# Patient Record
Sex: FEMALE | Race: WHITE | NOT HISPANIC OR LATINO | Employment: FULL TIME | URBAN - METROPOLITAN AREA
[De-identification: names, ages, dates, MRNs, and addresses within clinical notes are randomized per-mention and may not be internally consistent; named-entity substitution may affect disease eponyms.]

---

## 2017-01-06 ENCOUNTER — OFFICE VISIT (OUTPATIENT)
Dept: FAMILY MEDICINE | Facility: CLINIC | Age: 62
End: 2017-01-06
Payer: COMMERCIAL

## 2017-01-06 VITALS
DIASTOLIC BLOOD PRESSURE: 82 MMHG | HEIGHT: 66 IN | TEMPERATURE: 96.9 F | HEART RATE: 70 BPM | BODY MASS INDEX: 26.68 KG/M2 | SYSTOLIC BLOOD PRESSURE: 122 MMHG | OXYGEN SATURATION: 99 % | WEIGHT: 166 LBS

## 2017-01-06 DIAGNOSIS — Z00.00 ROUTINE GENERAL MEDICAL EXAMINATION AT A HEALTH CARE FACILITY: Primary | ICD-10-CM

## 2017-01-06 DIAGNOSIS — Z12.31 VISIT FOR SCREENING MAMMOGRAM: ICD-10-CM

## 2017-01-06 DIAGNOSIS — Z23 NEED FOR PROPHYLACTIC VACCINATION AND INOCULATION AGAINST INFLUENZA: ICD-10-CM

## 2017-01-06 DIAGNOSIS — F33.41 MAJOR DEPRESSIVE DISORDER, RECURRENT EPISODE, IN PARTIAL REMISSION (H): ICD-10-CM

## 2017-01-06 DIAGNOSIS — Z98.84 S/P GASTRIC BYPASS: ICD-10-CM

## 2017-01-06 DIAGNOSIS — R55 SYNCOPE, NEAR: ICD-10-CM

## 2017-01-06 DIAGNOSIS — I10 ESSENTIAL HYPERTENSION WITH GOAL BLOOD PRESSURE LESS THAN 140/90: ICD-10-CM

## 2017-01-06 DIAGNOSIS — Z86.711 PERSONAL HISTORY OF PE (PULMONARY EMBOLISM): ICD-10-CM

## 2017-01-06 DIAGNOSIS — K21.9 GASTROESOPHAGEAL REFLUX DISEASE WITHOUT ESOPHAGITIS: ICD-10-CM

## 2017-01-06 DIAGNOSIS — Z11.59 NEED FOR HEPATITIS C SCREENING TEST: ICD-10-CM

## 2017-01-06 DIAGNOSIS — D68.59 PRIMARY HYPERCOAGULABLE STATE (H): ICD-10-CM

## 2017-01-06 DIAGNOSIS — Z71.89 ADVANCE CARE PLANNING: Chronic | ICD-10-CM

## 2017-01-06 PROBLEM — Z87.19 H/O GASTROINTESTINAL HEMORRHAGE: Status: ACTIVE | Noted: 2017-01-06

## 2017-01-06 LAB
ALBUMIN SERPL-MCNC: 4 G/DL (ref 3.4–5)
ALP SERPL-CCNC: 108 U/L (ref 40–150)
ALT SERPL W P-5'-P-CCNC: 27 U/L (ref 0–50)
ANION GAP SERPL CALCULATED.3IONS-SCNC: 8 MMOL/L (ref 3–14)
AST SERPL W P-5'-P-CCNC: 20 U/L (ref 0–45)
BILIRUB SERPL-MCNC: 0.8 MG/DL (ref 0.2–1.3)
BUN SERPL-MCNC: 7 MG/DL (ref 7–30)
CALCIUM SERPL-MCNC: 9 MG/DL (ref 8.5–10.1)
CHLORIDE SERPL-SCNC: 104 MMOL/L (ref 94–109)
CHOLEST SERPL-MCNC: 187 MG/DL
CO2 SERPL-SCNC: 31 MMOL/L (ref 20–32)
CREAT SERPL-MCNC: 0.72 MG/DL (ref 0.52–1.04)
CREAT UR-MCNC: 359 MG/DL
ERYTHROCYTE [DISTWIDTH] IN BLOOD BY AUTOMATED COUNT: 13.9 % (ref 10–15)
FOLATE SERPL-MCNC: 20.9 NG/ML
GFR SERPL CREATININE-BSD FRML MDRD: 83 ML/MIN/1.7M2
GLUCOSE SERPL-MCNC: 87 MG/DL (ref 70–99)
HCT VFR BLD AUTO: 43.5 % (ref 35–47)
HCV AB SERPL QL IA: NORMAL
HDLC SERPL-MCNC: 79 MG/DL
HGB BLD-MCNC: 14.3 G/DL (ref 11.7–15.7)
IRON SATN MFR SERPL: 64 % (ref 15–46)
IRON SERPL-MCNC: 192 UG/DL (ref 35–180)
LDLC SERPL CALC-MCNC: 98 MG/DL
MCH RBC QN AUTO: 31.7 PG (ref 26.5–33)
MCHC RBC AUTO-ENTMCNC: 32.9 G/DL (ref 31.5–36.5)
MCV RBC AUTO: 97 FL (ref 78–100)
MICROALBUMIN UR-MCNC: 74 MG/L
MICROALBUMIN/CREAT UR: 20.53 MG/G CR (ref 0–25)
NONHDLC SERPL-MCNC: 108 MG/DL
PLATELET # BLD AUTO: 193 10E9/L (ref 150–450)
POTASSIUM SERPL-SCNC: 3.3 MMOL/L (ref 3.4–5.3)
PROT SERPL-MCNC: 7.3 G/DL (ref 6.8–8.8)
RBC # BLD AUTO: 4.51 10E12/L (ref 3.8–5.2)
SODIUM SERPL-SCNC: 143 MMOL/L (ref 133–144)
TIBC SERPL-MCNC: 301 UG/DL (ref 240–430)
TRIGL SERPL-MCNC: 50 MG/DL
VIT B12 SERPL-MCNC: 1854 PG/ML (ref 193–986)
WBC # BLD AUTO: 9.5 10E9/L (ref 4–11)

## 2017-01-06 PROCEDURE — 86803 HEPATITIS C AB TEST: CPT | Mod: 90 | Performed by: PHYSICIAN ASSISTANT

## 2017-01-06 PROCEDURE — 80061 LIPID PANEL: CPT | Performed by: PHYSICIAN ASSISTANT

## 2017-01-06 PROCEDURE — 83550 IRON BINDING TEST: CPT | Performed by: PHYSICIAN ASSISTANT

## 2017-01-06 PROCEDURE — 83540 ASSAY OF IRON: CPT | Performed by: PHYSICIAN ASSISTANT

## 2017-01-06 PROCEDURE — 90686 IIV4 VACC NO PRSV 0.5 ML IM: CPT | Performed by: PHYSICIAN ASSISTANT

## 2017-01-06 PROCEDURE — 82607 VITAMIN B-12: CPT | Mod: 90 | Performed by: PHYSICIAN ASSISTANT

## 2017-01-06 PROCEDURE — 80053 COMPREHEN METABOLIC PANEL: CPT | Performed by: PHYSICIAN ASSISTANT

## 2017-01-06 PROCEDURE — 85027 COMPLETE CBC AUTOMATED: CPT | Performed by: PHYSICIAN ASSISTANT

## 2017-01-06 PROCEDURE — 90471 IMMUNIZATION ADMIN: CPT | Performed by: PHYSICIAN ASSISTANT

## 2017-01-06 PROCEDURE — 99396 PREV VISIT EST AGE 40-64: CPT | Mod: 25 | Performed by: PHYSICIAN ASSISTANT

## 2017-01-06 PROCEDURE — 99000 SPECIMEN HANDLING OFFICE-LAB: CPT | Performed by: PHYSICIAN ASSISTANT

## 2017-01-06 PROCEDURE — 82746 ASSAY OF FOLIC ACID SERUM: CPT | Mod: 90 | Performed by: PHYSICIAN ASSISTANT

## 2017-01-06 PROCEDURE — 36415 COLL VENOUS BLD VENIPUNCTURE: CPT | Performed by: PHYSICIAN ASSISTANT

## 2017-01-06 PROCEDURE — 82043 UR ALBUMIN QUANTITATIVE: CPT | Performed by: PHYSICIAN ASSISTANT

## 2017-01-06 RX ORDER — METOPROLOL SUCCINATE 100 MG/1
150 TABLET, EXTENDED RELEASE ORAL DAILY
Qty: 135 TABLET | Refills: 1 | Status: SHIPPED | OUTPATIENT
Start: 2017-01-06 | End: 2017-03-17

## 2017-01-06 RX ORDER — BUPROPION HYDROCHLORIDE 150 MG/1
150 TABLET ORAL EVERY MORNING
Qty: 90 TABLET | Refills: 1 | Status: SHIPPED | OUTPATIENT
Start: 2017-01-06 | End: 2017-09-25

## 2017-01-06 RX ORDER — PANTOPRAZOLE SODIUM 40 MG/1
40 TABLET, DELAYED RELEASE ORAL
Qty: 180 TABLET | Refills: 1 | Status: SHIPPED | OUTPATIENT
Start: 2017-01-06 | End: 2018-01-08

## 2017-01-06 NOTE — PROGRESS NOTES
SUBJECTIVE:     CC: Marlee Tobar is an 61 year old woman who presents for preventive health visit.     Healthy Habits:    Do you get at least three servings of calcium containing foods daily (dairy, green leafy vegetables, etc.)? yes    Amount of exercise or daily activities, outside of work: 5 day(s) per week    Problems taking medications regularly No    Medication side effects: No    Have you had an eye exam in the past two years? yes    Do you see a dentist twice per year? yes    Do you have sleep apnea, excessive snoring or daytime drowsiness?no      She no longer has dizziness episodes.        Today's PHQ-2 Score: 0  PHQ-2 ( 1999 Pfizer) 1/6/2017 1/6/2017   Q1: Little interest or pleasure in doing things 0 0   Q2: Feeling down, depressed or hopeless 0 0   PHQ-2 Score 0 0       Abuse: Current or Past(Physical, Sexual or Emotional)- No  Do you feel safe in your environment - Yes    Social History   Substance Use Topics     Smoking status: Never Smoker      Smokeless tobacco: Never Used     Alcohol Use: Yes      Comment: very rare     The patient does not drink >3 drinks per day nor >7 drinks per week.    Recent Labs   Lab Test  12/16/15   0750  12/10/14   0746  10/28/13   0713   CHOL  160  166  129   HDL  68  64  53   LDL  79  90  67   TRIG  63  58  46   CHOLHDLRATIO   --   2.6  2.5   NHDL  92   --    --        Reviewed orders with patient.  Reviewed health maintenance and updated orders accordingly - Yes    Mammo Decision Support:  Patient over age 50, mutual decision to screen reflected in health maintenance.    Pertinent mammograms are reviewed under the imaging tab.  History of abnormal Pap smear: NO - age 30- 65 PAP every 3 years recommended  All Histories reviewed and updated in Epic.      ROS:  C: NEGATIVE for fever, chills, change in weight  I: NEGATIVE for worrisome rashes, moles or lesions  E: NEGATIVE for vision changes or irritation  ENT: NEGATIVE for ear, mouth and throat problems  R:  "NEGATIVE for significant cough or SOB  B: NEGATIVE for masses, tenderness or discharge  CV: NEGATIVE for chest pain, palpitations or peripheral edema  GI: NEGATIVE for nausea, abdominal pain, heartburn, or change in bowel habits  : NEGATIVE for unusual urinary or vaginal symptoms. No vaginal bleeding.  M: NEGATIVE for significant arthralgias or myalgia  N: NEGATIVE for weakness, dizziness or paresthesias  P: NEGATIVE for changes in mood or affect     Problem list, Medication list, Allergies, and Medical/Social/Surgical histories reviewed in [facility] and updated as appropriate.  Labs reviewed in EPIC  BP Readings from Last 3 Encounters:   01/06/17 122/82   01/28/16 162/90   12/16/15 130/82    Wt Readings from Last 3 Encounters:   01/06/17 166 lb (75.297 kg)   01/28/16 168 lb 6.4 oz (76.386 kg)   12/16/15 163 lb 4 oz (74.05 kg)                  OBJECTIVE:     /82 mmHg  Pulse 70  Temp(Src) 96.9  F (36.1  C) (Oral)  Ht 5' 6\" (1.676 m)  Wt 166 lb (75.297 kg)  BMI 26.81 kg/m2  SpO2 99%  LMP 06/27/2010  Breastfeeding? No  EXAM:  GENERAL APPEARANCE: healthy, alert and no distress  EYES: Eyes grossly normal to inspection, PERRL and conjunctivae and sclerae normal  HENT: ear canals and TM's normal, nose and mouth without ulcers or lesions, oropharynx clear and oral mucous membranes moist  NECK: no adenopathy, no asymmetry, masses, or scars and thyroid normal to palpation  RESP: lungs clear to auscultation - no rales, rhonchi or wheezes  BREAST: normal without masses, tenderness or nipple discharge and no palpable axillary masses or adenopathy  CV: regular rate and rhythm, normal S1 S2, no S3 or S4, no murmur, click or rub, no peripheral edema and peripheral pulses strong  ABDOMEN: soft, nontender, no hepatosplenomegaly, no masses and bowel sounds normal   (female): normal female external genitalia, normal urethral meatus, vaginal mucosal atrophy noted, normal cervix, adnexae, and uterus without masses or " abnormal discharge  MS: no musculoskeletal defects are noted and gait is age appropriate without ataxia  SKIN: no suspicious lesions or rashes  NEURO: Normal strength and tone, sensory exam grossly normal, mentation intact and speech normal  PSYCH: mentation appears normal and affect normal/bright    ASSESSMENT/PLAN:     1. Routine general medical examination at a health care facility       HEALTH CARE MAINTENANCE              Reviewed USPTF recommendations and anticipatory guidance.              See orders.   - Lipid Profile with reflex to direct LDL    2. Primary hypercoagulable state (H)  Not on anticoagulation, due to previous GI bleed.        4. S/P gastric bypass  Due to recheck labs.    - Vitamin B12  - Comprehensive metabolic panel  - CBC with platelets  - cyanocobalamin 500 MCG TABS; Take 1 tablet (500 mcg) by mouth daily  Dispense: 90 tablet; Refill: 3  - Folate  - Iron and iron binding capacity    5. Major depressive disorder, recurrent episode, in partial remission (H)  Stable.   - buPROPion (WELLBUTRIN XL) 150 MG 24 hr tablet; Take 1 tablet (150 mg) by mouth every morning  Dispense: 90 tablet; Refill: 1    6. Essential hypertension with goal blood pressure less than 140/90  Stable.  Due to recheck labs.  - metoprolol (TOPROL-XL) 100 MG 24 hr tablet; Take 1.5 tablets (150 mg) by mouth daily  Dispense: 135 tablet; Refill: 1    7. Gastroesophageal reflux disease without esophagitis  Stable.   - pantoprazole (PROTONIX) 40 MG EC tablet; Take 1 tablet (40 mg) by mouth 2 times daily  Dispense: 180 tablet; Refill: 1    8. Personal history of PE (pulmonary embolism)  No symptoms of SOB, chest pain or leg pains.    9. Syncope, near  Symptoms have resolved, reviewed previous workup by Dr. Merrill.  No change in treatment at this time.     10. Need for hepatitis C screening test  due  - Hepatitis C Screen Reflex to HCV RNA Quant and Genotype    11. Advance care planning  due    12. Need for prophylactic vaccination  "and inoculation against influenza  Discussed influenza vaccination, including risks/benefits.  Patient has opted to have this done.          - FLU VAC, SPLIT VIRUS IM > 3 YO (QUADRIVALENT) [08525]  - Vaccine Administration, Initial [14771]    13. Visit for screening mammogram  I discussed in detail with Marlee Tobar the importance of breast cancer screening through monthly self breast exams and annual breast exams in the clinic.    - MA SCREENING DIGITAL BILAT - Future  (s+30); Future    COUNSELING:   Reviewed preventive health counseling, as reflected in patient instructions       Regular exercise       Healthy diet/nutrition       Advance Care Planning         reports that she has never smoked. She has never used smokeless tobacco.    Estimated body mass index is 26.81 kg/(m^2) as calculated from the following:    Height as of this encounter: 5' 6\" (1.676 m).    Weight as of this encounter: 166 lb (75.297 kg).   Weight management plan: Discussed healthy diet and exercise guidelines and patient will follow up in 12 months in clinic to re-evaluate.    Counseling Resources:  ATP IV Guidelines  Pooled Cohorts Equation Calculator  Breast Cancer Risk Calculator  FRAX Risk Assessment  ICSI Preventive Guidelines  Dietary Guidelines for Americans, 2010  USDA's MyPlate  ASA Prophylaxis  Lung CA Screening    Lindsay Foreman PA-C  [facility]  Injectable Influenza Immunization Documentation    1.  Is the person to be vaccinated sick today?  No    2. Does the person to be vaccinated have an allergy to eggs or to a component of the vaccine?  No    3. Has the person to be vaccinated today ever had a serious reaction to influenza vaccine in the past?  No    4. Has the person to be vaccinated ever had Guillain-Plymouth syndrome?  No     Form completed by Michelle    "

## 2017-01-06 NOTE — MR AVS SNAPSHOT
After Visit Summary   1/6/2017    Marlee Tobar    MRN: [id number]           Patient Information     Date Of Birth          1955        Visit Information        Provider Department      1/6/2017 7:00 AM Lindsay Foreman PA-C [facility]        Today's Diagnoses     Visit for screening mammogram    -  1     Need for hepatitis C screening test         Need for prophylactic vaccination and inoculation against influenza         Advance care planning         Routine general medical examination at a health care facility         Primary hypercoagulable state (H)         Hypertension goal BP (blood pressure) < 140/90         S/P gastric bypass         Major depressive disorder, recurrent episode, in partial remission (H)         Essential hypertension with goal blood pressure less than 140/90         Gastroesophageal reflux disease without esophagitis           Care Instructions      Preventive Health Recommendations  Female Ages 50 - 64    Yearly exam: See your health care provider every year in order to  o Review health changes.   o Discuss preventive care.    o Review your medicines if your doctor has prescribed any.      Get a Pap test every three years (unless you have an abnormal result and your provider advises testing more often).    If you get Pap tests with HPV test, you only need to test every 5 years, unless you have an abnormal result.     You do not need a Pap test if your uterus was removed (hysterectomy) and you have not had cancer.    You should be tested each year for STDs (sexually transmitted diseases) if you're at risk.     Have a mammogram every 1 to 2 years.    Have a colonoscopy at age 50, or have a yearly FIT test (stool test). These exams screen for colon cancer.      Have a cholesterol test every 5 years, or more often if advised.    Have a diabetes test (fasting glucose) every three years. If you are at risk for diabetes, you should have this test more  often.     If you are at risk for osteoporosis (brittle bone disease), think about having a bone density scan (DEXA).    Shots: Get a flu shot each year. Get a tetanus shot every 10 years.    Nutrition:     Eat at least 5 servings of fruits and vegetables each day.    Eat whole-grain bread, whole-wheat pasta and brown rice instead of white grains and rice.    Talk to your provider about Calcium and Vitamin D.     Lifestyle    Exercise at least 150 minutes a week (30 minutes a day, 5 days a week). This will help you control your weight and prevent disease.    Limit alcohol to one drink per day.    No smoking.     Wear sunscreen to prevent skin cancer.     See your dentist every six months for an exam and cleaning.    See your eye doctor every 1 to 2 years.      Based on your medical history and these are the current health maintenance or preventive care services that you are due for (some may have been done at this visit)  Health Maintenance Due   Topic Date Due     ADVANCE DIRECTIVE PLANNING Q5 YRS (NO INBASKET)  07/09/1975     HEPATITIS C SCREENING  07/09/1975     PHQ-9 Q6 MONTHS (NO INBASKET)  06/16/2016     INFLUENZA VACCINE (SYSTEM ASSIGNED)  09/01/2016     DEPRESSION ACTION PLAN Q1 YR (NO INBASKET)  12/16/2016     MAMMO Q1 YR INBASKET MESSAGE  12/22/2016       At [facility], we strive to deliver an exceptional experience to you, every time we see you.    If you receive a survey in the mail, please send us back your thoughts. We really do value your feedback.    Your care team's suggested websites for health information:  Www.[facility].org : Up to date and easily searchable information on multiple topics.  Www.medlineplus.gov : medication info, interactive tutorials, watch real surgeries online  Www.familydoctor.org : good info from the Academy of Family Physicians  Www.cdc.gov : public health info, travel advisories, epidemics (H1N1)  Www.aap.org : children's health info, normal  development, vaccinations  Www.health.[facility].[place].us : [place] dept of health, public health issues in [place], N1N1    How to contact your care team:   Chris Warren/Javier [phone]         Pharmacy [phone]    Dr. Cruz, Heather Wells PA-C, Dr. Napier, Mirna CRYSTAL CNP, Lindsay Foreman PA-C, Dr. Hogan, and Berkley Ceballos CNP    Team RNs: Yaneli & Karlene      Clinic hours  M-Th 7 am-7 pm   Fri 7 am-5 pm.   Urgent care M-F 11 am-9 pm,   Sat/Sun 9 am-5 pm.  Pharmacy M-Th 8 am-8 pm Fri 8 am-6 pm  Sat/Sun 9 am-5 pm.     All password changes, disabled accounts, or ID changes in [employer]/MyHealth will be done by our Access Services Department.    If you need help with your account or password, call: [phone]. Clinic staff no longer has the ability to change passwords.             Follow-ups after your visit        Your next 10 appointments already scheduled     Corky 10, 2017  8:15 AM   MA SCREENING DIGITAL BIL[id number]    [address] [place] [id number] [phone]           Do not use any powder, lotion or deodorant under your arms or on your breast. If you do, we will ask you to remove it before your exam.  Wear comfortable, two-piece clothing.  If you have any allergies, tell your care team.  Bring any previous mammograms from other facilities or have them mailed to the breast center.              Future tests that were ordered for you today     Open Future Orders        Priority Expected Expires Ordered    MA SCREENING DIGITAL BILAT - Future  (s+30) Routine  1/6/2018 1/6/2017            Who to contact     If you have questions or need follow up information about today's clinic visit or your schedule please contact [facility] directly at [phone].  Normal or non-critical lab and imaging results will be communicated to you by MyChart, letter or phone within 4 business days after  "the clinic has received the results. If you do not hear from us within 7 days, please contact the clinic through Slingbox or phone. If you have a critical or abnormal lab result, we will notify you by phone as soon as possible.  Submit refill requests through Slingbox or call your pharmacy and they will forward the refill request to us. Please allow 3 business days for your refill to be completed.          Additional Information About Your Visit        Slingbox Information     Slingbox gives you secure access to your electronic health record. If you see a primary care provider, you can also send messages to your care team and make appointments. If you have questions, please call your primary care clinic.  If you do not have a primary care provider, please call [phone] and they will assist you.        Care EveryWhere ID     This is your Care EveryWhere ID. This could be used by other organizations to access your [place] medical records  [id number]        Your Vitals Were     Pulse Temperature Height BMI (Body Mass Index) Pulse Oximetry Last Period    70 96.9  F (36.1  C) (Oral) 5' 6\" (1.676 m) 26.81 kg/m2 99% 06/27/2010    Breastfeeding?                   No            Blood Pressure from Last 3 Encounters:   01/06/17 122/82   01/28/16 162/90   12/16/15 130/82    Weight from Last 3 Encounters:   01/06/17 166 lb (75.297 kg)   01/28/16 168 lb 6.4 oz (76.386 kg)   12/16/15 163 lb 4 oz (74.05 kg)              We Performed the Following     Albumin Random Urine Quantitative     CBC with platelets     Comprehensive metabolic panel     DEPRESSION ACTION PLAN (DAP) Order [[id number]]     FLU VAC, SPLIT VIRUS IM > 3 YO (QUADRIVALENT) [[id number]]     Folate     Hepatitis C Screen Reflex to HCV RNA Quant and Genotype     Iron and iron binding capacity     Lipid Profile with reflex to direct LDL     Vaccine Administration, Initial [31856]     Vitamin B12          Today's Medication Changes          These changes are accurate as " of: 1/6/17  7:34 AM.  If you have any questions, ask your nurse or doctor.               These medicines have changed or have updated prescriptions.        Dose/Directions    buPROPion 150 MG 24 hr tablet   Commonly known as:  WELLBUTRIN XL   This may have changed:  See the new instructions.   Used for:  Major depressive disorder, recurrent episode, in partial remission (H)   Changed by:  Lindsay Foreman PA-C        Dose:  150 mg   Take 1 tablet (150 mg) by mouth every morning   Quantity:  90 tablet   Refills:  1            Where to get your medicines      These medications were sent to [facility] beds at the [facility] - [place], [place] - [address] [facility]  [address] [facility], [place] [facility] [address]     Phone:  [phone]    - buPROPion 150 MG 24 hr tablet  - cyanocobalamin 500 MCG Tabs  - metoprolol 100 MG 24 hr tablet  - pantoprazole 40 MG EC tablet             Primary Care Provider Office Phone # Fax #    Lindsay Foreman PA-C [phone]       [facility] [address]  [facility] [address]        Thank you!     Thank you for choosing [facility]  for your care. Our goal is always to provide you with excellent care. Hearing back from our patients is one way we can continue to improve our services. Please take a few minutes to complete the written survey that you may receive in the mail after your visit with us. Thank you!             Your Updated Medication List - Protect others around you: Learn how to safely use, store and throw away your medicines at www.disposemymeds.org.          This list is accurate as of: 1/6/17  7:34 AM.  Always use your most recent med list.                   Brand Name Dispense Instructions for use    Biotin 5000 MCG Caps      Take 1 tablet by mouth daily.       buPROPion 150 MG 24 hr tablet    WELLBUTRIN XL    90 tablet    Take 1 tablet (150 mg) by mouth every morning       CALCIUM CITRATE PO      Take 3 capsules by  mouth 3 times daily       COMPRESSION STOCKINGS     1 each    1 each continuous       cyanocobalamin 500 MCG Tabs     90 tablet    Take 1 tablet (500 mcg) by mouth daily       IRON 100 PLUS 100-250-0.025-1 MG Tabs   Generic drug:  Iron-vit C-vit B12-folic acid      Take  by mouth.       metoprolol 100 MG 24 hr tablet    TOPROL-XL    135 tablet    Take 1.5 tablets (150 mg) by mouth daily       MULTIVITAL Tabs      Take 2 tablets by mouth daily.       pantoprazole 40 MG EC tablet    PROTONIX    180 tablet    Take 1 tablet (40 mg) by mouth 2 times daily       VITAMIN C PO          VITAMIN D PO

## 2017-01-06 NOTE — PATIENT INSTRUCTIONS
Preventive Health Recommendations  Female Ages 50 - 64    Yearly exam: See your health care provider every year in order to  o Review health changes.   o Discuss preventive care.    o Review your medicines if your doctor has prescribed any.      Get a Pap test every three years (unless you have an abnormal result and your provider advises testing more often).    If you get Pap tests with HPV test, you only need to test every 5 years, unless you have an abnormal result.     You do not need a Pap test if your uterus was removed (hysterectomy) and you have not had cancer.    You should be tested each year for STDs (sexually transmitted diseases) if you're at risk.     Have a mammogram every 1 to 2 years.    Have a colonoscopy at age 50, or have a yearly FIT test (stool test). These exams screen for colon cancer.      Have a cholesterol test every 5 years, or more often if advised.    Have a diabetes test (fasting glucose) every three years. If you are at risk for diabetes, you should have this test more often.     If you are at risk for osteoporosis (brittle bone disease), think about having a bone density scan (DEXA).    Shots: Get a flu shot each year. Get a tetanus shot every 10 years.    Nutrition:     Eat at least 5 servings of fruits and vegetables each day.    Eat whole-grain bread, whole-wheat pasta and brown rice instead of white grains and rice.    Talk to your provider about Calcium and Vitamin D.     Lifestyle    Exercise at least 150 minutes a week (30 minutes a day, 5 days a week). This will help you control your weight and prevent disease.    Limit alcohol to one drink per day.    No smoking.     Wear sunscreen to prevent skin cancer.     See your dentist every six months for an exam and cleaning.    See your eye doctor every 1 to 2 years.      Based on your medical history and these are the current health maintenance or preventive care services that you are due for (some may have been done at this  visit)  Health Maintenance Due   Topic Date Due     ADVANCE DIRECTIVE PLANNING Q5 YRS (NO INBASKET)  07/09/1975     HEPATITIS C SCREENING  07/09/1975     PHQ-9 Q6 MONTHS (NO INBASKET)  06/16/2016     INFLUENZA VACCINE (SYSTEM ASSIGNED)  09/01/2016     DEPRESSION ACTION PLAN Q1 YR (NO INBASKET)  12/16/2016     MAMMO Q1 YR INBASKET MESSAGE  12/22/2016       At [facility], we strive to deliver an exceptional experience to you, every time we see you.    If you receive a survey in the mail, please send us back your thoughts. We really do value your feedback.    Your care team's suggested websites for health information:  Www.[place].org : Up to date and easily searchable information on multiple topics.  Www.medlineplus.gov : medication info, interactive tutorials, watch real surgeries online  Www.familydoctor.org : good info from the Academy of Family Physicians  Www.cdc.gov : public health info, travel advisories, epidemics (H1N1)  Www.aap.org : children's health info, normal development, vaccinations  Www.health.[facility].[place].us : [place] dept of health, public health issues in [place], N1N1    How to contact your care team:   Team Briana/Spirit [phone]         Pharmacy [phone]    Dr. Cruz, Heather Wells PA-C, Dr. Napier, Mirna CRYSTAL CNP, Lindsay Foreman PA-C, Dr. Hogan, and Berkley Ceballos, CNP    Team RNs: Yaneli Soler      Clinic hours  M-Th 7 am-7 pm   Fri 7 am-5 pm.   Urgent care M-F 11 am-9 pm,   Sat/Sun 9 am-5 pm.  Pharmacy M-Th 8 am-8 pm Fri 8 am-6 pm  Sat/Sun 9 am-5 pm.     All password changes, disabled accounts, or ID changes in [email or web address] will be done by our Access Services Department.    If you need help with your account or password, call: [phone]. Clinic staff no longer has the ability to change passwords.

## 2017-01-06 NOTE — NURSING NOTE
"Chief Complaint   Patient presents with     Physical     Pt is fasting.        Initial /82 mmHg  Pulse 70  Temp(Src) 96.9  F (36.1  C) (Oral)  Ht 5' 6\" (1.676 m)  Wt 166 lb (75.297 kg)  BMI 26.81 kg/m2  SpO2 99%  LMP 06/27/2010  Breastfeeding? No Estimated body mass index is 26.81 kg/(m^2) as calculated from the following:    Height as of this encounter: 5' 6\" (1.676 m).    Weight as of this encounter: 166 lb (75.297 kg).  BP completed using cuff size: regular  An,CMA      "

## 2017-01-07 ASSESSMENT — PATIENT HEALTH QUESTIONNAIRE - PHQ9: SUM OF ALL RESPONSES TO PHQ QUESTIONS 1-9: 2

## 2017-01-14 ENCOUNTER — RADIANT APPOINTMENT (OUTPATIENT)
Dept: MAMMOGRAPHY | Facility: CLINIC | Age: 62
End: 2017-01-14
Payer: COMMERCIAL

## 2017-01-14 DIAGNOSIS — Z12.31 VISIT FOR SCREENING MAMMOGRAM: ICD-10-CM

## 2017-01-14 PROCEDURE — G0202 SCR MAMMO BI INCL CAD: HCPCS | Mod: TC

## 2017-03-13 ENCOUNTER — TELEPHONE (OUTPATIENT)
Dept: FAMILY MEDICINE | Facility: CLINIC | Age: 62
End: 2017-03-13

## 2017-03-13 ENCOUNTER — OFFICE VISIT (OUTPATIENT)
Dept: URGENT CARE | Facility: URGENT CARE | Age: 62
End: 2017-03-13
Payer: COMMERCIAL

## 2017-03-13 VITALS
SYSTOLIC BLOOD PRESSURE: 170 MMHG | OXYGEN SATURATION: 100 % | HEIGHT: 66 IN | RESPIRATION RATE: 16 BRPM | DIASTOLIC BLOOD PRESSURE: 93 MMHG | TEMPERATURE: 98 F | WEIGHT: 165 LBS | BODY MASS INDEX: 26.52 KG/M2 | HEART RATE: 68 BPM

## 2017-03-13 DIAGNOSIS — R42 DIZZINESS: Primary | ICD-10-CM

## 2017-03-13 DIAGNOSIS — Z86.79 H/O ESSENTIAL HYPERTENSION: ICD-10-CM

## 2017-03-13 PROCEDURE — 93000 ELECTROCARDIOGRAM COMPLETE: CPT | Performed by: PHYSICIAN ASSISTANT

## 2017-03-13 PROCEDURE — 99214 OFFICE O/P EST MOD 30 MIN: CPT | Performed by: PHYSICIAN ASSISTANT

## 2017-03-13 RX ORDER — AMLODIPINE BESYLATE 5 MG/1
5 TABLET ORAL
Qty: 30 TABLET | Refills: 1 | Status: SHIPPED | OUTPATIENT
Start: 2017-03-13 | End: 2017-03-17

## 2017-03-13 ASSESSMENT — PAIN SCALES - GENERAL: PAINLEVEL: NO PAIN (0)

## 2017-03-13 NOTE — TELEPHONE ENCOUNTER
Reason for call:  Patient reporting a symptom    Symptom or request: light headness x1 week concerned about poss panic attack    Duration (how long have symptoms been present): 1 wk    Have you been treated for this before? Yes (panic attacks, vertigo x1)    Additional comments: please call back and advise asap Sending HIGH PRIORITY message    Phone Number patient can be reached at: [phone]    Best Time:  any    Can we leave a detailed message on this number:  YES    Call taken on 3/13/2017 at 10:38 AM by Anabel Childs

## 2017-03-13 NOTE — TELEPHONE ENCOUNTER
Patient is calling in for dizziness for one week. States that it comes and goes. At times she feels like the bottom of her feet are hot. States that times her nose is numb. She is having increased stress at this time. So she is unsure if that is part of the issue.     Patient is negative for: sudden onset of weakness in the arms or legs; difficulty speaking or confusion; fainting spells or loss of consciousness; irregular heart beats; history of trauma or blow to the head; recent hx or severe vomiting, diarrhea, or bleeding and dizziness and increased pulse with sitting or standing; persistent severe headache or change in vision. (Telephone Triage Protocols for Nurses, fifth edition; Bassem)    Patient scheduled per protocol for tomorrow at 7:20 am. She will come into the clinic if symptoms change or she wants to be seen sooner.    Karlene Zuniga RN, [facility] Triage

## 2017-03-13 NOTE — MR AVS SNAPSHOT
After Visit Summary   3/13/2017    Marlee Tobar    MRN: [id number]           Patient Information     Date Of Birth          1955        Visit Information        Provider Department      3/13/2017 11:45 AM Mita Moon PA-C [facility]        Today's Diagnoses     Dizziness    -  1      Care Instructions    Needs follow with Dr. Merrill this week for blood pressure per DR. Merrill        Follow-ups after your visit        Your next 10 appointments already scheduled     Mar 14, 2017  7:20 AM CDT   [facility] Visit with Cris[facility]ier [facility]   [facility]    [address] [place] [id number] [phone]           Bring a current list of meds and any records pertaining to this visit.  For Physicals, please bring immunization records and any forms needing to be filled out.  Please arrive 10 minutes early to complete paperwork.              Who to contact     If you have questions or need follow up information about today's clinic visit or your schedule please contact [facility] directly at [phone].  Normal or non-critical lab and imaging results will be communicated to you by Electrochaeahart, letter or phone within 4 business days after the clinic has received the results. If you do not hear from us within 7 days, please contact the clinic through South[employer] Solution[employer] or phone. If you have a critical or abnormal lab result, we will notify you by phone as soon as possible.  Submit refill requests through Synappio or call your pharmacy and they will forward the refill request to us. Please allow 3 business days for your refill to be completed.          Additional Information About Your Visit        Electrochaeahart Information     Synappio gives you secure access to your electronic health record. If you see a primary care provider, you can also send messages to your care team and make appointments. If you have questions,  "please call your primary care clinic.  If you do not have a primary care provider, please call [phone] and they will assist you.        Care EveryWhere ID     This is your Care EveryWhere ID. This could be used by other organizations to access your [facility] medical records  [id number]        Your Vitals Were     Pulse Temperature Respirations Height Last Period Pulse Oximetry    68 98  F (36.7  C) (Oral) 16 5' 6\" (1.676 m) 06/27/2010 100%    BMI (Body Mass Index)                   26.63 kg/m2            Blood Pressure from Last 3 Encounters:   03/13/17 (!) 170/93   01/06/17 122/82   01/28/16 162/90    Weight from Last 3 Encounters:   03/13/17 165 lb (74.8 kg)   01/06/17 166 lb (75.3 kg)   01/28/16 168 lb 6.4 oz (76.4 kg)              We Performed the Following     EKG 12-lead complete w/read - Clinics          Today's Medication Changes          These changes are accurate as of: 3/13/17  1:01 PM.  If you have any questions, ask your nurse or doctor.               Start taking these medicines.        Dose/Directions    amLODIPine 5 MG tablet   Commonly known as:  NORVASC   Used for:  Dizziness   Started by:  Mita Moon PA-C        Dose:  5 mg   Take 1 tablet (5 mg) by mouth 2 times daily   Quantity:  30 tablet   Refills:  1            Where to get your medicines      These medications were sent to [facility] - [place], [place] - [address]  [address], [facility] [address]     Phone:  [phone]     amLODIPine 5 MG tablet                Primary Care Provider Office Phone # Fax #    Lindsay Deja Foreman PA-C [phone]       [facility] [address]  [facility] [address]        Thank you!     Thank you for choosing [facility]  for your care. Our goal is always to provide you with excellent care. Hearing back from our patients is one way we can continue to improve our services. Please take a few minutes to complete " the written survey that you may receive in the mail after your visit with us. Thank you!             Your Updated Medication List - Protect others around you: Learn how to safely use, store and throw away your medicines at www.disposemymeds.org.          This list is accurate as of: 3/13/17  1:01 PM.  Always use your most recent med list.                   Brand Name Dispense Instructions for use    amLODIPine 5 MG tablet    NORVASC    30 tablet    Take 1 tablet (5 mg) by mouth 2 times daily       Biotin 5000 MCG Caps      Take 1 tablet by mouth daily.       buPROPion 150 MG 24 hr tablet    WELLBUTRIN XL    90 tablet    Take 1 tablet (150 mg) by mouth every morning       CALCIUM CITRATE PO      Take 3 capsules by mouth 3 times daily       COMPRESSION STOCKINGS     1 each    1 each continuous       cyanocobalamin 500 MCG Tabs     90 tablet    Take 250 mcg daily       IRON 100 PLUS 100-250-0.025-1 MG Tabs   Generic drug:  Iron-vit C-vit B12-folic acid      Take  by mouth.       metoprolol 100 MG 24 hr tablet    TOPROL-XL    135 tablet    Take 1.5 tablets (150 mg) by mouth daily       MULTIVITAL Tabs      Take 2 tablets by mouth daily.       pantoprazole 40 MG EC tablet    PROTONIX    180 tablet    Take 1 tablet (40 mg) by mouth 2 times daily       VITAMIN C PO          VITAMIN D PO

## 2017-03-13 NOTE — PROGRESS NOTES
"RN Triage:     Chief Complaint   Patient presents with     Dizziness     1 wk hx of lightheaded episodes, today with additional sx       Initial BP (!) 170/93  Pulse 68  Temp 98  F (36.7  C) (Oral)  Resp 16  Ht 5' 6\" (1.676 m)  Wt 165 lb (74.8 kg)  LMP 06/27/2010  SpO2 100%  BMI 26.63 kg/m2 Estimated body mass index is 26.63 kg/(m^2) as calculated from the following:    Height as of this encounter: 5' 6\" (1.676 m).    Weight as of this encounter: 165 lb (74.8 kg).  BP completed using cuff size: regular    Assessment:  Patient states 1 week hx of episodes of feeling lightheaded, happening a couple times daily and lasts for less than 1 min.  She states she has been under a lot of stress and is selling her house etc.  Today she has had 2 episodes so far.  The last episode came on when she felt sick to her stomach and was running the bathroom (had diarrhea which she states she has occasionally s/p gastric bypass).  This time she felt her legs were weak, her feet were burning and tingling and her nose felt numb.  This went away within 1 minute.     Patient denies one sided weakness or paresthesias, severe headache, change in vision, N/V, SOB, chest, arm or jaw pain or pressure, leg pain or swelling, difficulty speaking or confusion; fainting spells or loss of consciousness; irregular heart beats, history of trauma or blow to the head, recent travel (hx of PE).     Blood pressure is elevated at 188/95, recheck at 170/93- pt states hx of controlled HTN and taking all medications as prescribed.  Pt is A&Ox3, NAD, ROBLERO, normal gait.      Triage Dispo: Urgent: Assessment reveals patient should be seen with next available provider.    Intervention: Pt placed in clinic room to be seen next. Report given to SID Bella RN      Allergies   Allergen Reactions     Contrast Dye      Happened when patient was ~ 18 years ago during a test for 'kidneys'.  Patient thinks she might have developed a rash, couldn't " "remember.     Diatrizoate Itching     Iodine      Vitamin K Swelling     facial       Past Medical History   Diagnosis Date     Bleeding stomach ulcer 2011     [facility] [place] [facility]     Gallstones      History of tonsillectomy      Hypertension goal BP (blood pressure) < 140/90      Pulmonary embolism (H) 2/10/2011     developed after surgery, coumadin x 6 months         Current Outpatient Prescriptions on File Prior to Visit:  cyanocobalamin 500 MCG TABS Take 250 mcg daily   buPROPion (WELLBUTRIN XL) 150 MG 24 hr tablet Take 1 tablet (150 mg) by mouth every morning   metoprolol (TOPROL-XL) 100 MG 24 hr tablet Take 1.5 tablets (150 mg) by mouth daily   pantoprazole (PROTONIX) 40 MG EC tablet Take 1 tablet (40 mg) by mouth 2 times daily   COMPRESSION STOCKINGS 1 each continuous   CALCIUM CITRATE PO Take 3 capsules by mouth 3 times daily   Cholecalciferol (VITAMIN D PO)    Ascorbic Acid (VITAMIN C PO)    Iron-vit C-vit B12-folic acid (IRON 100 PLUS) 100-250-0.025-1 MG TABS Take  by mouth.   Biotin 5000 MCG CAPS Take 1 tablet by mouth daily.   Multiple Vitamins-Minerals (MULTIVITAL) TABS Take 2 tablets by mouth daily.     No current facility-administered medications on file prior to visit.     Social History   Substance Use Topics     Smoking status: Never Smoker     Smokeless tobacco: Never Used     Alcohol use Yes      Comment: very rare       ROS:  General: negative for fever  Resp: chest pain as above  CV: + as above  ABD: Negative for abd pain.  Neurologic:negative for headache    OBJECTIVE:  BP (!) 170/93  Pulse 68  Temp 98  F (36.7  C) (Oral)  Resp 16  Ht 5' 6\" (1.676 m)  Wt 165 lb (74.8 kg)  LMP 06/27/2010  SpO2 100%  BMI 26.63 kg/m2   General:   awake, alert, and cooperative.  NAD.   Head: Normocephalic, atraumatic.  Eyes: Conjunctiva clear,   Heart: Regular rate and rhythm. No murmur.  Lungs: Chest is clear; no wheezes or rales.   Neuro: Alert and oriented - normal speech.  EKG- Compared to " one from 2014 and 2016. No change. I see not acute findings. I reviewed it with Dr. Merrill  ASSESSMENT:    ICD-10-CM    1. Dizziness R42 EKG 12-lead complete w/read - Clinics     amLODIPine (NORVASC) 5 MG tablet   2. Elevated BP I10    3. H/O essential hypertension Z86.79        PLAN: Discussed with Dr. Merrill. Add Norvasc 5 mg bid. F/U with Dr. Merrill this week. To ER if increase or new symptoms develop.      Advised about symptoms which might herald more serious problems.

## 2017-03-17 ENCOUNTER — OFFICE VISIT (OUTPATIENT)
Dept: FAMILY MEDICINE | Facility: CLINIC | Age: 62
End: 2017-03-17
Payer: COMMERCIAL

## 2017-03-17 VITALS
SYSTOLIC BLOOD PRESSURE: 140 MMHG | TEMPERATURE: 98 F | WEIGHT: 164 LBS | DIASTOLIC BLOOD PRESSURE: 80 MMHG | OXYGEN SATURATION: 100 % | BODY MASS INDEX: 26.36 KG/M2 | HEIGHT: 66 IN | HEART RATE: 66 BPM

## 2017-03-17 DIAGNOSIS — R53.83 OTHER FATIGUE: ICD-10-CM

## 2017-03-17 DIAGNOSIS — G47.00 INSOMNIA, UNSPECIFIED TYPE: ICD-10-CM

## 2017-03-17 DIAGNOSIS — I10 HYPERTENSION GOAL BP (BLOOD PRESSURE) < 130/80: Primary | ICD-10-CM

## 2017-03-17 DIAGNOSIS — Z13.29 SCREENING FOR THYROID DISORDER: ICD-10-CM

## 2017-03-17 LAB — TSH SERPL DL<=0.005 MIU/L-ACNC: 2.06 MU/L (ref 0.4–4)

## 2017-03-17 PROCEDURE — 86376 MICROSOMAL ANTIBODY EACH: CPT | Performed by: INTERNAL MEDICINE

## 2017-03-17 PROCEDURE — 99214 OFFICE O/P EST MOD 30 MIN: CPT | Performed by: INTERNAL MEDICINE

## 2017-03-17 PROCEDURE — 84443 ASSAY THYROID STIM HORMONE: CPT | Performed by: INTERNAL MEDICINE

## 2017-03-17 PROCEDURE — 36415 COLL VENOUS BLD VENIPUNCTURE: CPT | Performed by: INTERNAL MEDICINE

## 2017-03-17 RX ORDER — TRAZODONE HYDROCHLORIDE 50 MG/1
50 TABLET, FILM COATED ORAL
Qty: 30 TABLET | Refills: 5 | Status: SHIPPED | OUTPATIENT
Start: 2017-03-17 | End: 2017-04-10

## 2017-03-17 RX ORDER — CARVEDILOL 12.5 MG/1
12.5 TABLET ORAL 2 TIMES DAILY WITH MEALS
Qty: 60 TABLET | Refills: 5 | Status: SHIPPED | OUTPATIENT
Start: 2017-03-17 | End: 2017-04-11

## 2017-03-17 RX ORDER — LOSARTAN POTASSIUM 50 MG/1
50 TABLET ORAL 2 TIMES DAILY WITH MEALS
Qty: 60 TABLET | Refills: 11 | Status: SHIPPED | OUTPATIENT
Start: 2017-03-17 | End: 2017-04-11

## 2017-03-17 NOTE — MR AVS SNAPSHOT
After Visit Summary   3/17/2017    Marlee Tobar    MRN: [id number]           Patient Information     Date Of Birth          1955        Visit Information        Provider Department      3/17/2017 8:00 AM Bandar Merrill MD [facility]        Today's Diagnoses     Hypertension goal BP (blood pressure) < 130/80    -  1    Other fatigue        Screening for thyroid disorder        Insomnia, unspecified type          Care Instructions    This summary includes the important diagnoses, test, medications and other important parts of your medical history.  Below are a few good we sites you can use to learn more about these.     Www.Pose.com.Relative.ai : Up to date and easily searchable information on multiple topics.  Www.Pose.com.Relative.ai/Pharmacy/c_[email or web address].asp : [facility] $4.99 medications  Www.revoPT.gov : medication info, interactive tutorials, watch real surgeries online  Www.familydoctor.org : good info from the Academy of Family Physicians  Www.nxtControl.PrePayMe : good info from the [facility]  Www.cdc.gov : public health info, travel advisories, epidemics (H1N1)  Www.aap.org : children's health info, normal development, vaccinations  Www.health.[facility].[place].us : [place] dept of heatlh, public health issues in [place], N1N1    Based on your medical history and these are the current health maintenance or preventive care services that you are due for (some may have been done at this visit:)  There are no preventive care reminders to display for this patient.  =================================================================================  Normal Values   Blood pressure  <140/90 for most adults    <130/80 for some chronic diseases (ask your care team about yours)    BMI (body mass index)  18.5-25 kg/m2 (based on height and weight)     Thank you for visiting [facility]    Normal or non-critical lab and imaging results will be communicated to you by Soni, letter  or phone within 7 days.  If you do not hear from us within 10 days, please call the clinic. If you have a critical or abnormal lab result, we will notify you by phone as soon as possible.     If you have any questions regarding your visit please contact:     Team Comfort:   Clinic Hours Telephone Number   Dr. Nate Romero Priscilla   7am-5pm  Monday - Friday [phone]  [facility]   Pharmacy 8am-8pm Monday-Thursday      8am-6pm Friday  9am-5pm Saturday-Sunday [phone]   Urgent Care 11am-8pm Monday-Friday        9am-5pm Saturday-Sunday [phone]     After hours, weekend or if you need to make an appointment with your primary provider please call [phone].   After Hours nurse advise: call [facility]: [phone]    Medication Refills:  Call your pharmacy and they will forward the refill to us. Please allow 3 business days for your refills to be completed.    Use LiveLoop (secure email communication and access to your chart) to send your primary care provider a message or make an appointment. Ask someone on your Team how to sign up for LiveLoop. To log on to [email or web address] or for more information in [employer] please visit the website at [email or web address].  As of October 8, 2013, all password changes, disabled accounts, or ID changes in LiveLoop/MyHealth will be done by our Access Services Department.   If you need help with your account or password, call: [phone]. Clinic staff no longer has the ability to change passwords.           Follow-ups after your visit        Follow-up notes from your care team     Return in about 1 month (around 4/17/2017) for BP Recheck, Routine Visit.      Who to contact     If you have questions or need follow up information about today's clinic visit or your schedule please contact [facility] directly at [phone].  Normal or non-critical lab and imaging results will  "be communicated to you by StudyAppshart, letter or phone within 4 business days after the clinic has received the results. If you do not hear from us within 7 days, please contact the clinic through Novogenie or phone. If you have a critical or abnormal lab result, we will notify you by phone as soon as possible.  Submit refill requests through Novogenie or call your pharmacy and they will forward the refill request to us. Please allow 3 business days for your refill to be completed.          Additional Information About Your Visit        Novogenie Information     Novogenie gives you secure access to your electronic health record. If you see a primary care provider, you can also send messages to your care team and make appointments. If you have questions, please call your primary care clinic.  If you do not have a primary care provider, please call [phone] and they will assist you.        Care EveryWhere ID     This is your Care EveryWhere ID. This could be used by other organizations to access your [facility] medical records  [id number]        Your Vitals Were     Pulse Temperature Height Last Period Pulse Oximetry BMI (Body Mass Index)    66 98  F (36.7  C) (Oral) 5' 6\" (1.676 m) 06/27/2010 100% 26.47 kg/m2       Blood Pressure from Last 3 Encounters:   03/17/17 140/80   03/13/17 (!) 170/93   01/06/17 122/82    Weight from Last 3 Encounters:   03/17/17 164 lb (74.4 kg)   03/13/17 165 lb (74.8 kg)   01/06/17 166 lb (75.3 kg)              We Performed the Following     Thyroid peroxidase antibody     TSH with free T4 reflex          Today's Medication Changes          These changes are accurate as of: 3/17/17  8:48 AM.  If you have any questions, ask your nurse or doctor.               Start taking these medicines.        Dose/Directions    traZODone 50 MG tablet   Commonly known as:  DESYREL   Used for:  Insomnia, unspecified type   Started by:  Bandar Merrill MD        Dose:  50 mg   Take 1 tablet (50 mg) by mouth " nightly as needed for sleep Take 2 hours before bedtime.   Quantity:  30 tablet   Refills:  5         These medicines have changed or have updated prescriptions.        Dose/Directions    carvedilol 12.5 MG tablet   Commonly known as:  COREG   This may have changed:  medication strength   Used for:  Hypertension goal BP (blood pressure) < 130/80   Changed by:  Bandar Merrill MD        Dose:  12.5 mg   Take 1 tablet (12.5 mg) by mouth 2 times daily (with meals)   Quantity:  60 tablet   Refills:  5       losartan 50 MG tablet   Commonly known as:  COZAAR   This may have changed:  medication strength   Used for:  Hypertension goal BP (blood pressure) < 130/80   Changed by:  Bandar Merrill MD        Dose:  50 mg   Take 1 tablet (50 mg) by mouth 2 times daily (with meals)   Quantity:  60 tablet   Refills:  11         Stop taking these medicines if you haven't already. Please contact your care team if you have questions.     amLODIPine 5 MG tablet   Commonly known as:  NORVASC   Stopped by:  Bandar Merrill MD           metoprolol 100 MG 24 hr tablet   Commonly known as:  TOPROL-XL   Stopped by:  Bandar Merrill MD                Where to get your medicines      These medications were sent to [facility] - [place], [place] - [address]  [address] [facility] [address]     Phone:  [phone]     carvedilol 12.5 MG tablet    losartan 50 MG tablet    traZODone 50 MG tablet                Primary Care Provider Office Phone # Fax #    Lindsay Foreman PA-C [phone]       [facility] [address]  [facility] [address]        Thank you!     Thank you for choosing [facility]  for your care. Our goal is always to provide you with excellent care. Hearing back from our patients is one way we can continue to improve our services. Please take a few minutes to complete the written survey that you may receive in the  mail after your visit with us. Thank you!             Your Updated Medication List - Protect others around you: Learn how to safely use, store and throw away your medicines at www.disposemymeds.org.          This list is accurate as of: 3/17/17  8:48 AM.  Always use your most recent med list.                   Brand Name Dispense Instructions for use    Biotin 5000 MCG Caps      Take 1 tablet by mouth daily.       buPROPion 150 MG 24 hr tablet    WELLBUTRIN XL    90 tablet    Take 1 tablet (150 mg) by mouth every morning       CALCIUM CITRATE PO      Take 3 capsules by mouth 3 times daily       carvedilol 12.5 MG tablet    COREG    60 tablet    Take 1 tablet (12.5 mg) by mouth 2 times daily (with meals)       COMPRESSION STOCKINGS     1 each    1 each continuous       cyanocobalamin 500 MCG Tabs     90 tablet    Take 250 mcg daily       IRON 100 PLUS 100-250-0.025-1 MG Tabs   Generic drug:  Iron-vit C-vit B12-folic acid      Take  by mouth.       losartan 50 MG tablet    COZAAR    60 tablet    Take 1 tablet (50 mg) by mouth 2 times daily (with meals)       MULTIVITAL Tabs      Take 2 tablets by mouth daily.       pantoprazole 40 MG EC tablet    PROTONIX    180 tablet    Take 1 tablet (40 mg) by mouth 2 times daily       traZODone 50 MG tablet    DESYREL    30 tablet    Take 1 tablet (50 mg) by mouth nightly as needed for sleep Take 2 hours before bedtime.       VITAMIN C PO          VITAMIN D PO

## 2017-03-17 NOTE — PROGRESS NOTES
SUBJECTIVE:                                                    Marlee Tobar is a 61 year old female who presents to clinic today for the following health issues:    Hypertension Follow-up      Outpatient blood pressures are not being checked.    Low Salt Diet: not monitoring salt       Amount of exercise or physical activity: 2-3 days/week for an average of 15-30 minutes    Problems taking medications regularly: No    Medication side effects: none but she doesn't feel well.    Diet: regular (no restrictions)    Duration of condition: diagnosed prior to pregnancy.    Associated symptoms: hypokalemia (3.3) last 1/6/2017 even though she doesn't take any diuretics.      Insomnia  Duration of complaint: chronic   Description:   Time to fall asleep (sleep latency): not elicited  Middle of night awakening:  YES  Early morning awakening:  YES  Progression of Symptoms:  worsening  Accompanying Signs & Symptoms:  Daytime sleepiness/napping: no   Excessive snoring/apnea: no   Restless legs: no   Frequent urination: no   Chronic pain:  no   History: Prior Insomnia: YES  Precipitating factors:   New stressful situation: YES  Caffeine intake: no   OTC decongestants: no   Any new medications: no   Alleviating factors: Self medicating (alcohol, etc.):  no  Therapies Tried and outcome: Tylenol PM (not effective).        Problem list and histories reviewed & adjusted, as indicated.  Additional history: as documented    Patient Active Problem List   Diagnosis     iamJOINT PAIN-LOWER LEG     iamPOSTSURGICAL STATES NEC     CARDIOVASCULAR SCREENING; LDL GOAL LESS THAN 130     Hypertension goal BP (blood pressure) < 140/90     Gallstones     Cholelithiases     GERD (gastroesophageal reflux disease)     Post-menopausal     Health Care Home     Pancreatitis     Varicose vein of leg     Torn earlobe     Dyspareunia     Microalbuminuria     Major depressive disorder, recurrent episode, in partial remission (H)     Primary  hypercoagulable state (H)     Overweight     Syncope, near     Advance care planning     S/P gastric bypass     H/O gastrointestinal hemorrhage     Personal history of PE (pulmonary embolism)     Insomnia, unspecified type     Past Surgical History   Procedure Laterality Date     Tonsillectomy       Knee surgery       meniscus     Cytoscopy       (secondary to frequent bladder infection)     Oleg       Davinci bypass gastric ariana-en-y  2/11     Esophagoscopy, gastroscopy, duodenoscopy (egd), combined  7/15/11     esophagogastrojejunoscopy     Cholecystectomy, laporoscopic  1/19/12     Endoscopy  7-27-12     Abdomen surgery       gastric bypass     Phlebectomy multiple stab  5/7/2014     Procedure: PHLEBECTOMY MULTIPLE STAB;  Surgeon: Timbo Baird MD;  Location:  OR       Social History   Substance Use Topics     Smoking status: Never Smoker     Smokeless tobacco: Never Used     Alcohol use Yes      Comment: very rare     Family History   Problem Relation Age of Onset     Hypertension Mother      Arthritis Mother      CANCER Mother      Hodgkins disease     Obesity Mother      CEREBROVASCULAR DISEASE Father      Alcohol/Drug Father      recovered alcoholic     Alcohol/Drug Sister      Gynecology Sister      history of abnormal paps--LEEPs done     Lipids Sister      Thyroid Disease Sister      Cardiovascular Sister      mitral valve prolapse     Alcohol/Drug Son      alcoholic     Depression Son      Hypertension Son      Psychotic Disorder Son      Hypertension Brother      Cardiovascular Brother      HEART DISEASE Brother 58     MI     Obesity Brother      Genitourinary Problems Daughter      Gynecology Daughter      Protein S deficiency     Unknown/Adopted Maternal Grandfather      Unknown/Adopted Paternal Grandmother      Unknown/Adopted Paternal Grandfather      Glaucoma No family hx of      Macular Degeneration No family hx of          Allergies   Allergen Reactions     Contrast Dye       Happened when patient was ~ 20 years ago during a test for 'kidneys'.  Patient thinks she might have developed a rash, couldn't remember.     Diatrizoate Itching     Iodine      Vitamin K Swelling     facial     Recent Labs   Lab Test  03/17/17   0843  01/06/17   0741  12/16/15   0750  12/10/14   0746   07/11/12   0739   LDL   --   98  79  90   < >   --    HDL   --   79  68  64   < >   --    TRIG   --   50  63  58   < >   --    ALT   --   27  30   --    --   12   CR   --   0.72  0.70  0.75   < >  0.66   GFRESTIMATED   --   83  86  80   < >  >90   GFRESTBLACK   --   >90   GFR Calc    >90   GFR Calc    >90   GFR Calc     < >  >90   POTASSIUM   --   3.3*  3.9  3.6   < >  3.9   TSH  2.06   --   2.14   --    < >   --     < > = values in this interval not displayed.      BP Readings from Last 3 Encounters:   03/17/17 140/80   03/13/17 (!) 170/93   01/06/17 122/82    Wt Readings from Last 3 Encounters:   03/17/17 164 lb (74.4 kg)   03/13/17 165 lb (74.8 kg)   01/06/17 166 lb (75.3 kg)                  Labs reviewed in EPIC    Reviewed and updated as needed this visit by clinical staff       Reviewed and updated as needed this visit by Provider         ROS:  C: NEGATIVE for fever, chills, change in weight  I: NEGATIVE for worrisome rashes, moles or lesions  E: NEGATIVE for vision changes or irritation  E/M: NEGATIVE for ear, mouth and throat problems  R: NEGATIVE for significant cough or SOB  B: NEGATIVE for masses, tenderness or discharge  CV: NEGATIVE for chest pain, palpitations or peripheral edema  GI: NEGATIVE for nausea, abdominal pain, heartburn, or change in bowel habits  : NEGATIVE for frequency, dysuria, or hematuria  M: NEGATIVE for significant arthralgias or myalgia  N: NEGATIVE for weakness, dizziness or paresthesias  E: NEGATIVE for temperature intolerance, skin/hair changes  H: NEGATIVE for bleeding problems  P: NEGATIVE for changes in mood or  "affect    OBJECTIVE:                                                    /80  Pulse 66  Temp 98  F (36.7  C) (Oral)  Ht 5' 6\" (1.676 m)  Wt 164 lb (74.4 kg)  LMP 06/27/2010  SpO2 100%  BMI 26.47 kg/m2  Body mass index is 26.47 kg/(m^2).  GENERAL: healthy, alert and no distress  EYES: Eyes grossly normal to inspection  NECK: no adenopathy  RESP: lungs clear to auscultation - no rales, rhonchi or wheezes  CV: regular rate and rhythm, normal S1 S2, no S3 or S4, no murmur, click or rub, no peripheral edema and peripheral pulses strong  MS: no gross musculoskeletal defects noted, no edema  SKIN: no suspicious lesions or rashes  NEURO: Normal strength and tone, mentation intact and speech normal  PSYCH: mentation appears normal, affect normal/bright    Diagnostic Test Results:  Results for orders placed or performed in visit on 03/17/17   TSH with free T4 reflex   Result Value Ref Range    TSH 2.06 0.40 - 4.00 mU/L        ASSESSMENT/PLAN:                                                            1. Hypertension goal BP (blood pressure) < 130/80  - carvedilol (COREG) 12.5 MG tablet; Take 1 tablet (12.5 mg) by mouth 2 times daily (with meals)  Dispense: 60 tablet; Refill: 5  - losartan (COZAAR) 50 MG tablet; Take 1 tablet (50 mg) by mouth 2 times daily (with meals)  Dispense: 60 tablet; Refill: 11    2. Other fatigue  - TSH with free T4 reflex  - Thyroid peroxidase antibody    3. Screening for thyroid disorder  - TSH with free T4 reflex  - Thyroid peroxidase antibody    4. Insomnia, unspecified type  - traZODone (DESYREL) 50 MG tablet; Take 1 tablet (50 mg) by mouth nightly as needed for sleep Take 2 hours before bedtime.  Dispense: 30 tablet; Refill:     Repeat BP in one month during next clinic appointment.    Bandar Merrill MD  [facility]  "

## 2017-03-17 NOTE — NURSING NOTE
"Chief Complaint   Patient presents with     Hypertension       Initial /84 (BP Location: Left arm, Patient Position: Chair, Cuff Size: Adult Regular)  Pulse 66  Temp 98  F (36.7  C) (Oral)  Ht 5' 6\" (1.676 m)  Wt 164 lb (74.4 kg)  LMP 06/27/2010  SpO2 100%  BMI 26.47 kg/m2 Estimated body mass index is 26.47 kg/(m^2) as calculated from the following:    Height as of this encounter: 5' 6\" (1.676 m).    Weight as of this encounter: 164 lb (74.4 kg).  Medication Reconciliation: complete     Alexsander Tang MA      "

## 2017-03-17 NOTE — PATIENT INSTRUCTIONS
This summary includes the important diagnoses, test, medications and other important parts of your medical history.  Below are a few good we sites you can use to learn more about these.     Www.[email or web address] : Up to date and easily searchable information on multiple topics.  [email or web address]_[email or web address] : [facility] $4.99 medications  Www.medlineplus.gov : medication info, interactive tutorials, watch real surgeries online  Www.familydoctor.org : good info from the Academy of Family Physicians  Www.[facility].com : good info from the [facility]  Www.cdc.gov : public health info, travel advisories, epidemics (H1N1)  Www.aap.org : children's health info, normal development, vaccinations  Www.health.[facility].[place].us : [place] dept of heat, public health issues in [place], N1N1    Based on your medical history and these are the current health maintenance or preventive care services that you are due for (some may have been done at this visit:)  There are no preventive care reminders to display for this patient.  =================================================================================  Normal Values   Blood pressure  <140/90 for most adults    <130/80 for some chronic diseases (ask your care team about yours)    BMI (body mass index)  18.5-25 kg/m2 (based on height and weight)     Thank you for visiting [facility]    Normal or non-critical lab and imaging results will be communicated to you by MyChart, letter or phone within 7 days.  If you do not hear from us within 10 days, please call the clinic. If you have a critical or abnormal lab result, we will notify you by phone as soon as possible.     If you have any questions regarding your visit please contact:     Team Comfort:   Clinic Hours Telephone Number   Dr. Nate Wells   7am-5pm  Monday - Friday [phone]  [facility]   Pharmacy 8am-8pm  Monday-Thursday      8am-6pm Friday  9am-5pm Saturday-Sunday [phone]   Urgent Care 11am-8pm Monday-Friday        9am-5pm Saturday-Sunday [phone]     After hours, weekend or if you need to make an appointment with your primary provider please call [phone].   After Hours nurse advise: call [facility]: [phone]    Medication Refills:  Call your pharmacy and they will forward the refill to us. Please allow 3 business days for your refills to be completed.    Use [employer] (secure email communication and access to your chart) to send your primary care provider a message or make an appointment. Ask someone on your Team how to sign up for [employer]. To log on to [employer] or for more information in [facility] please visit the website at [email or web address].  As of October 8, 2013, all password changes, disabled accounts, or ID changes in [employer]/MyHealth will be done by our Access Services Department.   If you need help with your account or password, call: [phone]. Clinic staff no longer has the ability to change passwords.

## 2017-03-19 PROBLEM — G47.00 INSOMNIA, UNSPECIFIED TYPE: Status: ACTIVE | Noted: 2017-03-19

## 2017-03-20 LAB — THYROPEROXIDASE AB SERPL-ACNC: <10 IU/ML

## 2017-04-10 ENCOUNTER — OFFICE VISIT (OUTPATIENT)
Dept: FAMILY MEDICINE | Facility: CLINIC | Age: 62
End: 2017-04-10
Payer: COMMERCIAL

## 2017-04-10 VITALS
BODY MASS INDEX: 26.03 KG/M2 | HEIGHT: 66 IN | HEART RATE: 69 BPM | DIASTOLIC BLOOD PRESSURE: 67 MMHG | SYSTOLIC BLOOD PRESSURE: 104 MMHG | WEIGHT: 162 LBS | TEMPERATURE: 97.6 F | OXYGEN SATURATION: 97 %

## 2017-04-10 DIAGNOSIS — I10 HYPERTENSION GOAL BP (BLOOD PRESSURE) < 140/90: Primary | ICD-10-CM

## 2017-04-10 DIAGNOSIS — G47.00 INSOMNIA, UNSPECIFIED TYPE: ICD-10-CM

## 2017-04-10 PROCEDURE — 99214 OFFICE O/P EST MOD 30 MIN: CPT | Performed by: INTERNAL MEDICINE

## 2017-04-10 RX ORDER — TRAZODONE HYDROCHLORIDE 50 MG/1
50 TABLET, FILM COATED ORAL
Qty: 90 TABLET | Refills: 3 | Status: SHIPPED | OUTPATIENT
Start: 2017-04-10 | End: 2018-01-08

## 2017-04-10 NOTE — PATIENT INSTRUCTIONS
This summary includes the important diagnoses, test, medications and other important parts of your medical history.  Below are a few good we sites you can use to learn more about these.     Www."[employer].org : Up to date and easily searchable information on multiple topics.  Www."[employer][email or web address]/c_[email or web address] : [facility] $4.99 medications  Www.medlineplus.gov : medication info, interactive tutorials, watch real surgeries online  Www.familydoctor.org : good info from the Academy of Family Physicians  Www.[facility].com : good info from the [facility]  Www.cdc.gov : public health info, travel advisories, epidemics (H1N1)  Www.aap.org : children's health info, normal development, vaccinations  Www.health.[facility].[place].us : [place] dept of heat, public health issues in [place], N1N1    Based on your medical history and these are the current health maintenance or preventive care services that you are due for (some may have been done at this visit:)  There are no preventive care reminders to display for this patient.  =================================================================================  Normal Values   Blood pressure  <140/90 for most adults    <130/80 for some chronic diseases (ask your care team about yours)    BMI (body mass index)  18.5-25 kg/m2 (based on height and weight)     Thank you for visiting [facility]    Normal or non-critical lab and imaging results will be communicated to you by MyChart, letter or phone within 7 days.  If you do not hear from us within 10 days, please call the clinic. If you have a critical or abnormal lab result, we will notify you by phone as soon as possible.     If you have any questions regarding your visit please contact:     Team Comfort:   Clinic Hours Telephone Number   Dr. Nate Wells   7am-5pm  Monday - Friday [phone]  [facility]   Pharmacy 8am-8pm  Monday-Thursday      8am-6pm Friday  9am-5pm Saturday-Sunday [phone]   Urgent Care 11am-8pm Monday-Friday        9am-5pm Saturday-Sunday [phone]     After hours, weekend or if you need to make an appointment with your primary provider please call [phone].   After Hours nurse advise: call [facility]: [phone]    Medication Refills:  Call your pharmacy and they will forward the refill to us. Please allow 3 business days for your refills to be completed.    Use [employer]itus (secure email communication and access to your chart) to send your primary care provider a message or make an appointment. Ask someone on your Team how to sign up for ubitus. To log on to [facility] or for more information in [employer] please visit the website at [email or web address].  As of October 8, 2013, all password changes, disabled accounts, or ID changes in ubitus/MyHealth will be done by our Access Services Department.   If you need help with your account or password, call: [phone]. Clinic staff no longer has the ability to change passwords.     PATIENT INSTRUCTIONS:    1) In a worst case scenario, if you feel light-headed, dizzy or sensation of passing out, check your BP and pulse.  2) If your pulse in the 40s, then reduce Carvedilol to 1/2 tablet per dose.  3) If your pulse is normal (> 60), then reduce Losartan to evening dose.  Patient Education    Conjugated Estrogens Oral tablet, Conjugated Estrogens, Medroxyprogesterone Acetate Oral tablet    Conjugated Estrogens, Medroxyprogesterone Acetate Oral tablet  Conjugated Estrogens Oral tablet, Conjugated Estrogens, Medroxyprogesterone Acetate Oral tablet  What is this medicine?  CONJUGATED ESTROGENS; MEDROXYPROGESTERONE (CON ju gate ed SCOTT meyer; me DROX ee proe TANVIR heena gottliebe) is used as hormone replacement in menopausal women who still have their uterus. This medicine helps to treat hot flashes and prevent osteoporosis (weak bones).  This medicine  may be used for other purposes; ask your health care provider or pharmacist if you have questions.  What should I tell my health care provider before I take this medicine?  They need to know if you have any of these conditions:    blood vessel disease or blood clots    breast, cervical, endometrial, or uterine cancer    diabetes    endometriosis    fibroids    gallbladder disease    heart disease or recent heart attack    high blood cholesterol    high blood pressure    high level of calcium in the blood    hysterectomy    kidney disease    liver disease    mental depression    migraine headaches    porphyria    protein C deficiency    protein S deficiency    stroke    systemic lupus erythematosus (SLE)    tobacco smoker    vaginal bleeding    an unusual or allergic reaction to estrogens, progestiins, other medicines, foods, dyes, or preservatives    pregnant or trying to get pregnant    breast-feeding  How should I use this medicine?  Take this medicine by mouth with a drink of water. You may take this medicine with food. Follow the directions on the prescription label. You will take one tablet daily at roughly the same time each day. Do not take your medicine more often than directed.  Talk to your pediatrician regarding the use of this medicine in children. Special care may be needed.  A patient package insert for the product will be given with each prescription and refill. Read this sheet carefully each time. The sheet may change frequently.  Overdosage: If you think you have taken too much of this medicine contact a poison control center or emergency room at once.  NOTE: This medicine is only for you. Do not share this medicine with others.  What if I miss a dose?  If you miss a dose, take it as soon as you can. If it is almost time for your next dose, take only that dose. Do not take double or extra doses.  What may interact with this medicine?    barbiturates, such as  phenobarbital    benzodiazepines    bosentan    bromocriptine    carbamazepine    cimetidine    cyclosporine    dantrolene    grapefruit juice    griseofulvin    hydrocortisone, cortisone, or prednisolone    isoniazid (INH)    medications for diabetes    methotrexate    mineral oil    phenytoin    raloxifene    rifabutin, rifampin, or rifapentine    tamoxifen    thyroid hormones    topiramate    tricyclic antidepressants    warfarin  This list may not describe all possible interactions. Give your health care provider a list of all the medicines, herbs, non-prescription drugs, or dietary supplements you use. Also tell them if you smoke, drink alcohol, or use illegal drugs. Some items may interact with your medicine.  What should I watch for while using this medicine?  Visit your health care professional for regular checks on your progress. You will need a regular breast and pelvic exam. You should also discuss the need for regular mammograms with your health care professional, and follow his or her guidelines.  This medicine can make your body retain fluid, making your fingers, hands, or ankles swell. Your blood pressure can go up. Contact your doctor or health care professional if you feel you are retaining fluid.  If you have any reason to think you are pregnant; stop taking this medicine at once and contact your doctor or health care professional.  Tobacco smoking increases the risk of getting a blood clot or having a stroke, especially if you are more than 35 years old. You are strongly advised not to smoke.  If you wear contact lenses and notice visual changes, or if the lenses begin to feel uncomfortable, consult your eye care specialist.  If you are going to have elective surgery, you may need to stop taking this medicine beforehand. Consult your health care professional for advice prior to scheduling the surgery.  What side effects may I notice from receiving this medicine?  Side effects that you should  report to your doctor or health care professional as soon as possible:    allergic reactions like skin rash, itching or hives, swelling of the face, lips, or tongue    breast tissue changes or discharge    changes in vision    chest pain    confusion, trouble speaking or understanding    dark urine    general ill feeling or flu-like symptoms    light-colored stools    nausea, vomiting    pain, swelling, warmth in the leg    right upper belly pain    severe headaches    shortness of breath    sudden numbness or weakness of the face, arm or leg    trouble walking, dizziness, loss of balance or coordination    unusual vaginal bleeding    yellowing of the eyes or skin  Side effects that usually do not require medical attention (report to your doctor or health care professional if they continue or are bothersome):    hair loss    increased hunger or thirst    increased urination    symptoms of vaginal infection like itching, irritation or unusual discharge    unusually weak or tired  This list may not describe all possible side effects. Call your doctor for medical advice about side effects. You may report side effects to FDA at 3-089-FDA-8647.  Where should I keep my medicine?  Keep out of the reach of children.  Store at room temperature between 15 and 30 degrees C (59 and 86 degrees F). Throw away any unused medicine after the expiration date.  NOTE:This sheet is a summary. It may not cover all possible information. If you have questions about this medicine, talk to your doctor, pharmacist, or health care provider. Copyright  2016 [employer] Standard        Patient Education    Conjugated Estrogens Oral tablet, Conjugated Estrogens, Medroxyprogesterone Acetate Oral tablet    Conjugated Estrogens, Medroxyprogesterone Acetate Oral tablet  Conjugated Estrogens Oral tablet, Conjugated Estrogens, Medroxyprogesterone Acetate Oral tablet  What is this medicine?  CONJUGATED ESTROGENS; MEDROXYPROGESTERONE (CON taylor jolly  mitch; me DROX ee proe TANVIR naik juan) is used as hormone replacement in menopausal women who still have their uterus. This medicine helps to treat hot flashes and prevent osteoporosis (weak bones).  This medicine may be used for other purposes; ask your health care provider or pharmacist if you have questions.  What should I tell my health care provider before I take this medicine?  They need to know if you have any of these conditions:    blood vessel disease or blood clots    breast, cervical, endometrial, or uterine cancer    diabetes    endometriosis    fibroids    gallbladder disease    heart disease or recent heart attack    high blood cholesterol    high blood pressure    high level of calcium in the blood    hysterectomy    kidney disease    liver disease    mental depression    migraine headaches    porphyria    protein C deficiency    protein S deficiency    stroke    systemic lupus erythematosus (SLE)    tobacco smoker    vaginal bleeding    an unusual or allergic reaction to estrogens, progestiins, other medicines, foods, dyes, or preservatives    pregnant or trying to get pregnant    breast-feeding  How should I use this medicine?  Take this medicine by mouth with a drink of water. You may take this medicine with food. Follow the directions on the prescription label. You will take one tablet daily at roughly the same time each day. Do not take your medicine more often than directed.  Talk to your pediatrician regarding the use of this medicine in children. Special care may be needed.  A patient package insert for the product will be given with each prescription and refill. Read this sheet carefully each time. The sheet may change frequently.  Overdosage: If you think you have taken too much of this medicine contact a poison control center or emergency room at once.  NOTE: This medicine is only for you. Do not share this medicine with others.  What if I miss a dose?  If you miss a dose, take it as soon as  you can. If it is almost time for your next dose, take only that dose. Do not take double or extra doses.  What may interact with this medicine?    barbiturates, such as phenobarbital    benzodiazepines    bosentan    bromocriptine    carbamazepine    cimetidine    cyclosporine    dantrolene    grapefruit juice    griseofulvin    hydrocortisone, cortisone, or prednisolone    isoniazid (INH)    medications for diabetes    methotrexate    mineral oil    phenytoin    raloxifene    rifabutin, rifampin, or rifapentine    tamoxifen    thyroid hormones    topiramate    tricyclic antidepressants    warfarin  This list may not describe all possible interactions. Give your health care provider a list of all the medicines, herbs, non-prescription drugs, or dietary supplements you use. Also tell them if you smoke, drink alcohol, or use illegal drugs. Some items may interact with your medicine.  What should I watch for while using this medicine?  Visit your health care professional for regular checks on your progress. You will need a regular breast and pelvic exam. You should also discuss the need for regular mammograms with your health care professional, and follow his or her guidelines.  This medicine can make your body retain fluid, making your fingers, hands, or ankles swell. Your blood pressure can go up. Contact your doctor or health care professional if you feel you are retaining fluid.  If you have any reason to think you are pregnant; stop taking this medicine at once and contact your doctor or health care professional.  Tobacco smoking increases the risk of getting a blood clot or having a stroke, especially if you are more than 35 years old. You are strongly advised not to smoke.  If you wear contact lenses and notice visual changes, or if the lenses begin to feel uncomfortable, consult your eye care specialist.  If you are going to have elective surgery, you may need to stop taking this medicine beforehand. Consult  your health care professional for advice prior to scheduling the surgery.  What side effects may I notice from receiving this medicine?  Side effects that you should report to your doctor or health care professional as soon as possible:    allergic reactions like skin rash, itching or hives, swelling of the face, lips, or tongue    breast tissue changes or discharge    changes in vision    chest pain    confusion, trouble speaking or understanding    dark urine    general ill feeling or flu-like symptoms    light-colored stools    nausea, vomiting    pain, swelling, warmth in the leg    right upper belly pain    severe headaches    shortness of breath    sudden numbness or weakness of the face, arm or leg    trouble walking, dizziness, loss of balance or coordination    unusual vaginal bleeding    yellowing of the eyes or skin  Side effects that usually do not require medical attention (report to your doctor or health care professional if they continue or are bothersome):    hair loss    increased hunger or thirst    increased urination    symptoms of vaginal infection like itching, irritation or unusual discharge    unusually weak or tired  This list may not describe all possible side effects. Call your doctor for medical advice about side effects. You may report side effects to FDA at 2-591-FDA-1076.  Where should I keep my medicine?  Keep out of the reach of children.  Store at room temperature between 15 and 30 degrees C (59 and 86 degrees F). Throw away any unused medicine after the expiration date.  NOTE:This sheet is a summary. It may not cover all possible information. If you have questions about this medicine, talk to your doctor, pharmacist, or health care provider. Copyright  2016 Gold Standard

## 2017-04-10 NOTE — LETTER
April 10, 2017      Marlee Tobar  [address] [id number]            Dear Marlee,                          On further review of your medical records, I DO not recommend hormonal replacement therapy due to your history of deep vein thrombosis (DVT). Taking hormonal replacement therapy such as Prempro, in your case, is considered detrimental if you have hypercoagulable state or history of DVT. For any questions, you may call my office at [phone].      Sincerely,      Bandar Merrill MD  Internal Medicine  [facility] Care Team

## 2017-04-10 NOTE — NURSING NOTE
"Chief Complaint   Patient presents with     Hypertension       Initial /67 (BP Location: Left arm, Patient Position: Chair, Cuff Size: Adult Regular)  Pulse 69  Temp 97.6  F (36.4  C) (Oral)  Ht 5' 6\" (1.676 m)  Wt 162 lb (73.5 kg)  LMP 06/27/2010  SpO2 97%  BMI 26.15 kg/m2 Estimated body mass index is 26.15 kg/(m^2) as calculated from the following:    Height as of this encounter: 5' 6\" (1.676 m).    Weight as of this encounter: 162 lb (73.5 kg).  Medication Reconciliation: complete     Pa Miriam Tang MA      "

## 2017-04-10 NOTE — LETTER
April 10, 2017      Marlee Tobar  [address] [id number]              Dear Marlee,                          On further review of your medical records, I DON'T recommend hormonal replacement therapy due to your history of deep vein thrombosis (DVT). Taking hormonal replacement therapy such as Prempro, in your case, is considered detrimental if you have hypercoagulable state or history of DVT. For any questions, you may call my office at [phone].      Sincerely,      Bandar Merrill MD  Internal Medicine  [facility] Care Team
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
## 2017-04-10 NOTE — MR AVS SNAPSHOT
After Visit Summary   4/10/2017    Marlee Tobar    MRN: [id number]           Patient Information     Date Of Birth          1955        Visit Information        Provider Department      4/10/2017 7:00 AM Bandar Merrill MD [facility]        Today's Diagnoses     Hypertension goal BP (blood pressure) < 140/90    -  1      Care Instructions    This summary includes the important diagnoses, test, medications and other important parts of your medical history.  Below are a few good we sites you can use to learn more about these.     Www.[email or web address].com : Up to date and easily searchable information on multiple topics.  [email or web address] : [facility] $4.99 medications  Www.medlineLive Gamer.gov : medication info, interactive tutorials, watch real surgeries online  Www.familydoctor.org : good info from the Academy of Family Physicians  Www.AccelOps.Ideedock : good info from the [facility]  Www.cdc.gov : public health info, travel advisories, epidemics (H1N1)  Www.aap.org : children's health info, normal development, vaccinations  Www.health.[facility].[place].us : [place] dept of heatlh, public health issues in [place], N1N1    Based on your medical history and these are the current health maintenance or preventive care services that you are due for (some may have been done at this visit:)  There are no preventive care reminders to display for this patient.  =================================================================================  Normal Values   Blood pressure  <140/90 for most adults    <130/80 for some chronic diseases (ask your care team about yours)    BMI (body mass index)  18.5-25 kg/m2 (based on height and weight)     Thank you for visiting [facility]    Normal or non-critical lab and imaging results will be communicated to you by MyChart, letter or phone within 7 days.  If you do not hear from us within 10 days, please call the clinic. If  you have a critical or abnormal lab result, we will notify you by phone as soon as possible.     If you have any questions regarding your visit please contact:     Team Comfort:   Clinic Hours Telephone Number   Dr. Nate Nava  He[facility] Mendozaev   7am-5pm  Monday - Friday [phone]  [facility]   Pharmacy 8am-8pm Monday-Thursday      8am-6pm Friday  9am-5pm Saturday-Sunday [phone]   Urgent Care 11am-8pm Monday-Friday        9am-5pm Saturday-Sunday [phone]     After hours, weekend or if you need to make an appointment with your primary provider please call [phone].   After Hours nurse advise: call [place] [facility]: [phone]    Medication Refills:  Call your pharmacy and they will forward the refill to us. Please allow 3 business days for your refills to be completed.    Use [employer] (secure email communication and access to your chart) to send your primary care provider a message or make an appointment. Ask someone on your Team how to sign up for [employer]. To log on to Swopboard or for more information in [employer] please visit the website at [email or web address].  As of October 8, 2013, all password changes, disabled accounts, or ID changes in [employer]/MyHealth will be done by our Access Services Department.   If you need help with your account or password, call: [phone]. Clinic staff no longer has the ability to change passwords.     PATIENT INSTRUCTIONS:    1) In a worst case scenario, if you feel light-headed, dizzy or sensation of passing out, check your BP and pulse.  2) If your pulse in the 40s, then reduce Carvedilol to 1/2 tablet per dose.  3) If your pulse is normal (> 60), then reduce Losartan to evening dose.        Follow-ups after your visit        Who to contact     If you have questions or need follow up information about today's clinic visit or your schedule please contact [facility]  "directly at [phone].  Normal or non-critical lab and imaging results will be communicated to you by MyChart, letter or phone within 4 business days after the clinic has received the results. If you do not hear from us within 7 days, please contact the clinic through [employer] or phone. If you have a critical or abnormal lab result, we will notify you by phone as soon as possible.  Submit refill requests through [employer] or call your pharmacy and they will forward the refill request to us. Please allow 3 business days for your refill to be completed.          Additional Information About Your Visit        [employer] Information     [employer] gives you secure access to your electronic health record. If you see a primary care provider, you can also send messages to your care team and make appointments. If you have questions, please call your primary care clinic.  If you do not have a primary care provider, please call [phone] and they will assist you.        Care EveryWhere ID     This is your Care EveryWhere ID. This could be used by other organizations to access your [place] medical records  [id number]        Your Vitals Were     Pulse Temperature Height Last Period Pulse Oximetry BMI (Body Mass Index)    69 97.6  F (36.4  C) (Oral) 5' 6\" (1.676 m) 06/27/2010 97% 26.15 kg/m2       Blood Pressure from Last 3 Encounters:   04/10/17 104/67   03/17/17 140/80   03/13/17 (!) 170/93    Weight from Last 3 Encounters:   04/10/17 162 lb (73.5 kg)   03/17/17 164 lb (74.4 kg)   03/13/17 165 lb (74.8 kg)              Today, you had the following     No orders found for display       Primary Care Provider Office Phone # Fax #    Lindsay Foreman PA-C [phone]       [facility] [address]  [facility] [address]        Thank you!     Thank you for choosing [facility]  for your care. Our goal is always to provide you with excellent care. Hearing back from our patients is " one way we can continue to improve our services. Please take a few minutes to complete the written survey that you may receive in the mail after your visit with us. Thank you!             Your Updated Medication List - Protect others around you: Learn how to safely use, store and throw away your medicines at www.disposemymeds.org.          This list is accurate as of: 4/10/17  7:25 AM.  Always use your most recent med list.                   Brand Name Dispense Instructions for use    Biotin 5000 MCG Caps      Take 1 tablet by mouth daily.       buPROPion 150 MG 24 hr tablet    WELLBUTRIN XL    90 tablet    Take 1 tablet (150 mg) by mouth every morning       CALCIUM CITRATE PO      Take 3 capsules by mouth 3 times daily       carvedilol 12.5 MG tablet    COREG    60 tablet    Take 1 tablet (12.5 mg) by mouth 2 times daily (with meals)       cyanocobalamin 500 MCG Tabs     90 tablet    Take 250 mcg daily       IRON 100 PLUS 100-250-0.025-1 MG Tabs   Generic drug:  Iron-vit C-vit B12-folic acid      Take  by mouth.       losartan 50 MG tablet    COZAAR    60 tablet    Take 1 tablet (50 mg) by mouth 2 times daily (with meals)       MULTIVITAL Tabs      Take 2 tablets by mouth daily.       pantoprazole 40 MG EC tablet    PROTONIX    180 tablet    Take 1 tablet (40 mg) by mouth 2 times daily       traZODone 50 MG tablet    DESYREL    30 tablet    Take 1 tablet (50 mg) by mouth nightly as needed for sleep Take 2 hours before bedtime.       VITAMIN C PO          VITAMIN D PO

## 2017-04-11 DIAGNOSIS — I10 HYPERTENSION GOAL BP (BLOOD PRESSURE) < 130/80: ICD-10-CM

## 2017-04-11 RX ORDER — LOSARTAN POTASSIUM 50 MG/1
50 TABLET ORAL 2 TIMES DAILY WITH MEALS
Qty: 180 TABLET | Refills: 3 | Status: SHIPPED | OUTPATIENT
Start: 2017-04-11 | End: 2018-01-08

## 2017-04-11 RX ORDER — CARVEDILOL 12.5 MG/1
12.5 TABLET ORAL 2 TIMES DAILY WITH MEALS
Qty: 180 TABLET | Refills: 3 | Status: SHIPPED | OUTPATIENT
Start: 2017-04-11 | End: 2018-01-08

## 2017-06-29 ENCOUNTER — OFFICE VISIT (OUTPATIENT)
Dept: FAMILY MEDICINE | Facility: CLINIC | Age: 62
End: 2017-06-29
Payer: COMMERCIAL

## 2017-06-29 DIAGNOSIS — H43.392 FLOATERS IN VISUAL FIELD, LEFT: Primary | ICD-10-CM

## 2017-06-29 DIAGNOSIS — Z78.0 ASYMPTOMATIC MENOPAUSAL STATE: ICD-10-CM

## 2017-06-29 PROCEDURE — 99213 OFFICE O/P EST LOW 20 MIN: CPT | Performed by: INTERNAL MEDICINE

## 2017-06-29 ASSESSMENT — PAIN SCALES - GENERAL: PAINLEVEL: NO PAIN (0)

## 2017-06-29 NOTE — NURSING NOTE
"Chief Complaint   Patient presents with     Eye Problem       Initial /89 (BP Location: Left arm, Patient Position: Chair, Cuff Size: Adult Large)  Pulse 65  Temp 98.4  F (36.9  C) (Oral)  Ht 5' 6\" (1.676 m)  Wt 160 lb 12.8 oz (72.9 kg)  LMP 06/27/2010  SpO2 100%  BMI 25.95 kg/m2 Estimated body mass index is 25.95 kg/(m^2) as calculated from the following:    Height as of this encounter: 5' 6\" (1.676 m).    Weight as of this encounter: 160 lb 12.8 oz (72.9 kg).  Medication Reconciliation: complete   Steffany Cordon      "
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
## 2017-06-29 NOTE — MR AVS SNAPSHOT
After Visit Summary   6/29/2017    Marlee Tobar    MRN: [id number]           Patient Information     Date Of Birth          1955        Visit Information        Provider Department      6/29/2017 1:00 PM Bandar Merrill MD [facility]        Today's Diagnoses     Floaters in visual field, left    -  1    Asymptomatic menopausal state          Care Instructions      What Are Retinal Tears and Detachments?  The retina is the inside lining of the eye. It turns light into nerve signals. These signals are then sent to the brain where they become the images you see. The retina may be torn or detached due to aging, an eye injury, or other problems. Tears and detachments are painless. But they often cause troubling vision changes. If you have symptoms of a tear or detachment, contact your eye doctor immediately. If you cannot reach your eye doctor, go to the emergency room. Retinal detachment is a medical emergency.      Symptoms of a detachment  If you have a detachment, you may notice:    A shadow or  curtain  across your vision.    Signs of a tear: floaters, flashes, and blurry vision  Symptoms of a tear  If you have a tear, you may notice:    Floaters which look like spots or threads in your vision    Flashes which look like bursts of light in your vision    Sudden blurry vision  Date Last Reviewed: 6/13/2015 2000-2017 The [employer]. [address] [place] [address]. All rights reserved. This information is not intended as a substitute for professional medical care. Always follow your healthcare professional's instructions.        What Are Retinal Tears and Detachments?  The retina is the inside lining of the eye. It turns light into nerve signals. These signals are then sent to the brain where they become the images you see. The retina may be torn or detached due to aging, an eye injury, or other problems. Tears and detachments are painless. But  they often cause troubling vision changes. If you have symptoms of a tear or detachment, contact your eye doctor immediately. If you cannot reach your eye doctor, go to the emergency room. Retinal detachment is a medical emergency.      Symptoms of a detachment  If you have a detachment, you may notice:    A shadow or  curtain  across your vision.    Signs of a tear: floaters, flashes, and blurry vision  Symptoms of a tear  If you have a tear, you may notice:    Floaters which look like spots or threads in your vision    Flashes which look like bursts of light in your vision    Sudden blurry vision  Date Last Reviewed: 6/13/2015 2000-2017 [employer]. [address] [place] [address]. All rights reserved. This information is not intended as a substitute for professional medical care. Always follow your healthcare professional's instructions.                Follow-ups after your visit        Additional Services     OPHTHALMOLOGY ADULT REFERRAL       Your provider has referred you to: [facility]: [facility] [phone]   [email or web address]    Please be aware that coverage of these services is subject to the terms and limitations of your health insurance plan.  Call member services at your health plan with any benefit or coverage questions.      Please bring the following with you to your appointment:    (1) Any X-Rays, CTs or MRIs which have been performed.  Contact the facility where they were done to arrange for  prior to your scheduled appointment.    (2) List of current medications  (3) This referral request   (4) Any documents/labs given to you for this referral                  Future tests that were ordered for you today     Open Future Orders        Priority Expected Expires Ordered    DX Hip/Pelvis/Spine Routine  6/29/2018 6/29/2017    OPHTHALMOLOGY ADULT REFERRAL Routine  6/29/2018 6/29/2017        "     Who to contact     If you have questions or need follow up information about today's clinic visit or your schedule [facility] at [phone].  Normal or non-critical lab and imaging results will be communicated to you by MyChart, letter or phone within 4 business days after the clinic has received the results. If you do not hear from us within 7 days, please contact the clinic through MyChart or phone. If you have a critical or abnormal lab result, we will notify you by phone as soon as possible.  Submit refill requests through [employer] or call your pharmacy and they will forward the refill request to us. Please allow 3 business days for your refill to be completed.          Additional Information About Your Visit        [employer] Information     [employer] gives you secure access to your electronic health record. If you see a primary care provider, you can also send messages to your care team and make appointments. If you have questions, please call your primary care clinic.  If you do not have a primary care provider, please call [phone] and they will assist you.        Care EveryWhere ID     This is your Care EveryWhere ID. This could be used by other organizations to access your [place] medical records  [id number]        Your Vitals Were     Pulse Temperature Height Last Period Pulse Oximetry BMI (Body Mass Index)    65 98.4  F (36.9  C) (Oral) 5' 6\" (1.676 m) 06/27/2010 100% 25.95 kg/m2       Blood Pressure from Last 3 Encounters:   06/29/17 154/88   04/10/17 104/67   03/17/17 140/80    Weight from Last 3 Encounters:   06/29/17 160 lb 12.8 oz (72.9 kg)   04/10/17 162 lb (73.5 kg)   03/17/17 164 lb (74.4 kg)               Primary Care Provider Office Phone # Fax #    Lindsay Foreman PA-C [phone]       [facility] [address]  [facility] [address]        Equal Access to Services     MELANI MARCOS AH: Yissel carrillo " Sodipesh, walibbyda luqadaha, qaybta kaalmada onelia, saad bishop manjitconnie larajanraghu tessa. So [facility] [phone].    ATENCIÓN: Si chelsey kingston, tiene a barroso disposición servicios gratuitos de asistencia lingüística. Kimberli velazco [phone].    We comply with applicable federal civil rights laws and [place] laws. We do not discriminate on the basis of race, color, national origin, age, disability sex, sexual orientation or gender identity.            Thank you!     Thank you for choosing [facility]  for your care. Our goal is always to provide you with excellent care. Hearing back from our patients is one way we can continue to improve our services. Please take a few minutes to complete the written survey that you may receive in the mail after your visit with us. Thank you!             Your Updated Medication List - Protect others around you: Learn how to safely use, store and throw away your medicines at www.disposemymeds.org.          This list is accurate as of: 6/29/17  1:33 PM.  Always use your most recent med list.                   Brand Name Dispense Instructions for use Diagnosis    Biotin 5000 MCG Caps      Take 1 tablet by mouth daily.        buPROPion 150 MG 24 hr tablet    WELLBUTRIN XL    90 tablet    Take 1 tablet (150 mg) by mouth every morning    Major depressive disorder, recurrent episode, in partial remission (H)       CALCIUM CITRATE PO      Take 3 capsules by mouth 3 times daily    DVT (deep venous thrombosis), left, Hypercoagulable state (H), Varicose veins       carvedilol 12.5 MG tablet    COREG    180 tablet    Take 1 tablet (12.5 mg) by mouth 2 times daily (with meals)    Hypertension goal BP (blood pressure) < 130/80       cyanocobalamin 500 MCG Tabs     90 tablet    Take 250 mcg daily    S/P gastric bypass       IRON 100 PLUS 100-250-0.025-1 MG Tabs   Generic drug:  Iron-vit C-vit B12-folic acid      Take  by mouth.        losartan 50 MG tablet    COZAAR    180  tablet    Take 1 tablet (50 mg) by mouth 2 times daily (with meals)    Hypertension goal BP (blood pressure) < 130/80       MULTIVITAL Tabs      Take 2 tablets by mouth daily.        pantoprazole 40 MG EC tablet    PROTONIX    180 tablet    Take 1 tablet (40 mg) by mouth 2 times daily    Gastroesophageal reflux disease without esophagitis       traZODone 50 MG tablet    DESYREL    90 tablet    Take 1 tablet (50 mg) by mouth nightly as needed for sleep Take 2 hours before bedtime.    Insomnia, unspecified type       VITAMIN C PO       Routine gynecological examination, Need for prophylactic vaccination and inoculation against influenza, HTN (hypertension), Screening for thyroid disorder, Lipid screening, Screening for diabetes mellitus       VITAMIN D PO       Routine gynecological examination, Need for prophylactic vaccination and inoculation against influenza, HTN (hypertension), Screening for thyroid disorder, Lipid screening, Screening for diabetes mellitus

## 2017-06-29 NOTE — PATIENT INSTRUCTIONS
What Are Retinal Tears and Detachments?  The retina is the inside lining of the eye. It turns light into nerve signals. These signals are then sent to the brain where they become the images you see. The retina may be torn or detached due to aging, an eye injury, or other problems. Tears and detachments are painless. But they often cause troubling vision changes. If you have symptoms of a tear or detachment, contact your eye doctor immediately. If you cannot reach your eye doctor, go to the emergency room. Retinal detachment is a medical emergency.      Symptoms of a detachment  If you have a detachment, you may notice:    A shadow or  curtain  across your vision.    Signs of a tear: floaters, flashes, and blurry vision  Symptoms of a tear  If you have a tear, you may notice:    Floaters which look like spots or threads in your vision    Flashes which look like bursts of light in your vision    Sudden blurry vision  Date Last Reviewed: 6/13/2015 2000-2017 The [employer]. [address] [place] [address]. All rights reserved. This information is not intended as a substitute for professional medical care. Always follow your healthcare professional's instructions.        What Are Retinal Tears and Detachments?  The retina is the inside lining of the eye. It turns light into nerve signals. These signals are then sent to the brain where they become the images you see. The retina may be torn or detached due to aging, an eye injury, or other problems. Tears and detachments are painless. But they often cause troubling vision changes. If you have symptoms of a tear or detachment, contact your eye doctor immediately. If you cannot reach your eye doctor, go to the emergency room. Retinal detachment is a medical emergency.      Symptoms of a detachment  If you have a detachment, you may notice:    A shadow or  curtain  across your vision.    Signs of a tear: floaters, flashes, and blurry vision  Symptoms  of a tear  If you have a tear, you may notice:    Floaters which look like spots or threads in your vision    Flashes which look like bursts of light in your vision    Sudden blurry vision  Date Last Reviewed: 6/13/2015 2000-2017 The [employer]. [address] [place] [address]. All rights reserved. This information is not intended as a substitute for professional medical care. Always follow your healthcare professional's instructions.

## 2017-07-04 VITALS
DIASTOLIC BLOOD PRESSURE: 88 MMHG | TEMPERATURE: 98.4 F | HEART RATE: 65 BPM | OXYGEN SATURATION: 100 % | BODY MASS INDEX: 25.84 KG/M2 | WEIGHT: 160.8 LBS | SYSTOLIC BLOOD PRESSURE: 139 MMHG | HEIGHT: 66 IN

## 2017-07-10 ENCOUNTER — OFFICE VISIT (OUTPATIENT)
Dept: OPHTHALMOLOGY | Facility: CLINIC | Age: 62
End: 2017-07-10
Attending: INTERNAL MEDICINE
Payer: COMMERCIAL

## 2017-07-10 DIAGNOSIS — H43.812 PVD (POSTERIOR VITREOUS DETACHMENT), LEFT EYE: Primary | ICD-10-CM

## 2017-07-10 DIAGNOSIS — H43.392 FLOATERS IN VISUAL FIELD, LEFT: ICD-10-CM

## 2017-07-10 PROCEDURE — 92014 COMPRE OPH EXAM EST PT 1/>: CPT | Performed by: OPHTHALMOLOGY

## 2017-07-10 ASSESSMENT — TONOMETRY
OD_IOP_MMHG: 12
OS_IOP_MMHG: 14
IOP_METHOD: TONOPEN

## 2017-07-10 ASSESSMENT — CONF VISUAL FIELD
OS_NORMAL: 1
OD_NORMAL: 1

## 2017-07-10 ASSESSMENT — SLIT LAMP EXAM - LIDS
COMMENTS: NORMAL
COMMENTS: NORMAL

## 2017-07-10 ASSESSMENT — REFRACTION_WEARINGRX
OS_SPHERE: +2.00
OD_SPHERE: +2.00
SPECS_TYPE: OTC READERS
OD_CYLINDER: SPHERE
OS_CYLINDER: SPHERE

## 2017-07-10 ASSESSMENT — VISUAL ACUITY
OD_SC: 20/20
OS_SC: 20/20
METHOD: SNELLEN - LINEAR

## 2017-07-10 ASSESSMENT — EXTERNAL EXAM - RIGHT EYE: OD_EXAM: NORMAL

## 2017-07-10 ASSESSMENT — EXTERNAL EXAM - LEFT EYE: OS_EXAM: NORMAL

## 2017-07-10 ASSESSMENT — CUP TO DISC RATIO
OD_RATIO: 0.1
OS_RATIO: 0.1

## 2017-07-10 NOTE — MR AVS SNAPSHOT
After Visit Summary   7/10/2017    Marlee Tobar    MRN: [id number]           Patient Information     Date Of Birth          1955        Visit Information        Provider Department      7/10/2017 2:15 PM Dante Concepcion MD [facility]        Today's Diagnoses     PVD (posterior vitreous detachment), left eye    -  1    Floaters in visual field, left           Follow-ups after your visit        Follow-up notes from your care team     Return in about 1 year (around 7/10/2018) for Annual Eye Exam.      Who to contact     If you have questions or need follow up information about today's clinic visit or your schedule please contact [facility] directly at [phone].  Normal or non-critical lab and imaging results will be communicated to you by [employer], letter or phone within 4 business days after the clinic has received the results. If you do not hear from us within 7 days, please contact the clinic through [employer] or phone. If you have a critical or abnormal lab result, we will notify you by phone as soon as possible.  Submit refill requests through [employer] or call your pharmacy and they will forward the refill request to us. Please allow 3 business days for your refill to be completed.          Additional Information About Your Visit        MyChart Information     [employer] gives you secure access to your electronic health record. If you see a primary care provider, you can also send messages to your care team and make appointments. If you have questions, please call your primary care clinic.  If you do not have a primary care provider, please call [phone] and they will assist you.      [employer] is an electronic gateway that provides easy, online access to your medical records. With [employer], you can request a clinic appointment, read your test results, renew a prescription or communicate with your care team.     To access your existing account,  please contact your [facility] or call [phone] for assistance.        Care EveryWhere ID     This is your Care EveryWhere ID. This could be used by other organizations to access your [place] medical records  [id number]        Your Vitals Were     Last Period                   06/27/2010            Blood Pressure from Last 3 Encounters:   06/29/17 139/88   04/10/17 104/67   03/17/17 140/80    Weight from Last 3 Encounters:   06/29/17 72.9 kg (160 lb 12.8 oz)   04/10/17 73.5 kg (162 lb)   03/17/17 74.4 kg (164 lb)              We Performed the Following     EYE EXAM, EST PATIENT,[facility]     OPHTHALMOLOGY ADULT REFERRAL        Primary Care Provider Office Phone # Fax #    Lindsay Foreman PA-C [phone]       [facility] [address]  [facility] [address]        Equal Access to Services     MELANI MARCOS : Hadii aad ku hadasho Soomaali, waaxda luqadaha, qaybta kaalmada adeegyada, waxay idiin hayaan dario khconnie gorman . So [facility] [phone].    ATENCIÓN: Si habla español, tiene a barroso disposición servicios gratuitos de asistencia lingüística. Llame al [phone].    We comply with applicable federal civil rights laws and [place] laws. We do not discriminate on the basis of race, color, national origin, age, disability sex, sexual orientation or gender identity.            Thank you!     Thank you for choosing [facility]  for your care. Our goal is always to provide you with excellent care. Hearing back from our patients is one way we can continue to improve our services. Please take a few minutes to complete the written survey that you may receive in the mail after your visit with us. Thank you!             Your Updated Medication List - Protect others around you: Learn how to safely use, store and throw away your medicines at www.disposemymeds.org.          This list is accurate as of: 7/10/17  3:06 PM.  Always use your  most recent med list.                   Brand Name Dispense Instructions for use Diagnosis    Biotin 5000 MCG Caps      Take 1 tablet by mouth daily.        buPROPion 150 MG 24 hr tablet    WELLBUTRIN XL    90 tablet    Take 1 tablet (150 mg) by mouth every morning    Major depressive disorder, recurrent episode, in partial remission (H)       CALCIUM CITRATE PO      Take 3 capsules by mouth 3 times daily    DVT (deep venous thrombosis), left, Hypercoagulable state (H), Varicose veins       carvedilol 12.5 MG tablet    COREG    180 tablet    Take 1 tablet (12.5 mg) by mouth 2 times daily (with meals)    Hypertension goal BP (blood pressure) < 130/80       cyanocobalamin 500 MCG Tabs     90 tablet    Take 250 mcg daily    S/P gastric bypass       IRON 100 PLUS 100-250-0.025-1 MG Tabs   Generic drug:  Iron-vit C-vit B12-folic acid      Take  by mouth.        losartan 50 MG tablet    COZAAR    180 tablet    Take 1 tablet (50 mg) by mouth 2 times daily (with meals)    Hypertension goal BP (blood pressure) < 130/80       MULTIVITAL Tabs      Take 2 tablets by mouth daily.        pantoprazole 40 MG EC tablet    PROTONIX    180 tablet    Take 1 tablet (40 mg) by mouth 2 times daily    Gastroesophageal reflux disease without esophagitis       traZODone 50 MG tablet    DESYREL    90 tablet    Take 1 tablet (50 mg) by mouth nightly as needed for sleep Take 2 hours before bedtime.    Insomnia, unspecified type       VITAMIN C PO       Routine gynecological examination, Need for prophylactic vaccination and inoculation against influenza, HTN (hypertension), Screening for thyroid disorder, Lipid screening, Screening for diabetes mellitus       VITAMIN D PO       Routine gynecological examination, Need for prophylactic vaccination and inoculation against influenza, HTN (hypertension), Screening for thyroid disorder, Lipid screening, Screening for diabetes mellitus

## 2017-07-10 NOTE — NURSING NOTE
Patient presents with:  Floaters Left Eye: started about 2 weeks ago, after being at [employer] and riding on a spinning ride      Referring Provider:  Bandar Merrill MD  [place] [address]  [place] [address]    HPI    Affected eye(s):  Left   Symptoms:     Floaters   No flashes         Do you have eye pain now?:  No      Comments:     Floaters Left Eye: started about 2 weeks ago, after being at [employer] and riding on a spinning ride

## 2017-07-10 NOTE — PROGRESS NOTES
Assessment & Plan   Malree Tobar is a 62 year old female who presents with:   Review of systems for the eyes was negative other than the pertinent positives and negatives noted in the HPI.  History is obtained from the patient.         PVD (posterior vitreous detachment), left eye  - No detachment of retina. Continue to observe.  - RD precautions discussed    Return as needed for annual exam.    Documentation for today's encounter was performed by Yolanda ROSADO. OSC. Acting as a scribe in my presence. I have reviewed and verified that it is an accurate recording of today's encounter.    Attending Physician Attestation:  Complete documentation of historical and exam elements from today's encounter can be found in the full encounter summary report (not reduplicated in this progress note).  I personally obtained the chief complaint(s) and history of present illness.  I confirmed and edited as necessary the review of systems, past medical/surgical history, family history, social history, and examination findings as documented by others; and I examined the patient myself.  I personally reviewed the relevant tests, images, and reports as documented above.  I formulated and edited as necessary the assessment and plan and discussed the findings and management plan with the patient and family. - Dante Concepcion MD

## 2017-09-25 DIAGNOSIS — F33.41 MAJOR DEPRESSIVE DISORDER, RECURRENT EPISODE, IN PARTIAL REMISSION (H): ICD-10-CM

## 2017-09-25 NOTE — TELEPHONE ENCOUNTER
buPROPion (WELLBUTRIN XL) 150 MG 24 hr tablet       Last Written Prescription Date: 01/06/17  Last Fill Quantity: 90; # refills: 1  Last Office Visit with [facility], [facility] or [facility] prescribing provider:  06/29/17        Last PHQ-9 score on record=   PHQ-9 SCORE 1/6/2017   Total Score -   Total Score 2       Lab Results   Component Value Date    AST 20 01/06/2017     Lab Results   Component Value Date    ALT 27 01/06/2017         [facility]

## 2017-09-27 ENCOUNTER — MYC MEDICAL ADVICE (OUTPATIENT)
Dept: INTERNAL MEDICINE | Facility: CLINIC | Age: 62
End: 2017-09-27

## 2017-09-27 RX ORDER — BUPROPION HYDROCHLORIDE 150 MG/1
TABLET ORAL
Qty: 90 TABLET | Refills: 0 | Status: SHIPPED | OUTPATIENT
Start: 2017-09-27 | End: 2017-12-17

## 2017-09-27 ASSESSMENT — PATIENT HEALTH QUESTIONNAIRE - PHQ9
SUM OF ALL RESPONSES TO PHQ QUESTIONS 1-9: 2
SUM OF ALL RESPONSES TO PHQ QUESTIONS 1-9: 2
10. IF YOU CHECKED OFF ANY PROBLEMS, HOW DIFFICULT HAVE THESE PROBLEMS MADE IT FOR YOU TO DO YOUR WORK, TAKE CARE OF THINGS AT HOME, OR GET ALONG WITH OTHER PEOPLE: NOT DIFFICULT AT ALL

## 2017-09-28 ASSESSMENT — PATIENT HEALTH QUESTIONNAIRE - PHQ9: SUM OF ALL RESPONSES TO PHQ QUESTIONS 1-9: 2

## 2017-10-04 ENCOUNTER — MYC MEDICAL ADVICE (OUTPATIENT)
Dept: FAMILY MEDICINE | Facility: CLINIC | Age: 62
End: 2017-10-04

## 2017-10-10 ENCOUNTER — OFFICE VISIT (OUTPATIENT)
Dept: FAMILY MEDICINE | Facility: CLINIC | Age: 62
End: 2017-10-10
Payer: COMMERCIAL

## 2017-10-10 ENCOUNTER — RADIANT APPOINTMENT (OUTPATIENT)
Dept: GENERAL RADIOLOGY | Facility: CLINIC | Age: 62
End: 2017-10-10
Attending: INTERNAL MEDICINE
Payer: COMMERCIAL

## 2017-10-10 VITALS
HEART RATE: 76 BPM | BODY MASS INDEX: 25.39 KG/M2 | TEMPERATURE: 98.5 F | OXYGEN SATURATION: 100 % | HEIGHT: 66 IN | DIASTOLIC BLOOD PRESSURE: 80 MMHG | WEIGHT: 158 LBS | SYSTOLIC BLOOD PRESSURE: 145 MMHG

## 2017-10-10 DIAGNOSIS — R35.0 URINARY FREQUENCY: ICD-10-CM

## 2017-10-10 DIAGNOSIS — R91.8 PULMONARY NODULES: ICD-10-CM

## 2017-10-10 DIAGNOSIS — R05.3 CHRONIC COUGH: Primary | ICD-10-CM

## 2017-10-10 DIAGNOSIS — I10 HYPERTENSION GOAL BP (BLOOD PRESSURE) < 140/90: ICD-10-CM

## 2017-10-10 DIAGNOSIS — Z23 NEED FOR PROPHYLACTIC VACCINATION AND INOCULATION AGAINST INFLUENZA: ICD-10-CM

## 2017-10-10 LAB
ALBUMIN UR-MCNC: NEGATIVE MG/DL
APPEARANCE UR: CLEAR
BILIRUB UR QL STRIP: NEGATIVE
COLOR UR AUTO: YELLOW
GLUCOSE UR STRIP-MCNC: NEGATIVE MG/DL
HGB UR QL STRIP: ABNORMAL
KETONES UR STRIP-MCNC: NEGATIVE MG/DL
LEUKOCYTE ESTERASE UR QL STRIP: NEGATIVE
NITRATE UR QL: NEGATIVE
PH UR STRIP: 6.5 PH (ref 5–7)
RBC #/AREA URNS AUTO: ABNORMAL /HPF
SOURCE: ABNORMAL
SP GR UR STRIP: 1.01 (ref 1–1.03)
UROBILINOGEN UR STRIP-ACNC: 0.2 EU/DL (ref 0.2–1)
WBC #/AREA URNS AUTO: ABNORMAL /HPF

## 2017-10-10 PROCEDURE — 36415 COLL VENOUS BLD VENIPUNCTURE: CPT | Performed by: INTERNAL MEDICINE

## 2017-10-10 PROCEDURE — 86698 HISTOPLASMA ANTIBODY: CPT | Mod: 90 | Performed by: INTERNAL MEDICINE

## 2017-10-10 PROCEDURE — 90471 IMMUNIZATION ADMIN: CPT | Performed by: INTERNAL MEDICINE

## 2017-10-10 PROCEDURE — 86635 COCCIDIOIDES ANTIBODY: CPT | Mod: 90 | Performed by: INTERNAL MEDICINE

## 2017-10-10 PROCEDURE — 86628 CANDIDA ANTIBODY: CPT | Mod: 90 | Performed by: INTERNAL MEDICINE

## 2017-10-10 PROCEDURE — 86612 BLASTOMYCES ANTIBODY: CPT | Mod: 90 | Performed by: INTERNAL MEDICINE

## 2017-10-10 PROCEDURE — 90686 IIV4 VACC NO PRSV 0.5 ML IM: CPT | Performed by: INTERNAL MEDICINE

## 2017-10-10 PROCEDURE — 99000 SPECIMEN HANDLING OFFICE-LAB: CPT | Performed by: INTERNAL MEDICINE

## 2017-10-10 PROCEDURE — 99214 OFFICE O/P EST MOD 30 MIN: CPT | Mod: 25 | Performed by: INTERNAL MEDICINE

## 2017-10-10 PROCEDURE — 71020 XR CHEST 2 VW: CPT

## 2017-10-10 PROCEDURE — 86606 ASPERGILLUS ANTIBODY: CPT | Mod: 90 | Performed by: INTERNAL MEDICINE

## 2017-10-10 PROCEDURE — 81001 URINALYSIS AUTO W/SCOPE: CPT | Performed by: INTERNAL MEDICINE

## 2017-10-10 ASSESSMENT — PAIN SCALES - GENERAL: PAINLEVEL: NO PAIN (0)

## 2017-10-10 NOTE — PROGRESS NOTES
Injectable Influenza Immunization Documentation    1.  Is the person to be vaccinated sick today?   No    2. Does the person to be vaccinated have an allergy to a component   of the vaccine?   No    3. Has the person to be vaccinated ever had a serious reaction   to influenza vaccine in the past?   No    4. Has the person to be vaccinated ever had Guillain-Barré syndrome?   No    Form completed by SEDRICK Corbett MA
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
## 2017-10-10 NOTE — MR AVS SNAPSHOT
After Visit Summary   10/10/2017    Marlee Tobar    MRN: [id number]           Patient Information     Date Of Birth          1955        Visit Information        Provider Department      10/10/2017 5:20 PM Bandar Merrill MD [facility]        Today's Diagnoses     Chronic cough    -  1    Urinary frequency        Need for prophylactic vaccination and inoculation against influenza          Care Instructions        ================================================================================  Normal Values   Blood pressure  <140/90 for most adults    <130/80 for some chronic diseases (ask your care team about yours)    BMI (body mass index)  18.5-25 kg/m2 (based on height and weight)     Thank you for visiting [facility]    Normal or non-critical lab and imaging results will be communicated to you by MyChart, letter or phone within 7 days.  If you do not hear from us within 10 days, please call the clinic. If you have a critical or abnormal lab result, we will notify you by phone as soon as possible.     If you have any questions regarding your visit please contact:     Team Comfort:   Clinic Hours Telephone Number   Dr. Nate Merrill 7am-5pm  Monday - Friday [phone]  Adán Payan RN   Pharmacy 8:00am-8pm Monday-Friday    9am-5pm Saturday-Sunday [phone]   Urgent Care 11am-9pm Monday-Friday        9am-5pm Saturday-Sunday [phone]     After hours, weekend or if you need to make an appointment with your primary provider please call [phone].   After Hours nurse advise: call [place] [facility]: [phone]    Medication Refills:  Call your pharmacy and they will forward the refill to us. Please allow 3 business days for your refills to be completed.      PATIENT INSTRUCTIONS:  1) To schedule CT of chest, please call [phone].          Follow-ups after your  "visit        Future tests that were ordered for you today     Open Future Orders        Priority Expected Expires Ordered    CT Chest w/o Contrast Routine  10/10/2018 10/10/2017            Who to contact     If you have questions or need follow up information about today's clinic visit or your schedule please contact [facility] PR[facility] MA directly at [phone].  Normal or non-critical lab and imaging results will be communicated to you by MyChart, letter or phone within 4 business days after the clinic has received the results. If you do not hear from us within 7 days, please contact the clinic through [employer] or phone. If you have a critical or abnormal lab result, we will notify you by phone as soon as possible.  Submit refill requests through [employer] or call your pharmacy and they will forward the refill request to us. Please allow 3 business days for your refill to be completed.          Additional Information About Your Visit        [employer] Information     [employer] gives you secure access to your electronic health record. If you see a primary care provider, you can also send messages to your care team and make appointments. If you have questions, please call your primary care clinic.  If you do not have a primary care provider, please call [phone] and they will assist you.        Care EveryWhere ID     This is your Care EveryWhere ID. This could be used by other organizations to access your [facility] medical records  [id number]        Your Vitals Were     Pulse Temperature Height Last Period Pulse Oximetry BMI (Body Mass Index)    76 98.5  F (36.9  C) (Oral) 5' 6\" (1.676 m) 06/27/2010 100% 25.5 kg/m2       Blood Pressure from Last 3 Encounters:   10/10/17 145/80   06/29/17 139/88   04/10/17 104/67    Weight from Last 3 Encounters:   10/10/17 158 lb (71.7 kg)   06/29/17 160 lb 12.8 oz (72.9 kg)   04/10/17 162 lb (73.5 kg)              We Performed the Following     ASPERGILLUS ANTIBODY     " BLASTOMYCES ANTIBODY ID     CANDIDA ANTIBODY BY ID     COCCIDIOIDES ANTIBODY     HC FLU VAC PRESRV FREE QUAD SPLIT VIR 3+YRS IM     Histoplasma Alis by Immunodiffusion     UA with Microscopic reflex to Culture     XR Chest 2 Views        Primary Care Provider Office Phone # Fax #    Lindsay Deja Foreman PA-C [phone]       [address]  [facility] [address]        Equal Access to Services     MELANI MARCOS : Hadii aad ku hadasho Soomaali, waaxda luqadaha, qaybta kaalmada adeegyada, waxay idiin hayaan adeeg kharash la'aan . So [facility] [phone].    ATENCIÓN: Si celestinola vashti, tiene a barroso disposición servicios gratuitos de asistencia lingüística. Llame al [phone].    We comply with applicable federal civil rights laws and [place] laws. We do not discriminate on the basis of race, color, national origin, age, disability, sex, sexual orientation, or gender identity.            Thank you!     Thank you for choosing [facility]  for your care. Our goal is always to provide you with excellent care. Hearing back from our patients is one way we can continue to improve our services. Please take a few minutes to complete the written survey that you may receive in the mail after your visit with us. Thank you!             Your Updated Medication List - Protect others around you: Learn how to safely use, store and throw away your medicines at www.disposemymeds.org.          This list is accurate as of: 10/10/17  6:23 PM.  Always use your most recent med list.                   Brand Name Dispense Instructions for use Diagnosis    Biotin 5000 MCG Caps      Take 1 tablet by mouth daily.        buPROPion 150 MG 24 hr tablet    WELLBUTRIN XL    90 tablet    TAKE ONE TABLET BY MOUTH EVERY MORNING    Major depressive disorder, recurrent episode, in partial remission (H)       CALCIUM CITRATE PO      Take 3 capsules by mouth 3 times daily    DVT (deep venous thrombosis), left,  Hypercoagulable state (H), Varicose veins       carvedilol 12.5 MG tablet    COREG    180 tablet    Take 1 tablet (12.5 mg) by mouth 2 times daily (with meals)    Hypertension goal BP (blood pressure) < 130/80       cyanocobalamin 500 MCG Tabs     90 tablet    Take 250 mcg daily    S/P gastric bypass       IRON 100 PLUS 100-250-0.025-1 MG Tabs   Generic drug:  Iron-vit C-vit B12-folic acid      Take  by mouth.        losartan 50 MG tablet    COZAAR    180 tablet    Take 1 tablet (50 mg) by mouth 2 times daily (with meals)    Hypertension goal BP (blood pressure) < 130/80       MULTIVITAL Tabs      Take 2 tablets by mouth daily.        pantoprazole 40 MG EC tablet    PROTONIX    180 tablet    Take 1 tablet (40 mg) by mouth 2 times daily    Gastroesophageal reflux disease without esophagitis       traZODone 50 MG tablet    DESYREL    90 tablet    Take 1 tablet (50 mg) by mouth nightly as needed for sleep Take 2 hours before bedtime.    Insomnia, unspecified type       VITAMIN C PO       Routine gynecological examination, Need for prophylactic vaccination and inoculation against influenza, HTN (hypertension), Screening for thyroid disorder, Lipid screening, Screening for diabetes mellitus       VITAMIN D PO       Routine gynecological examination, Need for prophylactic vaccination and inoculation against influenza, HTN (hypertension), Screening for thyroid disorder, Lipid screening, Screening for diabetes mellitus

## 2017-10-10 NOTE — PATIENT INSTRUCTIONS
================================================================================  Normal Values   Blood pressure  <140/90 for most adults    <130/80 for some chronic diseases (ask your care team about yours)    BMI (body mass index)  18.5-25 kg/m2 (based on height and weight)     Thank you for visiting [facility]    Normal or non-critical lab and imaging results will be communicated to you by MyChart, letter or phone within 7 days.  If you do not hear from us within 10 days, please call the clinic. If you have a critical or abnormal lab result, we will notify you by phone as soon as possible.     If you have any questions regarding your visit please contact:     Team Comfort:   Clinic Hours Telephone Number   Dr. Nate Albert Dr. V[place] 7am-5pm  Monday - Friday [phone]  Adán RN  Allyson RN  Ora RN   Pharmacy 8:00am-8pm Monday-Friday    9am-5pm Saturday-Sunday [phone]   Urgent Care 11am-9pm Monday-Friday        9am-5pm Saturday-Sunday [phone]     After hours, weekend or if you need to make an appointment with your primary provider please call [phone].   After Hours nurse advise: call [place] [facility]: [phone]    Medication Refills:  Call your pharmacy and they will forward the refill to us. Please allow 3 business days for your refills to be completed.      PATIENT INSTRUCTIONS:  1) To schedule CT of chest, please call [phone].

## 2017-10-11 ENCOUNTER — MYC MEDICAL ADVICE (OUTPATIENT)
Dept: FAMILY MEDICINE | Facility: CLINIC | Age: 62
End: 2017-10-11

## 2017-10-11 DIAGNOSIS — F40.240 CLAUSTROPHOBIA: Primary | ICD-10-CM

## 2017-10-11 RX ORDER — DIAZEPAM 5 MG
5 TABLET ORAL ONCE
Qty: 1 TABLET | Refills: 0 | Status: SHIPPED | OUTPATIENT
Start: 2017-10-11 | End: 2017-10-11

## 2017-10-12 ENCOUNTER — RADIANT APPOINTMENT (OUTPATIENT)
Dept: CT IMAGING | Facility: CLINIC | Age: 62
End: 2017-10-12
Attending: INTERNAL MEDICINE
Payer: COMMERCIAL

## 2017-10-12 DIAGNOSIS — R05.3 CHRONIC COUGH: ICD-10-CM

## 2017-10-12 PROCEDURE — 71250 CT THORAX DX C-: CPT | Performed by: RADIOLOGY

## 2017-10-12 NOTE — TELEPHONE ENCOUNTER
Faxed Rx for the Valium to [facility], [phone], right fax confirmed at 9:55 am today.  Lena Cohen MA/  For Teams [employer] and [employer]

## 2017-10-13 ENCOUNTER — TELEPHONE (OUTPATIENT)
Dept: FAMILY MEDICINE | Facility: CLINIC | Age: 62
End: 2017-10-13

## 2017-10-13 DIAGNOSIS — R91.8 PULMONARY NODULES: Primary | ICD-10-CM

## 2017-10-13 LAB — RADIOLOGIST FLAGS: NORMAL

## 2017-10-13 NOTE — TELEPHONE ENCOUNTER
I recommend repeat CT of chest in 8 weeks from last test and every 3 months (or otherwise depending on  future consultation with the Lung Nodule Program.      Referral sent to [facility] (Call [phone]).    Once she sees Lung Nodule Program Referral, all decisions will be deferred to them.

## 2017-10-13 NOTE — TELEPHONE ENCOUNTER
Reason for Call:  Request for results:    Name of test or procedure: Coccidones Antibody, Candida Antibody by ID, Blastomyces Antibody ID, Aspergillus Antibody, Histoplasma NICA by Immunodiffusion, UA with Microscopic and Reflex to Culture,     Date of test of procedure: 10/10/17    Location of the test or procedure: [facility]    OK to leave the result message on voice mail or with a family member? YES    Phone number Patient can be reached at:  Home number on file [phone] (home)    Additional comments: anytime    Call taken on 10/13/2017 at 1:19 PM by Antione Mg

## 2017-10-13 NOTE — TELEPHONE ENCOUNTER
Correction to below, patient not requesting lab results (she was notified they are still in process).      Patient calling about CT results. Patient shares her concern of the word nodule. Wondering what a nodule is or if she should be concerned. I did review some of the common questions regarding lung nodules with patient via [employer] Patient education resource. Patient verbalized an understanding.     Patient wondering why radiologist wrote f/u 3-6 months and Dr. Merrill indicated 6 months.   Patient's father did have lung cancer. Patient has a very rare smoking history. Maybe one cigarette a year, roughly 20 years ago.   Patient wondering if she should get another CT scan before end of year is up so it can be on her deductible for this year.     Patient requesting response from provider via [employer].   Allyson Osborne RN

## 2017-10-15 LAB — COCCIDIOIDES AB SPEC QL ID: NORMAL

## 2017-10-16 LAB
ASPERGILLUS AB SER QL ID: NORMAL
B DERMAT AB SER QL ID: NORMAL
C ALBICANS AB SER QL ID: DETECTED

## 2017-10-16 NOTE — TELEPHONE ENCOUNTER
1.  Date/reason for appt: 10/18/17 - Pulmonary Nodules    2.  Referring provider: Dr Bandar Merrill       3.  Call to patient (Yes / No - short description): Yes    4.  Previous care at / records requested from: [facility] - [facility]     5.  Recent Imaging: 10/12/17 CT chest, 10/10/17 XR chest - attached & viewable     (Pt scheduled for CT chest on 11/29/17 @ 0800 - prior to appt with Dr Valles @ 0820)

## 2017-10-16 NOTE — TELEPHONE ENCOUNTER
Appt time changed from 0840 to 1440 on 12/13/17 per pt request.    CT time changed from 0720 to 1340 on 12/13/17 per pt request.    ____________________________________________________________    Rescheduled appt with Dr Valles from 11/29/17 @ 0840 to 12/13/17 @ 0840 &   rescheduled CT chest from 11/29/17 @ 0800 to 12/13/17 @ 0720 per task-Sara     ____________________________________________________________    Rescheduled appt with Dr. Valles & CT appt from 10/18/17 to 11/29 per pt.

## 2017-10-17 LAB — LAB SCANNED RESULT: NORMAL

## 2017-10-18 ENCOUNTER — PRE VISIT (OUTPATIENT)
Dept: PULMONOLOGY | Facility: CLINIC | Age: 62
End: 2017-10-18

## 2017-11-13 ENCOUNTER — MYC MEDICAL ADVICE (OUTPATIENT)
Dept: FAMILY MEDICINE | Facility: CLINIC | Age: 62
End: 2017-11-13

## 2017-11-13 NOTE — TELEPHONE ENCOUNTER
Patient would like a call back from care team. Patient would like to know is she should come in to see Dr. Merrill or not. Please call patient back.

## 2017-11-15 ENCOUNTER — OFFICE VISIT (OUTPATIENT)
Dept: FAMILY MEDICINE | Facility: CLINIC | Age: 62
End: 2017-11-15
Payer: COMMERCIAL

## 2017-11-15 ENCOUNTER — RADIANT APPOINTMENT (OUTPATIENT)
Dept: GENERAL RADIOLOGY | Facility: CLINIC | Age: 62
End: 2017-11-15
Attending: INTERNAL MEDICINE
Payer: COMMERCIAL

## 2017-11-15 VITALS
HEART RATE: 79 BPM | WEIGHT: 159 LBS | HEIGHT: 66 IN | TEMPERATURE: 98.1 F | SYSTOLIC BLOOD PRESSURE: 128 MMHG | DIASTOLIC BLOOD PRESSURE: 80 MMHG | OXYGEN SATURATION: 99 % | BODY MASS INDEX: 25.55 KG/M2

## 2017-11-15 DIAGNOSIS — R05.8 UPPER AIRWAY COUGH SYNDROME: ICD-10-CM

## 2017-11-15 DIAGNOSIS — J32.0 RIGHT MAXILLARY SINUSITIS: Primary | ICD-10-CM

## 2017-11-15 PROCEDURE — 70220 X-RAY EXAM OF SINUSES: CPT

## 2017-11-15 PROCEDURE — 99214 OFFICE O/P EST MOD 30 MIN: CPT | Performed by: INTERNAL MEDICINE

## 2017-11-15 RX ORDER — FLUTICASONE PROPIONATE 50 MCG
1 SPRAY, SUSPENSION (ML) NASAL DAILY PRN
Qty: 1 BOTTLE | Refills: 11 | Status: SHIPPED | OUTPATIENT
Start: 2017-11-15 | End: 2019-08-13

## 2017-11-15 RX ORDER — AZITHROMYCIN 250 MG/1
TABLET, FILM COATED ORAL
Qty: 6 TABLET | Refills: 1 | Status: SHIPPED | OUTPATIENT
Start: 2017-11-15 | End: 2017-12-20

## 2017-11-15 RX ORDER — PREDNISONE 20 MG/1
40 TABLET ORAL DAILY
Qty: 10 TABLET | Refills: 1 | Status: SHIPPED | OUTPATIENT
Start: 2017-11-15 | End: 2017-11-20

## 2017-11-15 NOTE — NURSING NOTE
"Chief Complaint   Patient presents with     Cough       Initial /85 (BP Location: Left arm, Patient Position: Chair, Cuff Size: Adult Regular)  Pulse 79  Temp 98.1  F (36.7  C) (Oral)  Ht 5' 6\" (1.676 m)  Wt 159 lb (72.1 kg)  LMP 06/27/2010  SpO2 99%  BMI 25.66 kg/m2 Estimated body mass index is 25.66 kg/(m^2) as calculated from the following:    Height as of this encounter: 5' 6\" (1.676 m).    Weight as of this encounter: 159 lb (72.1 kg).  Medication Reconciliation: complete     Pa Miriam Tang MA      "

## 2017-11-15 NOTE — MR AVS SNAPSHOT
After Visit Summary   11/15/2017    Marlee Tobar    MRN: [id number]           Patient Information     Date Of Birth          1955        Visit Information        Provider Department      11/15/2017 8:00 AM Bandar Merrill MD [facility]        Today's Diagnoses     Upper airway cough syndrome    -  1    Ethmoid sinusitis, unspecified chronicity          Care Instructions    At [facility], we strive to deliver an exceptional experience to you, every time we see you.  If you receive a survey in the mail, please send us back your thoughts. We really do value your feedback.    Based on your medical history, these are the current health maintenance/preventive care services that you are due for (some may have been done at this visit.)  There are no preventive care reminders to display for this patient.      Suggested websites for health information:  Www.citibuddies.[employer] : Up to date and easily searchable information on multiple topics.  Www.[employer].gov : medication info, interactive tutorials, watch real surgeries online  Www.familydoctor.org : good info from the Academy of Family Physicians  Www.cdc.gov : public health info, travel advisories, epidemics (H1N1)  Www.aap.org : children's health info, normal development, vaccinations  Www.health.[facility].us : [place] dept of health, public health issues in [place], N1N1    Your care team:                            Family Medicine Internal Medicine   MD Bandar Abraham MD Shantel Branch-Fleming, MD Katya Georgiev PA-C Nam Ho, MD Pediatrics   KAMILLE Pathak, MD Helena Cornejo CNP, MD Deborah Mielke, MD Kim Thein, APRN CNP      Clinic hours: Monday - Thursday 7 am-7 pm; Fridays 7 am-5 pm.   Urgent care: Monday - Friday 11 am-9 pm; Saturday and Sunday 9 am-5 pm.  Pharmacy : Monday -Thursday 8 am-8 pm; Friday 8 am-6 pm; Saturday  and Sunday 9 am-5 pm.     Clinic: [phone]   Pharmacy: [phone]            Follow-ups after your visit        Your next 10 appointments already scheduled     Dec 20, 2017  9:40 AM CST   (Arrive by 9:25 AM)   CT CHEST W/O CONTRAST with UCCT1   [facility] ([facility])    [address] [place] [id number] [phone]           Please bring any scans or X-rays taken at other hospitals, if similar tests were done. Also bring a list of your medicines, including vitamins, minerals and over-the-counter drugs. It is safest to leave personal items at home.  Be sure to tell your doctor:   If you have any allergies.   If there s any chance you are pregnant.   If you are breastfeeding.   If you have any special needs.  You do not need to do anything special to prepare.  Please wear loose clothing, such as a sweat suit or jogging clothes. Avoid snaps, zippers and other metal. We may ask you to undress and put on a hospital gown.            Dec 20, 2017 10:20 AM CST   (Arrive by 10:05 AM)   NEW LUNG NODULE with Brett Valles MD   [facility] ([facility])    [address] [place] [address] [phone]              Who to contact     If you have questions or need follow up information about today's clinic visit or your schedule please contact [facility] directly at [phone].  Normal or non-critical lab and imaging results will be communicated to you by MyChart, letter or phone within 4 business days after the clinic has received the results. If you do not hear from us within 7 days, please contact the clinic through MyChart or phone. If you have a critical or abnormal lab result, we will notify you by phone as soon as possible.  Submit refill requests through [employer] or call your pharmacy and they will forward the refill request to us. Please allow  "3 business days for your refill to be completed.          Additional Information About Your Visit        [employer] Information     [employer] gives you secure access to your electronic health record. If you see a primary care provider, you can also send messages to your care team and make appointments. If you have questions, please call your primary care clinic.  If you do not have a primary care provider, please call [phone] and they will assist you.        Care EveryWhere ID     This is your Care EveryWhere ID. This could be used by other organizations to access your [place] medical records  [id number]        Your Vitals Were     Pulse Temperature Height Last Period Pulse Oximetry BMI (Body Mass Index)    79 98.1  F (36.7  C) (Oral) 5' 6\" (1.676 m) 06/27/2010 99% 25.66 kg/m2       Blood Pressure from Last 3 Encounters:   11/15/17 128/80   10/10/17 145/80   06/29/17 139/88    Weight from Last 3 Encounters:   11/15/17 159 lb (72.1 kg)   10/10/17 158 lb (71.7 kg)   06/29/17 160 lb 12.8 oz (72.9 kg)              We Performed the Following     XR Sinus Complete G/E 3 Views          Today's Medication Changes          These changes are accurate as of: 11/15/17  8:55 AM.  If you have any questions, ask your nurse or doctor.               Start taking these medicines.        Dose/Directions    azithromycin 250 MG tablet   Commonly known as:  ZITHROMAX   Used for:  Ethmoid sinusitis, unspecified chronicity   Started by:  Bandar Merrill MD        Two tablets first day, then one tablet daily for four days.   Quantity:  6 tablet   Refills:  1       fluticasone 50 MCG/ACT spray   Commonly known as:  FLONASE   Used for:  Upper airway cough syndrome   Started by:  Bandar Merrill MD        Dose:  1 spray   Spray 1 spray into both nostrils daily as needed for rhinitis or allergies   Quantity:  1 Bottle   Refills:  11       predniSONE 20 MG tablet   Commonly known as:  DELTASONE   Used for:  Ethmoid sinusitis, " unspecified chronicity   Started by:  Bandar Merrill MD        Dose:  40 mg   Take 2 tablets (40 mg) by mouth daily for 5 days   Quantity:  10 tablet   Refills:  1            Where to get your medicines      [place] [facility] [place] [address]  [address], [place] [address]     Phone:  [phone]     azithromycin 250 MG tablet    fluticasone 50 MCG/ACT spray    predniSONE 20 MG tablet                Primary Care Provider Office Phone # Fax #    Lindsay Foreman PA-C [phone]       [address]  [facility] [address]        Equal Access to Services     [facility] : Hadii padmini godoy hadasho Soyuriali, waaxda luqadaha, qaybta kaalmada adeegyada, saad gorman . So [facility] [phone].    ATENCIÓN: Si habla español, tiene a barroso disposición servicios gratuitos de asistencia lingüística. [facility] [phone].    We comply with applicable federal civil rights laws and [place] laws. We do not discriminate on the basis of race, color, national origin, age, disability, sex, sexual orientation, or gender identity.            Thank you!     Thank you for choosing [facility]  for your care. Our goal is always to provide you with excellent care. Hearing back from our patients is one way we can continue to improve our services. Please take a few minutes to complete the written survey that you may receive in the mail after your visit with us. Thank you!             Your Updated Medication List - Protect others around you: Learn how to safely use, store and throw away your medicines at www.disposemymeds.org.          This list is accurate as of: 11/15/17  8:55 AM.  Always use your most recent med list.                   Brand Name Dispense Instructions for use Diagnosis    azithromycin 250 MG tablet    ZITHROMAX    6 tablet    Two tablets first day, then one tablet daily for four  days.    Ethmoid sinusitis, unspecified chronicity       Biotin 5000 MCG Caps      Take 1 tablet by mouth daily.        buPROPion 150 MG 24 hr tablet    WELLBUTRIN XL    90 tablet    TAKE ONE TABLET BY MOUTH EVERY MORNING    Major depressive disorder, recurrent episode, in partial remission (H)       CALCIUM CITRATE PO      Take 3 capsules by mouth 3 times daily    DVT (deep venous thrombosis), left, Hypercoagulable state (H), Varicose veins       carvedilol 12.5 MG tablet    COREG    180 tablet    Take 1 tablet (12.5 mg) by mouth 2 times daily (with meals)    Hypertension goal BP (blood pressure) < 130/80       cyanocobalamin 500 MCG Tabs     90 tablet    Take 250 mcg daily    S/P gastric bypass       fluticasone 50 MCG/ACT spray    FLONASE    1 Bottle    Spray 1 spray into both nostrils daily as needed for rhinitis or allergies    Upper airway cough syndrome       IRON 100 PLUS 100-250-0.025-1 MG Tabs   Generic drug:  Iron-vit C-vit B12-folic acid      Take  by mouth.        losartan 50 MG tablet    COZAAR    180 tablet    Take 1 tablet (50 mg) by mouth 2 times daily (with meals)    Hypertension goal BP (blood pressure) < 130/80       MULTIVITAL Tabs      Take 2 tablets by mouth daily.        pantoprazole 40 MG EC tablet    PROTONIX    180 tablet    Take 1 tablet (40 mg) by mouth 2 times daily    Gastroesophageal reflux disease without esophagitis       predniSONE 20 MG tablet    DELTASONE    10 tablet    Take 2 tablets (40 mg) by mouth daily for 5 days    Ethmoid sinusitis, unspecified chronicity       traZODone 50 MG tablet    DESYREL    90 tablet    Take 1 tablet (50 mg) by mouth nightly as needed for sleep Take 2 hours before bedtime.    Insomnia, unspecified type       VITAMIN C PO       Routine gynecological examination, Need for prophylactic vaccination and inoculation against influenza, HTN (hypertension), Screening for thyroid disorder, Lipid screening, Screening for diabetes mellitus       VITAMIN D PO        Routine gynecological examination, Need for prophylactic vaccination and inoculation against influenza, HTN (hypertension), Screening for thyroid disorder, Lipid screening, Screening for diabetes mellitus
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
## 2017-11-15 NOTE — PATIENT INSTRUCTIONS
At [facility], we strive to deliver an exceptional experience to you, every time we see you.  If you receive a survey in the mail, please send us back your thoughts. We really do value your feedback.    Based on your medical history, these are the current health maintenance/preventive care services that you are due for (some may have been done at this visit.)  There are no preventive care reminders to display for this patient.      Suggested websites for health information:  Www.[employer].org : Up to date and easily searchable information on multiple topics.  Www.medlineplus.gov : medication info, interactive tutorials, watch real surgeries online  Www.familydoctor.org : good info from the Academy of Family Physicians  Www.cdc.gov : public health info, travel advisories, epidemics (H1N1)  Www.aap.org : children's health info, normal development, vaccinations  Www.health.[facility].[place].us : [place] dept of health, public health issues in [place], N1N1    Your care team:                            Family Medicine Internal Medicine   MD Bandar Abraham MD Shantel Branch-Fleming, MD Katya Georgiev PA-C Nam Ho, MD Pediatrics   KAMILLE Pathak, JOSE RAMON Lau APRSTEVE CNP   MD Helena Lopez MD Deborah Mielke, MD Kim Thein, APRN CNP      Clinic hours: Monday - Thursday 7 am-7 pm; Fridays 7 am-5 pm.   Urgent care: Monday - Friday 11 am-9 pm; Saturday and Sunday 9 am-5 pm.  Pharmacy : Monday -Thursday 8 am-8 pm; Friday 8 am-6 pm; Saturday and Sunday 9 am-5 pm.     Clinic: [phone]   Pharmacy: [phone]

## 2017-12-17 DIAGNOSIS — F33.41 MAJOR DEPRESSIVE DISORDER, RECURRENT EPISODE, IN PARTIAL REMISSION (H): ICD-10-CM

## 2017-12-17 NOTE — TELEPHONE ENCOUNTER
Requested Prescriptions   Pending Prescriptions Disp Refills     buPROPion (WELLBUTRIN XL) 150 MG 24 hr tablet [Pharmacy Med Name: BUPROPION HCL ER (XL) 150MG TB24] 90 tablet 0    Last Written Prescription Date:  9/27/17  Last Fill Quantity: 90,  # refills: 0   Last Office Visit with [facility], P or [facility] prescribing provider:  11/15/2017     Future Office Visit:      Sig: TAKE ONE TABLET BY MOUTH EVERY MORNING    SSRIs Protocol Failed    12/17/2017  8:29 AM       Failed - Medication is NOT Bupropion    If the medication is Bupropion (Wellbutrin), and the patient is taking for smoking cessation; OK to refill.         Passed - PHQ-9 score less than 5 in past 6 months    Please review PHQ-9 score.          Passed - Patient is age 18 or older       Passed - No active pregnancy on record       Passed - No positive pregnancy test in last 12 months       Passed - Recent (6 mo) or future visit with authorizing provider's specialty    Patient had office visit in the last 6 months or has a visit in the next 30 days with authorizing provider.  See chart review.

## 2017-12-20 ENCOUNTER — RADIANT APPOINTMENT (OUTPATIENT)
Dept: CT IMAGING | Facility: CLINIC | Age: 62
End: 2017-12-20
Attending: INTERNAL MEDICINE
Payer: COMMERCIAL

## 2017-12-20 ENCOUNTER — TELEPHONE (OUTPATIENT)
Dept: FAMILY MEDICINE | Facility: CLINIC | Age: 62
End: 2017-12-20

## 2017-12-20 ENCOUNTER — OFFICE VISIT (OUTPATIENT)
Dept: PULMONOLOGY | Facility: CLINIC | Age: 62
End: 2017-12-20
Attending: INTERNAL MEDICINE
Payer: COMMERCIAL

## 2017-12-20 VITALS
WEIGHT: 155.42 LBS | BODY MASS INDEX: 25.09 KG/M2 | SYSTOLIC BLOOD PRESSURE: 187 MMHG | RESPIRATION RATE: 16 BRPM | DIASTOLIC BLOOD PRESSURE: 98 MMHG | OXYGEN SATURATION: 100 % | TEMPERATURE: 98.3 F | HEART RATE: 74 BPM

## 2017-12-20 DIAGNOSIS — R91.8 PULMONARY NODULES: ICD-10-CM

## 2017-12-20 DIAGNOSIS — R91.8 PULMONARY NODULES: Primary | ICD-10-CM

## 2017-12-20 LAB — RADIOLOGIST FLAGS: NORMAL

## 2017-12-20 PROCEDURE — 99212 OFFICE O/P EST SF 10 MIN: CPT | Mod: ZF

## 2017-12-20 RX ORDER — BUPROPION HYDROCHLORIDE 150 MG/1
TABLET ORAL
Qty: 30 TABLET | Refills: 0 | Status: SHIPPED | OUTPATIENT
Start: 2017-12-20 | End: 2018-01-08

## 2017-12-20 ASSESSMENT — PAIN SCALES - GENERAL: PAINLEVEL: NO PAIN (0)
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
## 2017-12-20 NOTE — TELEPHONE ENCOUNTER
This is the patient's second CT of chest in two months.    First CT of chest was 10/12/2017 and recommendations from that CT of chest is to repeat test in 3 - 6 months.    However, patient insisted on earlier interval at 2 months since she met her health insurance deductible for this year.    CT of chest results today was already sent via [employer].

## 2017-12-20 NOTE — MR AVS SNAPSHOT
After Visit Summary   12/20/2017    Marlee Tobar    MRN: [id number]           Patient Information     Date Of Birth          1955        Visit Information        Provider Department      12/20/2017 10:20 AM Brett Valles MD [facility]        Today's Diagnoses     Pulmonary nodules    -  1       Follow-ups after your visit        Your next 10 appointments already scheduled     Oct 24, 2018  8:00 AM CDT   (Arrive by 7:45 AM)   CT CHEST W/O CONTRAST with UCCT2   [facility] CT ([facility])    [address] [place] [id number] [phone]           Please bring any scans or X-rays taken at other hospitals, if similar tests were done. Also bring a list of your medicines, including vitamins, minerals and over-the-counter drugs. It is safest to leave personal items at home.  Be sure to tell your doctor:   If you have any allergies.   If there s any chance you are pregnant.   If you are breastfeeding.   If you have any special needs.  You do not need to do anything special to prepare.  Please wear loose clothing, such as a sweat suit or jogging clothes. Avoid snaps, zippers and other metal. We may ask you to undress and put on a hospital gown.            Oct 24, 2018  8:40 AM CDT   (Arrive by 8:25 AM)   Return Visit with Brett Valles MD   [facility] ([facility])    [address] [place] [id number] [phone]              Future tests that were ordered for you today     Open Future Orders        Priority Expected Expires Ordered    CT Chest w/o Contrast Routine 10/20/2018 12/20/2018 12/20/2017            Who to contact     If you have questions or need follow up information about today's clinic visit or your schedule please contact [facility] directly at [phone].  Normal or non-critical lab and imaging  results will be communicated to you by [employer], letter or phone within 4 business days after the clinic has received the results. If you do not hear from us within 7 days, please contact the clinic through [employer] or phone. If you have a critical or abnormal lab result, we will notify you by phone as soon as possible.  Submit refill requests through [employer] or call your pharmacy and they will forward the refill request to us. Please allow 3 business days for your refill to be completed.          Additional Information About Your Visit        [employer] Information     [employer] gives you secure access to your electronic health record. If you see a primary care provider, you can also send messages to your care team and make appointments. If you have questions, please call your primary care clinic.  If you do not have a primary care provider, please call [phone] and they will assist you.        Care EveryWhere ID     This is your Care EveryWhere ID. This could be used by other organizations to access your [facility] medical records  [id number]        Your Vitals Were     Pulse Temperature Respirations Last Period Pulse Oximetry BMI (Body Mass Index)    74 98.3  F (36.8  C) (Oral) 16 06/27/2010 100% 25.09 kg/m2       Blood Pressure from Last 3 Encounters:   12/20/17 (!) 187/98   11/15/17 128/80   10/10/17 145/80    Weight from Last 3 Encounters:   12/20/17 70.5 kg (155 lb 6.8 oz)   11/15/17 72.1 kg (159 lb)   10/10/17 71.7 kg (158 lb)                 Today's Medication Changes          These changes are accurate as of: 12/20/17 11:02 AM.  If you have any questions, ask your nurse or doctor.               Stop taking these medicines if you haven't already. Please contact your care team if you have questions.     azithromycin 250 MG tablet   Commonly known as:  ZITHROMAX   Stopped by:  Brett Valles MD                    Primary Care Provider Office Phone # Fax #    Lindsay Foreman PA-C [phone]  [phone]       [address]  [place] [address]        Equal Access to Services     MELANI MARCOS : Hadii aad ku hadsamrao Sodipesh, waaxda luqadaha, qaybta kaalmada stacieottonielade, saad posadas alonaraghu saraviaradhaisidoro zarate. So [facility] [phone].    ATENCIÓN: Si habla español, tiene a barroso disposición servicios gratuitos de asistencia lingüística. Llame al [phone].    We comply with applicable federal civil rights laws and [place] laws. We do not discriminate on the basis of race, color, national origin, age, disability, sex, sexual orientation, or gender identity.            Thank you!     Thank you for choosing [facility]  for your care. Our goal is always to provide you with excellent care. Hearing back from our patients is one way we can continue to improve our services. Please take a few minutes to complete the written survey that you may receive in the mail after your visit with us. Thank you!             Your Updated Medication List - Protect others around you: Learn how to safely use, store and throw away your medicines at www.disposemymeds.org.          This list is accurate as of: 12/20/17 11:02 AM.  Always use your most recent med list.                   Brand Name Dispense Instructions for use Diagnosis    Biotin 5000 MCG Caps      Take 1 tablet by mouth daily.        buPROPion 150 MG 24 hr tablet    WELLBUTRIN XL    90 tablet    TAKE ONE TABLET BY MOUTH EVERY MORNING    Major depressive disorder, recurrent episode, in partial remission (H)       CALCIUM CITRATE PO      Take 3 capsules by mouth 3 times daily    DVT (deep venous thrombosis), left, Hypercoagulable state (H), Varicose veins       carvedilol 12.5 MG tablet    COREG    180 tablet    Take 1 tablet (12.5 mg) by mouth 2 times daily (with meals)    Hypertension goal BP (blood pressure) < 130/80       cyanocobalamin 500 MCG Tabs     90 tablet    Take 250 mcg daily    S/P gastric bypass       fluticasone 50 MCG/ACT  spray    FLONASE    1 Bottle    Spray 1 spray into both nostrils daily as needed for rhinitis or allergies    Upper airway cough syndrome       IRON 100 PLUS 100-250-0.025-1 MG Tabs   Generic drug:  Iron-vit C-vit B12-folic acid      Take  by mouth.        losartan 50 MG tablet    COZAAR    180 tablet    Take 1 tablet (50 mg) by mouth 2 times daily (with meals)    Hypertension goal BP (blood pressure) < 130/80       MULTIVITAL Tabs      Take 2 tablets by mouth daily.        pantoprazole 40 MG EC tablet    PROTONIX    180 tablet    Take 1 tablet (40 mg) by mouth 2 times daily    Gastroesophageal reflux disease without esophagitis       traZODone 50 MG tablet    DESYREL    90 tablet    Take 1 tablet (50 mg) by mouth nightly as needed for sleep Take 2 hours before bedtime.    Insomnia, unspecified type       VITAMIN C PO       Routine gynecological examination, Need for prophylactic vaccination and inoculation against influenza, HTN (hypertension), Screening for thyroid disorder, Lipid screening, Screening for diabetes mellitus       VITAMIN D PO       Routine gynecological examination, Need for prophylactic vaccination and inoculation against influenza, HTN (hypertension), Screening for thyroid disorder, Lipid screening, Screening for diabetes mellitus
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
## 2017-12-20 NOTE — TELEPHONE ENCOUNTER
[facility] [place] [facility] called. CT done today, radiologist noted a lung nodule that needs follow up.    Radha Tapia RN   [facility] Triage

## 2017-12-21 NOTE — TELEPHONE ENCOUNTER
Call patient and informed of message below.  Stated understood.  Appointment scheduled for 1-8-18 with Dr. Merrill for physical.     Alexsander Tang MA

## 2017-12-21 NOTE — TELEPHONE ENCOUNTER
buPROPion (WELLBUTRIN XL) 150 MG 24 hr tablet    PHQ-9 SCORE 12/16/2015 1/6/2017 9/27/2017   Total Score - - -   Total Score MyChart - - 2 (Minimal depression)   Total Score 3 2 2     Medication is being filled for 1 time refill only due to:  Patient needs to be seen because due for depression follow up.   Please assist with scheduling.    Yamila Blanco RN, BSN

## 2018-01-08 ENCOUNTER — MYC MEDICAL ADVICE (OUTPATIENT)
Dept: FAMILY MEDICINE | Facility: CLINIC | Age: 63
End: 2018-01-08

## 2018-01-08 ENCOUNTER — OFFICE VISIT (OUTPATIENT)
Dept: FAMILY MEDICINE | Facility: CLINIC | Age: 63
End: 2018-01-08
Payer: COMMERCIAL

## 2018-01-08 VITALS
WEIGHT: 157 LBS | OXYGEN SATURATION: 99 % | DIASTOLIC BLOOD PRESSURE: 83 MMHG | HEIGHT: 66 IN | HEART RATE: 63 BPM | TEMPERATURE: 98.1 F | SYSTOLIC BLOOD PRESSURE: 137 MMHG | BODY MASS INDEX: 25.23 KG/M2

## 2018-01-08 DIAGNOSIS — Z98.84 BARIATRIC SURGERY STATUS: ICD-10-CM

## 2018-01-08 DIAGNOSIS — K21.9 GASTROESOPHAGEAL REFLUX DISEASE WITHOUT ESOPHAGITIS: ICD-10-CM

## 2018-01-08 DIAGNOSIS — J01.00 ACUTE NON-RECURRENT MAXILLARY SINUSITIS: ICD-10-CM

## 2018-01-08 DIAGNOSIS — I10 HYPERTENSION GOAL BP (BLOOD PRESSURE) < 140/90: ICD-10-CM

## 2018-01-08 DIAGNOSIS — F33.41 MAJOR DEPRESSIVE DISORDER, RECURRENT EPISODE, IN PARTIAL REMISSION (H): ICD-10-CM

## 2018-01-08 DIAGNOSIS — Z12.31 VISIT FOR SCREENING MAMMOGRAM: ICD-10-CM

## 2018-01-08 DIAGNOSIS — G47.00 INSOMNIA, UNSPECIFIED TYPE: ICD-10-CM

## 2018-01-08 DIAGNOSIS — Z00.00 ROUTINE GENERAL MEDICAL EXAMINATION AT A HEALTH CARE FACILITY: Primary | ICD-10-CM

## 2018-01-08 DIAGNOSIS — Z13.6 CARDIOVASCULAR SCREENING; LDL GOAL LESS THAN 130: ICD-10-CM

## 2018-01-08 LAB
ALBUMIN SERPL-MCNC: 3.8 G/DL (ref 3.4–5)
ALP SERPL-CCNC: 74 U/L (ref 40–150)
ALT SERPL W P-5'-P-CCNC: 22 U/L (ref 0–50)
ANION GAP SERPL CALCULATED.3IONS-SCNC: 8 MMOL/L (ref 3–14)
AST SERPL W P-5'-P-CCNC: 12 U/L (ref 0–45)
BASOPHILS # BLD AUTO: 0 10E9/L (ref 0–0.2)
BASOPHILS NFR BLD AUTO: 0.6 %
BILIRUB SERPL-MCNC: 0.7 MG/DL (ref 0.2–1.3)
BUN SERPL-MCNC: 15 MG/DL (ref 7–30)
CALCIUM SERPL-MCNC: 9.2 MG/DL (ref 8.5–10.1)
CHLORIDE SERPL-SCNC: 107 MMOL/L (ref 94–109)
CHOLEST SERPL-MCNC: 173 MG/DL
CO2 SERPL-SCNC: 29 MMOL/L (ref 20–32)
CREAT SERPL-MCNC: 0.68 MG/DL (ref 0.52–1.04)
CREAT UR-MCNC: 265 MG/DL
DEPRECATED CALCIDIOL+CALCIFEROL SERPL-MC: 64 UG/L (ref 20–75)
DIFFERENTIAL METHOD BLD: NORMAL
EOSINOPHIL # BLD AUTO: 0.2 10E9/L (ref 0–0.7)
EOSINOPHIL NFR BLD AUTO: 3.4 %
ERYTHROCYTE [DISTWIDTH] IN BLOOD BY AUTOMATED COUNT: 13.5 % (ref 10–15)
GFR SERPL CREATININE-BSD FRML MDRD: 88 ML/MIN/1.7M2
GLUCOSE SERPL-MCNC: 87 MG/DL (ref 70–99)
HCT VFR BLD AUTO: 40.2 % (ref 35–47)
HDLC SERPL-MCNC: 83 MG/DL
HGB BLD-MCNC: 13.1 G/DL (ref 11.7–15.7)
LDLC SERPL CALC-MCNC: 81 MG/DL
LYMPHOCYTES # BLD AUTO: 1.4 10E9/L (ref 0.8–5.3)
LYMPHOCYTES NFR BLD AUTO: 28.1 %
MCH RBC QN AUTO: 32 PG (ref 26.5–33)
MCHC RBC AUTO-ENTMCNC: 32.6 G/DL (ref 31.5–36.5)
MCV RBC AUTO: 98 FL (ref 78–100)
MICROALBUMIN UR-MCNC: 12 MG/L
MICROALBUMIN/CREAT UR: 4.57 MG/G CR (ref 0–25)
MONOCYTES # BLD AUTO: 0.6 10E9/L (ref 0–1.3)
MONOCYTES NFR BLD AUTO: 11.4 %
NEUTROPHILS # BLD AUTO: 2.8 10E9/L (ref 1.6–8.3)
NEUTROPHILS NFR BLD AUTO: 56.5 %
NONHDLC SERPL-MCNC: 90 MG/DL
PLATELET # BLD AUTO: 174 10E9/L (ref 150–450)
POTASSIUM SERPL-SCNC: 3.9 MMOL/L (ref 3.4–5.3)
PROT SERPL-MCNC: 6.9 G/DL (ref 6.8–8.8)
RBC # BLD AUTO: 4.1 10E12/L (ref 3.8–5.2)
SODIUM SERPL-SCNC: 144 MMOL/L (ref 133–144)
TRIGL SERPL-MCNC: 44 MG/DL
VIT B12 SERPL-MCNC: 1057 PG/ML (ref 193–986)
WBC # BLD AUTO: 5 10E9/L (ref 4–11)

## 2018-01-08 PROCEDURE — 36415 COLL VENOUS BLD VENIPUNCTURE: CPT | Performed by: INTERNAL MEDICINE

## 2018-01-08 PROCEDURE — 82043 UR ALBUMIN QUANTITATIVE: CPT | Performed by: INTERNAL MEDICINE

## 2018-01-08 PROCEDURE — 99396 PREV VISIT EST AGE 40-64: CPT | Performed by: INTERNAL MEDICINE

## 2018-01-08 PROCEDURE — 82607 VITAMIN B-12: CPT | Performed by: INTERNAL MEDICINE

## 2018-01-08 PROCEDURE — 80061 LIPID PANEL: CPT | Performed by: INTERNAL MEDICINE

## 2018-01-08 PROCEDURE — 80053 COMPREHEN METABOLIC PANEL: CPT | Performed by: INTERNAL MEDICINE

## 2018-01-08 PROCEDURE — 85025 COMPLETE CBC W/AUTO DIFF WBC: CPT | Performed by: INTERNAL MEDICINE

## 2018-01-08 PROCEDURE — 82306 VITAMIN D 25 HYDROXY: CPT | Performed by: INTERNAL MEDICINE

## 2018-01-08 RX ORDER — PANTOPRAZOLE SODIUM 40 MG/1
40 TABLET, DELAYED RELEASE ORAL
Qty: 180 TABLET | Refills: 3 | Status: SHIPPED | OUTPATIENT
Start: 2018-01-08 | End: 2018-08-02

## 2018-01-08 RX ORDER — CARVEDILOL 12.5 MG/1
12.5 TABLET ORAL 2 TIMES DAILY WITH MEALS
Qty: 180 TABLET | Refills: 3 | Status: SHIPPED | OUTPATIENT
Start: 2018-01-08 | End: 2018-07-31

## 2018-01-08 RX ORDER — BUPROPION HYDROCHLORIDE 150 MG/1
150 TABLET ORAL EVERY MORNING
Qty: 90 TABLET | Refills: 3 | Status: SHIPPED | OUTPATIENT
Start: 2018-01-08 | End: 2018-07-31

## 2018-01-08 RX ORDER — LOSARTAN POTASSIUM 50 MG/1
50 TABLET ORAL 2 TIMES DAILY WITH MEALS
Qty: 180 TABLET | Refills: 3 | Status: SHIPPED | OUTPATIENT
Start: 2018-01-08 | End: 2018-07-31

## 2018-01-08 RX ORDER — TRAZODONE HYDROCHLORIDE 50 MG/1
50 TABLET, FILM COATED ORAL
Qty: 90 TABLET | Refills: 3 | Status: SHIPPED | OUTPATIENT
Start: 2018-01-08 | End: 2019-08-13

## 2018-01-08 NOTE — LETTER
Marlee Tobar  [address] [place] [address]  Cell# [phone]      January 8, 2018      To [facility] Staff,                                              I am referring Mrs. Marlee Tobar to your facility. She was seen today by this provider for her routine annual physical examination. Mrs. Tobar is due for a routine screening mammography. Her previous screening bilateral mammography last January 14, 2017 is unremarkable. I sent this letter requesting a 3D mammography for this patient. For any questions, you may call my office at [phone].    Sincerely,         Bandar Merrill MD  Internal Medicine

## 2018-01-08 NOTE — PATIENT INSTRUCTIONS
Preventive Health Recommendations  Female Ages 50 - 64    Yearly exam: See your health care provider every year in order to  o Review health changes.   o Discuss preventive care.    o Review your medicines if your doctor has prescribed any.      Get a Pap test every three years (unless you have an abnormal result and your provider advises testing more often).    If you get Pap tests with HPV test, you only need to test every 5 years, unless you have an abnormal result.     You do not need a Pap test if your uterus was removed (hysterectomy) and you have not had cancer.    You should be tested each year for STDs (sexually transmitted diseases) if you're at risk.     Have a mammogram every 1 to 2 years.    Have a colonoscopy at age 50, or have a yearly FIT test (stool test). These exams screen for colon cancer.      Have a cholesterol test every 5 years, or more often if advised.    Have a diabetes test (fasting glucose) every three years. If you are at risk for diabetes, you should have this test more often.     If you are at risk for osteoporosis (brittle bone disease), think about having a bone density scan (DEXA).    Shots: Get a flu shot each year. Get a tetanus shot every 10 years.    Nutrition:     Eat at least 5 servings of fruits and vegetables each day.    Eat whole-grain bread, whole-wheat pasta and brown rice instead of white grains and rice.    Talk to your provider about Calcium and Vitamin D.     Lifestyle    Exercise at least 150 minutes a week (30 minutes a day, 5 days a week). This will help you control your weight and prevent disease.    Limit alcohol to one drink per day.    No smoking.     Wear sunscreen to prevent skin cancer.     See your dentist every six months for an exam and cleaning.    See your eye doctor every 1 to 2 years.  At [facility], we strive to deliver an exceptional experience to you, every time we see you.  If you receive a survey in the mail, please send  us back your thoughts. We really do value your feedback.    Based on your medical history, these are the current health maintenance/preventive care services that you are due for (some may have been done at this visit.)  Health Maintenance Due   Topic Date Due     DEPRESSION ACTION PLAN Q1 YR  01/06/2018     MAMMO Q1 YR  01/14/2018         Suggested websites for health information:  Www.[employer].org : Up to date and easily searchable information on multiple topics.  Www.medlineplus.gov : medication info, interactive tutorials, watch real surgeries online  Www.familydoctor.org : good info from the Academy of Family Physicians  Www.cdc.gov : public health info, travel advisories, epidemics (H1N1)  Www.aap.org : children's health info, normal development, vaccinations  Www.health.[facility].[place].us : [place] dept of health, public health issues in [place], N1N1    Your care team:                            Family Medicine Internal Medicine   MD Bandar Abraham MD Shantel Branch-Fleming, MD Katya Georgiev PA-C Nam Ho, MD Pediatrics   KAMILLE Pathak, JOSE RAMON Lau APRN CNP   MD Helena Lopez MD Deborah Mielke, MD Kim Thein, APRN CNP      Clinic hours: Monday - Thursday 7 am-7 pm; Fridays 7 am-5 pm.   Urgent care: Monday - Friday 11 am-9 pm; Saturday and Sunday 9 am-5 pm.  Pharmacy : Monday -Thursday 8 am-8 pm; Friday 8 am-6 pm; Saturday and Sunday 9 am-5 pm.     Clinic: [phone]   Pharmacy: [phone]

## 2018-01-08 NOTE — MR AVS SNAPSHOT
After Visit Summary   1/8/2018    Marlee Tobar    MRN: [id number]           Patient Information     Date Of Birth          1955        Visit Information        Provider Department      1/8/2018 7:00 AM Bandar Merrill MD [facility]        Today's Diagnoses     Routine general medical examination at a health care facility    -  1    CARDIOVASCULAR SCREENING; LDL GOAL LESS THAN 130        Visit for screening mammogram          Care Instructions      Preventive Health Recommendations  Female Ages 50 - 64    Yearly exam: See your health care provider every year in order to  o Review health changes.   o Discuss preventive care.    o Review your medicines if your doctor has prescribed any.      Get a Pap test every three years (unless you have an abnormal result and your provider advises testing more often).    If you get Pap tests with HPV test, you only need to test every 5 years, unless you have an abnormal result.     You do not need a Pap test if your uterus was removed (hysterectomy) and you have not had cancer.    You should be tested each year for STDs (sexually transmitted diseases) if you're at risk.     Have a mammogram every 1 to 2 years.    Have a colonoscopy at age 50, or have a yearly FIT test (stool test). These exams screen for colon cancer.      Have a cholesterol test every 5 years, or more often if advised.    Have a diabetes test (fasting glucose) every three years. If you are at risk for diabetes, you should have this test more often.     If you are at risk for osteoporosis (brittle bone disease), think about having a bone density scan (DEXA).    Shots: Get a flu shot each year. Get a tetanus shot every 10 years.    Nutrition:     Eat at least 5 servings of fruits and vegetables each day.    Eat whole-grain bread, whole-wheat pasta and brown rice instead of white grains and rice.    Talk to your provider about Calcium and Vitamin D.      Lifestyle    Exercise at least 150 minutes a week (30 minutes a day, 5 days a week). This will help you control your weight and prevent disease.    Limit alcohol to one drink per day.    No smoking.     Wear sunscreen to prevent skin cancer.     See your dentist every six months for an exam and cleaning.    See your eye doctor every 1 to 2 years.  At [facility], we strive to deliver an exceptional experience to you, every time we see you.  If you receive a survey in the mail, please send us back your thoughts. We really do value your feedback.    Based on your medical history, these are the current health maintenance/preventive care services that you are due for (some may have been done at this visit.)  Health Maintenance Due   Topic Date Due     DEPRESSION ACTION PLAN Q1 YR  01/06/2018     MAMMO Q1 YR 01/14/2018         Suggested websites for health information:  Www.[employer] : Up to date and easily searchable information on multiple topics.  Www.[employer].gov : medication info, interactive tutorials, watch real surgeries online  Www.familydoctor.org : good info from the Academy of Family Physicians  Www.cdc.gov : public health info, travel advisories, epidemics (H1N1)  Www.aap.org : children's health info, normal development, vaccinations  Www.health.[facility].[place].us : [place] dept of health, public health issues in [place], N1N1    Your care team:                            Family Medicine Internal Medicine   MD Bandar Abraham MD Shantel Branch-Fleming, MD Katya Georgiev PA-C Nam Ho, MD Pediatrics   KAMILLE Pathak, MD Helena Cornejo CNP, MD Deborah Mielke, MD Kim Thein, BONILLA CNP      Clinic hours: Monday - Thursday 7 am-7 pm; Fridays 7 am-5 pm.   Urgent care: Monday - Friday 11 am-9 pm; Saturday and Sunday 9 am-5 pm.  Pharmacy : Monday -Thursday 8 am-8 pm; Friday 8 am-6 pm; Saturday and Sunday 9 am-5 pm.      Clinic: [phone]   Pharmacy: [phone]            Follow-ups after your visit        Your next 10 appointments already scheduled     Oct 24, 2018  8:00 AM CDT   (Arrive by 7:45 AM)   CT CHEST W/O CONTRAST with UCCT2   [facility] ([facility] [place])    [address]  1st Floor  [facility] [id number] [phone]           Please bring any scans or X-rays taken at other hospitals, if similar tests were done. Also bring a list of your medicines, including vitamins, minerals and over-the-counter drugs. It is safest to leave personal items at home.  Be sure to tell your doctor:   If you have any allergies.   If there s any chance you are pregnant.   If you are breastfeeding.   If you have any special needs.  You do not need to do anything special to prepare.  Please wear loose clothing, such as a sweat suit or jogging clothes. Avoid snaps, zippers and other metal. We may ask you to undress and put on a hospital gown.            Oct 24, 2018  8:40 AM CDT   (Arrive by 8:25 AM)   Return Visit with Brett Valles MD   [facility] ([facility] [place])    [address]  [address] [place] [id number] [phone]              Who to contact     If you have questions or need follow up information about today's clinic visit or your schedule please contact [facility] [place] directly at [phone].  Normal or non-critical lab and imaging results will be communicated to you by MyChart, letter or phone within 4 business days after the clinic has received the results. If you do not hear from us within 7 days, please contact the clinic through MyChart or phone. If you have a critical or abnormal lab result, we will notify you by phone as soon as possible.  Submit refill requests through [employer] or call your pharmacy and they will forward the refill request to us. Please allow 3 business days for your  "refill to be completed.          Additional Information About Your Visit        MyChart Information     [employer] gives you secure access to your electronic health record. If you see a primary care provider, you can also send messages to your care team and make appointments. If you have questions, please call your primary care clinic.  If you do not have a primary care provider, please call [phone] and they will assist you.        Care EveryWhere ID     This is your Care EveryWhere ID. This could be used by other organizations to access your [facility] medical records  [id number]        Your Vitals Were     Pulse Temperature Height Last Period Pulse Oximetry BMI (Body Mass Index)    63 98.1  F (36.7  C) (Oral) 5' 6\" (1.676 m) 06/27/2010 99% 25.34 kg/m2       Blood Pressure from Last 3 Encounters:   01/08/18 137/83   12/20/17 (!) 187/98   11/15/17 128/80    Weight from Last 3 Encounters:   01/08/18 157 lb (71.2 kg)   12/20/17 155 lb 6.8 oz (70.5 kg)   11/15/17 159 lb (72.1 kg)              We Performed the Following     CBC with platelets and differential     Comprehensive metabolic panel (BMP + Alb, Alk Phos, ALT, AST, Total. Bili, TP)     DEPRESSION ACTION PLAN (DAP)     Lipid Profile (Chol, Trig, HDL, LDL calc)        Primary Care Provider Office Phone # Fax #    Lindsay Deja Foreman PA-C [phone] [address] [place] [facility] [address]        Equal Access to Services     [facility] AH: Hadii aad ku hadasho Soomaali, waaxda luqadaha, qaybta kaalmada adeegyada, saad idicherelle zarate. So [facility] [phone].    ATENCIÓN: Si habla español, tiene a barorso disposición servicios gratuitos de asistencia lingüística. Llame al [phone].    We comply with applicable federal civil rights laws and [place] laws. We do not discriminate on the basis of race, color, national origin, age, disability, sex, sexual orientation, or gender identity.            Thank you!     Thank you for " choosing [facility]  for your care. Our goal is always to provide you with excellent care. Hearing back from our patients is one way we can continue to improve our services. Please take a few minutes to complete the written survey that you may receive in the mail after your visit with us. Thank you!             Your Updated Medication List - Protect others around you: Learn how to safely use, store and throw away your medicines at www.disposemymeds.org.          This list is accurate as of: 1/8/18  7:28 AM.  Always use your most recent med list.                   Brand Name Dispense Instructions for use Diagnosis    Biotin 5000 MCG Caps      Take 1 tablet by mouth daily.        buPROPion 150 MG 24 hr tablet    WELLBUTRIN XL    30 tablet    TAKE ONE TABLET BY MOUTH EVERY MORNING    Major depressive disorder, recurrent episode, in partial remission (H)       CALCIUM CITRATE PO      Take 3 capsules by mouth 3 times daily    DVT (deep venous thrombosis), left, Hypercoagulable state (H), Varicose veins       carvedilol 12.5 MG tablet    COREG    180 tablet    Take 1 tablet (12.5 mg) by mouth 2 times daily (with meals)    Hypertension goal BP (blood pressure) < 130/80       cyanocobalamin 500 MCG Tabs     90 tablet    Take 250 mcg daily    S/P gastric bypass       fluticasone 50 MCG/ACT spray    FLONASE    1 Bottle    Spray 1 spray into both nostrils daily as needed for rhinitis or allergies    Upper airway cough syndrome       IRON 100 PLUS 100-250-0.025-1 MG Tabs   Generic drug:  Iron-vit C-vit B12-folic acid      Take  by mouth.        losartan 50 MG tablet    COZAAR    180 tablet    Take 1 tablet (50 mg) by mouth 2 times daily (with meals)    Hypertension goal BP (blood pressure) < 130/80       MULTIVITAL Tabs      Take 2 tablets by mouth daily.        pantoprazole 40 MG EC tablet    PROTONIX    180 tablet    Take 1 tablet (40 mg) by mouth 2 times daily    Gastroesophageal reflux disease without  esophagitis       traZODone 50 MG tablet    DESYREL    90 tablet    Take 1 tablet (50 mg) by mouth nightly as needed for sleep Take 2 hours before bedtime.    Insomnia, unspecified type       VITAMIN C PO       Routine gynecological examination, Need for prophylactic vaccination and inoculation against influenza, HTN (hypertension), Screening for thyroid disorder, Lipid screening, Screening for diabetes mellitus       VITAMIN D PO       Routine gynecological examination, Need for prophylactic vaccination and inoculation against influenza, HTN (hypertension), Screening for thyroid disorder, Lipid screening, Screening for diabetes mellitus
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
## 2018-01-09 NOTE — TELEPHONE ENCOUNTER
[facility]  Stefan Martino,  [employer] [facility]

## 2018-01-10 RX ORDER — AZITHROMYCIN 250 MG/1
TABLET, FILM COATED ORAL
Qty: 6 TABLET | Refills: 1 | Status: SHIPPED | OUTPATIENT
Start: 2018-01-10 | End: 2018-09-11

## 2018-01-16 ENCOUNTER — TRANSFERRED RECORDS (OUTPATIENT)
Dept: HEALTH INFORMATION MANAGEMENT | Facility: CLINIC | Age: 63
End: 2018-01-16

## 2018-01-22 ENCOUNTER — TELEPHONE (OUTPATIENT)
Dept: FAMILY MEDICINE | Facility: CLINIC | Age: 63
End: 2018-01-22

## 2018-01-22 DIAGNOSIS — Z98.84 BARIATRIC SURGERY STATUS: Primary | ICD-10-CM

## 2018-01-22 RX ORDER — CYANOCOBALAMIN (VITAMIN B-12) 2500 MCG
2500 TABLET, SUBLINGUAL SUBLINGUAL DAILY
Qty: 90 TABLET | Refills: 3 | Status: SHIPPED | OUTPATIENT
Start: 2018-01-22 | End: 2019-01-30 | Stop reason: DRUGHIGH

## 2018-01-22 NOTE — TELEPHONE ENCOUNTER
Called and spoke with pharmacist. Verified that new dose was received. Rx will be dispensed to patient.    Gabriella Alberto RN  [facility]

## 2018-01-22 NOTE — TELEPHONE ENCOUNTER
Call placed to pharmacy. Spoke with pharmacy representative. Only available sublingual dose of cyanocobalamin is 2500 mcg tab. Pharmacy wanting verification that provider approves this dosage as strength is much higher than previous prescription. If provider approves, pharmacy is asking for new prescription to be sent. Pharmacy is otherwise suggesting injectable doses if patient not able to have regular tablets.    Routing to provider to review and advise.    Gabriella Alberto RN  [facility] Triage

## 2018-01-22 NOTE — TELEPHONE ENCOUNTER
A regular tablet is not recommended to previous gastric bypass surgery. It will not be absorbed, that's why I prescribed a sublingual tablet.  Pharmacy can substitute a different dose, but it should still be sublingual.

## 2018-01-22 NOTE — TELEPHONE ENCOUNTER
Message faxed from [facility]:    We received rx for cyanocobalamin 500 MCG TABS with directions to place 1 tablet under the tongue daily.  Sublingual tablet doesn't come in this strength, is regular tablet okay?    If so, please send new rx with updated directions.

## 2018-01-22 NOTE — TELEPHONE ENCOUNTER
Routing to provider to review and advise.    Gabriella Alberto RN  [facility]

## 2018-01-25 ENCOUNTER — VIRTUAL VISIT (OUTPATIENT)
Dept: FAMILY MEDICINE | Facility: OTHER | Age: 63
End: 2018-01-25

## 2018-01-25 ENCOUNTER — MYC MEDICAL ADVICE (OUTPATIENT)
Dept: FAMILY MEDICINE | Facility: CLINIC | Age: 63
End: 2018-01-25

## 2018-01-25 NOTE — PROGRESS NOTES
"Date:   Clinician: Holland Mercedes  Clinician NPI: [id number]  Patient: Marlee Tobar  Patient : 1955  Patient Address: [address] [place] [address]  Patient Phone: [phone]  Visit Protocol: UTI  Patient Summary:  Marlee is a 62 year old ( : 1955 ) female who initiated a Visit for a presumed bladder infection. When asked the question \"Please sign me up to receive news, health information and promotions from [email or web address].\", Marlee responded \"Yes\".    Her symptoms began 2 days ago and consist of dysuria, urinary frequency, foul smelling urine, and urgency.   Symptom Details   Urinary Frequency: Every hour    She denies abdominal pain, vomiting, hematuria, urinary incontinence, loss of appetite, chills, vaginal discharge, flank pain, nausea, recent antibiotic use, hesitation, and feeling feverish. Marlee has never had kidney stones. She has not been hospitalized, been a patient in a nursing home, or had a catheter in the past two weeks. She denies risk factors for sexually transmitted infections.   Marlee has had two (2) UTIs in the past 12 months. Her most recent bladder infection was not within the last 4 weeks. Her current symptoms are similar to the previous UTI symptoms. She took ciprofloxacin for her last infection and found it to be effective.   Marlee does not get yeast infections when she takes antibiotics.   She denies pregnancy and denies breastfeeding. She does not menstruate.   She does NOT smoke or use smokeless tobacco.   Additional information as reported by the patient (free text): On the \"yeast infection\" question, only if it's a form of penicillin...   MEDICATIONS:  Acetaminophen (Tylenol)   Patient free text response:  Welbutrin    , ALLERGIES:  NKDA   Clinician Response:  Dear Marlee,  Based on the information you have provided, you likely have a bladder infection, also called an acute urinary tract infection (UTI).   To treat your infection, I am prescribing:  "  Nitrofurantoin (Macrobid). Swallow one (1) tablet twice a day for 5 days. Take the tablet with food. Continue taking the tablets even if you feel better before all the medication is gone. There is no refill with this prescription.   Antibiotic selections by the provider are based on safety and effectiveness. You may or may not be prescribed the same medication that you took for your last bladder infection.   Some people develop allergies to antibiotics. If you notice a new rash, significant swelling, or difficulty breathing, stop the medication immediately and go into a clinic for physical evaluation.   To help treat your current UTI and prevent future occurrences, remember to:     Drink 8-10, 8-ounce glasses of water daily.    Urinate after sexual intercourse.    Wipe front to back after using the bathroom.     Some women may develop a yeast infection as a side effect of taking antibiotics. If you notice symptoms of a yeast infection, [facility] can help treat that condition as well. Simply log in and complete another Visit, which will cover all of the necessary questions to determine the best treatment for you.   You should visit a clinic for a follow-up visit if your symptoms do not improve in 1-2 days or if you experience another urinary tract infection soon after completing this treatment.  If you become pregnant during this course of treatment, stop taking the medication and contact your primary care provider.   Diagnosis: Acute Uncomplicated Bladder Infection  Diagnosis ICD: N39.0  Prescription: nitrofurantoin (Macrobid) 100mg oral tablet 10 tablets, 5 days supply. Take one tablet by mouth two times a day for 5 days. Refills: 0, Refill as needed: no, Allow substitutions: yes  Pharmacy: [facility] - [phone] - [address] [place] [address]

## 2018-03-17 ENCOUNTER — HEALTH MAINTENANCE LETTER (OUTPATIENT)
Age: 63
End: 2018-03-17

## 2018-09-11 ENCOUNTER — RADIANT APPOINTMENT (OUTPATIENT)
Dept: GENERAL RADIOLOGY | Facility: CLINIC | Age: 63
End: 2018-09-11
Attending: INTERNAL MEDICINE
Payer: COMMERCIAL

## 2018-09-11 ENCOUNTER — OFFICE VISIT (OUTPATIENT)
Dept: FAMILY MEDICINE | Facility: CLINIC | Age: 63
End: 2018-09-11
Payer: COMMERCIAL

## 2018-09-11 VITALS
OXYGEN SATURATION: 98 % | RESPIRATION RATE: 20 BRPM | DIASTOLIC BLOOD PRESSURE: 100 MMHG | WEIGHT: 168 LBS | TEMPERATURE: 99 F | BODY MASS INDEX: 27 KG/M2 | SYSTOLIC BLOOD PRESSURE: 160 MMHG | HEART RATE: 80 BPM | HEIGHT: 66 IN

## 2018-09-11 DIAGNOSIS — R91.8 PULMONARY NODULES: ICD-10-CM

## 2018-09-11 DIAGNOSIS — J32.0 LEFT MAXILLARY SINUSITIS: Primary | ICD-10-CM

## 2018-09-11 PROCEDURE — 99214 OFFICE O/P EST MOD 30 MIN: CPT | Performed by: INTERNAL MEDICINE

## 2018-09-11 PROCEDURE — 70220 X-RAY EXAM OF SINUSES: CPT | Mod: FY

## 2018-09-11 PROCEDURE — 71046 X-RAY EXAM CHEST 2 VIEWS: CPT | Mod: FY

## 2018-09-11 RX ORDER — AZITHROMYCIN 250 MG/1
TABLET, FILM COATED ORAL
Qty: 6 TABLET | Refills: 2 | Status: SHIPPED | OUTPATIENT
Start: 2018-09-11 | End: 2019-08-13

## 2018-09-11 RX ORDER — AZITHROMYCIN 250 MG/1
TABLET, FILM COATED ORAL
Qty: 6 TABLET | Refills: 2 | Status: SHIPPED | OUTPATIENT
Start: 2018-09-11 | End: 2018-09-11

## 2018-09-11 ASSESSMENT — PAIN SCALES - GENERAL: PAINLEVEL: NO PAIN (0)

## 2018-09-11 NOTE — PATIENT INSTRUCTIONS
At [facility], we strive to deliver an exceptional experience to you, every time we see you.  If you receive a survey in the mail, please send us back your thoughts. We really do value your feedback.    Based on your medical history, these are the current health maintenance/preventive care services that you are due for (some may have been done at this visit.)  Health Maintenance Due   Topic Date Due     HIV SCREEN (SYSTEM ASSIGNED)  07/09/1975     PHQ-9 Q6 MONTHS  03/27/2018     INFLUENZA VACCINE (1) 09/01/2018     PAP Q3 YR  12/16/2018       Suggested websites for health information:  Www.[facility].org : Up to date and easily searchable information on multiple topics.  Www.[employer].gov : medication info, interactive tutorials, watch real surgeries online  Www.familydoctor.org : good info from the Academy of Family Physicians  Www.cdc.gov : public health info, travel advisories, epidemics (H1N1)  Www.aap.org : children's health info, normal development, vaccinations  Www.health.[facility].[place].us : [place] dept of health, public health issues in [place], N1N1    Your care team:                            Family Medicine Internal Medicine   MD Bandar Abraham MD Shantel Branch-Fleming, MD Katya Georgiev PA-C Megan Hill, APRSTEVE Albert MD Pediatrics   KAMILLE Pathak, MD Mirna Owen APRN CNP   MD Helena Lopez MD Deborah Mielke, MD Kim Thein, APRN [facility]      Clinic hours: Monday - Thursday 7 am-7 pm; Fridays 7 am-5 pm.   Urgent care: Monday - Friday 11 am-9 pm; Saturday and Sunday 9 am-5 pm.  Pharmacy : Monday -Thursday 8 am-8 pm; Friday 8 am-6 pm; Saturday and Sunday 9 am-5 pm.     Clinic: [phone]   Pharmacy: [phone]

## 2018-09-11 NOTE — MR AVS SNAPSHOT
After Visit Summary   9/11/2018    Marlee Tobar    MRN: [id number]           Patient Information     Date Of Birth          1955        Visit Information        Provider Department      9/11/2018 5:20 PM Bandar Merrill MD [facility]        Today's Diagnoses     Left maxillary sinusitis    -  1    Pulmonary nodules          Care Instructions    At [facility], we strive to deliver an exceptional experience to you, every time we see you.  If you receive a survey in the mail, please send us back your thoughts. We really do value your feedback.    Based on your medical history, these are the current health maintenance/preventive care services that you are due for (some may have been done at this visit.)  Health Maintenance Due   Topic Date Due     HIV SCREEN (SYSTEM ASSIGNED)  07/09/1975     PHQ-9 Q6 MONTHS  03/27/2018     INFLUENZA VACCINE (1) 09/01/2018     PAP Q3 YR  12/16/2018       Suggested websites for health information:  Www.Pigafe : Up to date and easily searchable information on multiple topics.  Www.medlineplus.gov : medication info, interactive tutorials, watch real surgeries online  Www.familydoctor.org : good info from the Academy of Family Physicians  Www.cdc.gov : public health info, travel advisories, epidemics (H1N1)  Www.aap.org : children's health info, normal development, vaccinations  Www.health.state.[place].us : [place] dept of health, public health issues in [place], N1N1    Your care team:                            Family Medicine Internal Medicine   MD Bandar Abraham MD Shantel Branch-Fleming, MD Katya Georgiev PA-C Megan Hill, APRN CNP Nam Ho, MD Pediatrics   KAMILLE Pathak, MD Mirna Owen APRN CNP   MD Helena Lopez MD Deborah Mielke, MD Kim Thein, APRN [facility]      Clinic hours: Monday - Thursday 7 am-7 pm; Fridays 7 am-5 pm.   Urgent care:  Monday - Friday 11 am-9 pm; Saturday and Sunday 9 am-5 pm.  Pharmacy : Monday -Thursday 8 am-8 pm; Friday 8 am-6 pm; Saturday and Sunday 9 am-5 pm.     Clinic: [phone]   Pharmacy: [phone]              Follow-ups after your visit        Your next 10 appointments already scheduled     Oct 24, 2018  8:00 AM CDT   CT CHEST W/O CONTRAST with UCCT2   [facility] [place] [facility] ([facility])    [address] [address] [phone]           How do I prepare for my exam? (Food and drink instructions) No Food and Drink Restrictions.  How do I prepare for my exam? (Other instructions) You do not need to do anything special to prepare for this exam. For a sinus scan: Use your nose spray (nasal decongestant spray) as directed.  What should I wear: Please wear loose clothing, such as a sweat suit or jogging clothes. Avoid snaps, zippers and other metal. We may ask you to undress and put on a hospital gown.  How long does the exam take: Most scans take less than 20 minutes.  What should I bring: Please bring any scans or X-rays taken at other hospitals, if similar tests were done. Also bring a list of your medicines, including vitamins, minerals and over-the-counter drugs. It is safest to leave personal items at home.  Do I need a : No  is needed.  What do I need to tell my doctor? Be sure to tell your doctor: * If you have any allergies. * If there s any chance you are pregnant. * If you are breastfeeding.  What should I do after the exam: No restrictions, You may resume normal activities.  What is this test: A CT (computed tomography) scan is a series of pictures that allows us to look inside your body. The scanner creates images of the body in cross sections, much like slices of bread. This helps us see any problems more clearly.  Who should I call with questions: If you have any questions, please call the Imaging Department where you will  have your exam. Directions, parking instructions, and other information is available on our website, [email or web address].            Oct 24, 2018  8:40 AM CDT   (Arrive by 8:25 AM)   Return Visit with Brett Valles MD   [facility] ([facility])    [address]  [address]  [facility] [address] [phone]              Future tests that were ordered for you today     Open Future Orders        Priority Expected Expires Ordered    CT Chest w/o Contrast Routine  9/11/2019 9/11/2018            Who to contact     If you have questions or need follow up information about today's clinic visit or your schedule please contact [facility] directly at [phone].  Normal or non-critical lab and imaging results will be communicated to you by MyChart, letter or phone within 4 business days after the clinic has received the results. If you do not hear from us within 7 days, please contact the clinic through MyChart or phone. If you have a critical or abnormal lab result, we will notify you by phone as soon as possible.  Submit refill requests through [employer] or call your pharmacy and they will forward the refill request to us. Please allow 3 business days for your refill to be completed.          Additional Information About Your Visit        MyChart Information     [employer] gives you secure access to your electronic health record. If you see a primary care provider, you can also send messages to your care team and make appointments. If you have questions, please call your primary care clinic.  If you do not have a primary care provider, please call [phone] and they will assist you.        Care EveryWhere ID     This is your Care EveryWhere ID. This could be used by other organizations to access your [place] medical records  [id number]        Your Vitals Were     Pulse Temperature Respirations Height Last Period Pulse Oximetry    80 99  " F (37.2  C) (Oral) 20 5' 6\" (1.676 m) 06/27/2010 98%    BMI (Body Mass Index)                   27.12 kg/m2            Blood Pressure from Last 3 Encounters:   09/11/18 (!) 160/100   01/08/18 137/83   12/20/17 (!) 187/98    Weight from Last 3 Encounters:   09/11/18 168 lb (76.2 kg)   01/08/18 157 lb (71.2 kg)   12/20/17 155 lb 6.8 oz (70.5 kg)              We Performed the Following     XR Chest 2 Views     XR Sinus Complete G/E 3 Views          Today's Medication Changes          These changes are accurate as of 9/11/18  6:53 PM.  If you have any questions, ask your nurse or doctor.               Start taking these medicines.        Dose/Directions    azithromycin 250 MG tablet   Commonly known as:  ZITHROMAX   Used for:  Left maxillary sinusitis   Started by:  Bandar Merrill MD        Two tablets first day, then one tablet daily for four days.   Quantity:  6 tablet   Refills:  2            Where to get your medicines      [place] [facility] [place] [address]  [address], [facility] [address]     Phone:  [phone]     azithromycin 250 MG tablet                Primary Care Provider Office Phone # Fax #    Bandar Merrill [phone]       [address]  [facility] [address]        Equal Access to Services     [facility] : Hadii aad ku hadasho Soyuriali, waaxda luqadaha, qaybta kaalmada adeegyada, saad main adeakbar gorman . So [facility] [phone].    ATENCIÓN: Si celestinola vashti, tiene a barroso disposición servicios gratuitos de asistencia lingüística. Llame al [phone].    We comply with applicable federal civil rights laws and [place] laws. We do not discriminate on the basis of race, color, national origin, age, disability, sex, sexual orientation, or gender identity.            Thank you!     Thank you for choosing [facility]  for your care. Our goal is always to " provide you with excellent care. Hearing back from our patients is one way we can continue to improve our services. Please take a few minutes to complete the written survey that you may receive in the mail after your visit with us. Thank you!             Your Updated Medication List - Protect others around you: Learn how to safely use, store and throw away your medicines at www.disposemymeds.org.          This list is accurate as of 9/11/18  6:53 PM.  Always use your most recent med list.                   Brand Name Dispense Instructions for use Diagnosis    azithromycin 250 MG tablet    ZITHROMAX    6 tablet    Two tablets first day, then one tablet daily for four days.    Left maxillary sinusitis       Biotin 5000 MCG Caps      Take 1 tablet by mouth daily.        buPROPion 150 MG 24 hr tablet    WELLBUTRIN XL    90 tablet    Take 1 tablet (150 mg) by mouth every morning    Major depressive disorder, recurrent episode, in partial remission (H)       CALCIUM CITRATE PO      Take 3 capsules by mouth 3 times daily    DVT (deep venous thrombosis), left, Hypercoagulable state (H), Varicose veins       carvedilol 12.5 MG tablet    COREG    180 tablet    Take 1 tablet (12.5 mg) by mouth 2 times daily (with meals)    Hypertension goal BP (blood pressure) < 140/90       * cyanocobalamin 500 MCG Tabs     90 tablet    Place 1 tablet (500 mcg) under the tongue daily    Bariatric surgery status       * cyanocobalamin 2500 MCG sublingual tablet    VITAMIN B-12    90 tablet    Place 2,500 mcg under the tongue daily    Bariatric surgery status       fluticasone 50 MCG/ACT spray    FLONASE    1 Bottle    Spray 1 spray into both nostrils daily as needed for rhinitis or allergies    Upper airway cough syndrome       IRON 100 PLUS 100-250-0.025-1 MG Tabs   Generic drug:  Iron-vit C-vit B12-folic acid      Take  by mouth.        losartan 50 MG tablet    COZAAR    180 tablet    Take 1 tablet (50 mg) by mouth 2 times daily (with meals)     Hypertension goal BP (blood pressure) < 140/90       MULTIVITAL Tabs      Take 2 tablets by mouth daily.        pantoprazole 40 MG EC tablet    PROTONIX    180 tablet    Take 1 tablet (40 mg) by mouth 2 times daily    Gastroesophageal reflux disease without esophagitis       traZODone 50 MG tablet    DESYREL    90 tablet    Take 1 tablet (50 mg) by mouth nightly as needed for sleep Take 2 hours before bedtime.    Insomnia, unspecified type       VITAMIN C PO       Routine gynecological examination, Need for prophylactic vaccination and inoculation against influenza, HTN (hypertension), Screening for thyroid disorder, Lipid screening, Screening for diabetes mellitus       VITAMIN D PO       Routine gynecological examination, Need for prophylactic vaccination and inoculation against influenza, HTN (hypertension), Screening for thyroid disorder, Lipid screening, Screening for diabetes mellitus       * Notice:  This list has 2 medication(s) that are the same as other medications prescribed for you. Read the directions carefully, and ask your doctor or other care provider to review them with you.
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
## 2018-09-12 ASSESSMENT — PATIENT HEALTH QUESTIONNAIRE - PHQ9: SUM OF ALL RESPONSES TO PHQ QUESTIONS 1-9: 1

## 2018-09-17 ENCOUNTER — TELEPHONE (OUTPATIENT)
Dept: FAMILY MEDICINE | Facility: CLINIC | Age: 63
End: 2018-09-17

## 2018-09-17 DIAGNOSIS — J20.5 ACUTE BRONCHITIS DUE TO RESPIRATORY SYNCYTIAL VIRUS (RSV): Primary | ICD-10-CM

## 2018-09-17 RX ORDER — PREDNISONE 20 MG/1
40 TABLET ORAL DAILY
Qty: 10 TABLET | Refills: 2 | Status: SHIPPED | OUTPATIENT
Start: 2018-09-17 | End: 2018-09-22

## 2018-09-17 NOTE — TELEPHONE ENCOUNTER
..Reason for Call:  Updating on condtion    Detailed comments: completed the 5 day regimen of the z-pac on Saturday 09-15; feels worse, losing her voice, and the cough   is worse;       Phone Number Patient can be reached at: Cell number on file:    Telephone Information:   Mobile [phone]       Best Time: anytime    Can we leave a detailed message on this number? YES    Call taken on 9/17/2018 at 12:40 PM by Urszula Rick

## 2018-09-17 NOTE — TELEPHONE ENCOUNTER
Routing to provider as patient seen in the last 7 days and documentation from OV is not completed to review provider direction.     Anita Younger RN

## 2018-09-17 NOTE — TELEPHONE ENCOUNTER
Recommend another round of Azithromycin, this time,, also add  Prednisone 4 mg daily x 5 days (Rx sent).    Inform patient that she two additional refills for Azithromyin.

## 2018-09-17 NOTE — TELEPHONE ENCOUNTER
Called patient and informed of recommendation from provider. She wants both medication sent to [facility] in [place]. Called Pharmacy and transferred PRINCE Castellanos from [facility] to [facility] and called verbal for Prednisone to [facility]. [facility] contacted and cancelled the Prednisone.    Clyde Hatfield CMA

## 2018-09-24 ENCOUNTER — TELEPHONE (OUTPATIENT)
Dept: FAMILY MEDICINE | Facility: CLINIC | Age: 63
End: 2018-09-24

## 2018-09-24 DIAGNOSIS — R09.81 CONGESTION OF PARANASAL SINUS: Primary | ICD-10-CM

## 2018-09-24 NOTE — TELEPHONE ENCOUNTER
Inform patient that I recommend CT of sinuses and ENT referral since empirical antibiotics and oral corticosteroids are not effective. To give another prescription for a condition that is not improving is not recommended.    Call [phone] to schedule ENT.  Call [phone] to schedule CT of sinuses.  Orders done.

## 2018-09-24 NOTE — TELEPHONE ENCOUNTER
Reason for Call:  Other prescription    Detailed comments: patient wanting to talk to Dr Merrill that she was seen on 9/11/2018 and has tried different medications for cough & sinus issues. Patient is going to go out of town  at end of week and needs to get this taken care of. Patient declined to make an appointment.    [facility] [address] [place] [id number] [facility]    Phone Number Patient can be reached at: Home number on file [phone] (home)    Best Time: any    Can we leave a detailed message on this number? YES    Call taken on 9/24/2018 at 10:08 AM by Marilia Coleman

## 2018-09-26 ENCOUNTER — OFFICE VISIT (OUTPATIENT)
Dept: OTOLARYNGOLOGY | Facility: OTHER | Age: 63
End: 2018-09-26
Payer: COMMERCIAL

## 2018-09-26 VITALS
OXYGEN SATURATION: 99 % | BODY MASS INDEX: 26.84 KG/M2 | DIASTOLIC BLOOD PRESSURE: 88 MMHG | HEIGHT: 66 IN | WEIGHT: 167 LBS | SYSTOLIC BLOOD PRESSURE: 140 MMHG | HEART RATE: 84 BPM

## 2018-09-26 DIAGNOSIS — R05.9 COUGH: Primary | ICD-10-CM

## 2018-09-26 DIAGNOSIS — J01.90 ACUTE SINUSITIS, RECURRENCE NOT SPECIFIED, UNSPECIFIED LOCATION: ICD-10-CM

## 2018-09-26 PROCEDURE — 99243 OFF/OP CNSLTJ NEW/EST LOW 30: CPT | Performed by: OTOLARYNGOLOGY

## 2018-09-26 RX ORDER — BENZONATATE 200 MG/1
200 CAPSULE ORAL 3 TIMES DAILY PRN
Qty: 21 CAPSULE | Refills: 0 | Status: SHIPPED | OUTPATIENT
Start: 2018-09-26 | End: 2019-08-13

## 2018-09-26 RX ORDER — FLUTICASONE PROPIONATE 50 MCG
2 SPRAY, SUSPENSION (ML) NASAL DAILY
Qty: 1 BOTTLE | Refills: 3 | Status: SHIPPED | OUTPATIENT
Start: 2018-09-26 | End: 2019-08-13

## 2018-09-26 RX ORDER — CLARITHROMYCIN 500 MG
500 TABLET ORAL 2 TIMES DAILY
Qty: 28 TABLET | Refills: 0 | Status: SHIPPED | OUTPATIENT
Start: 2018-09-26 | End: 2018-10-10

## 2018-09-26 NOTE — MR AVS SNAPSHOT
After Visit Summary   9/26/2018    Marlee Tobar    MRN: [id number]           Patient Information     Date Of Birth          1955        Visit Information        Provider Department      9/26/2018 8:30 AM Abilio Soler MD [facility]        Today's Diagnoses     Cough    -  1    Acute sinusitis, recurrence not specified, unspecified location           Follow-ups after your visit        Your next 10 appointments already scheduled     Oct 24, 2018  8:00 AM CDT   CT CHEST W/O CONTRAST with UCCT2   [facility] ([facility])    [address]  [facility] [address] [phone]           How do I prepare for my exam? (Food and drink instructions) No Food and Drink Restrictions.  How do I prepare for my exam? (Other instructions) You do not need to do anything special to prepare for this exam. For a sinus scan: Use your nose spray (nasal decongestant spray) as directed.  What should I wear: Please wear loose clothing, such as a sweat suit or jogging clothes. Avoid snaps, zippers and other metal. We may ask you to undress and put on a hospital gown.  How long does the exam take: Most scans take less than 20 minutes.  What should I bring: Please bring any scans or X-rays taken at other hospitals, if similar tests were done. Also bring a list of your medicines, including vitamins, minerals and over-the-counter drugs. It is safest to leave personal items at home.  Do I need a : No  is needed.  What do I need to tell my doctor? Be sure to tell your doctor: * If you have any allergies. * If there s any chance you are pregnant. * If you are breastfeeding.  What should I do after the exam: No restrictions, You may resume normal activities.  What is this test: A CT (computed tomography) scan is a series of pictures that allows us to look inside your body. The scanner creates images of the body in cross sections, much  like slices of bread. This helps us see any problems more clearly.  Who should I call with questions: If you have any questions, please call the Imaging Department where you will have your exam. Directions, parking instructions, and other information is available on our website, [email or web address].            Oct 24, 2018  8:40 AM CDT   (Arrive by 8:25 AM)   Return Visit with Brett Valles MD   [facility] ([facility])    [address]  [address]  [facility] [address] [phone]              Who to contact     If you have questions or need follow up information about today's clinic visit or your schedule please contact [facility] directly at [phone].  Normal or non-critical lab and imaging results will be communicated to you by MyChart, letter or phone within 4 business days after the clinic has received the results. If you do not hear from us within 7 days, please contact the clinic through MyChart or phone. If you have a critical or abnormal lab result, we will notify you by phone as soon as possible.  Submit refill requests through [employer] or call your pharmacy and they will forward the refill request to us. Please allow 3 business days for your refill to be completed.          Additional Information About Your Visit        [employer] Information     [employer] gives you secure access to your electronic health record. If you see a primary care provider, you can also send messages to your care team and make appointments. If you have questions, please call your primary care clinic.  If you do not have a primary care provider, please call [phone] and they will assist you.        Care EveryWhere ID     This is your Care EveryWhere ID. This could be used by other organizations to access your [place] medical records  [id number]        Your Vitals Were     Pulse Height Last Period Pulse Oximetry BMI (Body Mass Index)       84  "1.676 m (5' 6\") 06/27/2010 99% 26.95 kg/m2        Blood Pressure from Last 3 Encounters:   09/26/18 140/88   09/11/18 (!) 160/100   01/08/18 137/83    Weight from Last 3 Encounters:   09/26/18 75.8 kg (167 lb)   09/11/18 76.2 kg (168 lb)   01/08/18 71.2 kg (157 lb)              Today, you had the following     No orders found for display         Today's Medication Changes          These changes are accurate as of 9/26/18 10:29 AM.  If you have any questions, ask your nurse or doctor.               Start taking these medicines.        Dose/Directions    benzonatate 200 MG capsule   Commonly known as:  TESSALON   Used for:  Cough   Started by:  Abilio Soler MD        Dose:  200 mg   Take 1 capsule (200 mg) by mouth 3 times daily as needed for cough   Quantity:  21 capsule   Refills:  0       clarithromycin 500 MG tablet   Commonly known as:  BIAXIN   Used for:  Acute sinusitis, recurrence not specified, unspecified location   Started by:  Abilio Soler MD        Dose:  500 mg   Take 1 tablet (500 mg) by mouth 2 times daily for 14 days   Quantity:  28 tablet   Refills:  0         These medicines have changed or have updated prescriptions.        Dose/Directions    * fluticasone 50 MCG/ACT spray   Commonly known as:  FLONASE   This may have changed:  Another medication with the same name was added. Make sure you understand how and when to take each.   Used for:  Upper airway cough syndrome   Changed by:  Abilio Soler MD        Dose:  1 spray   Spray 1 spray into both nostrils daily as needed for rhinitis or allergies   Quantity:  1 Bottle   Refills:  11       * fluticasone 50 MCG/ACT spray   Commonly known as:  FLONASE   This may have changed:  You were already taking a medication with the same name, and this prescription was added. Make sure you understand how and when to take each.   Used for:  Acute sinusitis, recurrence not specified, unspecified location   Changed by:  Abilio Soler MD        " Dose:  2 spray   Spray 2 sprays into both nostrils daily   Quantity:  1 Bottle   Refills:  3       * Notice:  This list has 2 medication(s) that are the same as other medications prescribed for you. Read the directions carefully, and ask your doctor or other care provider to review them with you.         Where to get your medicines      These medications were sent to [facility] [address] [place], [place] - [address] [facility]  [address] [facility] [address]     Phone:  [phone]     benzonatate 200 MG capsule    clarithromycin 500 MG tablet    fluticasone 50 MCG/ACT spray                Primary Care Provider Office Phone # Fax #    Bandar Merrill [phone] [address]  [facility] [address]        Equal Access to Services     MELANI MARCOS AH: Hadii aad ku hadasho Soomaali, waaxda luqadaha, qaybta kaalmada adeegyada, saad gorman . So [facility] [phone].    ATENCIÓN: Si habla español, tiene a barroso disposición servicios gratuitos de asistencia lingüística. Llame al [phone].    We comply with applicable federal civil rights laws and [place] laws. We do not discriminate on the basis of race, color, national origin, age, disability, sex, sexual orientation, or gender identity.            Thank you!     Thank you for choosing [facility]  for your care. Our goal is always to provide you with excellent care. Hearing back from our patients is one way we can continue to improve our services. Please take a few minutes to complete the written survey that you may receive in the mail after your visit with us. Thank you!             Your Updated Medication List - Protect others around you: Learn how to safely use, store and throw away your medicines at www.disposemymeds.org.          This list is accurate as of 9/26/18 10:29 AM.  Always use your most recent med list.                   Brand Name Dispense Instructions for use Diagnosis     azithromycin 250 MG tablet    ZITHROMAX    6 tablet    Two tablets first day, then one tablet daily for four days.    Left maxillary sinusitis       benzonatate 200 MG capsule    TESSALON    21 capsule    Take 1 capsule (200 mg) by mouth 3 times daily as needed for cough    Cough       Biotin 5000 MCG Caps      Take 1 tablet by mouth daily.        buPROPion 150 MG 24 hr tablet    WELLBUTRIN XL    90 tablet    Take 1 tablet (150 mg) by mouth every morning    Major depressive disorder, recurrent episode, in partial remission (H)       CALCIUM CITRATE PO      Take 3 capsules by mouth 3 times daily    DVT (deep venous thrombosis), left, Hypercoagulable state (H), Varicose veins       carvedilol 12.5 MG tablet    COREG    180 tablet    Take 1 tablet (12.5 mg) by mouth 2 times daily (with meals)    Hypertension goal BP (blood pressure) < 140/90       clarithromycin 500 MG tablet    BIAXIN    28 tablet    Take 1 tablet (500 mg) by mouth 2 times daily for 14 days    Acute sinusitis, recurrence not specified, unspecified location       * cyanocobalamin 500 MCG Tabs     90 tablet    Place 1 tablet (500 mcg) under the tongue daily    Bariatric surgery status       * cyanocobalamin 2500 MCG sublingual tablet    VITAMIN B-12    90 tablet    Place 2,500 mcg under the tongue daily    Bariatric surgery status       * fluticasone 50 MCG/ACT spray    FLONASE    1 Bottle    Spray 1 spray into both nostrils daily as needed for rhinitis or allergies    Upper airway cough syndrome       * fluticasone 50 MCG/ACT spray    FLONASE    1 Bottle    Spray 2 sprays into both nostrils daily    Acute sinusitis, recurrence not specified, unspecified location       IRON 100 PLUS 100-250-0.025-1 MG Tabs   Generic drug:  Iron-vit C-vit B12-folic acid      Take  by mouth.        losartan 50 MG tablet    COZAAR    180 tablet    Take 1 tablet (50 mg) by mouth 2 times daily (with meals)    Hypertension goal BP (blood pressure) < 140/90       MULTIVITAL  Tabs      Take 2 tablets by mouth daily.        pantoprazole 40 MG EC tablet    PROTONIX    180 tablet    Take 1 tablet (40 mg) by mouth 2 times daily    Gastroesophageal reflux disease without esophagitis       traZODone 50 MG tablet    DESYREL    90 tablet    Take 1 tablet (50 mg) by mouth nightly as needed for sleep Take 2 hours before bedtime.    Insomnia, unspecified type       VITAMIN C PO       Routine gynecological examination, Need for prophylactic vaccination and inoculation against influenza, HTN (hypertension), Screening for thyroid disorder, Lipid screening, Screening for diabetes mellitus       VITAMIN D PO       Routine gynecological examination, Need for prophylactic vaccination and inoculation against influenza, HTN (hypertension), Screening for thyroid disorder, Lipid screening, Screening for diabetes mellitus       * Notice:  This list has 4 medication(s) that are the same as other medications prescribed for you. Read the directions carefully, and ask your doctor or other care provider to review them with you.

## 2018-09-26 NOTE — PROGRESS NOTES
ENT Consultation    Marlee Tobar who is a 63 year old female seen in consultation at the request of Bandar Merrill .      History of Present Illness - Marlee Tobar is a 63 year old female here today with recurring sinus infections and URI. Patient reports that 2 weeks ago she went in for a follow up for her lung nodules. They had done a sinus X-Ray, her doctor reported that she had a sinus infection. Patient was asymptomatic at the time. They decided to place her on a ZPack. At the end of this she was not feeling well. She was placed on another ZPack and prednisone, she states this did not improve her symptoms. Patient is experiencing cough, nasal congestion, and tickle in her throat. She has mostly clear drainage from her nose. No history of allergies. She has a history of acute bronchitis that develops into pneumonia.       Body mass index is 26.95 kg/(m^2).    BP Readings from Last 1 Encounters:   09/26/18 140/88     BP noted to be elevated today in office.  Patient to follow up with Primary Care provider regarding elevated blood pressure.    Marlee IS NOT a smoker/uses chewing tobacco.       Past Medical History -   Past Medical History:   Diagnosis Date     Bleeding stomach ulcer 2011    [facility] [place] [facility]     Gallstones      History of tonsillectomy      Hypertension goal BP (blood pressure) < 140/90      Pulmonary embolism (H) 2/10/2011    developed after surgery, coumadin x 6 months       Current Medications -   Current Outpatient Prescriptions:      Ascorbic Acid (VITAMIN C PO), , Disp: , Rfl:      azithromycin (ZITHROMAX) 250 MG tablet, Two tablets first day, then one tablet daily for four days., Disp: 6 tablet, Rfl: 2     Biotin 5000 MCG CAPS, Take 1 tablet by mouth daily., Disp: , Rfl:      buPROPion (WELLBUTRIN XL) 150 MG 24 hr tablet, Take 1 tablet (150 mg) by mouth every morning, Disp: 90 tablet, Rfl: 0     CALCIUM CITRATE PO, Take 3 capsules by mouth 3 times daily,  Disp: , Rfl:      carvedilol (COREG) 12.5 MG tablet, Take 1 tablet (12.5 mg) by mouth 2 times daily (with meals), Disp: 180 tablet, Rfl: 0     Cholecalciferol (VITAMIN D PO), , Disp: , Rfl:      cyabnocobalamin (VITAMIN B-12) 2500 MCG sublingual tablet, Place 2,500 mcg under the tongue daily, Disp: 90 tablet, Rfl: 3     cyanocobalamin 500 MCG TABS, Place 1 tablet (500 mcg) under the tongue daily, Disp: 90 tablet, Rfl: 3     fluticasone (FLONASE) 50 MCG/ACT spray, Spray 1 spray into both nostrils daily as needed for rhinitis or allergies, Disp: 1 Bottle, Rfl: 11     Iron-vit C-vit B12-folic acid (IRON 100 PLUS) 100-250-0.025-1 MG TABS, Take  by mouth., Disp: , Rfl:      losartan (COZAAR) 50 MG tablet, Take 1 tablet (50 mg) by mouth 2 times daily (with meals), Disp: 180 tablet, Rfl: 0     Multiple Vitamins-Minerals (MULTIVITAL) TABS, Take 2 tablets by mouth daily., Disp: , Rfl:      pantoprazole (PROTONIX) 40 MG EC tablet, Take 1 tablet (40 mg) by mouth 2 times daily, Disp: 180 tablet, Rfl: 3     traZODone (DESYREL) 50 MG tablet, Take 1 tablet (50 mg) by mouth nightly as needed for sleep Take 2 hours before bedtime., Disp: 90 tablet, Rfl: 3    Allergies -   Allergies   Allergen Reactions     Contrast Dye      Happened when patient was ~ 18 years ago during a test for 'kidneys'.  Patient thinks she might have developed a rash, couldn't remember.     Diatrizoate Itching     Iodine      Vitamin K Swelling     facial       Social History -   Social History     Social History     Marital status:      Spouse name: N/A     Number of children: N/A     Years of education: N/A     Social History Main Topics     Smoking status: Never Smoker     Smokeless tobacco: Never Used     Alcohol use Yes      Comment: very rare     Drug use: No     Sexual activity: Yes     Partners: Male     Birth control/ protection: None      Comment: postmenopausal     Other Topics Concern     Parent/Sibling W/ Cabg, Mi Or Angioplasty Before 65f  "55m? Yes     Brother 55     Social History Narrative       Family History -   Family History   Problem Relation Age of Onset     Hypertension Mother      Arthritis Mother      Cancer Mother      Hodgkins disease     Obesity Mother      Cerebrovascular Disease Father      Alcohol/Drug Father      recovered alcoholic     Alcohol/Drug Sister      Gynecology Sister      history of abnormal paps--LEEPs done     Lipids Sister      Thyroid Disease Sister      Cardiovascular Sister      mitral valve prolapse     Alcohol/Drug Son      alcoholic     Depression Son      Hypertension Son      Psychotic Disorder Son      Hypertension Brother      Cardiovascular Brother      HEART DISEASE Brother 58     MI     Obesity Brother      Genitourinary Problems Daughter      Gynecology Daughter      Protein S deficiency     Unknown/Adopted Maternal Grandfather      Unknown/Adopted Paternal Grandmother      Unknown/Adopted Paternal Grandfather      Glaucoma No family hx of      Macular Degeneration No family hx of        Review of Systems - As per HPI and PMHx, otherwise review of system review of the head and neck negative.    Physical Exam  /88  Pulse 84  Ht 1.676 m (5' 6\")  Wt 75.8 kg (167 lb)  LMP 06/27/2010  SpO2 99%  BMI 26.95 kg/m2  BMI: Body mass index is 26.95 kg/(m^2).    General - The patient is well nourished and well developed, and appears to have good nutritional status.  Alert and oriented to person and place, answers questions and cooperates with examination appropriately.    SKIN - No suspicious lesions or rashes.  Respiration - No respiratory distress. There was no wheezing, rhonchi, or stridor noted. Clear lung sounds on ausculation.   Head and Face - Normocephalic and atraumatic, with no gross asymmetry noted of the contour of the facial features.  The facial nerve is intact, with strong symmetric movements. Mild tenderness around maxillary sinuses.    Voice and Breathing - The patient was breathing " comfortably without the use of accessory muscles. The patients voice was clear and strong, and had appropriate pitch and quality.    Ears - Bilateral pinna and EACs with normal appearing overlying skin. Tympanic membrane intact with good mobility on pneumatic otoscopy bilaterally. Bony landmarks of the ossicular chain are normal. The tympanic membranes are normal in appearance. No retraction, perforation, or masses.  No fluid or purulence was seen in the external canal or the middle ear.     Eyes - Extraocular movements intact.  Sclera were not icteric or injected, conjunctiva were pink and moist.    Mouth - Examination of the oral cavity showed pink, healthy oral mucosa. No lesions or ulcerations noted.  The tongue was mobile and midline, and the dentition were in good condition.      Throat - The walls of the oropharynx were smooth, pink, moist, symmetric, and had no lesions or ulcerations.  The tonsillar pillars and soft palate were symmetric.  The uvula was midline on elevation.    Neck - Normal midline excursion of the laryngotracheal complex during swallowing.  Full range of motion on passive movement.  Palpation of the occipital, submental, submandibular, internal jugular chain, and supraclavicular nodes did not demonstrate any abnormal lymph nodes or masses.  The carotid pulse was palpable bilaterally.  Palpation of the thyroid was soft and smooth, with no nodules or goiter appreciated.  The trachea was mobile and midline.    Nose - External contour is symmetric, no gross deflection or scars.  Nasal mucosa is pink and moist with no abnormal mucus.  The septum was deviated to the left and non-obstructive, turbinates of normal size and position.  No polyps, masses, or purulence noted on examination.    Neuro - Nonfocal neuro exam is normal, CN 2 through 12 intact, normal gait and muscle tone.      Performed in clinic today:  No procedures preformed in clinic today      A/P - Marlee DELA CRUZ Amadou is a 63 year old  female with a cough and acute sinusitis. Patient was prescribed Biaxin 500 mg BID for 14 days, Tessalon Perles 200 mg TID PRN, and Flonase 2 sprays daily. She should return in 3 weeks for a recheck.       This document serves as a record of the services and decisions personally performed and made by Dr. Abilio Soler MD. It was created on his behalf by Oneida Butterfield, a trained medical scribe. The creation of this document is based the provider's statements to the medical scribe.  Oneida Butterfield 9/26/2018    Provider:   The information in this document, created by the medical scribe for me, accurately reflects the services I personally performed and the decisions made by me. I have reviewed and approved this document for accuracy prior to leaving the patient care area.  Dr. Abilio Soler MD 9/26/2018    Abilio Soler MD.  .

## 2018-09-26 NOTE — LETTER
9/26/2018         RE: Marlee Tobar  [address] [facility] [address]        Dear Colleague,    Thank you for referring your patient, Marlee Tobar, to the [facility]. Please see a copy of my visit note below.    ENT Consultation    Marlee Tobar who is a 63 year old female seen in consultation at the request of Bandar Merrill .      History of Present Illness - Marlee Tobar is a 63 year old female here today with recurring sinus infections and URI. Patient reports that 2 weeks ago she went in for a follow up for her lung nodules. They had done a sinus X-Ray, her doctor reported that she had a sinus infection. Patient was asymptomatic at the time. They decided to place her on a ZPack. At the end of this she was not feeling well. She was placed on another ZPack and prednisone, she states this did not improve her symptoms. Patient is experiencing cough, nasal congestion, and tickle in her throat. She has mostly clear drainage from her nose. No history of allergies. She has a history of acute bronchitis that develops into pneumonia.       Body mass index is 26.95 kg/(m^2).    BP Readings from Last 1 Encounters:   09/26/18 140/88     BP noted to be elevated today in office.  Patient to follow up with Primary Care provider regarding elevated blood pressure.    Marlee IS NOT a smoker/uses chewing tobacco.       Past Medical History -   Past Medical History:   Diagnosis Date     Bleeding stomach ulcer 2011    [facility] [place] [facility]     Gallstones      History of tonsillectomy      Hypertension goal BP (blood pressure) < 140/90      Pulmonary embolism (H) 2/10/2011    developed after surgery, coumadin x 6 months       Current Medications -   Current Outpatient Prescriptions:      Ascorbic Acid (VITAMIN C PO), , Disp: , Rfl:      azithromycin (ZITHROMAX) 250 MG tablet, Two tablets first day, then one tablet daily for four days., Disp: 6 tablet, Rfl: 2     Biotin  5000 MCG CAPS, Take 1 tablet by mouth daily., Disp: , Rfl:      buPROPion (WELLBUTRIN XL) 150 MG 24 hr tablet, Take 1 tablet (150 mg) by mouth every morning, Disp: 90 tablet, Rfl: 0     CALCIUM CITRATE PO, Take 3 capsules by mouth 3 times daily, Disp: , Rfl:      carvedilol (COREG) 12.5 MG tablet, Take 1 tablet (12.5 mg) by mouth 2 times daily (with meals), Disp: 180 tablet, Rfl: 0     Cholecalciferol (VITAMIN D PO), , Disp: , Rfl:      cyabnocobalamin (VITAMIN B-12) 2500 MCG sublingual tablet, Place 2,500 mcg under the tongue daily, Disp: 90 tablet, Rfl: 3     cyanocobalamin 500 MCG TABS, Place 1 tablet (500 mcg) under the tongue daily, Disp: 90 tablet, Rfl: 3     fluticasone (FLONASE) 50 MCG/ACT spray, Spray 1 spray into both nostrils daily as needed for rhinitis or allergies, Disp: 1 Bottle, Rfl: 11     Iron-vit C-vit B12-folic acid (IRON 100 PLUS) 100-250-0.025-1 MG TABS, Take  by mouth., Disp: , Rfl:      losartan (COZAAR) 50 MG tablet, Take 1 tablet (50 mg) by mouth 2 times daily (with meals), Disp: 180 tablet, Rfl: 0     Multiple Vitamins-Minerals (MULTIVITAL) TABS, Take 2 tablets by mouth daily., Disp: , Rfl:      pantoprazole (PROTONIX) 40 MG EC tablet, Take 1 tablet (40 mg) by mouth 2 times daily, Disp: 180 tablet, Rfl: 3     traZODone (DESYREL) 50 MG tablet, Take 1 tablet (50 mg) by mouth nightly as needed for sleep Take 2 hours before bedtime., Disp: 90 tablet, Rfl: 3    Allergies -   Allergies   Allergen Reactions     Contrast Dye      Happened when patient was ~ 18 years ago during a test for 'kidneys'.  Patient thinks she might have developed a rash, couldn't remember.     Diatrizoate Itching     Iodine      Vitamin K Swelling     facial       Social History -   Social History     Social History     Marital status:      Spouse name: N/A     Number of children: N/A     Years of education: N/A     Social History Main Topics     Smoking status: Never Smoker     Smokeless tobacco: Never Used      "Alcohol use Yes      Comment: very rare     Drug use: No     Sexual activity: Yes     Partners: Male     Birth control/ protection: None      Comment: postmenopausal     Other Topics Concern     Parent/Sibling W/ Cabg, Mi Or Angioplasty Before 65f 55m? Yes     Brother 55     Social History Narrative       Family History -   Family History   Problem Relation Age of Onset     Hypertension Mother      Arthritis Mother      Cancer Mother      Hodgkins disease     Obesity Mother      Cerebrovascular Disease Father      Alcohol/Drug Father      recovered alcoholic     Alcohol/Drug Sister      Gynecology Sister      history of abnormal paps--LEEPs done     Lipids Sister      Thyroid Disease Sister      Cardiovascular Sister      mitral valve prolapse     Alcohol/Drug Son      alcoholic     Depression Son      Hypertension Son      Psychotic Disorder Son      Hypertension Brother      Cardiovascular Brother      HEART DISEASE Brother 58     MI     Obesity Brother      Genitourinary Problems Daughter      Gynecology Daughter      Protein S deficiency     Unknown/Adopted Maternal Grandfather      Unknown/Adopted Paternal Grandmother      Unknown/Adopted Paternal Grandfather      Glaucoma No family hx of      Macular Degeneration No family hx of        Review of Systems - As per HPI and PMHx, otherwise review of system review of the head and neck negative.    Physical Exam  /88  Pulse 84  Ht 1.676 m (5' 6\")  Wt 75.8 kg (167 lb)  LMP 06/27/2010  SpO2 99%  BMI 26.95 kg/m2  BMI: Body mass index is 26.95 kg/(m^2).    General - The patient is well nourished and well developed, and appears to have good nutritional status.  Alert and oriented to person and place, answers questions and cooperates with examination appropriately.    SKIN - No suspicious lesions or rashes.  Respiration - No respiratory distress. There was no wheezing, rhonchi, or stridor noted. Clear lung sounds on ausculation.   Head and Face - Normocephalic " and atraumatic, with no gross asymmetry noted of the contour of the facial features.  The facial nerve is intact, with strong symmetric movements. Mild tenderness around maxillary sinuses.    Voice and Breathing - The patient was breathing comfortably without the use of accessory muscles. The patients voice was clear and strong, and had appropriate pitch and quality.    Ears - Bilateral pinna and EACs with normal appearing overlying skin. Tympanic membrane intact with good mobility on pneumatic otoscopy bilaterally. Bony landmarks of the ossicular chain are normal. The tympanic membranes are normal in appearance. No retraction, perforation, or masses.  No fluid or purulence was seen in the external canal or the middle ear.     Eyes - Extraocular movements intact.  Sclera were not icteric or injected, conjunctiva were pink and moist.    Mouth - Examination of the oral cavity showed pink, healthy oral mucosa. No lesions or ulcerations noted.  The tongue was mobile and midline, and the dentition were in good condition.      Throat - The walls of the oropharynx were smooth, pink, moist, symmetric, and had no lesions or ulcerations.  The tonsillar pillars and soft palate were symmetric.  The uvula was midline on elevation.    Neck - Normal midline excursion of the laryngotracheal complex during swallowing.  Full range of motion on passive movement.  Palpation of the occipital, submental, submandibular, internal jugular chain, and supraclavicular nodes did not demonstrate any abnormal lymph nodes or masses.  The carotid pulse was palpable bilaterally.  Palpation of the thyroid was soft and smooth, with no nodules or goiter appreciated.  The trachea was mobile and midline.    Nose - External contour is symmetric, no gross deflection or scars.  Nasal mucosa is pink and moist with no abnormal mucus.  The septum was deviated to the left and non-obstructive, turbinates of normal size and position.  No polyps, masses, or  purulence noted on examination.    Neuro - Nonfocal neuro exam is normal, CN 2 through 12 intact, normal gait and muscle tone.      Performed in clinic today:  No procedures preformed in clinic today      A/P - Marlee Tobar is a 63 year old female with a cough and acute sinusitis. Patient was prescribed Biaxin 500 mg BID for 14 days, Tessalon Perles 200 mg TID PRN, and Flonase 2 sprays daily. She should return in 3 weeks for a recheck.       This document serves as a record of the services and decisions personally performed and made by Dr. Abilio Soler MD. It was created on his behalf by Oneida Butterfield, a trained medical scribe. The creation of this document is based the provider's statements to the medical scribe.  Oneida Butterfield 9/26/2018    Provider:   The information in this document, created by the medical scribe for me, accurately reflects the services I personally performed and the decisions made by me. I have reviewed and approved this document for accuracy prior to leaving the patient care area.  Dr. Abilio Soler MD 9/26/2018    Abilio Soler MD.  .      Again, thank you for allowing me to participate in the care of your patient.        Sincerely,        Abilio Soler MD, MD

## 2018-12-28 ENCOUNTER — TELEPHONE (OUTPATIENT)
Dept: INTERNAL MEDICINE | Facility: CLINIC | Age: 63
End: 2018-12-28

## 2018-12-28 DIAGNOSIS — K21.9 GASTROESOPHAGEAL REFLUX DISEASE WITHOUT ESOPHAGITIS: ICD-10-CM

## 2018-12-28 RX ORDER — PANTOPRAZOLE SODIUM 40 MG/1
40 TABLET, DELAYED RELEASE ORAL
Qty: 180 TABLET | Refills: 1 | Status: SHIPPED | OUTPATIENT
Start: 2018-12-28 | End: 2019-08-13

## 2018-12-28 NOTE — TELEPHONE ENCOUNTER
Call Center Reason:  Patient called said her insurance wont cover the PANTOPRAZOLE so she need it to be sent to [facility]. Only this medication her insurance wont cover at her usual pharmacy. [facility] [phone]

## 2018-12-28 NOTE — TELEPHONE ENCOUNTER
Requested Prescriptions   Pending Prescriptions Disp Refills     pantoprazole (PROTONIX) 40 MG EC tablet 180 tablet 1     Sig: Take 1 tablet (40 mg) by mouth 2 times daily    There is no refill protocol information for this order        Different pharmacy being requested for refill than previously sent to. Unable to transfer from mail order pharmacy to local pharmacy.  Prescription approved per [facility] Refill Protocol.      Epifanio Sr RN, BSN

## 2019-01-18 DIAGNOSIS — I10 HYPERTENSION GOAL BP (BLOOD PRESSURE) < 140/90: ICD-10-CM

## 2019-01-24 RX ORDER — LOSARTAN POTASSIUM 50 MG/1
50 TABLET ORAL DAILY
Qty: 180 TABLET | Refills: 0 | Status: SHIPPED | OUTPATIENT
Start: 2019-01-24 | End: 2019-04-16

## 2019-01-30 ENCOUNTER — TELEPHONE (OUTPATIENT)
Dept: SURGERY | Facility: CLINIC | Age: 64
End: 2019-01-30

## 2019-01-30 ENCOUNTER — OFFICE VISIT (OUTPATIENT)
Dept: SURGERY | Facility: CLINIC | Age: 64
End: 2019-01-30
Attending: INTERNAL MEDICINE
Payer: COMMERCIAL

## 2019-01-30 ENCOUNTER — ANCILLARY PROCEDURE (OUTPATIENT)
Dept: CT IMAGING | Facility: CLINIC | Age: 64
End: 2019-01-30
Payer: COMMERCIAL

## 2019-01-30 VITALS
OXYGEN SATURATION: 100 % | WEIGHT: 174 LBS | TEMPERATURE: 98.4 F | SYSTOLIC BLOOD PRESSURE: 164 MMHG | HEART RATE: 64 BPM | BODY MASS INDEX: 28.08 KG/M2 | DIASTOLIC BLOOD PRESSURE: 98 MMHG

## 2019-01-30 DIAGNOSIS — R91.8 PULMONARY NODULES: ICD-10-CM

## 2019-01-30 DIAGNOSIS — R91.8 PULMONARY NODULES: Primary | ICD-10-CM

## 2019-01-30 PROCEDURE — G0463 HOSPITAL OUTPT CLINIC VISIT: HCPCS | Mod: ZF

## 2019-01-30 ASSESSMENT — ENCOUNTER SYMPTOMS
CHILLS: 0
HEMOPTYSIS: 0
DECREASED LIBIDO: 1
WEIGHT GAIN: 1
FATIGUE: 1
FEVER: 0
HOT FLASHES: 0
WHEEZING: 0
DYSPNEA ON EXERTION: 0
SPUTUM PRODUCTION: 0
SHORTNESS OF BREATH: 1
WEIGHT LOSS: 0
HALLUCINATIONS: 0
SNORES LOUDLY: 0
COUGH: 0
POLYPHAGIA: 0
ALTERED TEMPERATURE REGULATION: 1
POLYDIPSIA: 0
NIGHT SWEATS: 0
COUGH DISTURBING SLEEP: 0
INCREASED ENERGY: 1
DECREASED APPETITE: 0
POSTURAL DYSPNEA: 0

## 2019-01-30 ASSESSMENT — PAIN SCALES - GENERAL: PAINLEVEL: NO PAIN (0)

## 2019-01-30 NOTE — TELEPHONE ENCOUNTER
Tried to leave a msg for the pt to cancel appts from today due to Sara [facility] was out.  Unable to reach pt as no one picked up the phone and mailbox is full.    Left two msgs for pts emergency contact charlie Herrera and stated:    First msg -  Was told to tell pt that appts from today will be cancelled due to Sara [place] is out.    Second msg -  Was told to call back and tell pt that Sara Andrew will be coming in for pts appts.      Awaiting to hear back from pt or pts daughter.

## 2019-01-30 NOTE — NURSING NOTE
"Oncology Rooming Note    January 30, 2019 9:38 AM   Marlee Tobar is a 63 year old female who presents for:    Chief Complaint   Patient presents with     Oncology Clinic Visit     Return; Lung Nodule     Initial Vitals: Pulse 64   Wt 78.9 kg (174 lb)   LMP 06/27/2010   SpO2 100%   BMI 28.08 kg/m   Estimated body mass index is 28.08 kg/m  as calculated from the following:    Height as of 9/26/18: 1.676 m (5' 6\").    Weight as of this encounter: 78.9 kg (174 lb). Body surface area is 1.92 meters squared.  No Pain (0) Comment: Data Unavailable   Patient's last menstrual period was 06/27/2010.  Allergies reviewed: Yes  Medications reviewed: Yes    Medications: Medication refills not needed today.  Pharmacy name entered into Wanderio:    [facility] - [place], [place] - [address] [facility] - [place], [place] - [address]  [facility] - [place], [place] - [address]  WRITTEN PRESCRIPTION REQUESTED  [facility] [address] [place], [place] - [address] [facility] [address] [place], [place] [facility] [address] [facility] [place] [place] - [address] AT [address]AL TO [facility] S[address]    Clinical concerns:  Pt has noticed increase in SOB, notices when she is overtired and upon exertion.        8 minutes for nursing intake (face to face time)     Ca Alves CMA              "

## 2019-01-30 NOTE — LETTER
1/30/2019     RE: Marlee Tobar  [address] [facility] [address]     Dear Colleague,    Thank you for referring your patient, Marlee Tobar, to the [facility]. Please see a copy of my visit note below.        Pulmonary Nodule Clinic        HPI:     Marlee Tobar is a 63 year old female  referred to the Lung Nodule Clinic for Oncology Clinic Visit (Return; Lung Nodule)             Review of Systems:   14 point ROS performed with pertinent +/- noted in the HPI.  The remainder of the ROS was otherwise negative.        Pertinent Medications     Current Outpatient Medications   Medication Sig     Ascorbic Acid (VITAMIN C PO)      azithromycin (ZITHROMAX) 250 MG tablet Two tablets first day, then one tablet daily for four days.     benzonatate (TESSALON) 200 MG capsule Take 1 capsule (200 mg) by mouth 3 times daily as needed for cough     Biotin 5000 MCG CAPS Take 1 tablet by mouth daily.     buPROPion (WELLBUTRIN XL) 150 MG 24 hr tablet Take 1 tablet (150 mg) by mouth every morning     CALCIUM CITRATE PO Take 3 capsules by mouth 3 times daily     carvedilol (COREG) 12.5 MG tablet Take 1 tablet (12.5 mg) by mouth 2 times daily (with meals)     Cholecalciferol (VITAMIN D PO)      cyanocobalamin 500 MCG TABS Place 1 tablet (500 mcg) under the tongue daily     fluticasone (FLONASE) 50 MCG/ACT spray Spray 2 sprays into both nostrils daily     fluticasone (FLONASE) 50 MCG/ACT spray Spray 1 spray into both nostrils daily as needed for rhinitis or allergies     Iron-vit C-vit B12-folic acid (IRON 100 PLUS) 100-250-0.025-1 MG TABS Take  by mouth.     losartan (COZAAR) 50 MG tablet Take 1 tablet (50 mg) by mouth daily (Patient taking differently: Take 50 mg by mouth 2 times daily )     Multiple Vitamins-Minerals (MULTIVITAL) TABS Take 2 tablets by mouth daily.     pantoprazole (PROTONIX) 40 MG EC tablet Take 1 tablet (40 mg) by mouth 2 times daily     traZODone (DESYREL) 50 MG tablet Take 1  tablet (50 mg) by mouth nightly as needed for sleep Take 2 hours before bedtime.     No current facility-administered medications for this visit.           Allergies:      Allergies   Allergen Reactions     Contrast Dye      Happened when patient was ~ 18 years ago during a test for 'kidneys'.  Patient thinks she might have developed a rash, couldn't remember.     Diatrizoate Itching     Iodine      Vitamin K Swelling     facial          Past Medical Hx:   Patient Active Problem List    Health Care Home         Priority: High [1]         Date Noted: 10/20/2011            EMERGENCY CARE PLAN            Presenting Problem Signs and Symptoms Treatment            Plan  Questions or conerns during clinic hours               I will call the clinic             directly               Questions or conerns outside clinic hours    I            will call the 24 hour             nurse line at [phone]              Patient needs to schedule an appointment    I            will call the 24 hour scheduling team at            [phone] or clinic directly  Same day            treatment     I will call the clinic first, nurse            line if after             hours, urgent care and express care if needed                                                                                        DX V65.8 REPLACED WITH [id number] [facility] CARE HOME            (04/08/2013)      Pulmonary nodules         Priority: Medium [2]         Date Noted: 10/12/2017      Insomnia, unspecified type         Priority: Medium [2]         Date Noted: 03/19/2017      Advance care planning         Priority: Medium [2]         Date Noted: 01/06/2017            Advance Care Planning 1/6/2017: ACP Review of            Chart / Resources Provided:  Reviewed chart for            advance care plan.  Marlee Tobar has been            provided information and resources to begin or            update their advance care plan.              Added by Loida Chen                                           S/P gastric bypass         Priority: Medium [2]         Date Noted: 01/06/2017      H/O gastrointestinal hemorrhage         Priority: Medium [2]         Date Noted: 01/06/2017      Personal history of PE (pulmonary embolism)         Priority: Medium [2]         Date Noted: 01/06/2017      Syncope, near         Priority: Medium [2]         Date Noted: 01/28/2016      Overweight         Priority: Medium [2]         Date Noted: 12/18/2015      Major depressive disorder, recurrent episode, in partial remission (H)         Priority: Medium [2]         Date Noted: 12/16/2015      Primary hypercoagulable state (H)         Priority: Medium [2]         Date Noted: 12/16/2015      Microalbuminuria         Priority: Medium [2]         Date Noted: 12/11/2014      Dyspareunia         Priority: Medium [2]         Date Noted: 12/10/2014      Torn earlobe         Priority: Medium [2]         Date Noted: 05/29/2014      Varicose vein of leg         Priority: Medium [2]         Date Noted: 04/30/2014      Pancreatitis         Priority: Medium [2]         Date Noted: 07/01/2012      Post-menopausal         Priority: Medium [2]         Date Noted: 09/08/2011      GERD (gastroesophageal reflux disease)         Priority: Medium [2]         Date Noted: 08/25/2011      Cholelithiases         Priority: Medium [2]         Date Noted: 04/14/2011      Gallstones         Priority: Medium [2]      Hypertension goal BP (blood pressure) < 140/90         Priority: Medium [2]      CARDIOVASCULAR SCREENING; LDL GOAL LESS THAN 130         Priority: Medium [2]         Date Noted: 10/31/2010      iamJOINT PAIN-LOWER LEG         Priority: Medium [2]         Date Noted: 08/15/2006      iamPOSTSURGICAL STATES NEC         Priority: Medium [2]         Date Noted: 08/15/2006           Family Hx:     Family History   Problem Relation Age of Onset     Hypertension Mother      Arthritis Mother      Cancer Mother         Hodgkins  disease     Obesity Mother      Cerebrovascular Disease Father      Alcohol/Drug Father         recovered alcoholic     Alcohol/Drug Sister      Gynecology Sister         history of abnormal paps--LEEPs done     Lipids Sister      Thyroid Disease Sister      Cardiovascular Sister         mitral valve prolapse     Alcohol/Drug Son         alcoholic     Depression Son      Hypertension Son      Psychotic Disorder Son      Hypertension Brother      Cardiovascular Brother      Heart Disease Brother 58        MI     Obesity Brother      Genitourinary Problems Daughter      Gynecology Daughter         Protein S deficiency     Unknown/Adopted Maternal Grandfather      Unknown/Adopted Paternal Grandmother      Unknown/Adopted Paternal Grandfather      Glaucoma No family hx of      Macular Degeneration No family hx of           Social Hx:     Social History     Tobacco Use     Smoking status: Never Smoker     Smokeless tobacco: Never Used   Substance Use Topics     Alcohol use: Yes     Comment: very rare            Objective   Vitals:  BP (!) 164/98   Pulse 64   Temp 98.4  F (36.9  C) (Oral)   Wt 78.9 kg (174 lb)   LMP 06/27/2010   SpO2 100%   BMI 28.08 kg/m       General:  Adult female;appears stated age; no acute distress; the patient is a good historian  HEENT:  NCAT; EOMI; No icterus; no injection; MMM  Neck: Supple, full range of motion, no lymphadenopathy  Pulm: CTAB, normal to percussion  CV: RRR, no murmurs, rubs or gallops  Abdomen: Soft, NT, ND, + BS  Extremities:  No cyanosis or clubbing, no edema  Neuro: Alert and oriented x 3, moves all extremities no obvious weakness  Skin: No skin rashes noted        Labs / Imaging/Studies     CBC RESULTS:   Recent Labs   Lab Test 01/08/18  0731 01/06/17  0741   WBC 5.0 9.5   RBC 4.10 4.51   HGB 13.1 14.3   HCT 40.2 43.5   MCV 98 97   MCH 32.0 31.7   MCHC 32.6 32.9   RDW 13.5 13.9    193          Lab Results   Component Value Date     01/08/2018      "01/06/2017     12/16/2015    Lab Results   Component Value Date    CHLORIDE 107 01/08/2018    CHLORIDE 104 01/06/2017    CHLORIDE 106 12/16/2015    Lab Results   Component Value Date    BUN 15 01/08/2018    BUN 7 01/06/2017    BUN 8 12/16/2015      Lab Results   Component Value Date    POTASSIUM 3.9 01/08/2018    POTASSIUM 3.3 01/06/2017    POTASSIUM 3.9 12/16/2015    Lab Results   Component Value Date    CO2 29 01/08/2018    CO2 31 01/06/2017    CO2 30 12/16/2015    Lab Results   Component Value Date    CR 0.68 01/08/2018    CR 0.72 01/06/2017    CR 0.70 12/16/2015        INR   Date Value Ref Range Status   07/11/2011 1.10 0.86 - 1.14 Final   06/09/2011 1.80 (H) 0.86 - 1.14 Final     INR Protime   Date Value Ref Range Status   08/31/2011 2.3 (A) 0.86 - 1.14    08/11/2011 1.6 (A) 0.86 - 1.14      INR Point of Care   Date Value Ref Range Status   04/30/2014 1.1 0.86 - 1.14 Final       Pertinent Cultures / Microbiology:   N/A    Imaging:   Chest CT scan    Pulmonary Function Tests:  None         Assessment and Plan:   Assessment: Camille is here with her , Yohan.  She denies any recent illnesses.   She does endorse an occasional dry cough \"more like a tickle\".   She also experiences intermittent SOB with exertion.   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   She denies wheeze, no fever or chills.       Her B/P is elevated today, was re-checked with slight improvement to 164/98.  She denies pedal edema or orthopnea, no chest pain.  Her chest CT was reviewed today and compared to the Dec 2017 CT.   The RML nodule appears unchanged.   Camille is a never smoker, so is a low-risk patient for lung cancer.    Plan:  Camille will be following up with Dr. Merrill in the next 1-2 months.   I recommended PFT's and appointment with pulmonary specialist at [facility].  She will discuss this first with Dr. Merrill.   In addition, " I asked her to discuss her hypertension with Dr. Merrill.   He made some medication changes recently, per patient.    PRN return.   I will release the formal radiology reading to [facility], as requested.       Diagnoses       Codes Comments    Pulmonary nodules    -  Primary R91.8           I spent 30 minutes with direct face to face interaction with this patient and provided at least 50% of this time counseling and coordinating care for lung nodules and SOB as noted above in the assessment and plan.      BONILLA Taylor, CNS    Answers for HPI/ROS submitted by the patient on 1/30/2019   General Symptoms: Yes  Skin Symptoms: No  HENT Symptoms: No  EYE SYMPTOMS: No  HEART SYMPTOMS: No  LUNG SYMPTOMS: Yes  INTESTINAL SYMPTOMS: No  URINARY SYMPTOMS: No  GYNECOLOGIC SYMPTOMS: Yes  BREAST SYMPTOMS: No  SKELETAL SYMPTOMS: No  BLOOD SYMPTOMS: No  NERVOUS SYSTEM SYMPTOMS: No  MENTAL HEALTH SYMPTOMS: No  Fever: No  Loss of appetite: No  Weight loss: No  Weight gain: Yes  Fatigue: Yes  Night sweats: No  Chills: No  Increased stress: Yes  Excessive hunger: No  Excessive thirst: No  Feeling hot or cold when others believe the temperature is normal: Yes  Loss of height: No  Post-operative complications: No  Surgical site pain: No  Hallucinations: No  Change in or Loss of Energy: Yes  Hyperactivity: No  Confusion: No  Cough: No  Sputum or phlegm: No  Coughing up blood: No  Difficulty breating or shortness of breath: Yes  Snoring: No  Wheezing: No  Difficulty breathing on exertion: No  Nighttime Cough: No  Difficulty breathing when lying flat: No  Bleeding or spotting between periods: No  Heavy or painful periods: No  Irregular periods: No  Vaginal discharge: No  Hot flashes: No  Vaginal dryness: Yes  Genital ulcers: No  Reduced libido: Yes  Painful intercourse: Yes  Difficulty with sexual arousal: Yes  Post-menopausal bleeding: No    Again, thank you for allowing me to participate in the care of your patient.       Sincerely,    BONILLA Lew CNS

## 2019-01-30 NOTE — PATIENT INSTRUCTIONS
Chest CT results will be released to [facility]; anticipate that no further f/u is needed.    If intermittent shortness of breath continues, discuss with Dr. Merrill.   Pulmonary function testing and appointment with pulmonologist at [facility] may be useful.  Discuss blood pressure with Dr. Merrill  164/98 today on 2nd check.

## 2019-01-30 NOTE — PROGRESS NOTES
Pulmonary Nodule Clinic        HPI:     Marlee Tobar is a 63 year old female  referred to the Lung Nodule Clinic for Oncology Clinic Visit (Return; Lung Nodule)             Review of Systems:   14 point ROS performed with pertinent +/- noted in the HPI.  The remainder of the ROS was otherwise negative.        Pertinent Medications     Current Outpatient Medications   Medication Sig     Ascorbic Acid (VITAMIN C PO)      azithromycin (ZITHROMAX) 250 MG tablet Two tablets first day, then one tablet daily for four days.     benzonatate (TESSALON) 200 MG capsule Take 1 capsule (200 mg) by mouth 3 times daily as needed for cough     Biotin 5000 MCG CAPS Take 1 tablet by mouth daily.     buPROPion (WELLBUTRIN XL) 150 MG 24 hr tablet Take 1 tablet (150 mg) by mouth every morning     CALCIUM CITRATE PO Take 3 capsules by mouth 3 times daily     carvedilol (COREG) 12.5 MG tablet Take 1 tablet (12.5 mg) by mouth 2 times daily (with meals)     Cholecalciferol (VITAMIN D PO)      cyanocobalamin 500 MCG TABS Place 1 tablet (500 mcg) under the tongue daily     fluticasone (FLONASE) 50 MCG/ACT spray Spray 2 sprays into both nostrils daily     fluticasone (FLONASE) 50 MCG/ACT spray Spray 1 spray into both nostrils daily as needed for rhinitis or allergies     Iron-vit C-vit B12-folic acid (IRON 100 PLUS) 100-250-0.025-1 MG TABS Take  by mouth.     losartan (COZAAR) 50 MG tablet Take 1 tablet (50 mg) by mouth daily (Patient taking differently: Take 50 mg by mouth 2 times daily )     Multiple Vitamins-Minerals (MULTIVITAL) TABS Take 2 tablets by mouth daily.     pantoprazole (PROTONIX) 40 MG EC tablet Take 1 tablet (40 mg) by mouth 2 times daily     traZODone (DESYREL) 50 MG tablet Take 1 tablet (50 mg) by mouth nightly as needed for sleep Take 2 hours before bedtime.     No current facility-administered medications for this visit.           Allergies:      Allergies   Allergen Reactions     Contrast Dye      Happened when patient  was ~ 18 years ago during a test for 'kidneys'.  Patient thinks she might have developed a rash, couldn't remember.     Diatrizoate Itching     Iodine      Vitamin K Swelling     facial          Past Medical Hx:   Patient Active Problem List    Health Care Home         Priority: High [1]         Date Noted: 10/20/2011            EMERGENCY CARE PLAN            Presenting Problem Signs and Symptoms Treatment            Plan  Questions or conerns during clinic hours               I will call the clinic             directly               Questions or conerns outside clinic hours    I            will call the 24 hour             nurse line at [phone]              Patient needs to schedule an appointment    I            will call the 24 hour scheduling team at            [phone] or clinic directly  Same day            treatment     I will call the clinic first, nurse            line if after             hours, urgent care and express care if needed                                                                                        DX V65.8 REPLACED WITH [id number] [facility] CARE HOME            (04/08/2013)      Pulmonary nodules         Priority: Medium [2]         Date Noted: 10/12/2017      Insomnia, unspecified type         Priority: Medium [2]         Date Noted: 03/19/2017      Advance care planning         Priority: Medium [2]         Date Noted: 01/06/2017            Advance Care Planning 1/6/2017: ACP Review of            Chart / Resources Provided:  Reviewed chart for            advance care plan.  Marlee Tobar has been            provided information and resources to begin or            update their advance care plan.              Added by Loida Chen                                          S/P gastric bypass         Priority: Medium [2]         Date Noted: 01/06/2017      H/O gastrointestinal hemorrhage         Priority: Medium [2]         Date Noted: 01/06/2017      Personal history of PE  (pulmonary embolism)         Priority: Medium [2]         Date Noted: 01/06/2017      Syncope, near         Priority: Medium [2]         Date Noted: 01/28/2016      Overweight         Priority: Medium [2]         Date Noted: 12/18/2015      Major depressive disorder, recurrent episode, in partial remission (H)         Priority: Medium [2]         Date Noted: 12/16/2015      Primary hypercoagulable state (H)         Priority: Medium [2]         Date Noted: 12/16/2015      Microalbuminuria         Priority: Medium [2]         Date Noted: 12/11/2014      Dyspareunia         Priority: Medium [2]         Date Noted: 12/10/2014      Torn earlobe         Priority: Medium [2]         Date Noted: 05/29/2014      Varicose vein of leg         Priority: Medium [2]         Date Noted: 04/30/2014      Pancreatitis         Priority: Medium [2]         Date Noted: 07/01/2012      Post-menopausal         Priority: Medium [2]         Date Noted: 09/08/2011      GERD (gastroesophageal reflux disease)         Priority: Medium [2]         Date Noted: 08/25/2011      Cholelithiases         Priority: Medium [2]         Date Noted: 04/14/2011      Gallstones         Priority: Medium [2]      Hypertension goal BP (blood pressure) < 140/90         Priority: Medium [2]      CARDIOVASCULAR SCREENING; LDL GOAL LESS THAN 130         Priority: Medium [2]         Date Noted: 10/31/2010      iamJOINT PAIN-LOWER LEG         Priority: Medium [2]         Date Noted: 08/15/2006      iamPOSTSURGICAL STATES NEC         Priority: Medium [2]         Date Noted: 08/15/2006           Family Hx:     Family History   Problem Relation Age of Onset     Hypertension Mother      Arthritis Mother      Cancer Mother         Hodgkins disease     Obesity Mother      Cerebrovascular Disease Father      Alcohol/Drug Father         recovered alcoholic     Alcohol/Drug Sister      Gynecology Sister         history of abnormal paps--LEEPs done     Lipids Sister       Thyroid Disease Sister      Cardiovascular Sister         mitral valve prolapse     Alcohol/Drug Son         alcoholic     Depression Son      Hypertension Son      Psychotic Disorder Son      Hypertension Brother      Cardiovascular Brother      Heart Disease Brother 58        MI     Obesity Brother      Genitourinary Problems Daughter      Gynecology Daughter         Protein S deficiency     Unknown/Adopted Maternal Grandfather      Unknown/Adopted Paternal Grandmother      Unknown/Adopted Paternal Grandfather      Glaucoma No family hx of      Macular Degeneration No family hx of           Social Hx:     Social History     Tobacco Use     Smoking status: Never Smoker     Smokeless tobacco: Never Used   Substance Use Topics     Alcohol use: Yes     Comment: very rare            Objective   Vitals:  BP (!) 164/98   Pulse 64   Temp 98.4  F (36.9  C) (Oral)   Wt 78.9 kg (174 lb)   LMP 06/27/2010   SpO2 100%   BMI 28.08 kg/m      General:  Adult female;appears stated age; no acute distress; the patient is a good historian  HEENT:  NCAT; EOMI; No icterus; no injection; MMM  Neck: Supple, full range of motion, no lymphadenopathy  Pulm: CTAB, normal to percussion  CV: RRR, no murmurs, rubs or gallops  Abdomen: Soft, NT, ND, + BS  Extremities:  No cyanosis or clubbing, no edema  Neuro: Alert and oriented x 3, moves all extremities no obvious weakness  Skin: No skin rashes noted        Labs / Imaging/Studies     CBC RESULTS:   Recent Labs   Lab Test 01/08/18  0731 01/06/17  0741   WBC 5.0 9.5   RBC 4.10 4.51   HGB 13.1 14.3   HCT 40.2 43.5   MCV 98 97   MCH 32.0 31.7   MCHC 32.6 32.9   RDW 13.5 13.9    193          Lab Results   Component Value Date     01/08/2018     01/06/2017     12/16/2015    Lab Results   Component Value Date    CHLORIDE 107 01/08/2018    CHLORIDE 104 01/06/2017    CHLORIDE 106 12/16/2015    Lab Results   Component Value Date    BUN 15 01/08/2018    BUN 7 01/06/2017     "BUN 8 12/16/2015      Lab Results   Component Value Date    POTASSIUM 3.9 01/08/2018    POTASSIUM 3.3 01/06/2017    POTASSIUM 3.9 12/16/2015    Lab Results   Component Value Date    CO2 29 01/08/2018    CO2 31 01/06/2017    CO2 30 12/16/2015    Lab Results   Component Value Date    CR 0.68 01/08/2018    CR 0.72 01/06/2017    CR 0.70 12/16/2015        INR   Date Value Ref Range Status   07/11/2011 1.10 0.86 - 1.14 Final   06/09/2011 1.80 (H) 0.86 - 1.14 Final     INR Protime   Date Value Ref Range Status   08/31/2011 2.3 (A) 0.86 - 1.14    08/11/2011 1.6 (A) 0.86 - 1.14      INR Point of Care   Date Value Ref Range Status   04/30/2014 1.1 0.86 - 1.14 Final       Pertinent Cultures / Microbiology:   N/A    Imaging:   Chest CT scan    Pulmonary Function Tests:  None         Assessment and Plan:   Assessment: Camille is here with her , Yohan.  She denies any recent illnesses.   She does endorse an occasional dry cough \"more like a tickle\".   She also experiences intermittent SOB with exertion.   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   She denies wheeze, no fever or chills.       Her B/P is elevated today, was re-checked with slight improvement to 164/98.  She denies pedal edema or orthopnea, no chest pain.  Her chest CT was reviewed today and compared to the Dec 2017 CT.   The RML nodule appears unchanged.   Camille is a never smoker, so is a low-risk patient for lung cancer.    Plan:  Camille will be following up with Dr. Merrill in the next 1-2 months.   I recommended PFT's and appointment with pulmonary specialist at [facility].  She will discuss this first with Dr. Merrill.   In addition, I asked her to discuss her hypertension with Dr. Merrill.   He made some medication changes recently, per patient.    PRN return.   I will release the formal radiology reading to [facility], as requested.       Diagnoses       Codes " Comments    Pulmonary nodules    -  Primary R91.8           I spent 30 minutes with direct face to face interaction with this patient and provided at least 50% of this time counseling and coordinating care for lung nodules and SOB as noted above in the assessment and plan.      BONILLA Taylor, CNS    Answers for HPI/ROS submitted by the patient on 1/30/2019   General Symptoms: Yes  Skin Symptoms: No  HENT Symptoms: No  EYE SYMPTOMS: No  HEART SYMPTOMS: No  LUNG SYMPTOMS: Yes  INTESTINAL SYMPTOMS: No  URINARY SYMPTOMS: No  GYNECOLOGIC SYMPTOMS: Yes  BREAST SYMPTOMS: No  SKELETAL SYMPTOMS: No  BLOOD SYMPTOMS: No  NERVOUS SYSTEM SYMPTOMS: No  MENTAL HEALTH SYMPTOMS: No  Fever: No  Loss of appetite: No  Weight loss: No  Weight gain: Yes  Fatigue: Yes  Night sweats: No  Chills: No  Increased stress: Yes  Excessive hunger: No  Excessive thirst: No  Feeling hot or cold when others believe the temperature is normal: Yes  Loss of height: No  Post-operative complications: No  Surgical site pain: No  Hallucinations: No  Change in or Loss of Energy: Yes  Hyperactivity: No  Confusion: No  Cough: No  Sputum or phlegm: No  Coughing up blood: No  Difficulty breating or shortness of breath: Yes  Snoring: No  Wheezing: No  Difficulty breathing on exertion: No  Nighttime Cough: No  Difficulty breathing when lying flat: No  Bleeding or spotting between periods: No  Heavy or painful periods: No  Irregular periods: No  Vaginal discharge: No  Hot flashes: No  Vaginal dryness: Yes  Genital ulcers: No  Reduced libido: Yes  Painful intercourse: Yes  Difficulty with sexual arousal: Yes  Post-menopausal bleeding: No

## 2019-03-09 ENCOUNTER — HEALTH MAINTENANCE LETTER (OUTPATIENT)
Age: 64
End: 2019-03-09

## 2019-04-08 ENCOUNTER — E-VISIT (OUTPATIENT)
Dept: FAMILY MEDICINE | Facility: CLINIC | Age: 64
End: 2019-04-08
Payer: COMMERCIAL

## 2019-04-08 DIAGNOSIS — T36.95XA ANTIBIOTIC-INDUCED YEAST INFECTION: ICD-10-CM

## 2019-04-08 DIAGNOSIS — B37.9 ANTIBIOTIC-INDUCED YEAST INFECTION: ICD-10-CM

## 2019-04-08 DIAGNOSIS — N39.0 ACUTE UTI (URINARY TRACT INFECTION): ICD-10-CM

## 2019-04-08 PROCEDURE — 99444 ZZC PHYSICIAN ONLINE EVALUATION & MANAGEMENT SERVICE: CPT | Performed by: INTERNAL MEDICINE

## 2019-04-08 RX ORDER — SULFAMETHOXAZOLE/TRIMETHOPRIM 800-160 MG
1 TABLET ORAL 2 TIMES DAILY
Qty: 6 TABLET | Refills: 0 | Status: SHIPPED | OUTPATIENT
Start: 2019-04-08 | End: 2019-08-13

## 2019-04-08 RX ORDER — FLUCONAZOLE 150 MG/1
150 TABLET ORAL ONCE
Qty: 1 TABLET | Refills: 0 | Status: SHIPPED | OUTPATIENT
Start: 2019-04-08 | End: 2019-08-13

## 2019-04-08 NOTE — PATIENT INSTRUCTIONS
Thank you for choosing us for your care. I have placed an order for a prescription so that you can start treatment. View your full visit summary for details by clicking on the link below. Your pharmacist will able to address any questions you may have about the medication.     If you re not feeling better within 2-3 days, please schedule an appointment.  You can schedule an appointment right here in [facility], or call [phone]  If the visit is for the same symptoms as your e-visit, we ll refund the cost of your e-visit if seen within seven days.      Urinary Tract Infections in Women    Urinary tract infections (UTIs) are most often caused by bacteria. These bacteria enter the urinary tract. The bacteria may come from outside the body. Or they may travel from the skin outside the rectum or vagina into the urethra. Female anatomy makes it easy for bacteria from the bowel to enter a woman s urinary tract, which is the most common source of UTI. This means women develop UTIs more often than men. Pain in or around the urinary tract is a common UTI symptom. But the only way to know for sure if you have a UTI for the healthcare provider to test your urine. The two tests that may be done are the urinalysis and urine culture.  Types of UTIs    Cystitis. A bladder infection (cystitis) is the most common UTI in women. You may have urgent or frequent urination. You may also have pain, burning when you urinate, and bloody urine.    Urethritis. This is an inflamed urethra, which is the tube that carries urine from the bladder to outside the body. You may have lower stomach or back pain. You may also have urgent or frequent urination.    Pyelonephritis. This is a kidney infection. If not treated, it can be serious and damage your kidneys. In severe cases, you may need to stay in the hospital. You may have a fever and lower back pain.  Medicines to treat a UTI  Most UTIs are treated with antibiotics. These kill the bacteria.  The length of time you need to take them depends on the type of infection. It may be as short as 3 days. If you have repeated UTIs, you may need a low-dose antibiotic for several months. Take antibiotics exactly as directed. Don t stop taking them until all of the medicine is gone. If you stop taking the antibiotic too soon, the infection may not go away. You may also develop a resistance to the antibiotic. This can make it much harder to treat.  Lifestyle changes to treat and prevent UTIs  The lifestyle changes below will help get rid of your UTI. They may also help prevent future UTIs.    Drink plenty of fluids. This includes water, juice, or other caffeine-free drinks. Fluids help flush bacteria out of your body.    Empty your bladder. Always empty your bladder when you feel the urge to urinate. And always urinate before going to sleep. Urine that stays in your bladder can lead to infection. Try to urinate before and after sex as well.    Practice good personal hygiene. Wipe yourself from front to back after using the toilet. This helps keep bacteria from getting into the urethra.    Use condoms during sex. These help prevent UTIs caused by sexually transmitted bacteria. Also don't use spermicides during sex. These can increase the risk for UTIs. Choose other forms of birth control instead. For women who tend to get UTIs after sex, a low-dose of a preventive antibiotic may be used. Be sure to discuss this option with your healthcare provider.    Follow up with your healthcare provider as directed. He or she may test to make sure the infection has cleared. If needed, more treatment may be started.  Date Last Reviewed: 1/1/2017 2000-2018 [employer]. [address] [place] [address]. All rights reserved. This information is not intended as a substitute for professional medical care. Always follow your healthcare professional's instructions.        Dr. Merrill is out of clinic. I have sent  Bactrim 1 tablet every 12 hours x3 days to the pharmacy. I have also sent diflucan for yeast infection. If not improving, please follow up in clinic.

## 2019-04-16 DIAGNOSIS — F33.41 MAJOR DEPRESSIVE DISORDER, RECURRENT EPISODE, IN PARTIAL REMISSION (H): ICD-10-CM

## 2019-04-16 DIAGNOSIS — I10 HYPERTENSION GOAL BP (BLOOD PRESSURE) < 140/90: ICD-10-CM

## 2019-04-16 NOTE — TELEPHONE ENCOUNTER
"Requested Prescriptions   Pending Prescriptions Disp Refills     losartan (COZAAR) 50 MG tablet [Pharmacy Med Name: LOSARTAN TAB 50MG]  Last Written Prescription Date:  01/24/19  Last Fill Quantity: 180,  # refills: 0   [facility]:  09/11/18-Vocal   Future Office Visit:    93 tablet 0     Sig: TAKE 1 TABLET DAILY       Angiotensin-II Receptors Failed - 4/16/2019  7:41 AM        Failed - Blood pressure under 140/90 in past 12 months     BP Readings from Last 3 Encounters:   01/30/19 (!) 164/98   09/26/18 140/88   09/11/18 (!) 160/100                 Failed - Normal serum creatinine on file in past 12 months     Recent Labs   Lab Test 01/08/18  0731   CR 0.68             Failed - Normal serum potassium on file in past 12 months     Recent Labs   Lab Test 01/08/18  0731   POTASSIUM 3.9                    Passed - Recent (12 mo) or future (30 days) visit within the authorizing provider's specialty     Patient had office visit in the last 12 months or has a visit in the next 30 days with authorizing provider or within the authorizing provider's specialty.  See \"Patient Info\" tab in inbasket, or \"Choose Columns\" in Meds & Orders section of the refill encounter.              Passed - Medication is active on med list        Passed - Patient is age 18 or older        Passed - No active pregnancy on record        Passed - No positive pregnancy test in past 12 months        buPROPion (WELLBUTRIN XL) 150 MG 24 hr tablet [Pharmacy Med Name: BUPROP 24 XL TAB 150MG]  Last Written Prescription Date:  10/31/18  Last Fill Quantity: 90,  # refills: 1   Last Office Visit with [facility] provider:  09/11/18-Desirae   Future Office Visit:    90 tablet 1     Sig: TAKE 1 TABLET EVERY MORNING       SSRIs Protocol Failed - 4/16/2019  7:41 AM        Failed - PHQ-9 score less than 5 in past 6 months     Please review last PHQ-9 score.           Failed - Recent (6 mo) or " "future (30 days) visit within the authorizing provider's specialty     Patient had office visit in the last 6 months or has a visit in the next 30 days with authorizing provider or within the authorizing provider's specialty.  See \"Patient Info\" tab in inbasket, or \"Choose Columns\" in Meds & Orders section of the refill encounter.            Passed - Medication is Bupropion     If the medication is Bupropion (Wellbutrin), and the patient is taking for smoking cessation; OK to refill.          Passed - Medication is active on med list        Passed - Patient is age 18 or older        Passed - No active pregnancy on record        Passed - No positive pregnancy test in last 12 months          "

## 2019-04-17 RX ORDER — BUPROPION HYDROCHLORIDE 150 MG/1
TABLET ORAL
Qty: 90 TABLET | Refills: 0 | Status: SHIPPED | OUTPATIENT
Start: 2019-04-17 | End: 2019-07-05

## 2019-04-17 RX ORDER — LOSARTAN POTASSIUM 50 MG/1
50 TABLET ORAL
Qty: 180 TABLET | Refills: 0 | Status: SHIPPED | OUTPATIENT
Start: 2019-04-17 | End: 2019-07-05

## 2019-04-17 NOTE — TELEPHONE ENCOUNTER
Please let patient know that rx refilled but should follow up in clinic for further refills.    Cesar Albetr MD

## 2019-04-17 NOTE — TELEPHONE ENCOUNTER
For losartan:  Routing refill request to provider for review/approval because:  Labs not current:  Creatinine, potassium       For bupropion:  Routing refill request to provider for review/approval because:  Patient needs to be seen because:  Over due for 6 month depression alma Sr RN, BSN

## 2019-04-19 ENCOUNTER — HEALTH MAINTENANCE LETTER (OUTPATIENT)
Age: 64
End: 2019-04-19

## 2019-05-07 DIAGNOSIS — Z12.39 SCREENING BREAST EXAMINATION: ICD-10-CM

## 2019-07-01 ENCOUNTER — E-VISIT (OUTPATIENT)
Dept: FAMILY MEDICINE | Facility: CLINIC | Age: 64
End: 2019-07-01
Payer: COMMERCIAL

## 2019-07-01 DIAGNOSIS — N30.00 ACUTE CYSTITIS WITHOUT HEMATURIA: Primary | ICD-10-CM

## 2019-07-01 PROCEDURE — 99444 ZZC PHYSICIAN ONLINE EVALUATION & MANAGEMENT SERVICE: CPT | Performed by: INTERNAL MEDICINE

## 2019-07-01 RX ORDER — SULFAMETHOXAZOLE/TRIMETHOPRIM 800-160 MG
1 TABLET ORAL 2 TIMES DAILY WITH MEALS
Qty: 14 TABLET | Refills: 0 | Status: SHIPPED | OUTPATIENT
Start: 2019-07-01 | End: 2019-08-13

## 2019-08-13 ENCOUNTER — OFFICE VISIT (OUTPATIENT)
Dept: FAMILY MEDICINE | Facility: CLINIC | Age: 64
End: 2019-08-13
Payer: COMMERCIAL

## 2019-08-13 VITALS
BODY MASS INDEX: 29.09 KG/M2 | WEIGHT: 181 LBS | HEART RATE: 66 BPM | OXYGEN SATURATION: 98 % | SYSTOLIC BLOOD PRESSURE: 110 MMHG | DIASTOLIC BLOOD PRESSURE: 80 MMHG | HEIGHT: 66 IN | TEMPERATURE: 98.3 F

## 2019-08-13 DIAGNOSIS — Z13.6 CARDIOVASCULAR SCREENING; LDL GOAL LESS THAN 100: ICD-10-CM

## 2019-08-13 DIAGNOSIS — F33.41 MAJOR DEPRESSIVE DISORDER, RECURRENT EPISODE, IN PARTIAL REMISSION (H): ICD-10-CM

## 2019-08-13 DIAGNOSIS — K21.9 GASTROESOPHAGEAL REFLUX DISEASE WITHOUT ESOPHAGITIS: ICD-10-CM

## 2019-08-13 DIAGNOSIS — Z00.00 ROUTINE GENERAL MEDICAL EXAMINATION AT A HEALTH CARE FACILITY: Primary | ICD-10-CM

## 2019-08-13 DIAGNOSIS — N32.81 OVERACTIVE BLADDER: ICD-10-CM

## 2019-08-13 DIAGNOSIS — I10 HYPERTENSION GOAL BP (BLOOD PRESSURE) < 140/90: ICD-10-CM

## 2019-08-13 DIAGNOSIS — Z23 NEED FOR SHINGLES VACCINE: ICD-10-CM

## 2019-08-13 DIAGNOSIS — Z13.29 SCREENING FOR THYROID DISORDER: ICD-10-CM

## 2019-08-13 LAB
BASOPHILS # BLD AUTO: 0 10E9/L (ref 0–0.2)
BASOPHILS NFR BLD AUTO: 0.6 %
DIFFERENTIAL METHOD BLD: NORMAL
EOSINOPHIL # BLD AUTO: 0.2 10E9/L (ref 0–0.7)
EOSINOPHIL NFR BLD AUTO: 3.2 %
ERYTHROCYTE [DISTWIDTH] IN BLOOD BY AUTOMATED COUNT: 13.5 % (ref 10–15)
HCT VFR BLD AUTO: 42.2 % (ref 35–47)
HGB BLD-MCNC: 13.9 G/DL (ref 11.7–15.7)
LYMPHOCYTES # BLD AUTO: 1.9 10E9/L (ref 0.8–5.3)
LYMPHOCYTES NFR BLD AUTO: 28.9 %
MCH RBC QN AUTO: 32.3 PG (ref 26.5–33)
MCHC RBC AUTO-ENTMCNC: 32.9 G/DL (ref 31.5–36.5)
MCV RBC AUTO: 98 FL (ref 78–100)
MONOCYTES # BLD AUTO: 0.6 10E9/L (ref 0–1.3)
MONOCYTES NFR BLD AUTO: 9.3 %
NEUTROPHILS # BLD AUTO: 3.8 10E9/L (ref 1.6–8.3)
NEUTROPHILS NFR BLD AUTO: 58 %
PLATELET # BLD AUTO: 201 10E9/L (ref 150–450)
RBC # BLD AUTO: 4.31 10E12/L (ref 3.8–5.2)
WBC # BLD AUTO: 6.6 10E9/L (ref 4–11)

## 2019-08-13 PROCEDURE — 90750 HZV VACC RECOMBINANT IM: CPT | Performed by: INTERNAL MEDICINE

## 2019-08-13 PROCEDURE — 80053 COMPREHEN METABOLIC PANEL: CPT | Performed by: INTERNAL MEDICINE

## 2019-08-13 PROCEDURE — 82043 UR ALBUMIN QUANTITATIVE: CPT | Performed by: INTERNAL MEDICINE

## 2019-08-13 PROCEDURE — 90471 IMMUNIZATION ADMIN: CPT | Performed by: INTERNAL MEDICINE

## 2019-08-13 PROCEDURE — 84443 ASSAY THYROID STIM HORMONE: CPT | Performed by: INTERNAL MEDICINE

## 2019-08-13 PROCEDURE — 80061 LIPID PANEL: CPT | Performed by: INTERNAL MEDICINE

## 2019-08-13 PROCEDURE — 99396 PREV VISIT EST AGE 40-64: CPT | Mod: 25 | Performed by: INTERNAL MEDICINE

## 2019-08-13 PROCEDURE — 85025 COMPLETE CBC W/AUTO DIFF WBC: CPT | Performed by: INTERNAL MEDICINE

## 2019-08-13 PROCEDURE — 36415 COLL VENOUS BLD VENIPUNCTURE: CPT | Performed by: INTERNAL MEDICINE

## 2019-08-13 RX ORDER — CARVEDILOL 12.5 MG/1
12.5 TABLET ORAL 2 TIMES DAILY WITH MEALS
Qty: 180 TABLET | Refills: 0 | Status: CANCELLED | OUTPATIENT
Start: 2019-08-13

## 2019-08-13 RX ORDER — LOSARTAN POTASSIUM 50 MG/1
50 TABLET ORAL 2 TIMES DAILY
Qty: 180 TABLET | Refills: 0 | Status: CANCELLED | OUTPATIENT
Start: 2019-08-13

## 2019-08-13 RX ORDER — HYDRALAZINE HYDROCHLORIDE 25 MG/1
25 TABLET, FILM COATED ORAL 2 TIMES DAILY
Qty: 30 TABLET | Refills: 1 | Status: SHIPPED | OUTPATIENT
Start: 2019-08-13 | End: 2019-09-04

## 2019-08-13 RX ORDER — PANTOPRAZOLE SODIUM 40 MG/1
40 TABLET, DELAYED RELEASE ORAL
Qty: 180 TABLET | Refills: 3 | Status: SHIPPED | OUTPATIENT
Start: 2019-08-13 | End: 2020-08-21

## 2019-08-13 RX ORDER — BUPROPION HYDROCHLORIDE 150 MG/1
TABLET ORAL
Qty: 90 TABLET | Refills: 0 | Status: CANCELLED | OUTPATIENT
Start: 2019-08-13

## 2019-08-13 RX ORDER — CARVEDILOL 12.5 MG/1
12.5 TABLET ORAL 2 TIMES DAILY WITH MEALS
Qty: 180 TABLET | Refills: 3 | Status: SHIPPED | OUTPATIENT
Start: 2019-08-13 | End: 2020-08-21

## 2019-08-13 RX ORDER — BUPROPION HYDROCHLORIDE 150 MG/1
TABLET ORAL
Qty: 90 TABLET | Refills: 3 | Status: SHIPPED | OUTPATIENT
Start: 2019-08-13 | End: 2020-08-21

## 2019-08-13 RX ORDER — TROLAMINE SALICYLATE 10 G/100G
CREAM TOPICAL 3 TIMES DAILY
COMMUNITY
End: 2022-02-22

## 2019-08-13 ASSESSMENT — PAIN SCALES - GENERAL: PAINLEVEL: NO PAIN (0)

## 2019-08-13 ASSESSMENT — PATIENT HEALTH QUESTIONNAIRE - PHQ9: SUM OF ALL RESPONSES TO PHQ QUESTIONS 1-9: 2

## 2019-08-13 ASSESSMENT — MIFFLIN-ST. JEOR: SCORE: 1397.76
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
## 2019-08-13 NOTE — PATIENT INSTRUCTIONS
Preventive Health Recommendations  Female Ages 50 - 64    Yearly exam: See your health care provider every year in order to  o Review health changes.   o Discuss preventive care.    o Review your medicines if your doctor has prescribed any.      Get a Pap test every three years (unless you have an abnormal result and your provider advises testing more often).    If you get Pap tests with HPV test, you only need to test every 5 years, unless you have an abnormal result.     You do not need a Pap test if your uterus was removed (hysterectomy) and you have not had cancer.    You should be tested each year for STDs (sexually transmitted diseases) if you're at risk.     Have a mammogram every 1 to 2 years.    Have a colonoscopy at age 50, or have a yearly FIT test (stool test). These exams screen for colon cancer.      Have a cholesterol test every 5 years, or more often if advised.    Have a diabetes test (fasting glucose) every three years. If you are at risk for diabetes, you should have this test more often.     If you are at risk for osteoporosis (brittle bone disease), think about having a bone density scan (DEXA).    Shots: Get a flu shot each year. Get a tetanus shot every 10 years.    Nutrition:     Eat at least 5 servings of fruits and vegetables each day.    Eat whole-grain bread, whole-wheat pasta and brown rice instead of white grains and rice.    Get adequate Calcium and Vitamin D.     Lifestyle    Exercise at least 150 minutes a week (30 minutes a day, 5 days a week). This will help you control your weight and prevent disease.    Limit alcohol to one drink per day.    No smoking.     Wear sunscreen to prevent skin cancer.     See your dentist every six months for an exam and cleaning.    See your eye doctor every 1 to 2 years.    At [facility], we strive to deliver an exceptional experience to you, every time we see you.  If you receive a survey in the mail, please send us back your  thoughts. We really do value your feedback.    Your care team:                            Family Medicine Internal Medicine   MD Bandar Abraham MD Shantel Branch-Fleming, MD Katya Georgiev PA-C Megan Hill, APRN JOSE RAMON Albert MD Pediatrics   Marcos Ellison, KAMILLE Hardy, MD Mirna Owen APRN CNP   MD Helena Lopez MD Deborah Mielke, MD Kim Thein, APRN [facility]      Clinic hours: Monday - Thursday 7 am-7 pm; Fridays 7 am-5 pm.   Urgent care: Monday - Friday 11 am-9 pm; Saturday and Sunday 9 am-5 pm.  Pharmacy : Monday -Thursday 8 am-8 pm; Friday 8 am-6 pm; Saturday and Sunday 9 am-5 pm.     Clinic: [phone]   Pharmacy: [phone]

## 2019-08-14 LAB
ALBUMIN SERPL-MCNC: 4 G/DL (ref 3.4–5)
ALP SERPL-CCNC: 111 U/L (ref 40–150)
ALT SERPL W P-5'-P-CCNC: 25 U/L (ref 0–50)
ANION GAP SERPL CALCULATED.3IONS-SCNC: 8 MMOL/L (ref 3–14)
AST SERPL W P-5'-P-CCNC: 21 U/L (ref 0–45)
BILIRUB SERPL-MCNC: 0.7 MG/DL (ref 0.2–1.3)
BUN SERPL-MCNC: 10 MG/DL (ref 7–30)
CALCIUM SERPL-MCNC: 9.5 MG/DL (ref 8.5–10.1)
CHLORIDE SERPL-SCNC: 106 MMOL/L (ref 94–109)
CHOLEST SERPL-MCNC: 180 MG/DL
CO2 SERPL-SCNC: 28 MMOL/L (ref 20–32)
CREAT SERPL-MCNC: 0.65 MG/DL (ref 0.52–1.04)
CREAT UR-MCNC: 40 MG/DL
GFR SERPL CREATININE-BSD FRML MDRD: >90 ML/MIN/{1.73_M2}
GLUCOSE SERPL-MCNC: 95 MG/DL (ref 70–99)
HDLC SERPL-MCNC: 72 MG/DL
LDLC SERPL CALC-MCNC: 98 MG/DL
MICROALBUMIN UR-MCNC: 5 MG/L
MICROALBUMIN/CREAT UR: 12.69 MG/G CR (ref 0–25)
NONHDLC SERPL-MCNC: 108 MG/DL
POTASSIUM SERPL-SCNC: 3.9 MMOL/L (ref 3.4–5.3)
PROT SERPL-MCNC: 7.5 G/DL (ref 6.8–8.8)
SODIUM SERPL-SCNC: 142 MMOL/L (ref 133–144)
TRIGL SERPL-MCNC: 51 MG/DL
TSH SERPL DL<=0.005 MIU/L-ACNC: 3.64 MU/L (ref 0.4–4)

## 2019-08-14 NOTE — NURSING NOTE
1.  Has the patient received the information for the Shingrix Yes    2.  Does the patient have any of the following contraindications?     Allergy to Neomycin or Gelatin? No       Current moderate or severe illness? No  Temp = 98.3  If temp > 99.0 degrees orally or patient has above allergies, vaccine is deferred    3.  The vaccine has been administered in the usual fashion and the patient was instructed to wait 15 minutes before leaving the building in the event of an allergic reaction: YES    Vaccination given by SEDRICK Corbett MA    Recorded by Jeannette Corbett

## 2019-09-04 ENCOUNTER — MYC MEDICAL ADVICE (OUTPATIENT)
Dept: FAMILY MEDICINE | Facility: CLINIC | Age: 64
End: 2019-09-04

## 2019-09-04 DIAGNOSIS — I10 HYPERTENSION GOAL BP (BLOOD PRESSURE) < 140/90: ICD-10-CM

## 2019-09-04 RX ORDER — HYDRALAZINE HYDROCHLORIDE 25 MG/1
25 TABLET, FILM COATED ORAL 2 TIMES DAILY
Qty: 180 TABLET | Refills: 3 | Status: SHIPPED | OUTPATIENT
Start: 2019-09-04 | End: 2020-08-21

## 2019-09-04 NOTE — TELEPHONE ENCOUNTER
Routing to provider to review and advise on MyC message below.  Patient started on Hydralazine with last OV on 19-order has .  Was given trial period and now patient asking for refills to be sent to mail order pharmacy, notes BP is doing well.    Gabriella Alberto RN

## 2019-09-22 DIAGNOSIS — I10 HYPERTENSION GOAL BP (BLOOD PRESSURE) < 140/90: ICD-10-CM

## 2019-09-22 NOTE — TELEPHONE ENCOUNTER
"Requested Prescriptions   Pending Prescriptions Disp Refills     losartan (COZAAR) 50 MG tablet [Pharmacy Med Name: LOSARTAN TAB 50MG]  Last Written Prescription Date:  7/5/19  Last Fill Quantity: 180,  # refills: 0   Last Office Visit with [facility] or [facility] prescribing provider:  8/13/19   Future Office Visit:      180 tablet 0     Sig: TAKE 1 TABLET TWICE A DAY, NO ADDITIONAL REFILLS SINCEDUE FOR CLINIC APPOINTMENT       Angiotensin-II Receptors Failed - 9/22/2019  6:23 AM        Failed - Medication is active on med list        Passed - Last blood pressure under 140/90 in past 12 months     BP Readings from Last 3 Encounters:   08/13/19 110/80   01/30/19 (!) 164/98   09/26/18 140/88                 Passed - Recent (12 mo) or future (30 days) visit within the authorizing provider's specialty     Patient had office visit in the last 12 months or has a visit in the next 30 days with authorizing provider or within the authorizing provider's specialty.  See \"Patient Info\" tab in inbasket, or \"Choose Columns\" in Meds & Orders section of the refill encounter.              Passed - Patient is age 18 or older        Passed - No active pregnancy on record        Passed - Normal serum creatinine on file in past 12 months     Recent Labs   Lab Test 08/13/19 1959   CR 0.65             Passed - Normal serum potassium on file in past 12 months     Recent Labs   Lab Test 08/13/19 1959   POTASSIUM 3.9                    Passed - No positive pregnancy test in past 12 months          "

## 2019-09-24 RX ORDER — LOSARTAN POTASSIUM 50 MG/1
50 TABLET ORAL 2 TIMES DAILY
Qty: 180 TABLET | Refills: 3 | Status: SHIPPED | OUTPATIENT
Start: 2019-09-24 | End: 2020-08-21

## 2019-09-24 NOTE — TELEPHONE ENCOUNTER
Routing refill request to provider for review/approval because:  Drug not active on patient's medication list  Allyson Osborne RN

## 2019-10-03 ENCOUNTER — HEALTH MAINTENANCE LETTER (OUTPATIENT)
Age: 64
End: 2019-10-03

## 2019-10-04 ENCOUNTER — MYC MEDICAL ADVICE (OUTPATIENT)
Dept: FAMILY MEDICINE | Facility: CLINIC | Age: 64
End: 2019-10-04

## 2020-02-08 ENCOUNTER — HEALTH MAINTENANCE LETTER (OUTPATIENT)
Age: 65
End: 2020-02-08

## 2020-03-13 ENCOUNTER — VIRTUAL VISIT (OUTPATIENT)
Dept: FAMILY MEDICINE | Facility: OTHER | Age: 65
End: 2020-03-13

## 2020-03-13 NOTE — PROGRESS NOTES
"Date: 2020 17:04:22  Clinician: Carmina Swenson  Clinician NPI: [id number]  Patient: Marlee Tobar  Patient : 1955  Patient Address: [address] [place] [address]  Patient Phone: [phone]  Visit Protocol: URI  Patient Summary:  Marlee is a 64 year old ( : 1955 ) female who initiated a Visit for COVID-19 (Coronavirus) evaluation and screening. When asked the question \"Please sign me up to receive news, health information and promotions. \", Marlee responded \"Yes\".    Marlee states her symptoms started today.   Her symptoms consist of ear pain, a cough, and myalgia.   Symptom details   Cough: Marlee coughs a few times an hour and her cough is not more bothersome at night. Phlegm comes into her throat when she coughs. She does not believe her cough is caused by post-nasal drip. The color of the phlegm is white.    Marlee denies having rhinitis, wheezing, malaise, sore throat, nasal congestion, fever, headache, teeth pain, chills, and facial pain or pressure. She also denies taking antibiotic medication for the symptoms and having recent facial or sinus surgery in the past 60 days. She is not experiencing dyspnea.   Precipitating events  She has not recently been exposed to someone with influenza. Marlee has been in close contact with the following high risk individuals: adults 65 or older and pregnant women.   Pertinent COVID-19 (Coronavirus) information  Marlee has not traveled internationally in the last 14 days before the start of her symptoms.   Marlee has not had close contact with a laboratory confirmed positive COVID-19 patient within 14 days of symptom onset.   Pertinent medical history  Marlee typically gets a yeast infection when she takes antibiotics. She is not sure if she has used fluconazole (Diflucan) to treat previous yeast infections.   Marlee does not need a return to work/school note.   Weight: 170 lbs   Marlee does not smoke or use smokeless tobacco.   " Additional information as reported by the patient (free text): My mother in law passed away this past week and our nephew came home for the  who was in the ...he was recently based in [place].  The symptom that is most prominent is a slight sore throat on the right side running up into my ear...slight dry cough with small amount of phlgem...some achiness...but I don't feel sick.  Just wondering if under the circumstances, if I should pursue this further or wait a bit.   Weight: 170 lbs    MEDICATIONS: carvedilol oral, hydralazine HCl (bulk), bupropion HCl oral, pantoprazole oral, ALLERGIES: NKDA  Clinician Response:  Dear Marlee,   Dear Marlee,  Based on the information you have provided, it does not appear you need Coronavirus (COVID-19) testing.   At this time, we recommend testing primarily for those people who have symptoms of cough and fever and have either traveled to a known area of infection or have been exposed to someone with laboratory confirmed Coronavirus by close contact.   Coronavirus - General Information:   The coronavirus infection starts within 14 days of an exposure.  Symptoms are those of a respiratory infection (such as fever, cough).   If you have not had symptoms by day 15, you should be considered uninfected by coronavirus.   Coronavirus - Symptoms:    The coronavirus can cause a respiratory illness, such as bronchitis or pneumonia.  The most common symptoms are: cough, fever, and shortness of breath.   Other symptoms are: body aches, chills, diarrhea, fatigue, headache, runny nose, and sore throat   Coronavirus - Exposure Risk Factors:   Exposure to a person who has been diagnosed with coronavirus.  Travel from an area with recent local transmission of coronavirus.  The CDC (www.cdc.gov) has the most up-to-date list of where the coronavirus outbreak is occurring.   Coronavirus - Spreading:    The virus likely spreads through respiratory droplets produced when a person  coughs or sneezes. These respiratory droplets can travel approximately 6 feet and can remain on surfaces. Common disinfectants will kill the virus.  The CDC currently does not recommend healthy people wear masks.   Coronavirus - Protect Yourself:    Avoid close contact with people known to have this new coronavirus infection.  Wash hands often with soap and water or alcohol-based hand .  Avoid touching the eyes, nose or mouth.   Thank you for limiting contact with others, wearing a simple mask to cover your cough, practice good hand hygiene habits and accessing our virtual services where possible to limit the spread of this virus.  For more information about COVID19 and options for caring for yourself at home, please visit the CDC website at https://www.cdc.gov/coronavirus/2019-ncov/about/steps-when-sick.html   For more options for care at [facility], please visit our website at [email or web address]     Diagnosis: Cough  Diagnosis ICD: R05  Additional Clinician Notes: I understand your concern regarding Covid-19. However, you do not qualify for testing at this time. The CDC is updating information and testing guidelines daily so continue to monitor your symptoms as recommendations may change.

## 2020-04-22 ENCOUNTER — E-VISIT (OUTPATIENT)
Dept: FAMILY MEDICINE | Facility: CLINIC | Age: 65
End: 2020-04-22
Payer: COMMERCIAL

## 2020-04-22 DIAGNOSIS — N30.00 ACUTE CYSTITIS WITHOUT HEMATURIA: Primary | ICD-10-CM

## 2020-04-22 PROCEDURE — 99421 OL DIG E/M SVC 5-10 MIN: CPT | Mod: 95 | Performed by: INTERNAL MEDICINE

## 2020-04-22 RX ORDER — SULFAMETHOXAZOLE/TRIMETHOPRIM 800-160 MG
1 TABLET ORAL 2 TIMES DAILY
Qty: 14 TABLET | Refills: 2 | Status: SHIPPED | OUTPATIENT
Start: 2020-04-22 | End: 2020-07-10

## 2020-04-27 ENCOUNTER — OFFICE VISIT (OUTPATIENT)
Dept: URGENT CARE | Facility: URGENT CARE | Age: 65
End: 2020-04-27
Payer: COMMERCIAL

## 2020-04-27 ENCOUNTER — VIRTUAL VISIT (OUTPATIENT)
Dept: FAMILY MEDICINE | Facility: OTHER | Age: 65
End: 2020-04-27

## 2020-04-27 DIAGNOSIS — Z20.822 SUSPECTED 2019 NOVEL CORONAVIRUS INFECTION: Primary | ICD-10-CM

## 2020-04-27 PROCEDURE — 99207 ZZC NO BILLABLE SERVICE THIS VISIT: CPT

## 2020-04-27 PROCEDURE — U0003 INFECTIOUS AGENT DETECTION BY NUCLEIC ACID (DNA OR RNA); SEVERE ACUTE RESPIRATORY SYNDROME CORONAVIRUS 2 (SARS-COV-2) (CORONAVIRUS DISEASE [COVID-19]), AMPLIFIED PROBE TECHNIQUE, MAKING USE OF HIGH THROUGHPUT TECHNOLOGIES AS DESCRIBED BY CMS-2020-01-R: HCPCS | Mod: 90 | Performed by: FAMILY MEDICINE

## 2020-04-27 PROCEDURE — 99000 SPECIMEN HANDLING OFFICE-LAB: CPT | Performed by: FAMILY MEDICINE

## 2020-04-27 NOTE — PROGRESS NOTES
"Date: 2020 12:51:43  Clinician: Dragan Carias  Clinician NPI: [id number]  Patient: Marlee Tobar  Patient : 1955  Patient Address: [address] [place] [address]  Patient Phone: [phone]  Visit Protocol: URI  Patient Summary:  Marele is a 64 year old ( : 1955 ) female who initiated a Visit for COVID-19 (Coronavirus) evaluation and screening. When asked the question \"Please sign me up to receive news, health information and promotions. \", Marlee responded \"Yes\".    Marlee states her symptoms started 1-2 days ago.   Her symptoms consist of a sore throat, a cough, malaise, myalgia, and a headache. She is experiencing mild difficulty breathing with activities but can speak normally in full sentences.   Symptom details     Cough: Marlee coughs a few times an hour and her cough is not more bothersome at night. Phlegm does not come into her throat when she coughs. She does not believe her cough is caused by post-nasal drip.     Sore throat: Marlee reports having mild throat pain (1-3 on a 10 point pain scale), does not have exudate on her tonsils, and can swallow liquids. She is not sure if the lymph nodes in her neck are enlarged. A rash has not appeared on the skin since the sore throat started.     Headache: She states the headache is moderate (4-6 on a 10 point pain scale).      Marlee denies having facial pain or pressure, nasal congestion, ear pain, fever, rhinitis, wheezing, chills, vomiting, nausea, teeth pain, ageusia, anosmia, and diarrhea. She also denies having recent facial or sinus surgery in the past 60 days.   Precipitating events  Within the past week, Marlee has not been exposed to someone with strep throat. She has not recently been exposed to someone with influenza. Marlee has not been in close contact with any high risk individuals.   Pertinent COVID-19 (Coronavirus) information  Marlee has not traveled internationally or to the areas where COVID-19 (Coronavirus) is " widespread, including cruise ship travel in the last 14 days before the start of her symptoms.   Marlee does not work or volunteer as healthcare worker or a  and does not work or volunteer in a healthcare facility.   She does not live with a healthcare worker.   Marlee has not had a close contact with a laboratory-confirmed COVID-19 patient within 14 days of symptom onset. She also has not had a close contact with a suspected COVID-19 patient within 14 days of symptom onset.   Pertinent medical history  Marlee has taken an antibiotic medication in the past month. Antibiotic details as reported by the patient (free text): Bactrim - started taking 4/22/2020 for UTI   Marlee typically gets a yeast infection when she takes antibiotics. She is not sure if she has used fluconazole (Diflucan) to treat previous yeast infections.   Marlee does not need a return to work/school note.   Weight: 170 lbs   Marlee does not smoke or use smokeless tobacco.   Additional information as reported by the patient (free text): My  and I just returned from [facility] (we self quarantined while there) but did fly, using several precautions. 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  Considering what's going on, I want to know if I should be tested because I'm not feeling the greatest but don't meet all of the symptom criteria.  (but breathing is definitely a bit more labored.)   Weight: 170 lbs    MEDICATIONS: carvedilol oral, hydralazine HCl (bulk), bupropion HCl oral, pantoprazole oral, ALLERGIES: NKDA  Clinician Response:  Dear Marlee,   Your symptoms show that you may have coronavirus (COVID-19). This illness can cause fever, cough and trouble breathing. Many people get a mild case and get better on their own. Some people can get very sick.   Will I be tested for COVID-19?  We would recommend you be tested for  coronavirus. Here is how to get that scheduled:   Call [phone]. Tell them you were referred by [facility] to have a COVID-19 test. You will be scheduled at one of our [facility] testing locations (drive-up). Please have your [facility] visit information ready when you call including your visit ID number to verify you were referred.    How can I protect others in the meantime?  First, stay home and away from others (self-isolate) until:   You've had no fever---and no medicine that reduces fever---for 3 full days (72 hours). And...    Your other symptoms have gotten better. For example, your cough or breathing has improved. And...    At least 7 days have passed since your symptoms started.   During this time:   Don't go to work, school or anywhere else.     Stay away from others in your home. No hugging, kissing or shaking hands.    Don't let anyone visit.    Cover your mouth and nose with a mask, tissue or wash cloth to avoid spreading germs.    Wash your hands and face often. Use soap and water.   How can I take care of myself?  1.Take Tylenol (acetaminophen) for fever or pain. If you have liver or kidney problems, ask your family doctor if it's okay to take Tylenol.   Adults can take either:    650 mg (two 325 mg pills) every 4 to 6 hours, or...    1,000 mg (two 500 mg pills) every 8 hours as needed.     Note: Don't take more than 3,000 mg in one day.   For children, check the Tylenol bottle for the right dose. The dose is based on the child's age or weight.  2.If you have other health problems (like cancer, heart failure, an organ transplant or severe kidney disease): Call your specialty clinic if you don't feel better in the next 2 days.  3.Know when to call 911: If your breathing is so bad that it keeps you from doing normal activities, call 911 or go to the emergency room. Tell them that you've been staying home and may have COVID-19.  4.Sign up for [facility]. We know it's scary to hear that you might have  COVID-19. We want to track your symptoms to make sure you're okay over the next 2 weeks. Please look for an email from [employer]---this is a free, online program that we'll use to keep in touch. To sign up, follow the link in the email. Learn more at [email or web address].  Where can I get more information?  To learn more about COVID-19 and how to care for yourself at home, please visit the CDC website at https://www.cdc.gov/coronavirus/2019-ncov/about/steps-when-sick.html.  For more about your care at [facility], please visit [email or web address]     Diagnosis: Cough  Diagnosis ICD: R05

## 2020-04-28 LAB
SARS-COV-2 RNA SPEC QL NAA+PROBE: NOT DETECTED
SPECIMEN SOURCE: NORMAL

## 2020-05-20 ENCOUNTER — TELEPHONE (OUTPATIENT)
Dept: BEHAVIORAL HEALTH | Facility: CLINIC | Age: 65
End: 2020-05-20

## 2020-05-20 SDOH — HEALTH STABILITY: MENTAL HEALTH
STRESS IS WHEN SOMEONE FEELS TENSE, NERVOUS, ANXIOUS, OR CAN'T SLEEP AT NIGHT BECAUSE THEIR MIND IS TROUBLED. HOW STRESSED ARE YOU?: ONLY A LITTLE

## 2020-05-20 SDOH — ECONOMIC STABILITY: FOOD INSECURITY: WITHIN THE PAST 12 MONTHS, THE FOOD YOU BOUGHT JUST DIDN'T LAST AND YOU DIDN'T HAVE MONEY TO GET MORE.: NEVER TRUE

## 2020-05-20 SDOH — ECONOMIC STABILITY: TRANSPORTATION INSECURITY
IN THE PAST 12 MONTHS, HAS LACK OF TRANSPORTATION KEPT YOU FROM MEETINGS, WORK, OR FROM GETTING THINGS NEEDED FOR DAILY LIVING?: NO

## 2020-05-20 SDOH — SOCIAL STABILITY: SOCIAL NETWORK
IN A TYPICAL WEEK, HOW MANY TIMES DO YOU TALK ON THE PHONE WITH FAMILY, FRIENDS, OR NEIGHBORS?: MORE THAN THREE TIMES A WEEK

## 2020-05-20 SDOH — SOCIAL STABILITY: SOCIAL INSECURITY: WITHIN THE LAST YEAR, HAVE YOU BEEN AFRAID OF YOUR PARTNER OR EX-PARTNER?: NO

## 2020-05-20 SDOH — ECONOMIC STABILITY: TRANSPORTATION INSECURITY
IN THE PAST 12 MONTHS, HAS THE LACK OF TRANSPORTATION KEPT YOU FROM MEDICAL APPOINTMENTS OR FROM GETTING MEDICATIONS?: NO

## 2020-05-20 SDOH — ECONOMIC STABILITY: FOOD INSECURITY: WITHIN THE PAST 12 MONTHS, YOU WORRIED THAT YOUR FOOD WOULD RUN OUT BEFORE YOU GOT MONEY TO BUY MORE.: NEVER TRUE

## 2020-05-20 SDOH — ECONOMIC STABILITY: INCOME INSECURITY: HOW HARD IS IT FOR YOU TO PAY FOR THE VERY BASICS LIKE FOOD, HOUSING, MEDICAL CARE, AND HEATING?: NOT HARD AT ALL

## 2020-05-20 NOTE — TELEPHONE ENCOUNTER
Community Paramedic Social Needs Assessment    Spoke with: Patient      Refer also to Social Determinants of Health tab for information obtained    1. Have you or any family members you live with been unable to meet basic needs of food, clothing, medication, utilities, , health care needs, other: No    2. How do you get to and from appointments/work/groceries, household needs? Personal vehicle.    3. What is your housing situation? Lives in own home.    4. How many people do you currently live with? 1 other.     5. Are you currently employed? Yes    6. Does the patient feel physically and emotionally safe where they currently live?  Yes    7. Are you experiencing increased, disruptive stress around Covid-19?  Yes, initially, however, things have calmed down quite a bit for her and she has been doing well lately.    8. How often do you speak with those that care about you or you feel close to? Daily.       Concerns/Barriers Noted During Call: The patient is due for a mammogram. She has already called the clinic and it was agreed to wait to have the imaging. The patients mother-in-law recently passed away. The pt and her  are coping well.     Resources Given: Recommended pt reach out to her PCP for any social or health care needs. She utilizes [facility] and is familiar with virtual visits.     Referrals: None.

## 2020-06-17 ENCOUNTER — NURSE TRIAGE (OUTPATIENT)
Dept: NURSING | Facility: CLINIC | Age: 65
End: 2020-06-17

## 2020-06-17 NOTE — TELEPHONE ENCOUNTER
Triage call:   Has an appointment tomorrow for her antibody screening she is wondering if she can be seen today. Assisted in transferring to scheduling     Gimla Boyle RN BSN 6/17/2020 2:13 PM

## 2020-06-18 PROCEDURE — 86769 SARS-COV-2 COVID-19 ANTIBODY: CPT | Performed by: FAMILY MEDICINE

## 2020-06-18 PROCEDURE — 36415 COLL VENOUS BLD VENIPUNCTURE: CPT | Performed by: FAMILY MEDICINE

## 2020-06-18 NOTE — LETTER
June 19, 2020        Marlee Tobar  [address] [facility] [address]      COVID-19 Antibody, IgG   Date Value Ref Range Status   06/18/2020 Negative NEG^Negative Final     Comment:     Negative results do not rule out SARS-CoV-2 infection, particularly in those   who have been in contact with the virus.  Follow-up testing with a molecular   diagnostic should be considered to rule out infection in these individuals.  Results from antibody testing should not be used as the sole basis to diagnose   or exclude SARS-CoV-2 infection or to inform infection status.           You have tested NEGATIVE for COVID-19 antibodies. This suggests you have not had or been exposed to COVID-19. But it does not mean that for sure.     The test finds antibodies in most people 10 days after they get sick. For some people, it takes longer than 10 days for antibodies to show up. Others may never show antibodies against COVID-19, especially if they have weak immune systems.    If you have COVID-19 symptoms now, please stay home and away from others.     What is antibody testing?    This is a kind of blood test. We take a small sample of your blood, and then test it for something called  antibodies.      Your body makes antibodies to fight infection. If your blood has antibodies for a certain germ, it means you ve been infected with that germ in the past.     Sometimes, antibodies stay in your body for years after you ve had the infection. They can be there even if the germ didn t make you sick. They are a sign that your body fought off the infection.    Will this test find antibodies in everyone who s had COVID-19?    No. The test finds antibodies in most people 10 days after they get sick. For some people, it takes longer than 10 days for antibodies to show up. Others may never show antibodies against COVID-19, especially if they have weak immune systems.    What does it mean if the test finds COVID-19 antibodies?    If we find  these antibodies, it suggests:     This person has had the virus.     Their body s immune system fought the virus.     We don t know if this will help protect someone from getting COVID-19 again. Scientists are still learning about this.    What are the signs of COVID-19?    Signs of COVID-19 can appear from 2 to 14 days (up to 2 weeks) after you re infected. Some people have no symptoms or only mild symptoms. Others get very sick. The most common symptoms are:          Cough    Shortness of breath or trouble breathing  Or at least 2 of these symptoms:    Fever    Chills    Repeated shaking with chills    Muscle pain    Headache    Sore throat    Losing your sense of taste or smell    You may have other symptoms. Please contact your doctor or clinic for any symptoms that worry you.    Where can I get more information?     To learn the [facility] guidelines for staying home, please visit the [place] Department of Health website at [email or web address]    To learn more about COVID-19 and how to care for yourself at home, please visit the CDC website at https://www.cdc.gov/coronavirus/2019-ncov/about/steps-when-sick.html    For more options for care at [facility], please visit our website at [email or web address]    Psychiatric hospital ([facility]) COVID-19 Hotline:  [phone]

## 2020-06-19 LAB
COVID-19 ANTIBODY IGG: NEGATIVE
LAB TEST METHOD: NORMAL

## 2020-06-26 ENCOUNTER — NURSE TRIAGE (OUTPATIENT)
Dept: NURSING | Facility: CLINIC | Age: 65
End: 2020-06-26

## 2020-06-26 NOTE — TELEPHONE ENCOUNTER
Patient reports her right eye was red from this morning.  Patient says she felt like there's something in there.  Patient used a lubricant from the drug store but says still feels irritated.  FNA advised since she hasn't tried flushing it out with water, patient to try that and then go to the ED if feels there's still something in there.  Reviewed care advice with caller per RN triage protocol guideline.  Caller verbalized understanding and agrees with plan.        Reason for Disposition    [1] Eye pain AND [2] persists > 1 hour since irrigation (regardless of duration of flushing)    Additional Information    Negative: Foreign body (FB) stuck on eyeball  (Exception: contact lens)    Negative: [1] Sharp FB (even if FB was removed) AND [2] any pain present now    Negative: [1] Eye has been washed out > 30 minutes ago AND [2] feels like FB is still present    Protocols used: EYE - FOREIGN BODY-A-AH

## 2020-07-10 ENCOUNTER — VIRTUAL VISIT (OUTPATIENT)
Dept: FAMILY MEDICINE | Facility: CLINIC | Age: 65
End: 2020-07-10
Payer: COMMERCIAL

## 2020-07-10 DIAGNOSIS — Z13.6 CARDIOVASCULAR SCREENING; LDL GOAL LESS THAN 130: ICD-10-CM

## 2020-07-10 DIAGNOSIS — Z23 NEED FOR SHINGLES VACCINE: ICD-10-CM

## 2020-07-10 DIAGNOSIS — I10 HYPERTENSION GOAL BP (BLOOD PRESSURE) < 140/90: Primary | ICD-10-CM

## 2020-07-10 DIAGNOSIS — Z98.84 H/O GASTRIC BYPASS: ICD-10-CM

## 2020-07-10 DIAGNOSIS — R91.8 PULMONARY NODULES: ICD-10-CM

## 2020-07-10 DIAGNOSIS — R06.09 EXERTIONAL DYSPNEA: ICD-10-CM

## 2020-07-10 DIAGNOSIS — Z23 NEED FOR TDAP VACCINATION: ICD-10-CM

## 2020-07-10 PROCEDURE — 99214 OFFICE O/P EST MOD 30 MIN: CPT | Mod: TEL | Performed by: INTERNAL MEDICINE

## 2020-07-10 ASSESSMENT — ANXIETY QUESTIONNAIRES
2. NOT BEING ABLE TO STOP OR CONTROL WORRYING: NOT AT ALL
GAD7 TOTAL SCORE: 1
1. FEELING NERVOUS, ANXIOUS, OR ON EDGE: NOT AT ALL
7. FEELING AFRAID AS IF SOMETHING AWFUL MIGHT HAPPEN: NOT AT ALL
IF YOU CHECKED OFF ANY PROBLEMS ON THIS QUESTIONNAIRE, HOW DIFFICULT HAVE THESE PROBLEMS MADE IT FOR YOU TO DO YOUR WORK, TAKE CARE OF THINGS AT HOME, OR GET ALONG WITH OTHER PEOPLE: NOT DIFFICULT AT ALL
6. BECOMING EASILY ANNOYED OR IRRITABLE: NOT AT ALL
5. BEING SO RESTLESS THAT IT IS HARD TO SIT STILL: NOT AT ALL
3. WORRYING TOO MUCH ABOUT DIFFERENT THINGS: SEVERAL DAYS

## 2020-07-10 ASSESSMENT — PATIENT HEALTH QUESTIONNAIRE - PHQ9
5. POOR APPETITE OR OVEREATING: NOT AT ALL
SUM OF ALL RESPONSES TO PHQ QUESTIONS 1-9: 1
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
## 2020-07-11 ASSESSMENT — ANXIETY QUESTIONNAIRES: GAD7 TOTAL SCORE: 1

## 2020-07-14 ENCOUNTER — ANCILLARY PROCEDURE (OUTPATIENT)
Dept: CT IMAGING | Facility: CLINIC | Age: 65
End: 2020-07-14
Attending: INTERNAL MEDICINE
Payer: COMMERCIAL

## 2020-07-14 DIAGNOSIS — R91.8 PULMONARY NODULES: ICD-10-CM

## 2020-07-14 PROCEDURE — 71250 CT THORAX DX C-: CPT | Performed by: RADIOLOGY

## 2020-08-21 ENCOUNTER — OFFICE VISIT (OUTPATIENT)
Dept: FAMILY MEDICINE | Facility: CLINIC | Age: 65
End: 2020-08-21
Payer: COMMERCIAL

## 2020-08-21 VITALS
HEIGHT: 67 IN | HEART RATE: 69 BPM | DIASTOLIC BLOOD PRESSURE: 83 MMHG | RESPIRATION RATE: 16 BRPM | OXYGEN SATURATION: 98 % | WEIGHT: 169.6 LBS | SYSTOLIC BLOOD PRESSURE: 145 MMHG | BODY MASS INDEX: 26.62 KG/M2 | TEMPERATURE: 97.4 F

## 2020-08-21 DIAGNOSIS — Z23 NEED FOR SHINGLES VACCINE: ICD-10-CM

## 2020-08-21 DIAGNOSIS — K21.9 GASTROESOPHAGEAL REFLUX DISEASE WITHOUT ESOPHAGITIS: ICD-10-CM

## 2020-08-21 DIAGNOSIS — R76.8 POSITIVE ANA (ANTINUCLEAR ANTIBODY): ICD-10-CM

## 2020-08-21 DIAGNOSIS — Z12.31 ENCOUNTER FOR SCREENING MAMMOGRAM FOR BREAST CANCER: ICD-10-CM

## 2020-08-21 DIAGNOSIS — Z00.00 ROUTINE GENERAL MEDICAL EXAMINATION AT A HEALTH CARE FACILITY: Primary | ICD-10-CM

## 2020-08-21 DIAGNOSIS — H43.391 VITREOUS FLOATERS OF RIGHT EYE: ICD-10-CM

## 2020-08-21 DIAGNOSIS — Z98.84 H/O GASTRIC BYPASS: ICD-10-CM

## 2020-08-21 DIAGNOSIS — R51.9 BILATERAL HEADACHE: ICD-10-CM

## 2020-08-21 DIAGNOSIS — M25.50 POLYARTHRALGIA: ICD-10-CM

## 2020-08-21 DIAGNOSIS — R06.09 EXERTIONAL DYSPNEA: ICD-10-CM

## 2020-08-21 DIAGNOSIS — I10 HYPERTENSION GOAL BP (BLOOD PRESSURE) < 140/90: ICD-10-CM

## 2020-08-21 DIAGNOSIS — Z23 NEED FOR DIPHTHERIA-TETANUS-PERTUSSIS (TDAP) VACCINE: ICD-10-CM

## 2020-08-21 DIAGNOSIS — Z13.6 CARDIOVASCULAR SCREENING; LDL GOAL LESS THAN 130: ICD-10-CM

## 2020-08-21 DIAGNOSIS — F33.41 MAJOR DEPRESSIVE DISORDER, RECURRENT EPISODE, IN PARTIAL REMISSION (H): ICD-10-CM

## 2020-08-21 LAB
ALBUMIN UR-MCNC: NEGATIVE MG/DL
APPEARANCE UR: CLEAR
BILIRUB UR QL STRIP: NEGATIVE
COLOR UR AUTO: YELLOW
CREAT UR-MCNC: 158 MG/DL
GLUCOSE UR STRIP-MCNC: NEGATIVE MG/DL
HGB UR QL STRIP: NEGATIVE
KETONES UR STRIP-MCNC: NEGATIVE MG/DL
LEUKOCYTE ESTERASE UR QL STRIP: NEGATIVE
MICROALBUMIN UR-MCNC: 11 MG/L
MICROALBUMIN/CREAT UR: 6.9 MG/G CR (ref 0–25)
NITRATE UR QL: NEGATIVE
PH UR STRIP: 5.5 PH (ref 5–7)
SOURCE: NORMAL
SP GR UR STRIP: 1.02 (ref 1–1.03)
UROBILINOGEN UR STRIP-ACNC: 0.2 EU/DL (ref 0.2–1)

## 2020-08-21 PROCEDURE — 90750 HZV VACC RECOMBINANT IM: CPT | Performed by: INTERNAL MEDICINE

## 2020-08-21 PROCEDURE — 99396 PREV VISIT EST AGE 40-64: CPT | Mod: 25 | Performed by: INTERNAL MEDICINE

## 2020-08-21 PROCEDURE — 90471 IMMUNIZATION ADMIN: CPT | Performed by: INTERNAL MEDICINE

## 2020-08-21 PROCEDURE — 82043 UR ALBUMIN QUANTITATIVE: CPT | Performed by: INTERNAL MEDICINE

## 2020-08-21 PROCEDURE — 99213 OFFICE O/P EST LOW 20 MIN: CPT | Mod: 25 | Performed by: INTERNAL MEDICINE

## 2020-08-21 PROCEDURE — 81003 URINALYSIS AUTO W/O SCOPE: CPT | Performed by: INTERNAL MEDICINE

## 2020-08-21 RX ORDER — BUPROPION HYDROCHLORIDE 150 MG/1
TABLET ORAL
Qty: 90 TABLET | Refills: 3 | Status: SHIPPED | OUTPATIENT
Start: 2020-08-21 | End: 2020-09-04

## 2020-08-21 RX ORDER — HYDRALAZINE HYDROCHLORIDE 25 MG/1
25 TABLET, FILM COATED ORAL 2 TIMES DAILY
Qty: 180 TABLET | Refills: 3 | Status: SHIPPED | OUTPATIENT
Start: 2020-08-21 | End: 2020-09-04

## 2020-08-21 RX ORDER — PANTOPRAZOLE SODIUM 40 MG/1
40 TABLET, DELAYED RELEASE ORAL
Qty: 180 TABLET | Refills: 3 | Status: SHIPPED | OUTPATIENT
Start: 2020-08-21 | End: 2020-09-02

## 2020-08-21 RX ORDER — CARVEDILOL 12.5 MG/1
12.5 TABLET ORAL 2 TIMES DAILY WITH MEALS
Qty: 180 TABLET | Refills: 3 | Status: SHIPPED | OUTPATIENT
Start: 2020-08-21 | End: 2020-08-24

## 2020-08-21 ASSESSMENT — MIFFLIN-ST. JEOR: SCORE: 1348.99

## 2020-08-21 ASSESSMENT — PAIN SCALES - GENERAL: PAINLEVEL: MILD PAIN (2)

## 2020-08-21 NOTE — PATIENT INSTRUCTIONS
At [facility], we strive to deliver an exceptional experience to you, every time we see you. If you receive a survey, please complete it as we do value your feedback.  If you have MyChart, you can expect to receive results automatically within 24 hours of their completion.  Your provider will send a note interpreting your results as well.   If you do not have MyChart, you should receive your results in about a week by mail.    Your care team:                            Family Medicine Internal Medicine   MD Bandar Abraham, MD Bandar Lr MD Katya Georgiev PA-C  Micaela Hackett, APRN CNP    Cesar Albert, MD Pediatrics   Marcos Ellison, PAMelissaC  Loreta Hardy, CNP MD Mirna Anthony APRN CNP   MD Helena Lopez MD Deborah Mielke, MD Berkley Ceballos, APRN CNP  Kaity Rodriguez, PALIVE Garner, CNP  MD Lucy Crowell MD Angela Wermerskirchen, MD      Clinic hours: Monday - Thursday 7 am-7 pm; Fridays 7 am-5 pm.   Urgent care: Monday - Friday 11 am-9 pm; Saturday and Sunday 9 am-5 pm.    Clinic: [phone]       [facility]: Monday - Thursday 8 am - 7 pm; Friday 8 am - 6 pm  [facility]: [phone]     Use www.[email or web address].org for 24/7 diagnosis and treatment of dozens of conditions.  Preventive Health Recommendations  Female Ages 50 - 64    Yearly exam: See your health care provider every year in order to  o Review health changes.   o Discuss preventive care.    o Review your medicines if your doctor has prescribed any.      Get a Pap test every three years (unless you have an abnormal result and your provider advises testing more often).    If you get Pap tests with HPV test, you only need to test every 5 years, unless you have an abnormal result.     You do not need a Pap test if your uterus was removed (hysterectomy) and you have not  had cancer.    You should be tested each year for STDs (sexually transmitted diseases) if you're at risk.     Have a mammogram every 1 to 2 years.    Have a colonoscopy at age 50, or have a yearly FIT test (stool test). These exams screen for colon cancer.      Have a cholesterol test every 5 years, or more often if advised.    Have a diabetes test (fasting glucose) every three years. If you are at risk for diabetes, you should have this test more often.     If you are at risk for osteoporosis (brittle bone disease), think about having a bone density scan (DEXA).    Shots: Get a flu shot each year. Get a tetanus shot every 10 years.    Nutrition:     Eat at least 5 servings of fruits and vegetables each day.    Eat whole-grain bread, whole-wheat pasta and brown rice instead of white grains and rice.    Get adequate Calcium and Vitamin D.     Lifestyle    Exercise at least 150 minutes a week (30 minutes a day, 5 days a week). This will help you control your weight and prevent disease.    Limit alcohol to one drink per day.    No smoking.     Wear sunscreen to prevent skin cancer.     See your dentist every six months for an exam and cleaning.    See your eye doctor every 1 to 2 years.
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
## 2020-08-21 NOTE — NURSING NOTE
Prior to immunization administration, verified patients identity using patient s name and date of birth. Please see Immunization Activity for additional information.     Screening Questionnaire for Adult Immunization    Are you sick today?   No   Do you have allergies to medications, food, a vaccine component or latex?   No   Have you ever had a serious reaction after receiving a vaccination?   No   Do you have a long-term health problem with heart, lung, kidney, or metabolic disease (e.g., diabetes), asthma, a blood disorder, no spleen, complement component deficiency, a cochlear implant, or a spinal fluid leak?  Are you on long-term aspirin therapy?   No   Do you have cancer, leukemia, HIV/AIDS, or any other immune system problem?   No   Do you have a parent, brother, or sister with an immune system problem?   No   In the past 3 months, have you taken medications that affect  your immune system, such as prednisone, other steroids, or anticancer drugs; drugs for the treatment of rheumatoid arthritis, Crohn s disease, or psoriasis; or have you had radiation treatments?   No   Have you had a seizure, or a brain or other nervous system problem?   No   During the past year, have you received a transfusion of blood or blood    products, or been given immune (gamma) globulin or antiviral drug?   No   For women: Are you pregnant or is there a chance you could become       pregnant during the next month?   No   Have you received any vaccinations in the past 4 weeks?   No     Immunization questionnaire answers were all negative.        Per orders of Dr. Merrill, injection of Shingrix given by Micaela Jaime MA. Patient instructed to remain in clinic for 15 minutes afterwards, and to report any adverse reaction to me immediately.       Screening performed by Micaela Jaime MA on 8/21/2020 at 11:55 AM.

## 2020-08-22 DIAGNOSIS — I10 HYPERTENSION GOAL BP (BLOOD PRESSURE) < 140/90: ICD-10-CM

## 2020-08-24 RX ORDER — HYDRALAZINE HYDROCHLORIDE 25 MG/1
25 TABLET, FILM COATED ORAL 2 TIMES DAILY
Qty: 180 TABLET | Refills: 3 | OUTPATIENT
Start: 2020-08-24

## 2020-08-31 DIAGNOSIS — K21.9 GASTROESOPHAGEAL REFLUX DISEASE WITHOUT ESOPHAGITIS: ICD-10-CM

## 2020-09-02 DIAGNOSIS — Z13.6 CARDIOVASCULAR SCREENING; LDL GOAL LESS THAN 130: ICD-10-CM

## 2020-09-02 DIAGNOSIS — Z00.00 ROUTINE GENERAL MEDICAL EXAMINATION AT A HEALTH CARE FACILITY: ICD-10-CM

## 2020-09-02 DIAGNOSIS — M25.50 POLYARTHRALGIA: ICD-10-CM

## 2020-09-02 DIAGNOSIS — I10 HYPERTENSION GOAL BP (BLOOD PRESSURE) < 140/90: ICD-10-CM

## 2020-09-02 DIAGNOSIS — Z98.84 H/O GASTRIC BYPASS: ICD-10-CM

## 2020-09-02 DIAGNOSIS — F33.41 MAJOR DEPRESSIVE DISORDER, RECURRENT EPISODE, IN PARTIAL REMISSION (H): ICD-10-CM

## 2020-09-02 LAB
ALBUMIN SERPL-MCNC: 3.9 G/DL (ref 3.4–5)
ALP SERPL-CCNC: 102 U/L (ref 40–150)
ALT SERPL W P-5'-P-CCNC: 22 U/L (ref 0–50)
ANION GAP SERPL CALCULATED.3IONS-SCNC: 8 MMOL/L (ref 3–14)
AST SERPL W P-5'-P-CCNC: 16 U/L (ref 0–45)
BASOPHILS # BLD AUTO: 0 10E9/L (ref 0–0.2)
BASOPHILS NFR BLD AUTO: 0.5 %
BILIRUB SERPL-MCNC: 0.7 MG/DL (ref 0.2–1.3)
BUN SERPL-MCNC: 12 MG/DL (ref 7–30)
CALCIUM SERPL-MCNC: 9.5 MG/DL (ref 8.5–10.1)
CHLORIDE SERPL-SCNC: 107 MMOL/L (ref 94–109)
CHOLEST SERPL-MCNC: 196 MG/DL
CO2 SERPL-SCNC: 28 MMOL/L (ref 20–32)
CREAT SERPL-MCNC: 0.74 MG/DL (ref 0.52–1.04)
DEPRECATED CALCIDIOL+CALCIFEROL SERPL-MC: 36 UG/L (ref 20–75)
DIFFERENTIAL METHOD BLD: NORMAL
EOSINOPHIL # BLD AUTO: 0.2 10E9/L (ref 0–0.7)
EOSINOPHIL NFR BLD AUTO: 3 %
ERYTHROCYTE [DISTWIDTH] IN BLOOD BY AUTOMATED COUNT: 13.6 % (ref 10–15)
FERRITIN SERPL-MCNC: 698 NG/ML (ref 8–252)
GFR SERPL CREATININE-BSD FRML MDRD: 86 ML/MIN/{1.73_M2}
GLUCOSE SERPL-MCNC: 85 MG/DL (ref 70–99)
HCT VFR BLD AUTO: 43.6 % (ref 35–47)
HDLC SERPL-MCNC: 71 MG/DL
HGB BLD-MCNC: 14.3 G/DL (ref 11.7–15.7)
LDLC SERPL CALC-MCNC: 110 MG/DL
LYMPHOCYTES # BLD AUTO: 1.3 10E9/L (ref 0.8–5.3)
LYMPHOCYTES NFR BLD AUTO: 21.1 %
MCH RBC QN AUTO: 31.8 PG (ref 26.5–33)
MCHC RBC AUTO-ENTMCNC: 32.8 G/DL (ref 31.5–36.5)
MCV RBC AUTO: 97 FL (ref 78–100)
MONOCYTES # BLD AUTO: 0.7 10E9/L (ref 0–1.3)
MONOCYTES NFR BLD AUTO: 10.9 %
NEUTROPHILS # BLD AUTO: 3.9 10E9/L (ref 1.6–8.3)
NEUTROPHILS NFR BLD AUTO: 64.5 %
NONHDLC SERPL-MCNC: 125 MG/DL
PLATELET # BLD AUTO: 224 10E9/L (ref 150–450)
POTASSIUM SERPL-SCNC: 4.2 MMOL/L (ref 3.4–5.3)
PROT SERPL-MCNC: 7.3 G/DL (ref 6.8–8.8)
RBC # BLD AUTO: 4.5 10E12/L (ref 3.8–5.2)
SODIUM SERPL-SCNC: 143 MMOL/L (ref 133–144)
TRIGL SERPL-MCNC: 75 MG/DL
WBC # BLD AUTO: 6 10E9/L (ref 4–11)

## 2020-09-02 PROCEDURE — 86431 RHEUMATOID FACTOR QUANT: CPT | Performed by: INTERNAL MEDICINE

## 2020-09-02 PROCEDURE — 86038 ANTINUCLEAR ANTIBODIES: CPT | Performed by: INTERNAL MEDICINE

## 2020-09-02 PROCEDURE — 83921 ORGANIC ACID SINGLE QUANT: CPT | Mod: 59 | Performed by: INTERNAL MEDICINE

## 2020-09-02 PROCEDURE — 85025 COMPLETE CBC W/AUTO DIFF WBC: CPT | Performed by: INTERNAL MEDICINE

## 2020-09-02 PROCEDURE — 82306 VITAMIN D 25 HYDROXY: CPT | Performed by: INTERNAL MEDICINE

## 2020-09-02 PROCEDURE — 36415 COLL VENOUS BLD VENIPUNCTURE: CPT | Performed by: INTERNAL MEDICINE

## 2020-09-02 PROCEDURE — 82728 ASSAY OF FERRITIN: CPT | Performed by: INTERNAL MEDICINE

## 2020-09-02 PROCEDURE — 80061 LIPID PANEL: CPT | Performed by: INTERNAL MEDICINE

## 2020-09-02 PROCEDURE — 80053 COMPREHEN METABOLIC PANEL: CPT | Performed by: INTERNAL MEDICINE

## 2020-09-02 PROCEDURE — 86225 DNA ANTIBODY NATIVE: CPT | Performed by: INTERNAL MEDICINE

## 2020-09-02 PROCEDURE — 86039 ANTINUCLEAR ANTIBODIES (ANA): CPT | Performed by: INTERNAL MEDICINE

## 2020-09-02 PROCEDURE — 86200 CCP ANTIBODY: CPT | Performed by: INTERNAL MEDICINE

## 2020-09-02 RX ORDER — PANTOPRAZOLE SODIUM 40 MG/1
TABLET, DELAYED RELEASE ORAL
Qty: 180 TABLET | Refills: 2 | Status: SHIPPED | OUTPATIENT
Start: 2020-09-02 | End: 2021-06-24

## 2020-09-03 LAB
ANA PAT SER IF-IMP: ABNORMAL
ANA SER QL IF: POSITIVE
ANA TITR SER IF: ABNORMAL {TITER}

## 2020-09-04 PROBLEM — M25.50 POLYARTHRALGIA: Status: ACTIVE | Noted: 2020-09-04

## 2020-09-04 PROBLEM — R76.8 POSITIVE ANA (ANTINUCLEAR ANTIBODY): Status: ACTIVE | Noted: 2020-09-04

## 2020-09-04 RX ORDER — HYDRALAZINE HYDROCHLORIDE 25 MG/1
25 TABLET, FILM COATED ORAL 2 TIMES DAILY
Qty: 180 TABLET | Refills: 2 | Status: SHIPPED | OUTPATIENT
Start: 2020-09-04 | End: 2021-06-24

## 2020-09-04 RX ORDER — CARVEDILOL 12.5 MG/1
12.5 TABLET ORAL 2 TIMES DAILY WITH MEALS
Qty: 180 TABLET | Refills: 0 | Status: SHIPPED | OUTPATIENT
Start: 2020-09-04 | End: 2021-01-07

## 2020-09-04 RX ORDER — BUPROPION HYDROCHLORIDE 150 MG/1
TABLET ORAL
Qty: 90 TABLET | Refills: 2 | Status: SHIPPED | OUTPATIENT
Start: 2020-09-04 | End: 2021-06-24

## 2020-09-04 NOTE — TELEPHONE ENCOUNTER
Different pharmacy being requested for refill than previously sent to.   Prescription approved per [facility] Refill Protocol.        Epifanio Sr RN, BSN, PHN

## 2020-09-08 LAB
CCP AB SER IA-ACNC: 2 U/ML
DSDNA AB SER-ACNC: 1 IU/ML
RHEUMATOID FACT SER NEPH-ACNC: <7 IU/ML (ref 0–20)

## 2020-09-14 LAB — METHYLMALONATE SERPL-SCNC: 0.16 UMOL/L (ref 0–0.4)

## 2020-09-15 ENCOUNTER — E-VISIT (OUTPATIENT)
Dept: FAMILY MEDICINE | Facility: CLINIC | Age: 65
End: 2020-09-15
Payer: COMMERCIAL

## 2020-09-15 ENCOUNTER — MYC MEDICAL ADVICE (OUTPATIENT)
Dept: FAMILY MEDICINE | Facility: CLINIC | Age: 65
End: 2020-09-15

## 2020-09-15 DIAGNOSIS — N30.00 ACUTE CYSTITIS WITHOUT HEMATURIA: Primary | ICD-10-CM

## 2020-09-15 PROCEDURE — 99421 OL DIG E/M SVC 5-10 MIN: CPT | Performed by: INTERNAL MEDICINE

## 2020-09-16 RX ORDER — SULFAMETHOXAZOLE/TRIMETHOPRIM 800-160 MG
1 TABLET ORAL 2 TIMES DAILY
Qty: 10 TABLET | Refills: 1 | Status: SHIPPED | OUTPATIENT
Start: 2020-09-16 | End: 2020-09-21

## 2020-09-23 ENCOUNTER — OFFICE VISIT (OUTPATIENT)
Dept: URGENT CARE | Facility: URGENT CARE | Age: 65
End: 2020-09-23
Payer: COMMERCIAL

## 2020-09-23 VITALS
WEIGHT: 170 LBS | TEMPERATURE: 98.5 F | OXYGEN SATURATION: 98 % | RESPIRATION RATE: 18 BRPM | DIASTOLIC BLOOD PRESSURE: 86 MMHG | HEART RATE: 72 BPM | SYSTOLIC BLOOD PRESSURE: 144 MMHG | BODY MASS INDEX: 26.68 KG/M2 | HEIGHT: 67 IN

## 2020-09-23 DIAGNOSIS — Z23 NEED FOR PROPHYLACTIC VACCINATION USING TETANUS AND DIPHTHERIA TOXOIDS ADSORBED (TD) VACCINE: ICD-10-CM

## 2020-09-23 DIAGNOSIS — Z23 NEED FOR PROPHYLACTIC VACCINATION AND INOCULATION AGAINST INFLUENZA: ICD-10-CM

## 2020-09-23 DIAGNOSIS — G44.209 TENSION HEADACHE: Primary | ICD-10-CM

## 2020-09-23 PROCEDURE — 90714 TD VACC NO PRESV 7 YRS+ IM: CPT | Performed by: FAMILY MEDICINE

## 2020-09-23 PROCEDURE — 90682 RIV4 VACC RECOMBINANT DNA IM: CPT | Performed by: FAMILY MEDICINE

## 2020-09-23 PROCEDURE — 90471 IMMUNIZATION ADMIN: CPT | Performed by: FAMILY MEDICINE

## 2020-09-23 PROCEDURE — 99214 OFFICE O/P EST MOD 30 MIN: CPT | Mod: 25 | Performed by: FAMILY MEDICINE

## 2020-09-23 PROCEDURE — 90472 IMMUNIZATION ADMIN EACH ADD: CPT | Performed by: FAMILY MEDICINE

## 2020-09-23 RX ORDER — CYCLOBENZAPRINE HCL 10 MG
10 TABLET ORAL AT BEDTIME
Qty: 10 TABLET | Refills: 0 | Status: SHIPPED | OUTPATIENT
Start: 2020-09-23 | End: 2020-10-03

## 2020-09-23 ASSESSMENT — ENCOUNTER SYMPTOMS
BRUISES/BLEEDS EASILY: 0
VOMITING: 0
ARTHRALGIAS: 1
RHINORRHEA: 0
SEIZURES: 0
JOINT SWELLING: 0
TREMORS: 0
WHEEZING: 0
NAUSEA: 0
DIZZINESS: 0
SHORTNESS OF BREATH: 0
PALPITATIONS: 0
HEADACHES: 0
COUGH: 0
FEVER: 0
CHILLS: 0
VOICE CHANGE: 0
WEAKNESS: 0
POLYDIPSIA: 0
DIARRHEA: 0
SORE THROAT: 0
ADENOPATHY: 0
POLYPHAGIA: 0
DYSURIA: 0
PSYCHIATRIC NEGATIVE: 1

## 2020-09-23 ASSESSMENT — PAIN SCALES - GENERAL: PAINLEVEL: MILD PAIN (3)

## 2020-09-23 ASSESSMENT — MIFFLIN-ST. JEOR: SCORE: 1350.8

## 2020-09-23 NOTE — PROGRESS NOTES
SUBJECTIVE:   Marlee Tobar is a 65 year old female presenting with a chief complaint of   Chief Complaint   Patient presents with     Headache     Mass       She is an established patient of [facility].    Marlee a 65-year-old female presenting today with fullness or swelling in the anterior neck bilateral slightly worse on the right versus the left. Headaches over the past month.  Seem to occur 3-4 times a week lasting about a day she does not use any OTC medicine.  She has had a history of migraines but those seems to have resolved in her early 40s. Occiput area and intermittent radiation to the forehead and bilaterally. Did have eye exam 10 days ago and was normal with slight early cataract.       PMH  She was referred to neurology and has this appointment in October.  She is also referred to rheumatologist over the past month she has had fingertip area soreness and I suppose would like to pursue a rheumatoid work-up.      She had a gastric bypass in 2011 been using supplements since that time appropriately so but did have a recent diagnosis of iron deficiency anemia.  Approximately 5 years ago. she did have an elevated ferritin recently and therefore her iron was switched from daily to every other day she did see her primary care physician as recent as 2 weeks ago    Review of Systems   Constitutional: Negative for chills and fever.   HENT: Negative for congestion, ear pain, rhinorrhea, sore throat and voice change.    Respiratory: Negative for cough, shortness of breath and wheezing.    Cardiovascular: Negative for chest pain and palpitations.   Gastrointestinal: Negative for diarrhea, nausea and vomiting.   Endocrine: Negative for polydipsia, polyphagia and polyuria.   Genitourinary: Negative for dysuria, vaginal bleeding, vaginal discharge and vaginal pain.        Resolved bladder infection over the past 5 days.    Musculoskeletal: Positive for arthralgias (fingers and rarely knees ). Negative for joint  swelling.   Allergic/Immunologic: Negative for environmental allergies and food allergies.   Neurological: Negative for dizziness, tremors, seizures, weakness and headaches.   Hematological: Negative for adenopathy. Does not bruise/bleed easily.   Psychiatric/Behavioral: Negative.         Welbutrin hx of anxiety and mild depression         Past Medical History:   Diagnosis Date     Bleeding stomach ulcer 2011    [facility] [place] [facility]     Gallstones      History of tonsillectomy      Hypertension goal BP (blood pressure) < 140/90      Pulmonary embolism (H) 2/10/2011    developed after surgery, coumadin x 6 months     Family History   Problem Relation Age of Onset     Hypertension Mother      Arthritis Mother      Cancer Mother         Hodgkins disease     Obesity Mother      Cerebrovascular Disease Father      Alcohol/Drug Father         recovered alcoholic     Alcohol/Drug Sister      Gynecology Sister         history of abnormal paps--LEEPs done     Lipids Sister      Thyroid Disease Sister      Cardiovascular Sister         mitral valve prolapse     Alcohol/Drug Son         alcoholic     Depression Son      Hypertension Son      Psychotic Disorder Son      Hypertension Brother      Cardiovascular Brother      Heart Disease Brother 58        MI     Obesity Brother      Genitourinary Problems Daughter      Gynecology Daughter         Protein S deficiency     Unknown/Adopted Maternal Grandfather      Unknown/Adopted Paternal Grandmother      Unknown/Adopted Paternal Grandfather      Glaucoma No family hx of      Macular Degeneration No family hx of      Current Outpatient Medications   Medication Sig Dispense Refill     Ascorbic Acid (VITAMIN C PO)        Biotin 5000 MCG CAPS Take 1 tablet by mouth daily.       buPROPion (WELLBUTRIN XL) 150 MG 24 hr tablet TAKE 1 TABLET EVERY MORNING 90 tablet 2     CALCIUM CITRATE PO Take 3 capsules by mouth 3 times daily       carvedilol (COREG) 12.5 MG tablet  "Take 1 tablet (12.5 mg) by mouth 2 times daily (with meals) 180 tablet 0     Cholecalciferol (VITAMIN D PO)        Glucosamine-Chondroit-Vit C-Mn (GLUCOSAMINE CHONDR 1500 COMPLX PO) Take 1 tablet by mouth daily       hydrALAZINE (APRESOLINE) 25 MG tablet Take 1 tablet (25 mg) by mouth 2 times daily Take with Carvedilol. 180 tablet 2     Iron-vit C-vit B12-folic acid (IRON 100 PLUS) 100-250-0.025-1 MG TABS Take  by mouth.       Multiple Vitamins-Minerals (MULTIVITAL) TABS Take 2 tablets by mouth daily.       pantoprazole (PROTONIX) 40 MG EC tablet TAKE ONE TABLET BY MOUTH TWICE DAILY  180 tablet 2     trolamine salicylate (ASPERCREME) 10 % external cream Apply topically 3 times daily       Social History     Tobacco Use     Smoking status: Never Smoker     Smokeless tobacco: Never Used   Substance Use Topics     Alcohol use: Yes     Comment: very rare       OBJECTIVE  BP (!) 144/86   Pulse 72   Temp 98.5  F (36.9  C) (Oral)   Resp 18   Ht 1.689 m (5' 6.5\")   Wt 77.1 kg (170 lb)   LMP 06/27/2010 (Exact Date)   SpO2 98%   Breastfeeding No   BMI 27.03 kg/m      Physical Exam  HENT:      Head: Normocephalic and atraumatic.      Right Ear: External ear normal.      Left Ear: External ear normal.      Nose: Nose normal.      Mouth/Throat:      Pharynx: No oropharyngeal exudate.   Eyes:      General: No scleral icterus.        Right eye: No discharge.         Left eye: No discharge.      Conjunctiva/sclera: Conjunctivae normal.      Pupils: Pupils are equal, round, and reactive to light.   Neck:      Musculoskeletal: Normal range of motion and neck supple.      Thyroid: No thyromegaly.      Trachea: No tracheal deviation.   Cardiovascular:      Rate and Rhythm: Normal rate and regular rhythm.      Heart sounds: Normal heart sounds. No murmur. No friction rub. No gallop.    Pulmonary:      Effort: Pulmonary effort is normal. No respiratory distress.      Breath sounds: Normal breath sounds. No stridor. No wheezing " or rales.   Chest:      Chest wall: No tenderness.   Abdominal:      General: Bowel sounds are normal. There is no distension.      Palpations: Abdomen is soft. There is no mass.      Tenderness: There is no abdominal tenderness. There is no guarding or rebound.   Musculoskeletal:         General: No tenderness or deformity.   Lymphadenopathy:      Cervical: No cervical adenopathy.   Skin:     General: Skin is warm and dry.      Findings: No erythema or rash.   Neurological:      Mental Status: She is alert and oriented to person, place, and time.      Cranial Nerves: No cranial nerve deficit.   Psychiatric:         Judgment: Judgment normal.           ASSESSMENT:    ICD-10-CM    1. Tension headache  G44.209         PLAN:  Exam is reassuring most consistent with tension headache I do feel she is holding a lot of her tension in the occipital area upper trapezius contribute to her recurrent headaches.  Emphasized regular rest healthy nutrition and  potentially pursue massage for her ongoing neck tension.  She does have an upcoming appointment with her neurologist for her history of headache and migraine.  Also has an upcoming point with a rheumatologist for possible early rheumatoid signs with her hand pain she intends to keep these appointments.  Stress reduction techniques meditation etc. when possible.  The patient indicates understanding of these issues and agrees with the plan.   Wu Olivo MD

## 2020-09-23 NOTE — NURSING NOTE
Prior to immunization administration, verified patients identity using patient s name and date of birth. Please see Immunization Activity for additional information.     Screening Questionnaire for Adult Immunization    Are you sick today?   No   Do you have allergies to medications, food, a vaccine component or latex?   Yes   Have you ever had a serious reaction after receiving a vaccination?   No   Do you have a long-term health problem with heart, lung, kidney, or metabolic disease (e.g., diabetes), asthma, a blood disorder, no spleen, complement component deficiency, a cochlear implant, or a spinal fluid leak?  Are you on long-term aspirin therapy?   No   Do you have cancer, leukemia, HIV/AIDS, or any other immune system problem?   No   Do you have a parent, brother, or sister with an immune system problem?   No   In the past 3 months, have you taken medications that affect  your immune system, such as prednisone, other steroids, or anticancer drugs; drugs for the treatment of rheumatoid arthritis, Crohn s disease, or psoriasis; or have you had radiation treatments?   No   Have you had a seizure, or a brain or other nervous system problem?   No   During the past year, have you received a transfusion of blood or blood    products, or been given immune (gamma) globulin or antiviral drug?   No   For women: Are you pregnant or is there a chance you could become       pregnant during the next month?   No   Have you received any vaccinations in the past 4 weeks?   No     Immunization questionnaire was positive for at least one answer.  Notified known drug allergies.      Patient instructed to remain in clinic for 15 minutes afterwards, and to report any adverse reaction to me immediately.       Screening performed by Donna Olivier on 9/23/2020 at 1:40 PM.

## 2020-10-08 ENCOUNTER — ANCILLARY PROCEDURE (OUTPATIENT)
Dept: MAMMOGRAPHY | Facility: CLINIC | Age: 65
End: 2020-10-08
Attending: INTERNAL MEDICINE
Payer: COMMERCIAL

## 2020-10-08 DIAGNOSIS — Z12.31 ENCOUNTER FOR SCREENING MAMMOGRAM FOR BREAST CANCER: ICD-10-CM

## 2020-10-08 PROCEDURE — 77063 BREAST TOMOSYNTHESIS BI: CPT | Mod: GC | Performed by: STUDENT IN AN ORGANIZED HEALTH CARE EDUCATION/TRAINING PROGRAM

## 2020-10-08 PROCEDURE — 77067 SCR MAMMO BI INCL CAD: CPT | Mod: GC | Performed by: STUDENT IN AN ORGANIZED HEALTH CARE EDUCATION/TRAINING PROGRAM

## 2020-11-13 ENCOUNTER — VIRTUAL VISIT (OUTPATIENT)
Dept: FAMILY MEDICINE | Facility: OTHER | Age: 65
End: 2020-11-13

## 2020-11-13 NOTE — PROGRESS NOTES
"Date: 2020 08:19:52  Clinician: Alma Silver  Clinician NPI: [id number]  Patient: Marlee Tobar  Patient : 1955  Patient Address: [address] [place] [address]  Patient Phone: [phone]  Visit Protocol: URI  Patient Summary:  Marlee is a 65 year old ( : 1955 ) female who initiated a [facility] Visit for COVID-19 (Coronavirus) evaluation and screening. When asked the question \"Please sign me up to receive news, health information and promotions. \", Marlee responded \"Yes\".    Marlee states her symptoms started today.   Her symptoms consist of myalgia, chills, a sore throat, ageusia, a cough, and anosmia.   Symptom details     Cough: Marlee coughs a few times an hour and her cough is more bothersome at night. Phlegm does not come into her throat when she coughs. She does not believe her cough is caused by post-nasal drip.     Sore throat: Marlee reports having mild throat pain (1-3 on a 10 point pain scale), does not have exudate on her tonsils, and can swallow liquids. She is not sure if the lymph nodes in her neck are enlarged. A rash has not appeared on the skin since the sore throat started.      Marlee denies having vomiting, rhinitis, facial pain or pressure, malaise, teeth pain, diarrhea, ear pain, headache, wheezing, fever, nasal congestion, and nausea. She also denies taking antibiotic medication in the past month and having recent facial or sinus surgery in the past 60 days. She is not experiencing dyspnea.   Precipitating events  Within the past week, Marlee has not been exposed to someone with strep throat. She has not recently been exposed to someone with influenza. Marlee has not been in close contact with any high risk individuals.   Pertinent COVID-19 (Coronavirus) information  Marlee does not work or volunteer as healthcare worker or a . In the past 14 days, Marlee has not worked or volunteered at a healthcare facility or group living setting.   In the " past 14 days, she also has not lived in a congregate living setting.   Marlee has not had a close contact with a laboratory-confirmed COVID-19 patient within 14 days of symptom onset.    Since December 2019, Marlee has been tested for COVID-19 and has not had upper respiratory infection or influenza-like illness.      Result of COVID-19 test: Negative     Date of her COVID-19 test: 04/27/2020      Pertinent medical history  Marlee typically gets a yeast infection when she takes antibiotics. She has used fluconazole (Diflucan) to treat previous yeast infections. 2 doses of fluconazole (Diflucan) has typically been needed for symptoms to resolve in the past.  Marlee needs a return to work/school note.   Weight: 165 lbs   Marlee does not smoke or use smokeless tobacco.   Weight: 165 lbs    MEDICATIONS: carvedilol oral, hydralazine HCl (bulk), pantoprazole oral, bupropion HCl oral, ALLERGIES: NKDA  Clinician Response:  Dear Marlee,   Your symptoms show that you may have coronavirus (COVID-19). This illness can cause fever, cough and trouble breathing. Many people get a mild case and get better on their own. Some people can get very sick.  Based on the symptoms you have shared, I would like you to be re-checked in 2 to 3 days. Please call your family clinic to set up a video or phone visit.  Will I be tested for COVID-19?  We would like to test you for this virus.   Please call [phone] to schedule your visit. Explain that you were referred by [facility] to have a COVID-19 test. Be ready to [facility].   * If you need to schedule in [place] or [place] please call [phone] or for [employer] employees please call [phone].    The following will serve as your written order for this COVID Test, ordered by me, for the indication of suspected COVID [Z20.828]: The test will be ordered in [employer], our electronic health record, after you are scheduled. It will show as ordered and authorized by  "Sudhir Manuel MD.  Order: COVID-19 (Coronavirus) PCR for SYMPTOMATIC testing from [facility].   1.When it's time for your COVID test:   Stay at least 6 feet away from others. (If someone will drive you to your test, stay in the backseat, as far away from the  as you can.)   Cover your mouth and nose with a mask, tissue or washcloth.  Go straight to the testing site. Don't make any stops on the way there or back.      2.Starting now: Stay home and away from others (self-isolate) until:   You've had no fever---and no medicine that reduces fever---for one full day (24 hours). And...   Your other symptoms have gotten better. For example, your cough or breathing has improved. And...   At least 10 days have passed since your symptoms started.       During this time, don't leave the house except for testing or medical care.   Stay in your own room, even for meals. Use your own bathroom if you can.   Stay away from others in your home. No hugging, kissing or shaking hands. No visitors.  Don't go to work, school or anywhere else.    Clean \"high touch\" surfaces often (doorknobs, counters, handles, etc.). Use a household cleaning spray or wipes. You'll find a full list of  on the EPA website: www.epa.gov/pesticide-registration/list-n-disinfectants-use-against-sars-cov-2.   Cover your mouth and nose with a mask, tissue or washcloth to avoid spreading germs.  Wash your hands and face often. Use soap and water.  Caregivers in these groups are at risk for severe illness due to COVID-19:  o People 65 years and older  o People who live in a nursing home or long-term care facility  o People with chronic disease (lung, heart, cancer, diabetes, kidney, liver, immunologic)   o People who have a weakened immune system, including those who:   Are in cancer treatment  Take medicine that weakens the immune system, such as corticosteroids  Had a bone marrow or organ transplant  Have an immune deficiency  Have poorly controlled HIV or " AIDS  Are obese (body mass index of 40 or higher)  Smoke regularly   o Caregivers should wear gloves while washing dishes, handling laundry and cleaning bedrooms and bathrooms.  o Use caution when washing and drying laundry: Don't shake dirty laundry, and use the warmest water setting that you can.  o For more tips, go to www.cdc.gov/coronavirus/2019-ncov/downloads/10Things.pdf.      How can I take care of myself?   Get lots of rest. Drink extra fluids (unless a doctor has told you not to)   Take Tylenol (acetaminophen) for fever or pain. If you have liver or kidney problems, ask your family doctor if it's okay to take Tylenol.   Adults can take either:    650 mg (two 325 mg pills) every 4 to 6 hours, or...   1,000 mg (two 500 mg pills) every 8 hours as needed.    Note: Don't take more than 3,000 mg in one day. Acetaminophen is found in many medicines (both prescribed and over-the-counter medicines). Read all labels to be sure you don't take too much.   For children, check the Tylenol bottle for the right dose. The dose is based on the child's age or weight.    If you have other health problems (like cancer, heart failure, an organ transplant or severe kidney disease): Call your specialty clinic if you don't feel better in the next 2 days.       Know when to call 911. Emergency warning signs include:    Trouble breathing or shortness of breath Pain or pressure in the chest that doesn't go away Feeling confused like you haven't felt before, or not being able to wake up Bluish-colored lips or face  Where can I get more information?    Inaika Wickliffe -- About COVID-19: [email or web address]   CDC -- What to Do If You're Sick: www.cdc.gov/coronavirus/2019-ncov/about/steps-when-sick.html   CDC -- Ending Home Isolation: www.cdc.gov/coronavirus/2019-ncov/hcp/disposition-in-home-patients.html   CDC -- Caring for Someone: www.cdc.gov/coronavirus/2019-ncov/if-you-are-sick/care-for-someone.html   [facility] -- Interim  Guidance for Hospital Discharge to Home: www.health.[facility].[place]/diseases/coronavirus/hcp/hospdischarge.pdf   [facility] clinical trials (COVID-19 research studies): clinicalaffairs.[facility]/umn-clinical-trials    Below are the COVID-19 hotlines at the [facility] ([facility]). Interpreters are available.    For health questions: Call 462-663-5619 or 1-403.442.4509 (7 a.m. to 7 p.m.) For questions about schools and childcare: Call 831-352-4209 or 1-120.285.6414 (7 a.m. to 7 p.m.)       Diagnosis: Contact with and (suspected) exposure to other viral communicable diseases  Diagnosis ICD: Z20.828

## 2020-11-14 DIAGNOSIS — Z20.822 ENCOUNTER FOR LABORATORY TESTING FOR COVID-19 VIRUS: Primary | ICD-10-CM

## 2020-11-14 PROCEDURE — U0003 INFECTIOUS AGENT DETECTION BY NUCLEIC ACID (DNA OR RNA); SEVERE ACUTE RESPIRATORY SYNDROME CORONAVIRUS 2 (SARS-COV-2) (CORONAVIRUS DISEASE [COVID-19]), AMPLIFIED PROBE TECHNIQUE, MAKING USE OF HIGH THROUGHPUT TECHNOLOGIES AS DESCRIBED BY CMS-2020-01-R: HCPCS | Performed by: FAMILY MEDICINE

## 2020-11-15 LAB
SARS-COV-2 RNA SPEC QL NAA+PROBE: ABNORMAL
SPECIMEN SOURCE: ABNORMAL

## 2021-01-26 ENCOUNTER — MYC MEDICAL ADVICE (OUTPATIENT)
Dept: FAMILY MEDICINE | Facility: CLINIC | Age: 66
End: 2021-01-26

## 2021-02-16 ENCOUNTER — PATIENT OUTREACH (OUTPATIENT)
Dept: FAMILY MEDICINE | Facility: CLINIC | Age: 66
End: 2021-02-16

## 2021-02-16 NOTE — TELEPHONE ENCOUNTER
Panel Management Review   One phone call and send letter if unable to reach them or MyChart message and send letter if not read after 2 weeks (You will get a message to your inbasket)      BP Readings from Last 1 Encounters:   09/23/20 (!) 144/86        Health Maintenance Due   Topic Date Due     HIV SCREENING  07/09/1970     PAP  12/16/2018     PHQ-9  01/10/2021        Fail List measure: [unfilled]            Patient is due/failing the following:   BP CHECK    Action needed:   Patient needs office visit for blood pressure follow up.    Type of outreach:    BK  to schedule pt for blood pressure follow up.     Questions for provider review:    None                                                                                       Chart routed to Care Team .                                            Zuri Aguilar, CMA

## 2021-05-17 ENCOUNTER — TELEPHONE (OUTPATIENT)
Dept: FAMILY MEDICINE | Facility: CLINIC | Age: 66
End: 2021-05-17

## 2021-05-17 NOTE — LETTER
June 9, 2021          Marlee Tobar  [address] [facility] [address]            Dear Marlee Tobar,      At [facility] we care about your health and are committed to providing quality patient care. Regular appointments are a vital part of the care and management of your health and can help prevent many of the complications that can occur.      It has come to our attention that you are due for a blood pressure check.  Please call [facility] at [phone] soon to schedule your follow up appointment.    If you have transferred care to another clinic please call to inform us so that we do not continue to send you reminder letters.      Sincerely,      [facility] Care Team

## 2021-05-17 NOTE — TELEPHONE ENCOUNTER
Patient Quality Outreach      Summary:    Patient has the following on her problem list/HM:   Hypertension   Last three blood pressure readings:  BP Readings from Last 3 Encounters:   09/23/20 (!) 144/86   08/21/20 (!) 145/83   08/13/19 110/80     Blood pressure: Failed    HTN Guidelines:  ? 139/89     Patient is due/failing the following:   Hypertension follow-up visit    Type of outreach:        Questions for provider review:    None                                                                                                                                          Chart routed to Care Team.

## 2021-06-22 ENCOUNTER — ANCILLARY PROCEDURE (OUTPATIENT)
Dept: GENERAL RADIOLOGY | Facility: CLINIC | Age: 66
End: 2021-06-22
Attending: NURSE PRACTITIONER
Payer: COMMERCIAL

## 2021-06-22 ENCOUNTER — NURSE TRIAGE (OUTPATIENT)
Dept: NURSING | Facility: CLINIC | Age: 66
End: 2021-06-22

## 2021-06-22 ENCOUNTER — OFFICE VISIT (OUTPATIENT)
Dept: URGENT CARE | Facility: URGENT CARE | Age: 66
End: 2021-06-22
Payer: COMMERCIAL

## 2021-06-22 VITALS
OXYGEN SATURATION: 97 % | DIASTOLIC BLOOD PRESSURE: 84 MMHG | TEMPERATURE: 98.8 F | HEART RATE: 84 BPM | RESPIRATION RATE: 16 BRPM | SYSTOLIC BLOOD PRESSURE: 156 MMHG

## 2021-06-22 DIAGNOSIS — Z20.822 SUSPECTED COVID-19 VIRUS INFECTION: ICD-10-CM

## 2021-06-22 DIAGNOSIS — R05.9 COUGH: Primary | ICD-10-CM

## 2021-06-22 PROCEDURE — U0005 INFEC AGEN DETEC AMPLI PROBE: HCPCS | Performed by: NURSE PRACTITIONER

## 2021-06-22 PROCEDURE — 99214 OFFICE O/P EST MOD 30 MIN: CPT | Performed by: NURSE PRACTITIONER

## 2021-06-22 PROCEDURE — 71046 X-RAY EXAM CHEST 2 VIEWS: CPT | Performed by: RADIOLOGY

## 2021-06-22 PROCEDURE — U0003 INFECTIOUS AGENT DETECTION BY NUCLEIC ACID (DNA OR RNA); SEVERE ACUTE RESPIRATORY SYNDROME CORONAVIRUS 2 (SARS-COV-2) (CORONAVIRUS DISEASE [COVID-19]), AMPLIFIED PROBE TECHNIQUE, MAKING USE OF HIGH THROUGHPUT TECHNOLOGIES AS DESCRIBED BY CMS-2020-01-R: HCPCS | Performed by: NURSE PRACTITIONER

## 2021-06-22 RX ORDER — AMOXICILLIN 500 MG/1
CAPSULE ORAL
COMMUNITY
Start: 2021-06-21 | End: 2021-06-30

## 2021-06-22 ASSESSMENT — ENCOUNTER SYMPTOMS
FATIGUE: 1
COUGH: 1
HEADACHES: 1
DIAPHORESIS: 1
CHILLS: 1

## 2021-06-22 ASSESSMENT — PAIN SCALES - GENERAL: PAINLEVEL: NO PAIN (0)

## 2021-06-22 NOTE — PROGRESS NOTES
"SUBJECTIVE:   Marlee Tobar is a 65 year old female presenting with a chief complaint of   Chief Complaint   Patient presents with     Cough     A week ago she left work with chills, states just was feeling \"not right\"- thursday 6/17 started chills, headache, fatigue- seems to be worse in the afternoon- possibly has to due with a tooth infection that she has- she was started on amoxicillian yesterday- she started a fever last night and more chills. Started a cough this afternoon- states that she has weak lungs and would like to get an xray.       She is an established patient of [facility].    Cough    Onset of symptoms was 8 day(s) ago.  Course of illness is worsening.    Severity moderate  Current and Associated symptoms: cough - productive, headache and fatigue  Treatment measures tried include on amoxicillin for dental infection.  Predisposing factors include None.        Review of Systems   Constitutional: Positive for chills, diaphoresis and fatigue.   Respiratory: Positive for cough.    Neurological: Positive for headaches.   All other systems reviewed and are negative.      Past Medical History:   Diagnosis Date     Bleeding stomach ulcer 2011    [facility] [place] [facility]     Gallstones      History of tonsillectomy      Hypertension goal BP (blood pressure) < 140/90      Pulmonary embolism (H) 2/10/2011    developed after surgery, coumadin x 6 months     Family History   Problem Relation Age of Onset     Hypertension Mother      Arthritis Mother      Cancer Mother         Hodgkins disease     Obesity Mother      Cerebrovascular Disease Father      Alcohol/Drug Father         recovered alcoholic     Alcohol/Drug Sister      Gynecology Sister         history of abnormal paps--LEEPs done     Lipids Sister      Thyroid Disease Sister      Cardiovascular Sister         mitral valve prolapse     Alcohol/Drug Son         alcoholic     Depression Son      Hypertension Son      Psychotic Disorder Son "      Hypertension Brother      Cardiovascular Brother      Heart Disease Brother 58        MI     Obesity Brother      Genitourinary Problems Daughter      Gynecology Daughter         Protein S deficiency     Unknown/Adopted Maternal Grandfather      Unknown/Adopted Paternal Grandmother      Unknown/Adopted Paternal Grandfather      Glaucoma No family hx of      Macular Degeneration No family hx of      Current Outpatient Medications   Medication Sig Dispense Refill     amoxicillin (AMOXIL) 500 MG capsule TAKE ONE CAPSULE BY MOUTH THREE TIMES DAILY FOR 10 DAYS       Ascorbic Acid (VITAMIN C PO)        Biotin 5000 MCG CAPS Take 1 tablet by mouth daily.       buPROPion (WELLBUTRIN XL) 150 MG 24 hr tablet TAKE 1 TABLET EVERY MORNING 90 tablet 2     CALCIUM CITRATE PO Take 3 capsules by mouth 3 times daily       carvedilol (COREG) 12.5 MG tablet TAKE ONE TABLET BY MOUTH TWICE A DAY WITH MEALS 180 tablet 1     Cholecalciferol (VITAMIN D PO)        Glucosamine-Chondroit-Vit C-Mn (GLUCOSAMINE CHONDR 1500 COMPLX PO) Take 1 tablet by mouth daily       hydrALAZINE (APRESOLINE) 25 MG tablet Take 1 tablet (25 mg) by mouth 2 times daily Take with Carvedilol. 180 tablet 2     Iron-vit C-vit B12-folic acid (IRON 100 PLUS) 100-250-0.025-1 MG TABS Take  by mouth.       Multiple Vitamins-Minerals (MULTIVITAL) TABS Take 2 tablets by mouth daily.       pantoprazole (PROTONIX) 40 MG EC tablet TAKE ONE TABLET BY MOUTH TWICE DAILY  180 tablet 2     trolamine salicylate (ASPERCREME) 10 % external cream Apply topically 3 times daily       Social History     Tobacco Use     Smoking status: Never Smoker     Smokeless tobacco: Never Used   Substance Use Topics     Alcohol use: Yes     Comment: very rare       OBJECTIVE  BP (!) 156/84   Pulse 84   Temp 98.8  F (37.1  C) (Oral)   Resp 16   LMP 06/27/2010 (Exact Date)   SpO2 97%     Physical Exam  Vitals signs and nursing note reviewed.   Constitutional:       General: She is not in acute  distress.     Appearance: She is well-developed. She is not diaphoretic.   HENT:      Head: Normocephalic and atraumatic.      Right Ear: Tympanic membrane and external ear normal.      Left Ear: Tympanic membrane and external ear normal.   Eyes:      Pupils: Pupils are equal, round, and reactive to light.   Neck:      Musculoskeletal: Normal range of motion and neck supple.   Pulmonary:      Effort: Pulmonary effort is normal. No respiratory distress.      Breath sounds: Normal breath sounds.   Lymphadenopathy:      Cervical: No cervical adenopathy.   Skin:     General: Skin is warm and dry.   Neurological:      Mental Status: She is alert.      Cranial Nerves: No cranial nerve deficit.     ASSESSMENT:      ICD-10-CM    1. Cough  R05 XR Chest 2 Views     Symptomatic COVID-19 Virus (Coronavirus) by PCR   2. Suspected COVID-19 virus infection  Z20.822         Medical Decision Making:    Differential Diagnosis:    Allergic rhinitis, Pneumonia, Sinusitis, COVID and Viral upper respiratory illness    PLAN:  Continue and finish the antibiotics  I recommend follow up with PCP in 7 days or sooner if symptoms are getting worse  Side effects of medications discussed  Over the counter medications discussed  All questions are answered and patient is in agreement with treatment plan  Ebonie Comer  [facility]  Family Nurse Practitoner            Patient Instructions     Patient Education     Chronic Cough with Uncertain Cause (Adult)  Everyone has had a cough as part of the common cold, flu, or bronchitis. This kind of cough occurs along with an achy feeling, low-grade fever, nasal and sinus congestion, and a scratchy or sore throat. This usually gets better in 2 to 3 weeks. A cough that lasts longer than 3 weeks may be due to other causes. Your healthcare provider may refer to this as a chronic cough.   If your cough does not improve over the next 2 weeks, further testing may be needed. Follow up with your healthcare provider as  advised. Cough suppressants may be recommended. Based on your exam today, the exact cause of your cough is not certain. Below are some common causes for persistent cough.   Smoker's cough  Smoker s cough doesn t go away. If you continue to smoke, it only gets worse. The cough is from irritation in the air passages. Talk to your healthcare provider about quitting. Medicines or nicotine-replacement products, like gum or the patch, may make quitting easier.   Postnasal drip  A cough that is worse at night may be due to postnasal drip. Excess mucus in the nose drains from the back of your nose to your throat. This triggers the cough reflex. Postnasal drip may be due to a sinus infection or allergy. Common allergens include dust, tobacco smoke (both inhaled and secondhand smoke), environmental pollutants, pollen, mold, pets, cleaning agents, room deodorizers, and chemical fumes. Over-the-counter antihistamines or decongestants may be helpful for allergies. A sinus infection may requires antibiotic treatment. See your healthcare provider if symptoms continue.     Medicines  Certain prescribed medicines can cause a chronic cough in some people:    ACE inhibitors for high blood pressure. These include benazepril, captopril, enalapril, fosinopril, lisinopril, quinapril, ramipril, and others.    Beta-blockers for high blood pressure and other conditions. These include propranolol, atenolol, metoprolol, nadolol, and others.  Let your healthcare provider know if you are taking any of these. The chronic cough may mean your medicine needs to be changed.   Asthma  Cough may be the only sign of mild asthma. You may have tests to find out if asthma is causing your cough. You may also take asthma medicine on a trial basis.   Acid reflux (heartburn, GERD)  The esophagus is the tube that carries food from the mouth to the stomach. A valve at its lower end prevents stomach acids from flowing upward. If this valve does not work properly,  acid from the stomach enters the esophagus. This may cause a burning pain in the upper abdomen or lower chest, belching, or cough. Symptoms are often worse when lying flat. Avoid eating or drinking before bedtime. Try using extra pillows to raise your upper body, or place 4-inch blocks under the head of your bed. You may try an over-the-counter (OTC) antacid or an acid-blocking medicine such as famotidine, cimetidine, esomeprazole, lansoprazole, or omeprazole. Stronger medicines for this condition can be prescribed by your healthcare provider. Ask your healthcare provider which OTC medicine to use. Depending on your current medicines, some OTC medicines may cause drug interactions and should be avoided.   Follow-up care  Follow up with your healthcare provider, or as advised, if your cough does not improve. Further testing may be needed.   Note: If an X-ray was taken, a specialist will review it. You will be notified of any new findings that may affect your care.   When to seek medical advice  Call your healthcare provider right away if any of these occur:    Mild wheezing or difficulty breathing    Fever of 100.4 F (38 C) or higher, or as directed by your healthcare provider    Unexpected weight loss    Coughing up large amounts of colored sputum or blood-tinged sputum    Night sweats (sheets and pajamas get soaking wet)  Call 911  Call 911 if any of these occur:     Coughing up blood    Moderate to severe trouble breathing or wheezing  [employer] last reviewed this educational content on 6/1/2018 2000-2021 [employer]. All rights reserved. This information is not intended as a substitute for professional medical care. Always follow your healthcare professional's instructions.

## 2021-06-22 NOTE — TELEPHONE ENCOUNTER
Chills off and for 5 days    Seen dentist last week for tooth pain and infection   -no prescription given then  -talked to them today   -got abx Amoxicillin     Fever 99.0    Has woke up with sweats, but is unsure if she should go to ED    Patient will call dentist in the morning    COVID 19 Nurse Triage Plan/Patient Instructions    Please be aware that novel coronavirus (COVID-19) may be circulating in the community. If you develop symptoms such as fever, cough, or SOB or if you have concerns about the presence of another infection including coronavirus (COVID-19), please contact your health care provider or visit [email or web address].     Disposition/Instructions    Home care recommended. Follow home care protocol based instructions.    Thank you for taking steps to prevent the spread of this virus.  o Limit your contact with others.  o Wear a simple mask to cover your cough.  o Wash your hands well and often.    Resources    [facility]: About COVID-19: [email or web address]    CDC: What to Do If You're Sick: www.cdc.gov/coronavirus/2019-ncov/about/steps-when-sick.html    CDC: Ending Home Isolation: www.cdc.gov/coronavirus/2019-ncov/hcp/disposition-in-home-patients.html     CDC: Caring for Someone: www.cdc.gov/coronavirus/2019-ncov/if-you-are-sick/care-for-someone.html     [facility]: Interim Guidance for Hospital Discharge to Home: www.health.[facility].[place].us/diseases/coronavirus/hcp/hospdischarge.pdf    [facility] clinical trials (COVID-19 research studies): [email or web address].[email or web address]/n-clinical-trials     Below are the COVID-19 hotlines at the [place] Department of [facility] ([facility]). Interpreters are available.   o For health questions: Call 892-381-2962 or 1-575.299.4800 (7 a.m. to 7 p.m.)  o For questions about schools and childcare: Call 455-142-5500 or 1-572.925.9849 (7 a.m. to 7 p.m.)     Reason for Disposition    Fever    Additional Information    Negative: Shock suspected (e.g.,  "cold/pale/clammy skin, too weak to stand, low BP, rapid pulse)    Negative: [1] Similar pain previously AND [2] it was from \"heart attack\"    Negative: [1] Similar pain previously AND [2] it was from \"angina\" AND [3] not relieved by nitroglycerin    Negative: Sounds like a life-threatening emergency to the triager    Negative: Chest pain    Negative: Toothache followed tooth injury    Negative: Toothache or mouth pain after tooth extraction    Negative: Canker sore (i.e., aphthous ulcer)    Negative: Tongue is very swollen and tender    Negative: [1] Face is swollen AND [2] fever    Negative: Patient sounds very sick or weak to the triager    Negative: [1] SEVERE pain (e.g., excruciating, unable to do any normal activities) AND [2] not improved 2 hours after pain medicine    Negative: Face is very swollen    Protocols used: TOOTHACHE-A-    Ai Younger RN on 6/22/2021 at 3:01 AM    "

## 2021-06-23 LAB
LABORATORY COMMENT REPORT: NORMAL
SARS-COV-2 RNA RESP QL NAA+PROBE: NEGATIVE
SARS-COV-2 RNA RESP QL NAA+PROBE: NORMAL
SPECIMEN SOURCE: NORMAL
SPECIMEN SOURCE: NORMAL

## 2021-06-23 NOTE — PATIENT INSTRUCTIONS
Patient Education     Chronic Cough with Uncertain Cause (Adult)  Everyone has had a cough as part of the common cold, flu, or bronchitis. This kind of cough occurs along with an achy feeling, low-grade fever, nasal and sinus congestion, and a scratchy or sore throat. This usually gets better in 2 to 3 weeks. A cough that lasts longer than 3 weeks may be due to other causes. Your healthcare provider may refer to this as a chronic cough.   If your cough does not improve over the next 2 weeks, further testing may be needed. Follow up with your healthcare provider as advised. Cough suppressants may be recommended. Based on your exam today, the exact cause of your cough is not certain. Below are some common causes for persistent cough.   Smoker's cough  Smoker s cough doesn t go away. If you continue to smoke, it only gets worse. The cough is from irritation in the air passages. Talk to your healthcare provider about quitting. Medicines or nicotine-replacement products, like gum or the patch, may make quitting easier.   Postnasal drip  A cough that is worse at night may be due to postnasal drip. Excess mucus in the nose drains from the back of your nose to your throat. This triggers the cough reflex. Postnasal drip may be due to a sinus infection or allergy. Common allergens include dust, tobacco smoke (both inhaled and secondhand smoke), environmental pollutants, pollen, mold, pets, cleaning agents, room deodorizers, and chemical fumes. Over-the-counter antihistamines or decongestants may be helpful for allergies. A sinus infection may requires antibiotic treatment. See your healthcare provider if symptoms continue.     Medicines  Certain prescribed medicines can cause a chronic cough in some people:    ACE inhibitors for high blood pressure. These include benazepril, captopril, enalapril, fosinopril, lisinopril, quinapril, ramipril, and others.    Beta-blockers for high blood pressure and other conditions. These  include propranolol, atenolol, metoprolol, nadolol, and others.  Let your healthcare provider know if you are taking any of these. The chronic cough may mean your medicine needs to be changed.   Asthma  Cough may be the only sign of mild asthma. You may have tests to find out if asthma is causing your cough. You may also take asthma medicine on a trial basis.   Acid reflux (heartburn, GERD)  The esophagus is the tube that carries food from the mouth to the stomach. A valve at its lower end prevents stomach acids from flowing upward. If this valve does not work properly, acid from the stomach enters the esophagus. This may cause a burning pain in the upper abdomen or lower chest, belching, or cough. Symptoms are often worse when lying flat. Avoid eating or drinking before bedtime. Try using extra pillows to raise your upper body, or place 4-inch blocks under the head of your bed. You may try an over-the-counter (OTC) antacid or an acid-blocking medicine such as famotidine, cimetidine, esomeprazole, lansoprazole, or omeprazole. Stronger medicines for this condition can be prescribed by your healthcare provider. Ask your healthcare provider which OTC medicine to use. Depending on your current medicines, some OTC medicines may cause drug interactions and should be avoided.   Follow-up care  Follow up with your healthcare provider, or as advised, if your cough does not improve. Further testing may be needed.   Note: If an X-ray was taken, a specialist will review it. You will be notified of any new findings that may affect your care.   When to seek medical advice  Call your healthcare provider right away if any of these occur:    Mild wheezing or difficulty breathing    Fever of 100.4 F (38 C) or higher, or as directed by your healthcare provider    Unexpected weight loss    Coughing up large amounts of colored sputum or blood-tinged sputum    Night sweats (sheets and pajamas get soaking wet)  Call 911  Call 911 if any  of these occur:     Coughing up blood    Moderate to severe trouble breathing or wheezing  [place] last reviewed this educational content on 6/1/2018 2000-2021 [employer]. All rights reserved. This information is not intended as a substitute for professional medical care. Always follow your healthcare professional's instructions.

## 2021-06-24 DIAGNOSIS — F33.41 MAJOR DEPRESSIVE DISORDER, RECURRENT EPISODE, IN PARTIAL REMISSION (H): ICD-10-CM

## 2021-06-24 DIAGNOSIS — I10 HYPERTENSION GOAL BP (BLOOD PRESSURE) < 140/90: ICD-10-CM

## 2021-06-24 DIAGNOSIS — K21.9 GASTROESOPHAGEAL REFLUX DISEASE WITHOUT ESOPHAGITIS: ICD-10-CM

## 2021-06-24 RX ORDER — BUPROPION HYDROCHLORIDE 150 MG/1
TABLET ORAL
Qty: 90 TABLET | Refills: 0 | Status: SHIPPED | OUTPATIENT
Start: 2021-06-24 | End: 2021-09-15

## 2021-06-24 RX ORDER — CARVEDILOL 12.5 MG/1
TABLET ORAL
Qty: 180 TABLET | Refills: 0 | Status: SHIPPED | OUTPATIENT
Start: 2021-06-24 | End: 2021-09-15

## 2021-06-24 RX ORDER — PANTOPRAZOLE SODIUM 40 MG/1
TABLET, DELAYED RELEASE ORAL
Qty: 180 TABLET | Refills: 0 | Status: SHIPPED | OUTPATIENT
Start: 2021-06-24 | End: 2021-09-15

## 2021-06-24 RX ORDER — HYDRALAZINE HYDROCHLORIDE 25 MG/1
TABLET, FILM COATED ORAL
Qty: 180 TABLET | Refills: 0 | Status: SHIPPED | OUTPATIENT
Start: 2021-06-24 | End: 2021-09-15

## 2021-06-24 NOTE — TELEPHONE ENCOUNTER
Routing refill request to provider for review/approval because:  BP Readings from Last 3 Encounters:   06/22/21 (!) 156/84   09/23/20 (!) 144/86   08/21/20 (!) 145/83     No current PHQ-9  Swapna MORGANN, RN

## 2021-06-25 ENCOUNTER — MYC MEDICAL ADVICE (OUTPATIENT)
Dept: FAMILY MEDICINE | Facility: CLINIC | Age: 66
End: 2021-06-25

## 2021-06-25 NOTE — TELEPHONE ENCOUNTER
Briana refills sent.    Inform patient that she'll be due for a clinic appt within 60 days.    Dr. Merrill

## 2021-06-26 ENCOUNTER — OFFICE VISIT (OUTPATIENT)
Dept: URGENT CARE | Facility: URGENT CARE | Age: 66
End: 2021-06-26
Payer: COMMERCIAL

## 2021-06-26 VITALS
RESPIRATION RATE: 16 BRPM | DIASTOLIC BLOOD PRESSURE: 77 MMHG | OXYGEN SATURATION: 98 % | HEART RATE: 80 BPM | SYSTOLIC BLOOD PRESSURE: 121 MMHG | TEMPERATURE: 98.6 F

## 2021-06-26 DIAGNOSIS — R50.9 FEBRILE ILLNESS: Primary | ICD-10-CM

## 2021-06-26 LAB
ALBUMIN UR-MCNC: 30 MG/DL
APPEARANCE UR: CLEAR
BACTERIA #/AREA URNS HPF: ABNORMAL /HPF
BILIRUB UR QL STRIP: NEGATIVE
CAOX CRY #/AREA URNS HPF: ABNORMAL /HPF
COLOR UR AUTO: YELLOW
GLUCOSE UR STRIP-MCNC: NEGATIVE MG/DL
HGB UR QL STRIP: NEGATIVE
KETONES UR STRIP-MCNC: ABNORMAL MG/DL
LEUKOCYTE ESTERASE UR QL STRIP: NEGATIVE
NITRATE UR QL: NEGATIVE
NON-SQ EPI CELLS #/AREA URNS LPF: ABNORMAL /LPF
PH UR STRIP: 5.5 PH (ref 5–7)
RBC #/AREA URNS AUTO: ABNORMAL /HPF
SARS-COV-2 RNA RESP QL NAA+PROBE: NORMAL
SOURCE: ABNORMAL
SP GR UR STRIP: >1.03 (ref 1–1.03)
SPECIMEN SOURCE: NORMAL
UROBILINOGEN UR STRIP-ACNC: 1 EU/DL (ref 0.2–1)
WBC #/AREA URNS AUTO: ABNORMAL /HPF

## 2021-06-26 PROCEDURE — 36415 COLL VENOUS BLD VENIPUNCTURE: CPT | Performed by: FAMILY MEDICINE

## 2021-06-26 PROCEDURE — 80048 BASIC METABOLIC PNL TOTAL CA: CPT | Performed by: FAMILY MEDICINE

## 2021-06-26 PROCEDURE — U0003 INFECTIOUS AGENT DETECTION BY NUCLEIC ACID (DNA OR RNA); SEVERE ACUTE RESPIRATORY SYNDROME CORONAVIRUS 2 (SARS-COV-2) (CORONAVIRUS DISEASE [COVID-19]), AMPLIFIED PROBE TECHNIQUE, MAKING USE OF HIGH THROUGHPUT TECHNOLOGIES AS DESCRIBED BY CMS-2020-01-R: HCPCS | Performed by: FAMILY MEDICINE

## 2021-06-26 PROCEDURE — 86618 LYME DISEASE ANTIBODY: CPT | Performed by: FAMILY MEDICINE

## 2021-06-26 PROCEDURE — 86753 PROTOZOA ANTIBODY NOS: CPT | Mod: 90 | Performed by: FAMILY MEDICINE

## 2021-06-26 PROCEDURE — U0005 INFEC AGEN DETEC AMPLI PROBE: HCPCS | Performed by: FAMILY MEDICINE

## 2021-06-26 PROCEDURE — 81001 URINALYSIS AUTO W/SCOPE: CPT | Performed by: FAMILY MEDICINE

## 2021-06-26 PROCEDURE — 85025 COMPLETE CBC W/AUTO DIFF WBC: CPT | Performed by: FAMILY MEDICINE

## 2021-06-26 PROCEDURE — 99000 SPECIMEN HANDLING OFFICE-LAB: CPT | Performed by: FAMILY MEDICINE

## 2021-06-26 PROCEDURE — 87015 SPECIMEN INFECT AGNT CONCNTJ: CPT | Performed by: FAMILY MEDICINE

## 2021-06-26 PROCEDURE — 86666 EHRLICHIA ANTIBODY: CPT | Mod: 90 | Performed by: FAMILY MEDICINE

## 2021-06-26 PROCEDURE — 99214 OFFICE O/P EST MOD 30 MIN: CPT | Performed by: FAMILY MEDICINE

## 2021-06-26 RX ORDER — DOXYCYCLINE 100 MG/1
100 CAPSULE ORAL 2 TIMES DAILY WITH MEALS
Qty: 20 CAPSULE | Refills: 0 | Status: SHIPPED | OUTPATIENT
Start: 2021-06-26 | End: 2021-07-06

## 2021-06-26 NOTE — PROGRESS NOTES
"CHIEF COMPLAINT: FEVER    10-12 days ago felt chills and run down  The next days went to work and seemed fine  8 days ago had some dental evaluation xrays - told there was a \"gray \" area    Left town 7 days ago and was camping and was fatigued and chills   Felt like she was running a low grade temp    [facility] a [facility]   Was started on amoxicillin     Patient came to urgent care 4 DAYS AGO had temp 100.9   Severe night sweats   Was seen here   They did a chest xray and was negative for pneumonia   covid was negative    Got home still had the low grade fevers  Stayed home from work  Every night sweats last night 99    Mom had hodgkin's disease and is worried about this.    Slight cough initially   Chest Pain or shortness of breath: No  Orthopnea, Edema, PND: No  Urinary symptoms or Flank Pain: No - although she is susceptible   Focal numbness or weakness: No  Rash: No  No known tick bite   No recent change in medications: No  Concern about recent tick-bite: No     Problem list and histories reviewed & adjusted, as indicated.  Additional history: as documented    Problem list, Medication list, Allergies, and Medical/Social/Surgical histories reviewed in [facility] and updated as appropriate.    ROS:  Constitutional, HEENT, cardiovascular, pulmonary, GI, , musculoskeletal, neuro, skin, endocrine and psych systems are negative, except as otherwise noted.    OBJECTIVE:                                                    /77   Pulse 80   Temp 98.6  F (37  C) (Oral)   Resp 16   LMP 06/27/2010 (Exact Date)   SpO2 98%   There is no height or weight on file to calculate BMI.  GENERAL: healthy, alert and no distress  EYES: Eyes grossly normal to inspection  NECK: no adenopathy, no asymmetry, masses, or scars and thyroid normal to palpation  RESP: lungs clear to auscultation - no rales, rhonchi or wheezes  CV: regular rate and rhythm, normal S1 S2, no " S3 or S4, no murmur, click or rub, no peripheral edema and peripheral pulses strong  MS: no gross musculoskeletal defects noted, no edema  SKIN: no suspicious lesions or rashes  NEURO: Normal strength and tone, mentation intact and speech normal  PSYCH: mentation appears normal, affect normal/bright    Diagnostic Test Results:  Results for orders placed or performed in visit on 06/26/21 (from the past 24 hour(s))   *UA reflex to Microscopic and Culture ([facility] (except [place] and [place])    Specimen: Midstream Urine   Result Value Ref Range    Color Urine Yellow     Appearance Urine Clear     Glucose Urine Negative NEG^Negative mg/dL    Bilirubin Urine Negative NEG^Negative    Ketones Urine Trace (A) NEG^Negative mg/dL    Specific Gravity Urine >1.030 1.003 - 1.035    Blood Urine Negative NEG^Negative    pH Urine 5.5 5.0 - 7.0 pH    Protein Albumin Urine 30 (A) NEG^Negative mg/dL    Urobilinogen Urine 1.0 0.2 - 1.0 EU/dL    Nitrite Urine Negative NEG^Negative    Leukocyte Esterase Urine Negative NEG^Negative    Source Midstream Urine    Urine Microscopic   Result Value Ref Range    WBC Urine 0 - 5 OTO5^0 - 5 /HPF    RBC Urine O - 2 OTO2^O - 2 /HPF    Squamous Epithelial /LPF Urine Few FEW^Few /LPF    Bacteria Urine Few (A) NEG^Negative /HPF    Calcium Oxalate Moderate (A) NEG^Negative /HPF        ASSESSMENT/PLAN:                                                    No diagnosis found.      ICD-10-CM    1. Febrile illness  R50.9 CBC with platelets differential     *UA reflex to Microscopic and Culture ([place] and [facility] (except [facility])     Lyme Disease Alis with reflex to WB Serum     Anaplasma phagocytoph antibody IgG IgM     Babesia antibody IgG IgM     Parasite stain     doxycycline monohydrate (MONODOX) 100 MG capsule     Symptomatic COVID-19 Virus (Coronavirus) by PCR     Urine Microscopic     Basic metabolic panel   night sweats x 10-12 days   6 days of  amoxicillin no relief  Trial of doxy  Prescribed with doxycycline  Aware of the risks of GI intolerance, GERD, GI complications and photosensitivity with doxycycline.  Doxy to cover any tickborne disease  Check tick panel.   Repeat covid test  Continue isolation precautions until covid test comes back and until symptoms improve for 24 hours  If with any symptoms of chest pain or shortness of breath, lightheadedness, palpitations, feeling like passing out or change and worsening in the quality of your symptoms, please proceed to ER. Recommend follow up with PCP in a few days for re-evaluation.   Recommend follow up with primary care provider if no relief in 2-3 days, sooner if worse  Adverse reactions of medications discussed.  Aware to come back in if with worsening symptoms or if no relief despite treatment plan  Patient voiced understanding and had no further questions.     MD Alma Greenwood MD  [facility]

## 2021-06-27 LAB
LABORATORY COMMENT REPORT: NORMAL
SARS-COV-2 RNA RESP QL NAA+PROBE: NEGATIVE
SPECIMEN SOURCE: NORMAL

## 2021-06-28 LAB
ANION GAP SERPL CALCULATED.3IONS-SCNC: 3 MMOL/L (ref 3–14)
ANISOCYTOSIS BLD QL SMEAR: SLIGHT
BUN SERPL-MCNC: 15 MG/DL (ref 7–30)
CALCIUM SERPL-MCNC: 8.5 MG/DL (ref 8.5–10.1)
CHLORIDE SERPL-SCNC: 105 MMOL/L (ref 94–109)
CO2 SERPL-SCNC: 32 MMOL/L (ref 20–32)
CREAT SERPL-MCNC: 0.67 MG/DL (ref 0.52–1.04)
DIFFERENTIAL METHOD BLD: NORMAL
EOSINOPHIL # BLD AUTO: 0.1 10E9/L (ref 0–0.7)
EOSINOPHIL NFR BLD AUTO: 2 %
ERYTHROCYTE [DISTWIDTH] IN BLOOD BY AUTOMATED COUNT: 13.8 % (ref 10–15)
GFR SERPL CREATININE-BSD FRML MDRD: >90 ML/MIN/{1.73_M2}
GLUCOSE SERPL-MCNC: 57 MG/DL (ref 70–99)
HCT VFR BLD AUTO: 38.8 % (ref 35–47)
HGB BLD-MCNC: 12.6 G/DL (ref 11.7–15.7)
LYMPHOCYTES # BLD AUTO: 2.7 10E9/L (ref 0.8–5.3)
LYMPHOCYTES NFR BLD AUTO: 50 %
Lab: NORMAL
MCH RBC QN AUTO: 30.5 PG (ref 26.5–33)
MCHC RBC AUTO-ENTMCNC: 32.5 G/DL (ref 31.5–36.5)
MCV RBC AUTO: 94 FL (ref 78–100)
MONOCYTES # BLD AUTO: 0.4 10E9/L (ref 0–1.3)
MONOCYTES NFR BLD AUTO: 7 %
NEUTROPHILS # BLD AUTO: 2.2 10E9/L (ref 1.6–8.3)
NEUTROPHILS NFR BLD AUTO: 41 %
PARASITE SPEC INSPECT: NORMAL
PLATELET # BLD AUTO: 159 10E9/L (ref 150–450)
PLATELET # BLD EST: NORMAL 10*3/UL
POTASSIUM SERPL-SCNC: 4.5 MMOL/L (ref 3.4–5.3)
RBC # BLD AUTO: 4.13 10E12/L (ref 3.8–5.2)
SODIUM SERPL-SCNC: 140 MMOL/L (ref 133–144)
SPECIMEN SOURCE: NORMAL
WBC # BLD AUTO: 5.4 10E9/L (ref 4–11)

## 2021-06-29 LAB — B BURGDOR IGG+IGM SER QL: 0.05 (ref 0–0.89)

## 2021-06-30 ENCOUNTER — OFFICE VISIT (OUTPATIENT)
Dept: URGENT CARE | Facility: URGENT CARE | Age: 66
End: 2021-06-30
Payer: COMMERCIAL

## 2021-06-30 ENCOUNTER — MYC MEDICAL ADVICE (OUTPATIENT)
Dept: FAMILY MEDICINE | Facility: CLINIC | Age: 66
End: 2021-06-30

## 2021-06-30 VITALS
BODY MASS INDEX: 25.76 KG/M2 | RESPIRATION RATE: 20 BRPM | WEIGHT: 162 LBS | OXYGEN SATURATION: 98 % | DIASTOLIC BLOOD PRESSURE: 78 MMHG | SYSTOLIC BLOOD PRESSURE: 143 MMHG | TEMPERATURE: 98 F | HEART RATE: 80 BPM

## 2021-06-30 DIAGNOSIS — R21 RASH AND NONSPECIFIC SKIN ERUPTION: ICD-10-CM

## 2021-06-30 DIAGNOSIS — L23.9 ALLERGIC DERMATITIS: Primary | ICD-10-CM

## 2021-06-30 DIAGNOSIS — R50.9 FEBRILE ILLNESS: ICD-10-CM

## 2021-06-30 LAB
B MICROTI IGG TITR SER: NORMAL {TITER}
B MICROTI IGM TITR SER: NORMAL {TITER}

## 2021-06-30 PROCEDURE — 99214 OFFICE O/P EST MOD 30 MIN: CPT | Performed by: PHYSICIAN ASSISTANT

## 2021-06-30 RX ORDER — CEFUROXIME AXETIL 500 MG/1
500 TABLET ORAL 2 TIMES DAILY
Qty: 28 TABLET | Refills: 0 | Status: SHIPPED | OUTPATIENT
Start: 2021-06-30 | End: 2021-07-14

## 2021-06-30 RX ORDER — PREDNISONE 20 MG/1
20 TABLET ORAL 2 TIMES DAILY
Qty: 10 TABLET | Refills: 0 | Status: SHIPPED | OUTPATIENT
Start: 2021-06-30 | End: 2021-07-05

## 2021-06-30 RX ORDER — CETIRIZINE HYDROCHLORIDE 10 MG/1
10 TABLET ORAL DAILY
Qty: 30 TABLET | Refills: 0 | Status: SHIPPED | OUTPATIENT
Start: 2021-06-30 | End: 2021-09-21

## 2021-06-30 ASSESSMENT — ENCOUNTER SYMPTOMS
CHEST TIGHTNESS: 0
COLOR CHANGE: 1
FATIGUE: 0
WHEEZING: 0
HEADACHES: 1
COUGH: 1
FEVER: 1
WOUND: 0
PALPITATIONS: 0
CHILLS: 1
SHORTNESS OF BREATH: 0
CARDIOVASCULAR NEGATIVE: 1

## 2021-06-30 NOTE — PROGRESS NOTES
Jb Obrien is a 65 year old who presents for the following health issues   HPI   Rash  Onset/Duration: today  Description  Location: all over her skin  Character: blotchy, hives  Itching: moderate  Intensity:  moderate  Progression of Symptoms:  worsening  Accompanying signs and symptoms:   Fever: YES- currently on doxy for tickborne illness.  Was on amoxicillin last week for tooth infection.  Has had fever and chills for 1week in which workup for tickborne illness, UA and CXR were all negative or normal.  Fever has resolve now.  Body aches or joint pain: no  Sore throat symptoms: no  Recent cold symptoms: no  History:           Previous episodes of similar rash: None  New exposures:  Medication-doxy.  Had eaten some pineapple today but has never had reaction to pineapple before.  Recent travel: no  Exposure to similar rash: no  Precipitating or alleviating factors: none  Therapies tried and outcome: none    Patient Active Problem List   Diagnosis     iamJOINT PAIN-LOWER LEG     iamPOSTSURGICAL STATES NEC     CARDIOVASCULAR SCREENING; LDL GOAL LESS THAN 130     Hypertension goal BP (blood pressure) < 140/90     Gallstones     Cholelithiases     GERD (gastroesophageal reflux disease)     Post-menopausal     Health Care Home     Pancreatitis     Varicose vein of leg     Torn earlobe     Dyspareunia     Microalbuminuria     Major depressive disorder, recurrent episode, in partial remission (H)     Primary hypercoagulable state (H)     Overweight     Syncope, near     Advance care planning     H/O gastric bypass     H/O gastrointestinal hemorrhage     Personal history of PE (pulmonary embolism)     Insomnia, unspecified type     Pulmonary nodules     Polyarthralgia     Positive TIFFANI (antinuclear antibody)     Current Outpatient Medications   Medication     Ascorbic Acid (VITAMIN C PO)     Biotin 5000 MCG CAPS     buPROPion (WELLBUTRIN XL) 150 MG 24 hr tablet     CALCIUM CITRATE PO     carvedilol (COREG) 12.5  MG tablet     cefuroxime (CEFTIN) 500 MG tablet     cetirizine (ZYRTEC) 10 MG tablet     Cholecalciferol (VITAMIN D PO)     doxycycline monohydrate (MONODOX) 100 MG capsule     Glucosamine-Chondroit-Vit C-Mn (GLUCOSAMINE CHONDR 1500 COMPLX PO)     hydrALAZINE (APRESOLINE) 25 MG tablet     Iron-vit C-vit B12-folic acid (IRON 100 PLUS) 100-250-0.025-1 MG TABS     Multiple Vitamins-Minerals (MULTIVITAL) TABS     pantoprazole (PROTONIX) 40 MG EC tablet     predniSONE (DELTASONE) 20 MG tablet     trolamine salicylate (ASPERCREME) 10 % external cream     No current facility-administered medications for this visit.         Allergies   Allergen Reactions     Contrast Dye      Happened when patient was ~ 18 years ago during a test for 'kidneys'.  Patient thinks she might have developed a rash, couldn't remember.     Diatrizoate Itching     Iodine      Vitamin K Swelling     facial       Review of Systems   Constitutional: Positive for chills and fever. Negative for fatigue.   Respiratory: Positive for cough. Negative for chest tightness, shortness of breath and wheezing.    Cardiovascular: Negative.  Negative for chest pain, palpitations and peripheral edema.   Skin: Positive for color change and rash. Negative for pallor and wound.   Neurological: Positive for headaches.   All other systems reviewed and are negative.           Objective    BP (!) 143/78 (BP Location: Left arm, Patient Position: Sitting, Cuff Size: Adult Regular)   Pulse 80   Temp 98  F (36.7  C) (Tympanic)   Resp 20   Wt 73.5 kg (162 lb)   LMP 06/27/2010 (Exact Date)   SpO2 98%   Breastfeeding No   BMI 25.76 kg/m    Body mass index is 25.76 kg/m .  Physical Exam  Vitals signs and nursing note reviewed.   Constitutional:       General: She is not in acute distress.     Appearance: Normal appearance. She is well-developed and normal weight. She is not ill-appearing.   HENT:      Head: Normocephalic and atraumatic.      Comments: TMs are intact without  any erythema or bulging bilaterally.  Airway is patent.     Nose: Nose normal.      Mouth/Throat:      Mouth: Mucous membranes are moist.      Pharynx: Uvula midline. No pharyngeal swelling, oropharyngeal exudate or posterior oropharyngeal erythema.      Tonsils: No tonsillar exudate.   Eyes:      General: No scleral icterus.     Extraocular Movements: Extraocular movements intact.      Conjunctiva/sclera: Conjunctivae normal.      Pupils: Pupils are equal, round, and reactive to light.   Neck:      Musculoskeletal: Normal range of motion and neck supple.      Thyroid: No thyromegaly.   Cardiovascular:      Rate and Rhythm: Normal rate and regular rhythm.      Pulses: Normal pulses.      Heart sounds: Normal heart sounds, S1 normal and S2 normal. No murmur. No friction rub. No gallop.    Pulmonary:      Effort: Pulmonary effort is normal. No accessory muscle usage, respiratory distress or retractions.      Breath sounds: Normal breath sounds and air entry. No stridor. No decreased breath sounds, wheezing, rhonchi or rales.   Lymphadenopathy:      Cervical: No cervical adenopathy.   Skin:     General: Skin is warm and dry.      Findings: Rash present. No bruising, ecchymosis or erythema. Rash is macular, papular and urticarial (scattered over her chest, back and arms). Rash is not crusting, nodular, purpuric, pustular, scaling or vesicular.      Nails: There is no clubbing.     Neurological:      Mental Status: She is alert and oriented to person, place, and time.   Psychiatric:         Mood and Affect: Mood normal.         Behavior: Behavior normal.         Thought Content: Thought content normal.         Judgment: Judgment normal.            Assessment/Plan:  Allergic dermatitis:  ?contact/allergic dermatitis to doxy.  Will give [id number] and recommended zyrtec for itching.  Stop the doxy.  Avoid triggers and irritating agents.  Avoid scratching to present secondary infection.  RTC if worsening rash or if  she develops redness, swelling, drainage or fevers.  To the ER if she develops swelling of the lips, tongue, throat, difficulty breathing or wheezing.  -     cetirizine (ZYRTEC) 10 MG tablet; Take 1 tablet (10 mg) by mouth daily  -     predniSONE (DELTASONE) 20 MG tablet; Take 1 tablet (20 mg) by mouth 2 times daily for 5 days    Rash and nonspecific skin eruption    Febrile illness:  Workup with labs have been negative.  Will switch doxy to ceftin.  F/u with PCP if no improvement.  -     cefuroxime (CEFTIN) 500 MG tablet; Take 1 tablet (500 mg) by mouth 2 times daily for 14 days        Pratima Rodas PA-C

## 2021-07-01 LAB
A PHAGOCYTOPH IGG TITR SER IF: NORMAL {TITER}
A PHAGOCYTOPH IGM TITR SER IF: NORMAL {TITER}

## 2021-07-08 ENCOUNTER — OFFICE VISIT (OUTPATIENT)
Dept: FAMILY MEDICINE | Facility: CLINIC | Age: 66
End: 2021-07-08
Payer: COMMERCIAL

## 2021-07-08 VITALS
SYSTOLIC BLOOD PRESSURE: 139 MMHG | BODY MASS INDEX: 25.43 KG/M2 | HEART RATE: 81 BPM | DIASTOLIC BLOOD PRESSURE: 80 MMHG | TEMPERATURE: 98.5 F | HEIGHT: 67 IN | WEIGHT: 162 LBS | OXYGEN SATURATION: 98 %

## 2021-07-08 DIAGNOSIS — R68.83 CHILLS: Primary | ICD-10-CM

## 2021-07-08 DIAGNOSIS — K04.7 DENTAL ABSCESS: ICD-10-CM

## 2021-07-08 LAB
RETICS # AUTO: 127.4 10E9/L (ref 25–95)
RETICS/RBC NFR AUTO: 3.1 % (ref 0.5–2)

## 2021-07-08 PROCEDURE — 85060 BLOOD SMEAR INTERPRETATION: CPT | Performed by: PATHOLOGY

## 2021-07-08 PROCEDURE — 85045 AUTOMATED RETICULOCYTE COUNT: CPT | Performed by: INTERNAL MEDICINE

## 2021-07-08 PROCEDURE — 36415 COLL VENOUS BLD VENIPUNCTURE: CPT | Performed by: INTERNAL MEDICINE

## 2021-07-08 PROCEDURE — 99214 OFFICE O/P EST MOD 30 MIN: CPT | Performed by: INTERNAL MEDICINE

## 2021-07-08 PROCEDURE — 85025 COMPLETE CBC W/AUTO DIFF WBC: CPT | Performed by: INTERNAL MEDICINE

## 2021-07-08 ASSESSMENT — ANXIETY QUESTIONNAIRES
IF YOU CHECKED OFF ANY PROBLEMS ON THIS QUESTIONNAIRE, HOW DIFFICULT HAVE THESE PROBLEMS MADE IT FOR YOU TO DO YOUR WORK, TAKE CARE OF THINGS AT HOME, OR GET ALONG WITH OTHER PEOPLE: NOT DIFFICULT AT ALL
2. NOT BEING ABLE TO STOP OR CONTROL WORRYING: NOT AT ALL
6. BECOMING EASILY ANNOYED OR IRRITABLE: NOT AT ALL
GAD7 TOTAL SCORE: 0
5. BEING SO RESTLESS THAT IT IS HARD TO SIT STILL: NOT AT ALL
3. WORRYING TOO MUCH ABOUT DIFFERENT THINGS: NOT AT ALL
7. FEELING AFRAID AS IF SOMETHING AWFUL MIGHT HAPPEN: NOT AT ALL
1. FEELING NERVOUS, ANXIOUS, OR ON EDGE: NOT AT ALL

## 2021-07-08 ASSESSMENT — MIFFLIN-ST. JEOR: SCORE: 1322.46

## 2021-07-08 ASSESSMENT — PATIENT HEALTH QUESTIONNAIRE - PHQ9
5. POOR APPETITE OR OVEREATING: NOT AT ALL
SUM OF ALL RESPONSES TO PHQ QUESTIONS 1-9: 3

## 2021-07-08 NOTE — PATIENT INSTRUCTIONS
At [facility], we strive to deliver an exceptional experience to you, every time we see you. If you receive a survey, please complete it as we do value your feedback.  If you have MyChart, you can expect to receive results automatically within 24 hours of their completion.  Your provider will send a note interpreting your results as well.   If you do not have MyChart, you should receive your results in about a week by mail.    Your care team:                            Family Medicine Internal Medicine   MD Bandar Abraham MD Shantel Branch-Fleming, MD Srinivasa Vaka, MD Katya Belousova, PALIVE Hackett, APRN CNP    Cesar Albert, MD Pediatrics   Marcos Ellison, PALIVE Hardy, CNP MD Mirna Anthony APRN CNP   MD Helena Lopez MD Deborah Mielke, MD Berkley Ceballos, APRN [facility]      Clinic hours: Monday - Thursday 7 am-6 pm; Fridays 7 am-5 pm.   Urgent care: Monday - Friday 10 am- 8 pm; Saturday and Sunday 9 am-5 pm.    Clinic: [phone]       [facility]: Monday - Thursday 8 am - 7 pm; Friday 8 am - 6 pm  [facility]: [phone]     Use [email or web address] for 24/7 diagnosis and treatment of dozens of conditions.

## 2021-07-08 NOTE — PROGRESS NOTES
[facility] Internal Medicine Progress Note           Assessment and Plan:   1. Chills  Normal peripheral smear.  - Blood Morphology Pathologist Review  - CBC with platelets differential  - Reticulocyte Count    2. Dental abscess  Suspected cause of chills.  - amoxicillin-clavulanate (AUGMENTIN) 875-125 MG tablet; Take 1 tablet by mouth 2 times daily  Dispense: 20 tablet; Refill: 2       32 minutes spent on the date of the encounter doing chart review, review of test results, interpretation of tests, patient visit and documentation       Interval History:   Reason for visit: chills  Onset: acute  Description: Cold intolerance without fever.  Course: worse  Intensity: mild-moderate  Associated symptoms: dental pain  History: no  Evaluation: seen by at Urgent Care last 6/26/2021 and extensive lab workup is unremarkable.  Precipitating factor: unknown but patient is worried about hematopoietic malignancies  Alleviating factor: none  Complications: none  Therapies tried and outcome: Tylenol (not effective)            Significant Problems:   Patient Active Problem List   Diagnosis     iamJOINT PAIN-LOWER LEG     iamPOSTSURGICAL STATES NEC     CARDIOVASCULAR SCREENING; LDL GOAL LESS THAN 130     Hypertension goal BP (blood pressure) < 140/90     Gallstones     Cholelithiases     GERD (gastroesophageal reflux disease)     Post-menopausal     Health Care Home     Pancreatitis     Varicose vein of leg     Torn earlobe     Dyspareunia     Microalbuminuria     Major depressive disorder, recurrent episode, in partial remission (H)     Primary hypercoagulable state (H)     Overweight     Syncope, near     Advance care planning     H/O gastric bypass     H/O gastrointestinal hemorrhage     Personal history of PE (pulmonary embolism)     Insomnia, unspecified type     Pulmonary nodules     Polyarthralgia     Positive TIFFANI (antinuclear antibody)              Review of Systems:   CONSTITUTIONAL:NEGATIVE  for malaise and  "myalgias  INTEGUMENTARY/SKIN: NEGATIVE for worrisome rashes, moles or lesions  EYES: NEGATIVE for vision changes or irritation  ENT/MOUTH: NEGATIVE for epistaxis and fever  RESP: NEGATIVE for significant cough or SOB  BREAST: NEGATIVE for masses, tenderness or discharge  CV: NEGATIVE for chest pain, palpitations or peripheral edema  GI: NEGATIVE for nausea, abdominal pain, heartburn, or change in bowel habits  : NEGATIVE for frequency, dysuria, or hematuria  MUSCULOSKELETAL: NEGATIVE for significant arthralgias or myalgia  NEURO: NEGATIVE for weakness, dizziness or paresthesias  ENDOCRINE: NEGATIVE for temperature intolerance, skin/hair changes  HEME: NEGATIVE for bleeding problems  PSYCHIATRIC: NEGATIVE for changes in mood or affect             Physical Exam:   /80   Pulse 81   Temp 98.5  F (36.9  C) (Oral)   Ht 1.702 m (5' 7\")   Wt 73.5 kg (162 lb)   LMP 06/27/2010 (Exact Date)   SpO2 98%   BMI 25.37 kg/m    Constitutional: Awake, alert, cooperative, no apparent distress, and appears stated age.  Eyes: extra-ocular muscles intact and sclera clear  ENT: normocepalic, without obvious abnormality  Hematologic / Lymphatic: no cervical lymphadenopathy  Lungs: good air exchange, no retractions and clear to auscultation  Cardiovascular: regular rate and rhythm and normal S1 and S2  Musculoskeletal: no lower extremity pitting edema present  Neurologic: Mental Status Exam:  Level of Alertness:   awake  Orientation:   person, place, time  Memory:   normal  Fund of Knowledge:  normal  Attention/Concentration:  normal  Language:  normal  Neuropsychiatric: Affect: normal  Skin: No rashes, erythema, pallor, petechia or purpura.          Data:     Copath Report Patient Name: ARMANDO MROEAU   MR#: [id number]   Specimen #: [id number]   Collected: 7/8/2021   Received: 7/10/2021   Reported: 7/13/2021 06:56   Ordering Phy(s): HAIR JEFFERSON     For improved result formatting, select 'View Enhanced Report Format' " under    Linked Documents section.     TEST(S):   Peripheral Smear Morphology     FINAL DIAGNOSIS:   Peripheral Smear Morphology:   - No diagnostic abnormalities identified, essentially normal peripheral   blood smear for age and gender.     COMMENT:   The patient has a mild reticulocytosis without evidence of hemolysis.   Increased polychromasia is not   appreciated morphologically. The significance of the reticulocytosis is   unclear but given the history of fever   and chills the possibility of tick borne infection comes into the   differential as these are not always   morphologically identifiable. These possibilities were already considered   clinically and additional laboratory   testing performed 6/26/2021 for Babesiosis, Anaplasma, and Lyme disease   are negative.     Electronically signed out by:     MARIANNA Jones M.D.      WBC 4.0 - 11.0 10e9/L 5.4  BK     RBC Count 3.8 - 5.2 10e12/L 4.12  BK    Hemoglobin 11.7 - 15.7 g/dL 12.6  BK    Hematocrit 35.0 - 47.0 % 40.1  BK    MCV 78 - 100 fl 97  BK    MCH 26.5 - 33.0 pg 30.6  BK    MCHC 31.5 - 36.5 g/dL 31.4Low   BK   Comment: Results confirmed by repeat test    RDW 10.0 - 15.0 % 14.9  BK    Platelet Count 150 - 450 10e9/L 219  BK    % Neutrophils % 42.0  MG    % Lymphocytes % 48.0  MG    % Monocytes % 7.0  MG    % Eosinophils % 3.0  MG    Absolute Neutrophil 1.6 - 8.3 10e9/L 2.3  MG    Absolute Lymphocytes 0.8 - 5.3 10e9/L 2.5  MG    Absolute Monocytes 0.0 - 1.3 10e9/L 0.4  MG    Absolute Eosinophils 0.0 - 0.7 10e9/L 0.2  MG    RBC Morphology  Normal  MG    Platelet Estimate  Automated count confirmed.  Platelet morphology is normal.  MG    Diff Method  Manual Differential  MG         Specimen Collected: 07/08/21 12:25 PM   Last Resulted: 07/09/21  7:39 AM          Disposition: Follow-up in 4 weeks.      Bandar Merrill MD  Internal Medicine  [facility] Team

## 2021-07-09 LAB
DIFFERENTIAL METHOD BLD: ABNORMAL
EOSINOPHIL # BLD AUTO: 0.2 10E9/L (ref 0–0.7)
EOSINOPHIL NFR BLD AUTO: 3 %
ERYTHROCYTE [DISTWIDTH] IN BLOOD BY AUTOMATED COUNT: 14.9 % (ref 10–15)
HCT VFR BLD AUTO: 40.1 % (ref 35–47)
HGB BLD-MCNC: 12.6 G/DL (ref 11.7–15.7)
LYMPHOCYTES # BLD AUTO: 2.5 10E9/L (ref 0.8–5.3)
LYMPHOCYTES NFR BLD AUTO: 48 %
MCH RBC QN AUTO: 30.6 PG (ref 26.5–33)
MCHC RBC AUTO-ENTMCNC: 31.4 G/DL (ref 31.5–36.5)
MCV RBC AUTO: 97 FL (ref 78–100)
MONOCYTES # BLD AUTO: 0.4 10E9/L (ref 0–1.3)
MONOCYTES NFR BLD AUTO: 7 %
NEUTROPHILS # BLD AUTO: 2.3 10E9/L (ref 1.6–8.3)
NEUTROPHILS NFR BLD AUTO: 42 %
PLATELET # BLD AUTO: 219 10E9/L (ref 150–450)
PLATELET # BLD EST: ABNORMAL 10*3/UL
RBC # BLD AUTO: 4.12 10E12/L (ref 3.8–5.2)
RBC MORPH BLD: NORMAL
WBC # BLD AUTO: 5.4 10E9/L (ref 4–11)

## 2021-07-09 ASSESSMENT — ANXIETY QUESTIONNAIRES: GAD7 TOTAL SCORE: 0

## 2021-07-14 LAB — COPATH REPORT: NORMAL

## 2021-07-14 NOTE — TELEPHONE ENCOUNTER
Briana refills were provided on 6/24/2021  Patient did have an appointment following that on 7/8/2021  Called and explained that she does not need another appointment at this time  Advised her to continue to go through the pharmacy for future refill requests.  Patient asked what her 7/8/2021 results mean as she is seeing values that are out of range.  Explained that there is still 1 more test that is still in progress (blood morphology pathologist review).  Let patient know that provider will review and advise further once all results are completed  Patient verbalized understanding.    Braulio Joshi RN  [facility]

## 2021-07-14 NOTE — TELEPHONE ENCOUNTER
Spoke to patient she is wondering if she should see  again as she just seen him on the 7/8/21 also has some question on test results that came back and would like a call back to discuss [phone]

## 2021-09-05 ENCOUNTER — HEALTH MAINTENANCE LETTER (OUTPATIENT)
Age: 66
End: 2021-09-05

## 2021-09-09 ENCOUNTER — MYC MEDICAL ADVICE (OUTPATIENT)
Dept: FAMILY MEDICINE | Facility: CLINIC | Age: 66
End: 2021-09-09

## 2021-09-13 ENCOUNTER — VIRTUAL VISIT (OUTPATIENT)
Dept: FAMILY MEDICINE | Facility: CLINIC | Age: 66
End: 2021-09-13
Payer: COMMERCIAL

## 2021-09-13 DIAGNOSIS — F41.8 SITUATIONAL ANXIETY: Primary | ICD-10-CM

## 2021-09-13 DIAGNOSIS — Z23 HIGH PRIORITY FOR 2019-NCOV VACCINE: ICD-10-CM

## 2021-09-13 PROCEDURE — 99213 OFFICE O/P EST LOW 20 MIN: CPT | Mod: GT | Performed by: INTERNAL MEDICINE

## 2021-09-13 RX ORDER — ALPRAZOLAM 0.25 MG
.25-.5 TABLET ORAL 3 TIMES DAILY PRN
Qty: 30 TABLET | Refills: 5 | Status: SHIPPED | OUTPATIENT
Start: 2021-09-13 | End: 2022-02-22

## 2021-09-13 NOTE — PROGRESS NOTES
Camille is a 66 year old who is being evaluated via a billable video visit.      How would you like to obtain your AVS? MyChart  If the video visit is dropped, the invitation should be resent by: Text to cell phone: [phone]  Will anyone else be joining your video visit? No    Subjective   Camille is a 66 year old who presents for the following health issues:    Over the past 3 years, I have developed clausutrophobia.  It got increasingly worse.  Beginning to develop anxiety when I fly out.  We're going to [employer].   Some rides may not be enjoyable.  Is there something I can do about it.    Since I am going to [employer], I would to receive my third of dose of the Pfizer vaccine.       ASSESSMENT/PLAN  Situational anxiety (primary diagnosis)  - ALPRAZolam (XANAX) 0.25 MG tablet; Take 1-2 tablets (0.25-0.5 mg) by mouth 3 times daily as needed for anxiety    High priority for 2019-nCoV vaccine  - COVID-19,PF,PFIZER (12+ YRS); Standing        Video-Visit Details    Type of service:  Video Visit    Video Start: 7:50 AM  Video End Time:7:55 AM    Originating Location (pt. Location): Home    Distant Location (provider location):  [facility]     Platform used for Video Visit: Doximity       Disposition: Follow-up in 6 months.    Bandar Merrill MD  Internal Medicine

## 2021-09-14 ENCOUNTER — IMMUNIZATION (OUTPATIENT)
Dept: NURSING | Facility: CLINIC | Age: 66
End: 2021-09-14
Payer: COMMERCIAL

## 2021-09-14 PROCEDURE — 91300 PR COVID VAC PFIZER DIL RECON 30 MCG/0.3 ML IM: CPT

## 2021-09-14 PROCEDURE — 90682 RIV4 VACC RECOMBINANT DNA IM: CPT

## 2021-09-14 PROCEDURE — 90471 IMMUNIZATION ADMIN: CPT

## 2021-09-14 PROCEDURE — 0003A PR ADMIN COVID VAC PFIZER, 3RD DOSE IMM COMP PT: CPT

## 2021-10-31 ENCOUNTER — HEALTH MAINTENANCE LETTER (OUTPATIENT)
Age: 66
End: 2021-10-31

## 2021-12-26 ENCOUNTER — HEALTH MAINTENANCE LETTER (OUTPATIENT)
Age: 66
End: 2021-12-26

## 2022-01-12 ENCOUNTER — OFFICE VISIT (OUTPATIENT)
Dept: URGENT CARE | Facility: URGENT CARE | Age: 67
End: 2022-01-12
Payer: COMMERCIAL

## 2022-01-12 ENCOUNTER — NURSE TRIAGE (OUTPATIENT)
Dept: NURSING | Facility: CLINIC | Age: 67
End: 2022-01-12
Payer: COMMERCIAL

## 2022-01-12 VITALS
HEART RATE: 69 BPM | SYSTOLIC BLOOD PRESSURE: 153 MMHG | OXYGEN SATURATION: 97 % | DIASTOLIC BLOOD PRESSURE: 95 MMHG | TEMPERATURE: 98.5 F

## 2022-01-12 DIAGNOSIS — J02.9 PHARYNGITIS, UNSPECIFIED ETIOLOGY: ICD-10-CM

## 2022-01-12 DIAGNOSIS — R07.0 THROAT PAIN: Primary | ICD-10-CM

## 2022-01-12 LAB
DEPRECATED S PYO AG THROAT QL EIA: NEGATIVE
GROUP A STREP BY PCR: NOT DETECTED

## 2022-01-12 PROCEDURE — 99000 SPECIMEN HANDLING OFFICE-LAB: CPT | Performed by: PHYSICIAN ASSISTANT

## 2022-01-12 PROCEDURE — U0003 INFECTIOUS AGENT DETECTION BY NUCLEIC ACID (DNA OR RNA); SEVERE ACUTE RESPIRATORY SYNDROME CORONAVIRUS 2 (SARS-COV-2) (CORONAVIRUS DISEASE [COVID-19]), AMPLIFIED PROBE TECHNIQUE, MAKING USE OF HIGH THROUGHPUT TECHNOLOGIES AS DESCRIBED BY CMS-2020-01-R: HCPCS | Mod: 90 | Performed by: PHYSICIAN ASSISTANT

## 2022-01-12 PROCEDURE — U0005 INFEC AGEN DETEC AMPLI PROBE: HCPCS | Mod: 90 | Performed by: PHYSICIAN ASSISTANT

## 2022-01-12 PROCEDURE — 87651 STREP A DNA AMP PROBE: CPT | Performed by: PHYSICIAN ASSISTANT

## 2022-01-12 PROCEDURE — 99213 OFFICE O/P EST LOW 20 MIN: CPT | Performed by: PHYSICIAN ASSISTANT

## 2022-01-12 RX ORDER — LIDOCAINE HYDROCHLORIDE 20 MG/ML
15 SOLUTION OROPHARYNGEAL
Qty: 300 ML | Refills: 0 | Status: SHIPPED | OUTPATIENT
Start: 2022-01-12 | End: 2022-02-22

## 2022-01-12 NOTE — PROGRESS NOTES
Throat pain  - Streptococcus A Rapid Screen w/Reflex to PCR - Clinic Collect  - Symptomatic; Yes; 1/5/2022 COVID-19 Virus (Coronavirus) by PCR Nose  - phenol (CHLORASEPTIC) 1.4 % spray; Take 1 spray (1 mL) by mouth every hour as needed for sore throat  - lidocaine, viscous, (XYLOCAINE) 2 % solution; Swish and spit 15 mLs in mouth every 3 hours as needed for moderate pain ; Max 8 doses/24 hour period.  - Group A Streptococcus PCR Throat Swab    Pharyngitis, unspecified etiology  - phenol (CHLORASEPTIC) 1.4 % spray; Take 1 spray (1 mL) by mouth every hour as needed for sore throat  - lidocaine, viscous, (XYLOCAINE) 2 % solution; Swish and spit 15 mLs in mouth every 3 hours as needed for moderate pain ; Max 8 doses/24 hour period.     See Patient Instructions  Patient Instructions     Patient Education     Self-Care for Sore Throats     Sore throats happen for many reasons, such as colds, allergies, cigarette smoke, air pollution, and infections caused by viruses or bacteria. In any case, your throat becomes red and sore. Your goal for self-care is to reduce your discomfort while giving your throat a chance to heal.  Moisten and soothe your throat  Tips include the following:    Try a sip of water first thing after waking up.    Keep your throat moist by drinking 6 or more glasses of clear liquids every day.    Run a cool-air humidifier in your room overnight.    Stay away from cigarette smoke.     Check the air quality index,if air pollution gives you a sore throat. On high pollution days, try to limit outdoor time.    Suck on throat lozenges, cough drops, hard candy, ice chips, or frozen fruit-juice bars. Use the sugar-free versions if your diet or medical condition requires them.  Gargle to ease irritation  Gargling every hour or 2 can ease irritation. Try gargling with 1 of these solutions:    1/4 teaspoon of salt in 1/2 cup of warm water    An over-the-counter anesthetic gargle  Use medicine for more  relief  Over-the-counter medicine can reduce sore throat symptoms. Ask your pharmacist if you have questions about which medicine to use. To prevent possible medicine interactions, let the pharmacist know what medicines you take. To decrease symptoms:    Ease pain with anesthetic sprays. Aspirin or an aspirin substitute also helps. Remember, never give aspirin to anyone 18 or younger. Don't take aspirin if you are already taking blood thinners.     For sore throats caused by allergies, try antihistamines to block the allergic reaction.  Unless a sore throat is caused by a bacterial infection, antibiotics won t help you.  Prevent future sore throats  Prevention tips include:    Stop smoking or reduce contact with secondhand smoke. Smoke irritates the tender throat lining.    Limit contact with pets and with allergy-causing substances, such as pollen and mold.    Wash your hands often when you re around someone with a sore throat or cold. This will keep viruses or bacteria from spreading.    Limit outdoor time when air pollution is bad.    Don t strain your vocal cords.  When to call your healthcare provider  Contact your healthcare provider if you have:    Fever of 100.4 F (38.0 C) or higher, or as directed by your healthcare provider    White spots on the throat    Great Trouble swallowing    A skin rash    Recent exposure to someone else with strep bacteria    Severe hoarseness and swollen glands in the neck or jaw  Call 911  Call 911 if any of the following occur:    Trouble breathing or catching your breath    Drooling and problems swallowing    Wheezing    Unable to talk    Feeling dizzy or faint    Feeling of doom  [employer] last reviewed this educational content on 9/1/2019 2000-2021 [employer]. All rights reserved. This information is not intended as a substitute for professional medical care. Always follow your healthcare professional's instructions.               KAMILLE Stevens  [facility]    Subjective   66 year old who presents to clinic today for the following health issues:    Pharyngitis       HPI     Acute Illness  Acute illness concerns: throat pain   Onset/Duration: 1 week  Symptoms:  Fever: no  Chills/Sweats: YES  Headache (location?): no  Sinus Pressure: no  Conjunctivitis:  no  Ear Pain: YES: bilateral  Rhinorrhea: no  Congestion: no  Sore Throat: YES  Cough: no  Wheeze: no  Decreased Appetite: no  Nausea: no  Vomiting: no  Diarrhea: no  Dysuria/Freq.: no  Dysuria or Hematuria: no  Fatigue/Achiness: YES  Sick/Strep Exposure: Coworkers have been sick-   Therapies tried and outcome: Tylenol, ibuprofen, and salt water gargles.      Review of Systems   Review of Systems   See HPI     Objective    Temp: 98.5  F (36.9  C) Temp src: Tympanic BP: (!) 165/98 Pulse: 69     SpO2: 97 %       Physical Exam   Physical Exam  Constitutional:       General: She is not in acute distress.     Appearance: Normal appearance. She is normal weight. She is not ill-appearing, toxic-appearing or diaphoretic.   HENT:      Head: Normocephalic and atraumatic.      Right Ear: Tympanic membrane, ear canal and external ear normal. There is no impacted cerumen.      Left Ear: Tympanic membrane, ear canal and external ear normal. There is no impacted cerumen.      Nose: Congestion and rhinorrhea present.      Mouth/Throat:      Mouth: Mucous membranes are moist.      Pharynx: Posterior oropharyngeal erythema present. No oropharyngeal exudate.   Cardiovascular:      Rate and Rhythm: Normal rate and regular rhythm.      Pulses: Normal pulses.      Heart sounds: Normal heart sounds. No murmur heard.  No friction rub. No gallop.    Pulmonary:      Effort: Pulmonary effort is normal. No respiratory distress.      Breath sounds: Normal breath sounds. No stridor. No wheezing, rhonchi or rales.   Chest:      Chest wall: No tenderness.   Neurological:      General: No focal deficit present.      Mental  Status: She is alert and oriented to person, place, and time. Mental status is at baseline.      Gait: Gait normal.   Psychiatric:         Mood and Affect: Mood normal.         Behavior: Behavior normal.         Thought Content: Thought content normal.         Judgment: Judgment normal.          Results for orders placed or performed in visit on 01/12/22 (from the past 24 hour(s))   Streptococcus A Rapid Screen w/Reflex to PCR - Clinic Collect    Specimen: Throat; Swab   Result Value Ref Range    Group A Strep antigen Negative Negative

## 2022-01-12 NOTE — TELEPHONE ENCOUNTER
Patient calls with concerns regarding a sore throat she has had for a week. Patient denies any cough, congestion or fever. Sore throat does seem to be worsening and now her ears are hurting, right more than left.     See today in office per protocol. Patient verbalizes understanding and will go to urgent care at this time. She denies further questions or concerns.    Kelli YANG [facility]        Reason for Disposition    Earache also present    Additional Information    Negative: Severe difficulty breathing (e.g., struggling for each breath, speaks in single words)    Negative: Sounds like a life-threatening emergency to the triager    Negative: Throat culture results, call about    Negative: Productive cough is the main symptom    Negative: Runny nose is the main symptom    Negative: Drooling or spitting out saliva (because can't swallow)    Negative: Unable to open mouth completely    Negative: Drinking very little and has signs of dehydration (e.g., no urine > 12 hours, very dry mouth, very lightheaded)    Negative: Patient sounds very sick or weak to the triager    Negative: Difficulty breathing (per caller) but not severe    Negative: Fever > 103 F (39.4 C)    Negative: Refuses to drink anything for > 12 hours    Negative: SEVERE sore throat pain    Negative: Pus on tonsils (back of throat) and swollen neck lymph nodes ('glands')    Protocols used: SORE THROAT-A-OH  COVID 19 Nurse Triage Plan/Patient Instructions    Please be aware that novel coronavirus (COVID-19) may be circulating in the community. If you develop symptoms such as fever, cough, or SOB or if you have concerns about the presence of another infection including coronavirus (COVID-19), please contact your health care provider or visit [email or web address].     Disposition/Instructions    Virtual Visit with provider recommended. Reference Visit Selection Guide.  In-Person Visit with provider recommended.  Reference Visit Selection Guide.    Thank you for taking steps to prevent the spread of this virus.  o Limit your contact with others.  o Wear a simple mask to cover your cough.  o Wash your hands well and often.    Resources    [facility]: About COVID-19: [email or web address]    CDC: What to Do If You're Sick: www.cdc.gov/coronavirus/2019-ncov/about/steps-when-sick.html    CDC: Ending Home Isolation: www.cdc.gov/coronavirus/2019-ncov/hcp/disposition-in-home-patients.html     CDC: Caring for Someone: www.cdc.gov/coronavirus/2019-ncov/if-you-are-sick/care-for-someone.html     [facility]: Interim Guidance for Hospital Discharge to Home: [email or web address]/hcp/hospdischarge.pdf    [facility] clinical trials (COVID-19 research studies): [email or web address]-clinical-trials     Below are the COVID-19 hotlines at the [place] Department [facility] ([facility]). Interpreters are available.   o For health questions: Call 572-109-6894 or 1-194.858.2172 (7 a.m. to 7 p.m.)  o For questions about schools and childcare: Call 769-580-7303 or 1-778.936.4723 (7 a.m. to 7 p.m.)

## 2022-01-12 NOTE — PATIENT INSTRUCTIONS
Patient Education     Self-Care for Sore Throats     Sore throats happen for many reasons, such as colds, allergies, cigarette smoke, air pollution, and infections caused by viruses or bacteria. In any case, your throat becomes red and sore. Your goal for self-care is to reduce your discomfort while giving your throat a chance to heal.  Moisten and soothe your throat  Tips include the following:    Try a sip of water first thing after waking up.    Keep your throat moist by drinking 6 or more glasses of clear liquids every day.    Run a cool-air humidifier in your room overnight.    Stay away from cigarette smoke.     Check the air quality index,if air pollution gives you a sore throat. On high pollution days, try to limit outdoor time.    Suck on throat lozenges, cough drops, hard candy, ice chips, or frozen fruit-juice bars. Use the sugar-free versions if your diet or medical condition requires them.  Gargle to ease irritation  Gargling every hour or 2 can ease irritation. Try gargling with 1 of these solutions:    1/4 teaspoon of salt in 1/2 cup of warm water    An over-the-counter anesthetic gargle  Use medicine for more relief  Over-the-counter medicine can reduce sore throat symptoms. Ask your pharmacist if you have questions about which medicine to use. To prevent possible medicine interactions, let the pharmacist know what medicines you take. To decrease symptoms:    Ease pain with anesthetic sprays. Aspirin or an aspirin substitute also helps. Remember, never give aspirin to anyone 18 or younger. Don't take aspirin if you are already taking blood thinners.     For sore throats caused by allergies, try antihistamines to block the allergic reaction.  Unless a sore throat is caused by a bacterial infection, antibiotics won t help you.  Prevent future sore throats  Prevention tips include:    Stop smoking or reduce contact with secondhand smoke. Smoke irritates the tender throat lining.    Limit contact with  pets and with allergy-causing substances, such as pollen and mold.    Wash your hands often when you re around someone with a sore throat or cold. This will keep viruses or bacteria from spreading.    Limit outdoor time when air pollution is bad.    Don t strain your vocal cords.  When to call your healthcare provider  Contact your healthcare provider if you have:    Fever of 100.4 F (38.0 C) or higher, or as directed by your healthcare provider    White spots on the throat    Great Trouble swallowing    A skin rash    Recent exposure to someone else with strep bacteria    Severe hoarseness and swollen glands in the neck or jaw  Call 911  Call 911 if any of the following occur:    Trouble breathing or catching your breath    Drooling and problems swallowing    Wheezing    Unable to talk    Feeling dizzy or faint    Feeling of doom  [employer] last reviewed this educational content on 9/1/2019 2000-2021 [employer]. All rights reserved. This information is not intended as a substitute for professional medical care. Always follow your healthcare professional's instructions.

## 2022-01-13 ENCOUNTER — MYC MEDICAL ADVICE (OUTPATIENT)
Dept: FAMILY MEDICINE | Facility: CLINIC | Age: 67
End: 2022-01-13
Payer: COMMERCIAL

## 2022-01-13 LAB — SARS-COV-2 RNA RESP QL NAA+PROBE: NOT DETECTED

## 2022-02-10 ENCOUNTER — TRANSFERRED RECORDS (OUTPATIENT)
Dept: HEALTH INFORMATION MANAGEMENT | Facility: CLINIC | Age: 67
End: 2022-02-10
Payer: COMMERCIAL

## 2022-02-11 ENCOUNTER — ANCILLARY PROCEDURE (OUTPATIENT)
Dept: MAMMOGRAPHY | Facility: CLINIC | Age: 67
End: 2022-02-11
Attending: INTERNAL MEDICINE
Payer: COMMERCIAL

## 2022-02-11 DIAGNOSIS — Z12.31 VISIT FOR SCREENING MAMMOGRAM: ICD-10-CM

## 2022-02-11 PROCEDURE — 77063 BREAST TOMOSYNTHESIS BI: CPT | Mod: TC | Performed by: RADIOLOGY

## 2022-02-11 PROCEDURE — 77067 SCR MAMMO BI INCL CAD: CPT | Mod: TC | Performed by: RADIOLOGY

## 2022-02-21 ASSESSMENT — PATIENT HEALTH QUESTIONNAIRE - PHQ9
10. IF YOU CHECKED OFF ANY PROBLEMS, HOW DIFFICULT HAVE THESE PROBLEMS MADE IT FOR YOU TO DO YOUR WORK, TAKE CARE OF THINGS AT HOME, OR GET ALONG WITH OTHER PEOPLE: NOT DIFFICULT AT ALL
SUM OF ALL RESPONSES TO PHQ QUESTIONS 1-9: 6
SUM OF ALL RESPONSES TO PHQ QUESTIONS 1-9: 6

## 2022-02-21 ASSESSMENT — ENCOUNTER SYMPTOMS
ARTHRALGIAS: 0
SORE THROAT: 0
HEMATOCHEZIA: 0
NERVOUS/ANXIOUS: 1
JOINT SWELLING: 0
COUGH: 0
DIZZINESS: 0
PALPITATIONS: 0
HEARTBURN: 0
ABDOMINAL PAIN: 0
DIARRHEA: 0
FREQUENCY: 0
NAUSEA: 0
WEAKNESS: 0
HEADACHES: 0
FEVER: 0
MYALGIAS: 0
DYSURIA: 0
EYE PAIN: 0
BREAST MASS: 0
HEMATURIA: 0
PARESTHESIAS: 0
SHORTNESS OF BREATH: 0
CHILLS: 0
CONSTIPATION: 0

## 2022-02-22 ENCOUNTER — OFFICE VISIT (OUTPATIENT)
Dept: FAMILY MEDICINE | Facility: CLINIC | Age: 67
End: 2022-02-22
Payer: COMMERCIAL

## 2022-02-22 VITALS
HEART RATE: 68 BPM | WEIGHT: 165.13 LBS | DIASTOLIC BLOOD PRESSURE: 88 MMHG | HEIGHT: 66 IN | SYSTOLIC BLOOD PRESSURE: 145 MMHG | OXYGEN SATURATION: 100 % | TEMPERATURE: 98.3 F | RESPIRATION RATE: 20 BRPM | BODY MASS INDEX: 26.54 KG/M2

## 2022-02-22 DIAGNOSIS — F41.8 SITUATIONAL ANXIETY: ICD-10-CM

## 2022-02-22 DIAGNOSIS — F33.41 MAJOR DEPRESSIVE DISORDER, RECURRENT EPISODE, IN PARTIAL REMISSION (H): ICD-10-CM

## 2022-02-22 DIAGNOSIS — Z12.4 SCREENING FOR CERVICAL CANCER: ICD-10-CM

## 2022-02-22 DIAGNOSIS — Z00.00 ENCOUNTER FOR ANNUAL PHYSICAL EXAM: Primary | ICD-10-CM

## 2022-02-22 DIAGNOSIS — I10 HTN, GOAL BELOW 140/90: ICD-10-CM

## 2022-02-22 DIAGNOSIS — Z78.0 ASYMPTOMATIC POSTMENOPAUSAL STATUS: ICD-10-CM

## 2022-02-22 DIAGNOSIS — Z13.6 CARDIOVASCULAR SCREENING; LDL GOAL LESS THAN 100: ICD-10-CM

## 2022-02-22 DIAGNOSIS — D68.59 PRIMARY HYPERCOAGULABLE STATE (H): ICD-10-CM

## 2022-02-22 DIAGNOSIS — K21.9 GASTROESOPHAGEAL REFLUX DISEASE WITHOUT ESOPHAGITIS: ICD-10-CM

## 2022-02-22 DIAGNOSIS — Z98.84 HISTORY OF ROUX-EN-Y GASTRIC BYPASS: ICD-10-CM

## 2022-02-22 DIAGNOSIS — N32.81 OVERACTIVE BLADDER: ICD-10-CM

## 2022-02-22 LAB
ALBUMIN SERPL-MCNC: 3.9 G/DL (ref 3.4–5)
ALBUMIN UR-MCNC: NEGATIVE MG/DL
ALP SERPL-CCNC: 82 U/L (ref 40–150)
ALT SERPL W P-5'-P-CCNC: 39 U/L (ref 0–50)
ANION GAP SERPL CALCULATED.3IONS-SCNC: 4 MMOL/L (ref 3–14)
APPEARANCE UR: CLEAR
AST SERPL W P-5'-P-CCNC: 27 U/L (ref 0–45)
BACTERIA #/AREA URNS HPF: ABNORMAL /HPF
BASOPHILS # BLD AUTO: 0 10E3/UL (ref 0–0.2)
BASOPHILS NFR BLD AUTO: 1 %
BILIRUB SERPL-MCNC: 0.6 MG/DL (ref 0.2–1.3)
BILIRUB UR QL STRIP: NEGATIVE
BUN SERPL-MCNC: 12 MG/DL (ref 7–30)
CALCIUM SERPL-MCNC: 9 MG/DL (ref 8.5–10.1)
CHLORIDE BLD-SCNC: 108 MMOL/L (ref 94–109)
CHOLEST SERPL-MCNC: 173 MG/DL
CO2 SERPL-SCNC: 30 MMOL/L (ref 20–32)
COLOR UR AUTO: YELLOW
CREAT SERPL-MCNC: 0.63 MG/DL (ref 0.52–1.04)
CREAT UR-MCNC: 68 MG/DL
EOSINOPHIL # BLD AUTO: 0.2 10E3/UL (ref 0–0.7)
EOSINOPHIL NFR BLD AUTO: 3 %
ERYTHROCYTE [DISTWIDTH] IN BLOOD BY AUTOMATED COUNT: 13.1 % (ref 10–15)
FASTING STATUS PATIENT QL REPORTED: YES
FERRITIN SERPL-MCNC: 603 NG/ML (ref 8–252)
GFR SERPL CREATININE-BSD FRML MDRD: >90 ML/MIN/1.73M2
GLUCOSE BLD-MCNC: 93 MG/DL (ref 70–99)
GLUCOSE UR STRIP-MCNC: NEGATIVE MG/DL
HCT VFR BLD AUTO: 40.3 % (ref 35–47)
HDLC SERPL-MCNC: 74 MG/DL
HGB BLD-MCNC: 13.2 G/DL (ref 11.7–15.7)
HGB UR QL STRIP: NEGATIVE
IMM GRANULOCYTES # BLD: 0 10E3/UL
IMM GRANULOCYTES NFR BLD: 0 %
KETONES UR STRIP-MCNC: NEGATIVE MG/DL
LDLC SERPL CALC-MCNC: 81 MG/DL
LEUKOCYTE ESTERASE UR QL STRIP: NEGATIVE
LYMPHOCYTES # BLD AUTO: 2.1 10E3/UL (ref 0.8–5.3)
LYMPHOCYTES NFR BLD AUTO: 38 %
MCH RBC QN AUTO: 31.2 PG (ref 26.5–33)
MCHC RBC AUTO-ENTMCNC: 32.8 G/DL (ref 31.5–36.5)
MCV RBC AUTO: 95 FL (ref 78–100)
MICROALBUMIN UR-MCNC: 12 MG/L
MICROALBUMIN/CREAT UR: 17.65 MG/G CR (ref 0–25)
MONOCYTES # BLD AUTO: 0.5 10E3/UL (ref 0–1.3)
MONOCYTES NFR BLD AUTO: 9 %
NEUTROPHILS # BLD AUTO: 2.7 10E3/UL (ref 1.6–8.3)
NEUTROPHILS NFR BLD AUTO: 49 %
NITRATE UR QL: NEGATIVE
NONHDLC SERPL-MCNC: 99 MG/DL
PH UR STRIP: 6 [PH] (ref 5–7)
PLATELET # BLD AUTO: 195 10E3/UL (ref 150–450)
POTASSIUM BLD-SCNC: 4.2 MMOL/L (ref 3.4–5.3)
PROT SERPL-MCNC: 7 G/DL (ref 6.8–8.8)
RBC # BLD AUTO: 4.23 10E6/UL (ref 3.8–5.2)
RBC #/AREA URNS AUTO: ABNORMAL /HPF
SODIUM SERPL-SCNC: 142 MMOL/L (ref 133–144)
SP GR UR STRIP: 1.02 (ref 1–1.03)
SQUAMOUS #/AREA URNS AUTO: ABNORMAL /LPF
TRIGL SERPL-MCNC: 92 MG/DL
UROBILINOGEN UR STRIP-ACNC: 0.2 E.U./DL
WBC # BLD AUTO: 5.6 10E3/UL (ref 4–11)
WBC #/AREA URNS AUTO: ABNORMAL /HPF

## 2022-02-22 PROCEDURE — 82043 UR ALBUMIN QUANTITATIVE: CPT | Performed by: INTERNAL MEDICINE

## 2022-02-22 PROCEDURE — 80061 LIPID PANEL: CPT | Performed by: INTERNAL MEDICINE

## 2022-02-22 PROCEDURE — 36415 COLL VENOUS BLD VENIPUNCTURE: CPT | Performed by: INTERNAL MEDICINE

## 2022-02-22 PROCEDURE — 99396 PREV VISIT EST AGE 40-64: CPT | Performed by: INTERNAL MEDICINE

## 2022-02-22 PROCEDURE — 85025 COMPLETE CBC W/AUTO DIFF WBC: CPT | Performed by: INTERNAL MEDICINE

## 2022-02-22 PROCEDURE — 80053 COMPREHEN METABOLIC PANEL: CPT | Performed by: INTERNAL MEDICINE

## 2022-02-22 PROCEDURE — 82306 VITAMIN D 25 HYDROXY: CPT | Performed by: INTERNAL MEDICINE

## 2022-02-22 PROCEDURE — 81001 URINALYSIS AUTO W/SCOPE: CPT | Performed by: INTERNAL MEDICINE

## 2022-02-22 PROCEDURE — 82728 ASSAY OF FERRITIN: CPT | Performed by: INTERNAL MEDICINE

## 2022-02-22 PROCEDURE — 83921 ORGANIC ACID SINGLE QUANT: CPT | Performed by: INTERNAL MEDICINE

## 2022-02-22 RX ORDER — CARVEDILOL 12.5 MG/1
12.5 TABLET ORAL 2 TIMES DAILY WITH MEALS
Qty: 180 TABLET | Refills: 3 | Status: SHIPPED | OUTPATIENT
Start: 2022-02-22 | End: 2022-11-20

## 2022-02-22 RX ORDER — BUPROPION HYDROCHLORIDE 150 MG/1
150 TABLET ORAL EVERY MORNING
Qty: 90 TABLET | Refills: 3 | Status: SHIPPED | OUTPATIENT
Start: 2022-02-22 | End: 2022-11-17

## 2022-02-22 RX ORDER — PANTOPRAZOLE SODIUM 40 MG/1
40 TABLET, DELAYED RELEASE ORAL 2 TIMES DAILY
Qty: 180 TABLET | Refills: 3 | Status: SHIPPED | OUTPATIENT
Start: 2022-02-22 | End: 2022-11-17

## 2022-02-22 RX ORDER — ALPRAZOLAM 0.25 MG
.25-.5 TABLET ORAL 3 TIMES DAILY PRN
Qty: 30 TABLET | Refills: 5 | Status: SHIPPED | OUTPATIENT
Start: 2022-02-22 | End: 2023-08-22

## 2022-02-22 RX ORDER — HYDRALAZINE HYDROCHLORIDE 25 MG/1
25 TABLET, FILM COATED ORAL 2 TIMES DAILY
Qty: 180 TABLET | Refills: 3 | Status: SHIPPED | OUTPATIENT
Start: 2022-02-22 | End: 2022-11-20

## 2022-02-22 ASSESSMENT — ENCOUNTER SYMPTOMS
SHORTNESS OF BREATH: 0
DIZZINESS: 0
HEMATOCHEZIA: 0
MYALGIAS: 0
CONSTIPATION: 0
SORE THROAT: 0
NAUSEA: 0
ABDOMINAL PAIN: 0
HEADACHES: 0
FREQUENCY: 0
HEARTBURN: 0
PALPITATIONS: 0
DIARRHEA: 0
ARTHRALGIAS: 0
WEAKNESS: 0
COUGH: 0
HEMATURIA: 0
BREAST MASS: 0
EYE PAIN: 0
PARESTHESIAS: 0
JOINT SWELLING: 0
DYSURIA: 0
NERVOUS/ANXIOUS: 1
CHILLS: 0
FEVER: 0

## 2022-02-22 ASSESSMENT — PATIENT HEALTH QUESTIONNAIRE - PHQ9: SUM OF ALL RESPONSES TO PHQ QUESTIONS 1-9: 6

## 2022-02-22 ASSESSMENT — PAIN SCALES - GENERAL: PAINLEVEL: MILD PAIN (3)
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
## 2022-02-23 LAB — DEPRECATED CALCIDIOL+CALCIFEROL SERPL-MC: 58 UG/L (ref 20–75)

## 2022-02-24 LAB — METHYLMALONATE SERPL-SCNC: 0.12 UMOL/L (ref 0–0.4)

## 2022-03-08 ENCOUNTER — TRANSFERRED RECORDS (OUTPATIENT)
Dept: HEALTH INFORMATION MANAGEMENT | Facility: CLINIC | Age: 67
End: 2022-03-08
Payer: COMMERCIAL

## 2022-05-19 ENCOUNTER — OFFICE VISIT (OUTPATIENT)
Dept: URGENT CARE | Facility: URGENT CARE | Age: 67
End: 2022-05-19

## 2022-05-19 ENCOUNTER — IMMUNIZATION (OUTPATIENT)
Dept: NURSING | Facility: CLINIC | Age: 67
End: 2022-05-19
Payer: COMMERCIAL

## 2022-05-19 VITALS
TEMPERATURE: 98.5 F | HEART RATE: 69 BPM | WEIGHT: 169.2 LBS | BODY MASS INDEX: 27.49 KG/M2 | SYSTOLIC BLOOD PRESSURE: 156 MMHG | RESPIRATION RATE: 16 BRPM | DIASTOLIC BLOOD PRESSURE: 90 MMHG | OXYGEN SATURATION: 99 %

## 2022-05-19 DIAGNOSIS — I10 HTN, GOAL BELOW 140/90: ICD-10-CM

## 2022-05-19 DIAGNOSIS — R07.0 THROAT PAIN: ICD-10-CM

## 2022-05-19 DIAGNOSIS — J06.9 VIRAL UPPER RESPIRATORY TRACT INFECTION WITH COUGH: Primary | ICD-10-CM

## 2022-05-19 LAB
DEPRECATED S PYO AG THROAT QL EIA: NEGATIVE
GROUP A STREP BY PCR: NOT DETECTED

## 2022-05-19 PROCEDURE — 99214 OFFICE O/P EST MOD 30 MIN: CPT | Performed by: PHYSICIAN ASSISTANT

## 2022-05-19 PROCEDURE — 0054A COVID-19,PF,PFIZER (12+ YRS): CPT

## 2022-05-19 PROCEDURE — 87651 STREP A DNA AMP PROBE: CPT | Performed by: PHYSICIAN ASSISTANT

## 2022-05-19 PROCEDURE — 91305 COVID-19,PF,PFIZER (12+ YRS): CPT

## 2022-05-19 ASSESSMENT — ENCOUNTER SYMPTOMS
ARTHRALGIAS: 0
JOINT SWELLING: 0
SHORTNESS OF BREATH: 0
HEADACHES: 0
VOMITING: 0
WEAKNESS: 0
ENDOCRINE NEGATIVE: 1
MYALGIAS: 0
BACK PAIN: 0
DIZZINESS: 0
CHILLS: 0
SORE THROAT: 1
COUGH: 1
NECK PAIN: 0
WOUND: 0
CARDIOVASCULAR NEGATIVE: 1
FEVER: 0
RHINORRHEA: 0
PALPITATIONS: 0
ALLERGIC/IMMUNOLOGIC NEGATIVE: 1
NECK STIFFNESS: 0
EYES NEGATIVE: 1
LIGHT-HEADEDNESS: 0
DIARRHEA: 0
MUSCULOSKELETAL NEGATIVE: 1
NAUSEA: 0

## 2022-05-19 NOTE — PROGRESS NOTES
Chief Complaint:     Chief Complaint   Patient presents with     URI     Sore throat and the cough progress when tried to talk-sx sine Sunday, Had laryngitis since Sunday, lost voice, still taste and smell          Results for orders placed or performed in visit on 05/19/22   Streptococcus A Rapid Screen w/Reflex to PCR - Clinic Collect     Status: Normal    Specimen: Throat; Swab   Result Value Ref Range    Group A Strep antigen Negative Negative       Medical Decision Making:    Vital signs reviewed by Abdulaziz Gilman PA-C  BP (!) 157/85 (BP Location: Left arm, Patient Position: Sitting, Cuff Size: Adult Large)   Pulse 69   Temp 98.5  F (36.9  C) (Tympanic)   Resp 16   Wt 76.7 kg (169 lb 3.2 oz)   LMP 06/27/2010 (Exact Date)   SpO2 99%   BMI 27.49 kg/m      Differential Diagnosis:  URI Adult/Peds:  Bronchitis-viral, Influenza, Pneumonia, Sinusitis, Strep pharyngitis, Tonsilitis, Viral pharyngitis, Viral syndrome and Viral upper respiratory illness        ASSESSMENT    1. Viral upper respiratory tract infection with cough    2. Throat pain    3. HTN, goal below 140/90        PLAN    Patient is in no acute distress.    Temp is 98.5 in clinic today, lung sounds were clear, and O2 sats at 99% on RA.    RST was negative.  We will call with PCR results only if positive.  Rest, Push fluids, vaporizer, elevation of head of bed.  Ibuprofen and or Tylenol for any fever or body aches.  Over the counter cough suppressant- PRN- as discussed.   Patient is hypertensive in clinic today.  Second BP reading was also above goal at 156/90.  Patient instructed to follow up with PCP in the next week for BP recheck.  Sooner if symptoms worsen.  Worrisome symptoms discussed with instructions to go to the ED.  Patient verbalized understanding and agreed with this plan.    Labs:    Results for orders placed or performed in visit on 05/19/22   Streptococcus A Rapid Screen w/Reflex to PCR - Clinic Collect     Status: Normal     Specimen: Throat; Swab   Result Value Ref Range    Group A Strep antigen Negative Negative        Vital signs reviewed by Abdulaziz Gilman PA-C  BP (!) 157/85 (BP Location: Left arm, Patient Position: Sitting, Cuff Size: Adult Large)   Pulse 69   Temp 98.5  F (36.9  C) (Tympanic)   Resp 16   Wt 76.7 kg (169 lb 3.2 oz)   LMP 06/27/2010 (Exact Date)   SpO2 99%   BMI 27.49 kg/m      Current Meds      Current Outpatient Medications:      ALPRAZolam (XANAX) 0.25 MG tablet, Take 1-2 tablets (0.25-0.5 mg) by mouth 3 times daily as needed for anxiety, Disp: 30 tablet, Rfl: 5     Ascorbic Acid (VITAMIN C PO), , Disp: , Rfl:      Biotin 5000 MCG CAPS, Take 1 tablet by mouth daily., Disp: , Rfl:      buPROPion (WELLBUTRIN XL) 150 MG 24 hr tablet, Take 1 tablet (150 mg) by mouth every morning, Disp: 90 tablet, Rfl: 3     CALCIUM CITRATE PO, Take 3 capsules by mouth 3 times daily, Disp: , Rfl:      carvedilol (COREG) 12.5 MG tablet, Take 1 tablet (12.5 mg) by mouth 2 times daily (with meals), Disp: 180 tablet, Rfl: 3     Cholecalciferol (VITAMIN D PO), , Disp: , Rfl:      Glucosamine-Chondroit-Vit C-Mn (GLUCOSAMINE CHONDR 1500 COMPLX PO), Take 1 tablet by mouth daily, Disp: , Rfl:      hydrALAZINE (APRESOLINE) 25 MG tablet, Take 1 tablet (25 mg) by mouth 2 times daily, Disp: 180 tablet, Rfl: 3     Iron-vit C-vit B12-folic acid 100-250-0.025-1 MG TABS, Take  by mouth., Disp: , Rfl:      Multiple Vitamins-Minerals (MULTIVITAL) TABS, Take 2 tablets by mouth daily., Disp: , Rfl:      pantoprazole (PROTONIX) 40 MG EC tablet, Take 1 tablet (40 mg) by mouth 2 times daily, Disp: 180 tablet, Rfl: 3      Respiratory History    occasional episodes of bronchitis      SUBJECTIVE    HPI: Marlee Tobar is an 66 year old female who presents with chest congestion, cough nonproductive, occasional and sore throat.  Symptoms began 4  days ago and has unchanged.  There is no shortness of breath, wheezing and chest pain.  Patient is eating  and drinking well.  No fever, nausea, vomiting, or diarrhea.    Patient denies any recent travel or exposure to known COVID positive tested individual.      ROS:     Review of Systems   Constitutional: Negative for chills and fever.   HENT: Positive for congestion and sore throat. Negative for ear pain and rhinorrhea.    Eyes: Negative.    Respiratory: Positive for cough. Negative for shortness of breath.    Cardiovascular: Negative.  Negative for chest pain and palpitations.   Gastrointestinal: Negative for diarrhea, nausea and vomiting.   Endocrine: Negative.    Genitourinary: Negative.    Musculoskeletal: Negative.  Negative for arthralgias, back pain, joint swelling, myalgias, neck pain and neck stiffness.   Skin: Negative.  Negative for rash and wound.   Allergic/Immunologic: Negative.  Negative for immunocompromised state.   Neurological: Negative for dizziness, weakness, light-headedness and headaches.         Family History   Family History   Problem Relation Age of Onset     Hypertension Mother      Arthritis Mother      Cancer Mother         Hodgkins disease     Obesity Mother      Cerebrovascular Disease Father      Alcohol/Drug Father         recovered alcoholic     Alcohol/Drug Sister      Gynecology Sister         history of abnormal paps--LEEPs done     Lipids Sister      Thyroid Disease Sister      Cardiovascular Sister         mitral valve prolapse     Alcohol/Drug Son         alcoholic     Depression Son      Hypertension Son      Psychotic Disorder Son      Hypertension Brother      Cardiovascular Brother      Heart Disease Brother 58        MI     Obesity Brother      Genitourinary Problems Daughter      Gynecology Daughter         Protein S deficiency     Unknown/Adopted Maternal Grandfather      Unknown/Adopted Paternal Grandmother      Unknown/Adopted Paternal Grandfather      Glaucoma No family hx of      Macular Degeneration No family hx of         Problem history  Patient Active Problem  List   Diagnosis     iamJOINT PAIN-LOWER LEG     iamPOSTSURGICAL STATES NEC     CARDIOVASCULAR SCREENING; LDL GOAL LESS THAN 130     HTN, goal below 140/90     Gallstones     Cholelithiases     GERD (gastroesophageal reflux disease)     Post-menopausal     Health Care Home     Pancreatitis     Varicose vein of leg     Torn earlobe     Dyspareunia     Microalbuminuria     Major depressive disorder, recurrent episode, in partial remission (H)     Primary hypercoagulable state (H)     Situational anxiety     Overweight     Syncope, near     Advance care planning     H/O gastric bypass     H/O gastrointestinal hemorrhage     Personal history of PE (pulmonary embolism)     Insomnia, unspecified type     Pulmonary nodules     Polyarthralgia     Positive TIFFANI (antinuclear antibody)     Overactive bladder        Allergies  Allergies   Allergen Reactions     Doxycycline Rash     Contrast Dye      Happened when patient was ~ 18 years ago during a test for 'kidneys'.  Patient thinks she might have developed a rash, couldn't remember.     Diatrizoate Itching     Iodine      Vitamin K Swelling     facial        Social History  Social History     Socioeconomic History     Marital status:      Spouse name: Not on file     Number of children: Not on file     Years of education: Not on file     Highest education level: Not on file   Occupational History     Occupation: Full time   Tobacco Use     Smoking status: Never Smoker     Smokeless tobacco: Never Used   Substance and Sexual Activity     Alcohol use: Yes     Comment: very rare     Drug use: No     Sexual activity: Yes     Partners: Male     Birth control/protection: None     Comment: postmenopausal   Other Topics Concern     Parent/sibling w/ CABG, MI or angioplasty before 65F 55M? Yes     Comment: Brother 55   Social History Narrative     Not on file     Social Determinants of Health     Financial Resource Strain: Not on file   Food Insecurity: Not on file    Transportation Needs: Not on file   Physical Activity: Not on file   Stress: Not on file   Social Connections: Not on file   Intimate Partner Violence: Not on file   Housing Stability: Not on file        OBJECTIVE     Vital signs reviewed by Abdulaziz Gilman PA-C  BP (!) 157/85 (BP Location: Left arm, Patient Position: Sitting, Cuff Size: Adult Large)   Pulse 69   Temp 98.5  F (36.9  C) (Tympanic)   Resp 16   Wt 76.7 kg (169 lb 3.2 oz)   LMP 06/27/2010 (Exact Date)   SpO2 99%   BMI 27.49 kg/m       Physical Exam  Vitals and nursing note reviewed.   Constitutional:       General: She is not in acute distress.     Appearance: She is well-developed. She is not ill-appearing, toxic-appearing or diaphoretic.   HENT:      Head: Normocephalic and atraumatic.      Right Ear: Hearing, tympanic membrane, ear canal and external ear normal. Tympanic membrane is not perforated, erythematous, retracted or bulging.      Left Ear: Hearing, tympanic membrane, ear canal and external ear normal. Tympanic membrane is not perforated, erythematous, retracted or bulging.      Nose: Congestion present. No mucosal edema or rhinorrhea.      Right Sinus: No maxillary sinus tenderness or frontal sinus tenderness.      Left Sinus: No maxillary sinus tenderness or frontal sinus tenderness.      Mouth/Throat:      Pharynx: Posterior oropharyngeal erythema present. No pharyngeal swelling, oropharyngeal exudate or uvula swelling.      Tonsils: No tonsillar exudate or tonsillar abscesses. 0 on the right. 0 on the left.   Eyes:      General:         Right eye: No discharge.         Left eye: No discharge.      Pupils: Pupils are equal, round, and reactive to light.   Cardiovascular:      Rate and Rhythm: Normal rate and regular rhythm.      Heart sounds: Normal heart sounds. No murmur heard.    No friction rub. No gallop.   Pulmonary:      Effort: Pulmonary effort is normal. No respiratory distress.      Breath sounds: Normal breath sounds.  No decreased breath sounds, wheezing, rhonchi or rales.   Chest:      Chest wall: No tenderness.   Abdominal:      General: Bowel sounds are normal. There is no distension.      Palpations: Abdomen is soft. There is no mass.      Tenderness: There is no abdominal tenderness. There is no guarding.   Musculoskeletal:      Cervical back: Normal range of motion and neck supple.   Lymphadenopathy:      Head:      Right side of head: No submental, submandibular, tonsillar, preauricular or posterior auricular adenopathy.      Left side of head: No submental, submandibular, tonsillar, preauricular or posterior auricular adenopathy.      Cervical: No cervical adenopathy.      Right cervical: No superficial or posterior cervical adenopathy.     Left cervical: No superficial or posterior cervical adenopathy.   Skin:     General: Skin is warm and dry.      Findings: No rash.   Neurological:      Mental Status: She is alert and oriented to person, place, and time.      Cranial Nerves: No cranial nerve deficit.      Deep Tendon Reflexes: Reflexes are normal and symmetric.   Psychiatric:         Behavior: Behavior normal. Behavior is cooperative.         Thought Content: Thought content normal.         Judgment: Judgment normal.           Abdulaziz Gilman PA-C  5/19/2022, 10:05 AM

## 2022-05-19 NOTE — LETTER
[facility] [address] [address]          May 19, 2022    RE:  Marlee Tobar                                                                                                                                                       [id number] [facility] [address]            To whom it may concern:    Marlee Tobar is under my professional care for illness.  She  may return to work with no restrictions when her voice returns.  This may take several weeks.    Sincerely,        Abdulaziz Gilman PA-C

## 2022-06-14 ENCOUNTER — TELEPHONE (OUTPATIENT)
Dept: FAMILY MEDICINE | Facility: CLINIC | Age: 67
End: 2022-06-14

## 2022-06-14 NOTE — TELEPHONE ENCOUNTER
Patient Quality Outreach    Patient is due for the following:   Hypertension -  BP check    NEXT STEPS:   Schedule a nurse only visit for blood pressure check    Type of outreach:    Sent IPextremet message. and Sent letter.      Questions for provider review:    None     Diane L. Schoenherr, RN

## 2022-06-14 NOTE — LETTER
[address] [address]  [phone]  Dept: [phone]    June 14, 2022    Marlee Tobar  [address] [facility] [address]    Dear Marlee Tobar,     At [facility] we care about your health and are committed to providing quality patient care.    Which includes staying current on preventive cancer screenings.  You can increase your chances of finding and treating cancers through regular screenings.      Our records indicate you may be due for the following preventive screening(s):  blood pressure check    To schedule an appointment or discuss this screening further, you may contact us by phone at the [facility] at [phone] or online through the patient portal/[email or web address] @ [email or web address]    If you have had any of the screenings listed above at another facility, please call us so that we may update your chart.      Your partners in health,      Quality Committee at [facility]

## 2022-07-28 ENCOUNTER — TRANSFERRED RECORDS (OUTPATIENT)
Dept: HEALTH INFORMATION MANAGEMENT | Facility: CLINIC | Age: 67
End: 2022-07-28

## 2022-08-17 ENCOUNTER — NURSE TRIAGE (OUTPATIENT)
Dept: NURSING | Facility: CLINIC | Age: 67
End: 2022-08-17

## 2022-08-17 NOTE — TELEPHONE ENCOUNTER
Patient is calling and states that on last Friday left breast was terribly itching August 12th.  Nipple is now red and irritated.  Patient has an appointment on August 30th.  Denies lump.  Denies fever cough and shortness of breath.  Patient states that breast looks infected and has spreading redness and she has no fever currently.      Reason for Disposition    Breast looks infected (spreading redness, feels hot or painful to touch) and no fever    Additional Information    Negative: SEVERE breast pain and fever > 103 F  (39.4 C)    Negative: Patient sounds very sick or weak to the triager    Negative: Breast looks infected (spreading redness, feels hot or painful to touch) and fever    Protocols used: BREAST SYMPTOMS-A-OH

## 2022-08-18 ENCOUNTER — OFFICE VISIT (OUTPATIENT)
Dept: FAMILY MEDICINE | Facility: OTHER | Age: 67
End: 2022-08-18
Payer: COMMERCIAL

## 2022-08-18 VITALS
DIASTOLIC BLOOD PRESSURE: 100 MMHG | OXYGEN SATURATION: 98 % | WEIGHT: 172 LBS | TEMPERATURE: 97.7 F | HEART RATE: 79 BPM | BODY MASS INDEX: 27.94 KG/M2 | SYSTOLIC BLOOD PRESSURE: 142 MMHG

## 2022-08-18 DIAGNOSIS — M25.50 MULTIPLE JOINT PAIN: ICD-10-CM

## 2022-08-18 DIAGNOSIS — R25.2 MUSCLE CRAMPING: ICD-10-CM

## 2022-08-18 DIAGNOSIS — I10 HTN, GOAL BELOW 140/90: ICD-10-CM

## 2022-08-18 DIAGNOSIS — L30.9 NIPPLE DERMATITIS: Primary | ICD-10-CM

## 2022-08-18 LAB
MAGNESIUM SERPL-MCNC: 2.4 MG/DL (ref 1.6–2.3)
URATE SERPL-MCNC: 5.1 MG/DL (ref 2.6–6)

## 2022-08-18 PROCEDURE — 84550 ASSAY OF BLOOD/URIC ACID: CPT | Performed by: PHYSICIAN ASSISTANT

## 2022-08-18 PROCEDURE — 83735 ASSAY OF MAGNESIUM: CPT | Performed by: PHYSICIAN ASSISTANT

## 2022-08-18 PROCEDURE — 99214 OFFICE O/P EST MOD 30 MIN: CPT | Performed by: PHYSICIAN ASSISTANT

## 2022-08-18 PROCEDURE — 36415 COLL VENOUS BLD VENIPUNCTURE: CPT | Performed by: PHYSICIAN ASSISTANT

## 2022-08-18 RX ORDER — TRIAMCINOLONE ACETONIDE 1 MG/G
CREAM TOPICAL
Qty: 30 G | Refills: 0 | Status: SHIPPED | OUTPATIENT
Start: 2022-08-18 | End: 2023-09-26

## 2022-08-18 ASSESSMENT — PATIENT HEALTH QUESTIONNAIRE - PHQ9
10. IF YOU CHECKED OFF ANY PROBLEMS, HOW DIFFICULT HAVE THESE PROBLEMS MADE IT FOR YOU TO DO YOUR WORK, TAKE CARE OF THINGS AT HOME, OR GET ALONG WITH OTHER PEOPLE: NOT DIFFICULT AT ALL
SUM OF ALL RESPONSES TO PHQ QUESTIONS 1-9: 3
SUM OF ALL RESPONSES TO PHQ QUESTIONS 1-9: 3

## 2022-08-18 ASSESSMENT — PAIN SCALES - GENERAL: PAINLEVEL: NO PAIN (0)

## 2022-08-18 NOTE — PROGRESS NOTES
Assessment & Plan     Nipple dermatitis  See HPI. Exam unremarkable today. No scaling, erosion, tenderness or discharge. Will have patient use topical hydrocortisone for next 3-5 days. I did advise her to schedule visit with breast specialist in the event the cream is ineffective. Reviewed mammogram from 02/2022. I do not feel that this needs to be repeated at this time. No masses found on exam.   - Adult General Surg Referral; Future  - triamcinolone (KENALOG) 0.1 % external cream; Apply thin layer to affected area twice daily    Multiple joint pain  Muscle cramping  Differential for patient's symptoms include dehydration, deconditioning, gout, restless leg, possibly radicular pains from back. I advised that we fill in the gaps for lab work. Pending the results can consider quality of life therapy such as gabapentin. Patient given stretches to perform prior to bed.   - Uric acid; Future  - Magnesium; Future  - Uric acid  - Magnesium    HTN, goal below 140/90  BP is elevated today at 142/100. I did not catch this until after the patient left. I will advise a FLOAT BP recheck in 2-4 weeks. I suspect that the BP was a product of anxiety, given the patient's history of this and nature of the exam.       Return in about 6 months (around 2/18/2023) for Return for scheduled annual checkup with PCP.    [facility] is a 67 year old, presenting for the following health issues:  Breast Problem (Left breast itching) and Leg Problem (Cramping at night)      History of Present Illness       Reason for visit:  Left breast itchy and discolored  Symptom onset:  3-7 days ago  Symptoms include:  See above  Symptom intensity:  Moderate  Symptom progression:  Staying the same  Had these symptoms before:  No  What makes it worse:  Leg cramps at night that wake me up  What makes it better:  No    She eats 2-3 servings of fruits and vegetables daily.She consumes 4  sweetened beverage(s) daily.She exercises with enough effort to increase her heart rate 10 to 19 minutes per day.  She exercises with enough effort to increase her heart rate 3 or less days per week.   She is taking medications regularly.    Today's PHQ-9         PHQ-9 Total Score: 3    PHQ-9 Q9 Thoughts of better off dead/self-harm past 2 weeks :   Not at all    How difficult have these problems made it for you to do your work, take care of things at home, or get along with other people: Not difficult at all     Patient is a 67 year old female who presents today with concerns about red rash over the left upper areola. She notice the rash this past Friday, 08/12. She recalls that the night before she had attended an outdoor concert, but does not recall any significant heat, sweating or irritation around the left breast/areola. She woke the morning of 08/12 to an itching sensation and small bump. She informs me that the bump seems to have resolved over this week, but there is an area of redness over the areola. Reviewed the most recent mammogram from 02/2022 and mammogram from 2020, both unremarkable. No pain, lumps or discharge from the breast/nipple.     Patient is also concerned about leg cramps which have been happening at night. She had a workup for this in Feb 2022 at which time it was found her iron stores were too high and that she was not drinking much water. She has tapered back on multivitamins and tried to drink a glass of water before bed. It is not clear as to whether the patient is drinking fluids throughout the day. Denies changes to medications, activity or diet. 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 When the leg cramping happens at night the patient says that it feels like her joints are catching and she will need to go for a walk to reduce the discomfort.     Review of Systems   Constitutional,  HEENT, cardiovascular, pulmonary, gi and gu systems are negative, except as otherwise noted.      Objective    BP (!) 142/100   Pulse 79   Temp 97.7  F (36.5  C) (Temporal)   Wt 78 kg (172 lb)   LMP 06/27/2010 (Exact Date)   SpO2 98%   BMI 27.94 kg/m    Body mass index is 27.94 kg/m .  Physical Exam   GENERAL: healthy, alert and no distress  NECK: no adenopathy, no asymmetry, masses, or scars and thyroid normal to palpation  RESP: lungs clear to auscultation - no rales, rhonchi or wheezes  BREAST: normal without masses, tenderness or nipple discharge, no palpable axillary masses or adenopathy and erythema over the left areola  CV: regular rate and rhythm, normal S1 S2, no S3 or S4, no murmur, click or rub, no peripheral edema and peripheral pulses strong  MS: no gross musculoskeletal defects noted, no edema  PSYCH: mentation appears normal, affect normal/bright    No results found for any visits on 08/18/22.                .  ..

## 2022-09-13 ENCOUNTER — OFFICE VISIT (OUTPATIENT)
Dept: SURGERY | Facility: CLINIC | Age: 67
End: 2022-09-13
Attending: PHYSICIAN ASSISTANT
Payer: COMMERCIAL

## 2022-09-13 VITALS
TEMPERATURE: 96.9 F | WEIGHT: 172 LBS | DIASTOLIC BLOOD PRESSURE: 110 MMHG | BODY MASS INDEX: 27.64 KG/M2 | HEIGHT: 66 IN | SYSTOLIC BLOOD PRESSURE: 150 MMHG

## 2022-09-13 DIAGNOSIS — L30.9 NIPPLE DERMATITIS: Primary | ICD-10-CM

## 2022-09-13 DIAGNOSIS — Z98.84 H/O GASTRIC BYPASS: ICD-10-CM

## 2022-09-13 PROCEDURE — 99203 OFFICE O/P NEW LOW 30 MIN: CPT | Performed by: SURGERY

## 2022-09-13 ASSESSMENT — PAIN SCALES - GENERAL: PAINLEVEL: NO PAIN (0)

## 2022-09-13 NOTE — PROGRESS NOTES
"  Assessment & Plan   Problem List Items Addressed This Visit        Other    H/O gastric bypass      Other Visit Diagnoses     Nipple dermatitis    -  Primary    BMI 27.0-27.9,adult             67-year-old female with left nipple dermatitis  -Seems like this has resolved with the hydrocortisone cream  -No residual masses, skin lesion, abnormality is noted on her breast exam today.  No abnormalities on the left nipple.  -She is to take a picture and put it on my chart if this area persists or recurs.  If this continues to persist I would proceed with likely a biopsy or further imaging.  -I reassured the patient that this is likely skin abnormality rather than breast tissue abnormality.  This is because the area resolved with hydrocortisone cream.  -All of her questions were answered to her satisfaction.    Review of the result(s) of each unique test - mammogram from Feb 2022  30 minutes spent on the date of the encounter doing chart review, patient visit and documentation        BMI:   Estimated body mass index is 27.94 kg/m  as calculated from the following:    Height as of this encounter: 1.671 m (5' 5.79\").    Weight as of this encounter: 78 kg (172 lb).       No follow-ups on file.    [facility] Medina MD  [facility] Obrien is a 67 year old, presenting for the following health issues:  Consult      Left breast skin irritation after a concert in middle of august 2022  Left it a while but worsens thus went to PCP; was given cortisone cream; redness went away; but she can feel something harden at the outer ridge of the left nipple.    Last mammogram in feb 2022; BIRAD 1  No similar rash elsewhere in the body  No itchiness; no pain; no nipple discharge  Unknown paternal side of family  Mom passed away of hodgkin but not sure if there is anything else involved.  Maternal aunt passed away with cancer but not sure which type.    Never had abnormal mammogram.             Review of " "Systems   Constitutional, HEENT, cardiovascular, pulmonary, GI, , musculoskeletal, neuro, skin, endocrine and psych systems are negative, except as otherwise noted.      Objective    BP (!) 150/110   Temp 96.9  F (36.1  C) (Temporal)   Ht 1.671 m (5' 5.79\")   Wt 78 kg (172 lb)   LMP 06/27/2010 (Exact Date)   BMI 27.94 kg/m    Body mass index is 27.94 kg/m .  Physical Exam  Vitals reviewed.   HENT:      Head: Normocephalic.   Eyes:      Conjunctiva/sclera: Conjunctivae normal.   Cardiovascular:      Pulses: Normal pulses.   Pulmonary:      Effort: Pulmonary effort is normal.   Chest:   Breasts:      Right: Normal. No skin change or supraclavicular adenopathy.      Left: Normal. No skin change, axillary adenopathy or supraclavicular adenopathy.       Musculoskeletal:         General: Normal range of motion.      Cervical back: Normal range of motion.   Lymphadenopathy:      Upper Body:      Right upper body: No supraclavicular adenopathy.      Left upper body: No supraclavicular or axillary adenopathy.   Skin:     General: Skin is warm.      Capillary Refill: Capillary refill takes less than 2 seconds.   Neurological:      General: No focal deficit present.      Mental Status: She is alert.   Psychiatric:         Mood and Affect: Mood normal.                    "

## 2022-09-13 NOTE — LETTER
"    9/13/2022         RE: Marlee Tobar  [address] [facility] [address]        Dear Colleague,    Thank you for referring your patient, Marlee Tobar, to the [facility]. Please see a copy of my visit note below.      Assessment & Plan   Problem List Items Addressed This Visit        Other    H/O gastric bypass      Other Visit Diagnoses     Nipple dermatitis    -  Primary    BMI 27.0-27.9,adult             67-year-old female with left nipple dermatitis  -Seems like this has resolved with the hydrocortisone cream  -No residual masses, skin lesion, abnormality is noted on her breast exam today.  No abnormalities on the left nipple.  -She is to take a picture and put it on my chart if this area persists or recurs.  If this continues to persist I would proceed with likely a biopsy or further imaging.  -I reassured the patient that this is likely skin abnormality rather than breast tissue abnormality.  This is because the area resolved with hydrocortisone cream.  -All of her questions were answered to her satisfaction.    Review of the result(s) of each unique test - mammogram from Feb 2022  30 minutes spent on the date of the encounter doing chart review, patient visit and documentation        BMI:   Estimated body mass index is 27.94 kg/m  as calculated from the following:    Height as of this encounter: 1.671 m (5' 5.79\").    Weight as of this encounter: 78 kg (172 lb).       No follow-ups on file.    Emily Medina MD  [facility]    Jb Obrien is a 67 year old, presenting for the following health issues:  Consult      Left breast skin irritation after a concert in middle of august 2022  Left it a while but worsens thus went to PCP; was given cortisone cream; redness went away; but she can feel something harden at the outer ridge of the left nipple.    Last mammogram in feb 2022; BIRAD 1  No similar rash elsewhere in the body  No itchiness; no pain; no " "nipple discharge  Unknown paternal side of family  Mom passed away of hodgkin but not sure if there is anything else involved.  Maternal aunt passed away with cancer but not sure which type.    Never had abnormal mammogram.             Review of Systems   Constitutional, HEENT, cardiovascular, pulmonary, GI, , musculoskeletal, neuro, skin, endocrine and psych systems are negative, except as otherwise noted.      Objective    BP (!) 150/110   Temp 96.9  F (36.1  C) (Temporal)   Ht 1.671 m (5' 5.79\")   Wt 78 kg (172 lb)   LMP 06/27/2010 (Exact Date)   BMI 27.94 kg/m    Body mass index is 27.94 kg/m .  Physical Exam  Vitals reviewed.   HENT:      Head: Normocephalic.   Eyes:      Conjunctiva/sclera: Conjunctivae normal.   Cardiovascular:      Pulses: Normal pulses.   Pulmonary:      Effort: Pulmonary effort is normal.   Chest:   Breasts:      Right: Normal. No skin change or supraclavicular adenopathy.      Left: Normal. No skin change, axillary adenopathy or supraclavicular adenopathy.       Musculoskeletal:         General: Normal range of motion.      Cervical back: Normal range of motion.   Lymphadenopathy:      Upper Body:      Right upper body: No supraclavicular adenopathy.      Left upper body: No supraclavicular or axillary adenopathy.   Skin:     General: Skin is warm.      Capillary Refill: Capillary refill takes less than 2 seconds.   Neurological:      General: No focal deficit present.      Mental Status: She is alert.   Psychiatric:         Mood and Affect: Mood normal.                        Again, thank you for allowing me to participate in the care of your patient.        Sincerely,        Emily Medina MD    "

## 2022-09-15 ENCOUNTER — MYC MEDICAL ADVICE (OUTPATIENT)
Dept: FAMILY MEDICINE | Facility: CLINIC | Age: 67
End: 2022-09-15

## 2022-09-19 ENCOUNTER — MYC MEDICAL ADVICE (OUTPATIENT)
Dept: FAMILY MEDICINE | Facility: CLINIC | Age: 67
End: 2022-09-19

## 2022-09-20 NOTE — TELEPHONE ENCOUNTER
Routing to provider to review and advise. Pt needs a hospital follow up visit.    No visits available this week.      Routing to provider to advise.      Chanda Saunders RN  [facility]

## 2022-09-26 ENCOUNTER — OFFICE VISIT (OUTPATIENT)
Dept: FAMILY MEDICINE | Facility: OTHER | Age: 67
End: 2022-09-26
Payer: COMMERCIAL

## 2022-09-26 VITALS
OXYGEN SATURATION: 99 % | HEART RATE: 89 BPM | TEMPERATURE: 97.8 F | HEIGHT: 66 IN | BODY MASS INDEX: 27.8 KG/M2 | SYSTOLIC BLOOD PRESSURE: 138 MMHG | RESPIRATION RATE: 16 BRPM | WEIGHT: 173 LBS | DIASTOLIC BLOOD PRESSURE: 74 MMHG

## 2022-09-26 DIAGNOSIS — Z87.828 HISTORY OF MOTOR VEHICLE ACCIDENT: ICD-10-CM

## 2022-09-26 DIAGNOSIS — R20.2 PARESTHESIA: Primary | ICD-10-CM

## 2022-09-26 DIAGNOSIS — E55.9 VITAMIN D DEFICIENCY, UNSPECIFIED: ICD-10-CM

## 2022-09-26 LAB
DEPRECATED CALCIDIOL+CALCIFEROL SERPL-MC: 56 UG/L (ref 20–75)
FOLATE SERPL-MCNC: 14.1 NG/ML (ref 4.6–34.8)
TSH SERPL DL<=0.005 MIU/L-ACNC: 1.64 MU/L (ref 0.4–4)
VIT B12 SERPL-MCNC: 3996 PG/ML (ref 232–1245)

## 2022-09-26 PROCEDURE — 84443 ASSAY THYROID STIM HORMONE: CPT | Performed by: PHYSICIAN ASSISTANT

## 2022-09-26 PROCEDURE — 82306 VITAMIN D 25 HYDROXY: CPT | Performed by: PHYSICIAN ASSISTANT

## 2022-09-26 PROCEDURE — 82607 VITAMIN B-12: CPT | Performed by: PHYSICIAN ASSISTANT

## 2022-09-26 PROCEDURE — 99214 OFFICE O/P EST MOD 30 MIN: CPT | Performed by: PHYSICIAN ASSISTANT

## 2022-09-26 PROCEDURE — 36415 COLL VENOUS BLD VENIPUNCTURE: CPT | Performed by: PHYSICIAN ASSISTANT

## 2022-09-26 PROCEDURE — 82746 ASSAY OF FOLIC ACID SERUM: CPT | Performed by: PHYSICIAN ASSISTANT

## 2022-09-26 RX ORDER — GABAPENTIN 300 MG/1
CAPSULE ORAL
Qty: 81 CAPSULE | Refills: 0 | Status: SHIPPED | OUTPATIENT
Start: 2022-09-26 | End: 2022-10-19 | Stop reason: DRUGHIGH

## 2022-09-26 ASSESSMENT — ENCOUNTER SYMPTOMS: NUMBNESS: 1

## 2022-09-26 ASSESSMENT — PAIN SCALES - GENERAL: PAINLEVEL: NO PAIN (0)
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
## 2022-10-19 ENCOUNTER — OFFICE VISIT (OUTPATIENT)
Dept: FAMILY MEDICINE | Facility: CLINIC | Age: 67
End: 2022-10-19
Payer: COMMERCIAL

## 2022-10-19 VITALS
OXYGEN SATURATION: 98 % | HEIGHT: 66 IN | HEART RATE: 74 BPM | DIASTOLIC BLOOD PRESSURE: 80 MMHG | BODY MASS INDEX: 28.06 KG/M2 | TEMPERATURE: 97.3 F | WEIGHT: 174.6 LBS | SYSTOLIC BLOOD PRESSURE: 160 MMHG

## 2022-10-19 DIAGNOSIS — R20.2 PARESTHESIA OF BOTH FEET: Primary | ICD-10-CM

## 2022-10-19 DIAGNOSIS — M79.89 PAIN AND SWELLING OF LEFT LOWER EXTREMITY: ICD-10-CM

## 2022-10-19 DIAGNOSIS — Z23 HIGH PRIORITY FOR COVID-19 VACCINATION: ICD-10-CM

## 2022-10-19 DIAGNOSIS — Z23 NEED FOR PROPHYLACTIC VACCINATION AND INOCULATION AGAINST INFLUENZA: ICD-10-CM

## 2022-10-19 DIAGNOSIS — R79.9 ABNORMAL FINDING OF BLOOD CHEMISTRY, UNSPECIFIED: ICD-10-CM

## 2022-10-19 DIAGNOSIS — Z13.1 SCREENING FOR DIABETES MELLITUS: ICD-10-CM

## 2022-10-19 DIAGNOSIS — M79.605 PAIN AND SWELLING OF LEFT LOWER EXTREMITY: ICD-10-CM

## 2022-10-19 LAB
HBA1C MFR BLD: 5.3 % (ref 0–5.6)
TOTAL PROTEIN SERUM FOR ELP: 6.7 G/DL (ref 6.4–8.3)

## 2022-10-19 PROCEDURE — G0008 ADMIN INFLUENZA VIRUS VAC: HCPCS | Performed by: INTERNAL MEDICINE

## 2022-10-19 PROCEDURE — 83036 HEMOGLOBIN GLYCOSYLATED A1C: CPT | Performed by: INTERNAL MEDICINE

## 2022-10-19 PROCEDURE — 90662 IIV NO PRSV INCREASED AG IM: CPT | Performed by: INTERNAL MEDICINE

## 2022-10-19 PROCEDURE — 91312 COVID-19,PF,PFIZER BOOSTER BIVALENT: CPT | Performed by: INTERNAL MEDICINE

## 2022-10-19 PROCEDURE — 36415 COLL VENOUS BLD VENIPUNCTURE: CPT | Performed by: INTERNAL MEDICINE

## 2022-10-19 PROCEDURE — 84155 ASSAY OF PROTEIN SERUM: CPT | Performed by: INTERNAL MEDICINE

## 2022-10-19 PROCEDURE — 99214 OFFICE O/P EST MOD 30 MIN: CPT | Mod: 25 | Performed by: INTERNAL MEDICINE

## 2022-10-19 PROCEDURE — 84165 PROTEIN E-PHORESIS SERUM: CPT

## 2022-10-19 PROCEDURE — 0124A COVID-19,PF,PFIZER BOOSTER BIVALENT: CPT | Performed by: INTERNAL MEDICINE

## 2022-10-19 RX ORDER — GABAPENTIN 100 MG/1
100 CAPSULE ORAL 3 TIMES DAILY
Qty: 90 CAPSULE | Refills: 5 | Status: SHIPPED | OUTPATIENT
Start: 2022-10-19 | End: 2022-11-20

## 2022-10-19 ASSESSMENT — PAIN SCALES - GENERAL: PAINLEVEL: NO PAIN (0)

## 2022-10-19 NOTE — NURSING NOTE
Prior to immunization administration, verified patients identity using patient s name and date of birth. Please see Immunization Activity for additional information.     Screening Questionnaire for Adult Immunization    Are you sick today?   No   Do you have allergies to medications, food, a vaccine component or latex?   No   Have you ever had a serious reaction after receiving a vaccination?   No   Do you have a long-term health problem with heart, lung, kidney, or metabolic disease (e.g., diabetes), asthma, a blood disorder, no spleen, complement component deficiency, a cochlear implant, or a spinal fluid leak?  Are you on long-term aspirin therapy?   No   Do you have cancer, leukemia, HIV/AIDS, or any other immune system problem?   No   Do you have a parent, brother, or sister with an immune system problem?   No   In the past 3 months, have you taken medications that affect  your immune system, such as prednisone, other steroids, or anticancer drugs; drugs for the treatment of rheumatoid arthritis, Crohn s disease, or psoriasis; or have you had radiation treatments?   No   Have you had a seizure, or a brain or other nervous system problem?   No   During the past year, have you received a transfusion of blood or blood    products, or been given immune (gamma) globulin or antiviral drug?   No   For women: Are you pregnant or is there a chance you could become       pregnant during the next month?   No   Have you received any vaccinations in the past 4 weeks?   No     Immunization questionnaire answers were all negative.        Per orders of Dr. Merrill, injection of influenza high dose and bivalent pfizer given by Marilee Aguilar MA. Patient instructed to remain in clinic for 15 minutes afterwards, and to report any adverse reaction to me immediately.       Screening performed by Marilee Aguilar MA on 10/19/2022 at 9:31 AM.
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
## 2022-10-20 ENCOUNTER — ANCILLARY PROCEDURE (OUTPATIENT)
Dept: ULTRASOUND IMAGING | Facility: CLINIC | Age: 67
End: 2022-10-20
Attending: INTERNAL MEDICINE
Payer: COMMERCIAL

## 2022-10-20 DIAGNOSIS — M79.89 PAIN AND SWELLING OF LEFT LOWER EXTREMITY: ICD-10-CM

## 2022-10-20 DIAGNOSIS — M79.605 PAIN AND SWELLING OF LEFT LOWER EXTREMITY: ICD-10-CM

## 2022-10-20 LAB
ALBUMIN SERPL ELPH-MCNC: 4.2 G/DL (ref 3.7–5.1)
ALPHA1 GLOB SERPL ELPH-MCNC: 0.2 G/DL (ref 0.2–0.4)
ALPHA2 GLOB SERPL ELPH-MCNC: 0.8 G/DL (ref 0.5–0.9)
B-GLOBULIN SERPL ELPH-MCNC: 0.6 G/DL (ref 0.6–1)
GAMMA GLOB SERPL ELPH-MCNC: 0.8 G/DL (ref 0.7–1.6)
M PROTEIN SERPL ELPH-MCNC: 0 G/DL
PROT PATTERN SERPL ELPH-IMP: NORMAL

## 2022-10-20 PROCEDURE — 93971 EXTREMITY STUDY: CPT | Mod: LT | Performed by: SURGERY

## 2022-10-26 ENCOUNTER — MYC MEDICAL ADVICE (OUTPATIENT)
Dept: FAMILY MEDICINE | Facility: CLINIC | Age: 67
End: 2022-10-26

## 2022-10-26 NOTE — TELEPHONE ENCOUNTER
Routing to provider.      Refer to telephone encounter from 10/19.        Chanda Saunders RN  [facility]

## 2022-11-04 NOTE — TELEPHONE ENCOUNTER
Called and let a voicemail message for patient to return our call to schedule an appointment next month. (ok per provider same day appt in December)  Lena Cohen [facility]  2nd Floor  Primary Care

## 2022-11-04 NOTE — TELEPHONE ENCOUNTER
Please schedule a follow-up appointment with this provider next month.    May use Same Day slot.    Notify patient once appointment is made.

## 2022-11-17 ENCOUNTER — TELEPHONE (OUTPATIENT)
Dept: FAMILY MEDICINE | Facility: CLINIC | Age: 67
End: 2022-11-17

## 2022-11-17 NOTE — TELEPHONE ENCOUNTER
Patient Quality Outreach    Patient is due for the following:   Hypertension -  BP check    Next Steps:   Patient has upcoming appointment, these items will be addressed at that time.    Type of outreach:    Chart review performed, no outreach needed.      Questions for provider review:    None     Chanda Liu

## 2022-11-17 NOTE — TELEPHONE ENCOUNTER
How Severe Is Your Skin Lesion?: moderate Pt stated she is currently working out of state and will have a different schedule when she gets back to [place]. She will know more after June 4, so I have postponed this until June 7.    Thank you,  Roxann Joseph, Patient Representative   Have Your Skin Lesions Been Treated?: not been treated Is This A New Presentation, Or A Follow-Up?: Skin Lesions yes

## 2022-12-01 ENCOUNTER — OFFICE VISIT (OUTPATIENT)
Dept: FAMILY MEDICINE | Facility: CLINIC | Age: 67
End: 2022-12-01
Payer: COMMERCIAL

## 2022-12-01 ENCOUNTER — MEDICAL CORRESPONDENCE (OUTPATIENT)
Dept: HEALTH INFORMATION MANAGEMENT | Facility: CLINIC | Age: 67
End: 2022-12-01

## 2022-12-01 VITALS
OXYGEN SATURATION: 99 % | RESPIRATION RATE: 16 BRPM | SYSTOLIC BLOOD PRESSURE: 142 MMHG | WEIGHT: 174.5 LBS | BODY MASS INDEX: 28.04 KG/M2 | TEMPERATURE: 97.3 F | HEART RATE: 74 BPM | HEIGHT: 66 IN | DIASTOLIC BLOOD PRESSURE: 90 MMHG

## 2022-12-01 DIAGNOSIS — Z23 NEED FOR PROPHYLACTIC VACCINATION AGAINST STREPTOCOCCUS PNEUMONIAE (PNEUMOCOCCUS): ICD-10-CM

## 2022-12-01 DIAGNOSIS — K21.9 GASTROESOPHAGEAL REFLUX DISEASE WITHOUT ESOPHAGITIS: ICD-10-CM

## 2022-12-01 DIAGNOSIS — I10 HTN, GOAL BELOW 140/90: Primary | ICD-10-CM

## 2022-12-01 DIAGNOSIS — R91.1 SOLITARY LUNG NODULE: ICD-10-CM

## 2022-12-01 DIAGNOSIS — N64.4 PAIN OF LEFT BREAST: ICD-10-CM

## 2022-12-01 DIAGNOSIS — F33.41 MAJOR DEPRESSIVE DISORDER, RECURRENT EPISODE, IN PARTIAL REMISSION (H): ICD-10-CM

## 2022-12-01 PROCEDURE — 99214 OFFICE O/P EST MOD 30 MIN: CPT | Mod: 25 | Performed by: INTERNAL MEDICINE

## 2022-12-01 PROCEDURE — 90677 PCV20 VACCINE IM: CPT | Performed by: INTERNAL MEDICINE

## 2022-12-01 PROCEDURE — G0009 ADMIN PNEUMOCOCCAL VACCINE: HCPCS | Performed by: INTERNAL MEDICINE

## 2022-12-01 RX ORDER — PANTOPRAZOLE SODIUM 40 MG/1
40 TABLET, DELAYED RELEASE ORAL 2 TIMES DAILY
Qty: 180 TABLET | Refills: 3 | Status: SHIPPED | OUTPATIENT
Start: 2022-12-01 | End: 2023-03-13

## 2022-12-01 RX ORDER — TELMISARTAN 20 MG/1
20 TABLET ORAL DAILY
Qty: 15 TABLET | Refills: 0 | Status: SHIPPED | OUTPATIENT
Start: 2022-12-01 | End: 2022-12-05

## 2022-12-01 RX ORDER — BUPROPION HYDROCHLORIDE 150 MG/1
150 TABLET ORAL EVERY MORNING
Qty: 90 TABLET | Refills: 3 | Status: SHIPPED | OUTPATIENT
Start: 2022-12-01 | End: 2023-10-31

## 2022-12-01 ASSESSMENT — PAIN SCALES - GENERAL: PAINLEVEL: SEVERE PAIN (7)

## 2022-12-01 NOTE — PROGRESS NOTES
Camille is a 67 year old presenting for the following health issues:    [facility] Internal Medicine Progress Note           Assessment and Plan:     HTN, goal below 140/90  - telmisartan (MICARDIS) 20 MG tablet; Take 1 tablet (20 mg) by mouth daily    Major depressive disorder, recurrent episode, in partial remission (H)  - buPROPion (WELLBUTRIN XL) 150 MG 24 hr tablet; Take 1 tablet (150 mg) by mouth every morning    Gastroesophageal reflux disease without esophagitis  - pantoprazole (PROTONIX) 40 MG EC tablet; Take 1 tablet (40 mg) by mouth 2 times daily    Solitary lung nodule  - CT Chest w/o Contrast; Future    Pain of left breast  - MA Diagnostic Digital Left; Future  - US Breast Left Limited 1-3 Quadrants; Future    Need for prophylactic vaccination against Streptococcus pneumoniae (pneumococcus)  - PNEUMOCOCCAL 20 VALENT CONJUGATE (PREVNAR 20)           Interval History:     Hypertension Follow-up      Do you check your blood pressure regularly outside of the clinic? No     Are you following a low salt diet? Yes    Are your blood pressures ever more than 140 on the top number (systolic) OR more   than 90 on the bottom number (diastolic), for example 140/90?  N/A      How many servings of fruits and vegetables do you eat daily?  2-3    On average, how many sweetened beverages do you drink each day (Examples: soda, juice, sweet tea, etc.  Do NOT count diet or artificially sweetened beverages)?   0    How many days per week do you exercise enough to make your heart beat faster? 3 or less    How many minutes a day do you exercise enough to make your heart beat faster? 10 - 19    How many days per week do you miss taking your medication? 0              Significant Problems:   Patient Active Problem List   Diagnosis     iamJOINT PAIN-LOWER LEG     iamPOSTSURGICAL STATES NEC     CARDIOVASCULAR SCREENING; LDL GOAL LESS THAN 130     HTN, goal below 140/90     Gallstones     Cholelithiases     GERD  "(gastroesophageal reflux disease)     Post-menopausal     Health Care Home     Pancreatitis     Varicose vein of leg     Torn earlobe     Dyspareunia     Microalbuminuria     Major depressive disorder, recurrent episode, in partial remission (H)     Primary hypercoagulable state (H)     Situational anxiety     Overweight     Syncope, near     Advance care planning     H/O gastric bypass     H/O gastrointestinal hemorrhage     Personal history of PE (pulmonary embolism)     Insomnia, unspecified type     Pulmonary nodules     Polyarthralgia     Positive TIFFANI (antinuclear antibody)     Overactive bladder     Paresthesia of both feet              Review of Systems:   CONSTITUTIONAL: NEGATIVE for fever, chills, change in weight  INTEGUMENTARY/SKIN: NEGATIVE for worrisome rashes, moles or lesions  EYES: NEGATIVE for vision changes or irritation  ENT/MOUTH: NEGATIVE for ear, mouth and throat problems  RESP: NEGATIVE for significant cough or SOB  BREAST: NEGATIVE for masses, tenderness or discharge  CV: NEGATIVE for chest pain, palpitations or peripheral edema  GI: NEGATIVE for nausea, abdominal pain, heartburn, or change in bowel habits  : NEGATIVE for frequency, dysuria, or hematuria  MUSCULOSKELETAL: NEGATIVE for significant arthralgias or myalgia  NEURO: NEGATIVE for weakness, dizziness or paresthesias  ENDOCRINE: NEGATIVE for temperature intolerance, skin/hair changes  HEME: NEGATIVE for bleeding problems  PSYCHIATRIC: NEGATIVE for changes in mood or affect             Physical Exam:   BP (!) 142/90 (BP Location: Right arm, Patient Position: Sitting, Cuff Size: Adult Large)   Pulse 74   Temp 97.3  F (36.3  C) (Tympanic)   Resp 16   Ht 1.675 m (5' 5.95\")   Wt 79.2 kg (174 lb 8 oz)   LMP 06/27/2010 (Exact Date)   SpO2 99%   BMI 28.21 kg/m    Constitutional: Awake, alert, cooperative, no apparent distress, and appears stated age.  Eyes: extra-ocular muscles intact and sclera clear  ENT: normocepalic, without " obvious abnormality  Lungs: No increased work of breathing, good air exchange, clear to auscultation bilaterally, no crackles or wheezing.  Cardiovascular: Regular rate and rhythm, normal S1 and S2, no S3 or S4, and no murmur noted.  Musculoskeletal: no lower extremity pitting edema present  Neurologic: Motor Exam:  moves all extremities well and symmetrically  Sensory:  Sensory intact  Neuropsychiatric: Normal affect, mood, orientation, memory and insight.  Skin: No rashes, erythema, pallor, petechia or purpura.          Data:       EXAMINATION: CT CHEST W/O CONTRAST, 7/14/2020 7:42 AM     CLINICAL HISTORY: Lung nodule (Pulmonary nodule); Pulmonary nodules     COMPARISON: 1/30/2019, 12/20/2017, 10/12/2017.     TECHNIQUE: CT imaging obtained through the chest without contrast.  Coronal and axial MIP reformatted images obtained.      CONTRAST:  none.     FINDINGS:     Normal heart size, no pericardial effusion. Normal caliber thoracic  aorta and main pulmonary artery. Normal branching of the thoracic  great vessels. Calcifications of the aortic arch.     Normal thyroid. No suspicious lower cervical, axillary, or mediastinal  lymphadenopathy. Normal caliber esophagus.     Central tracheobronchial tree is widely patent. No pleural effusion or  pneumothorax. No focal pulmonary opacity or consolidation. Stable 6 mm  solid nodule right middle lobe (series 3, image 167). Decrease  conspicuity of the 2 mm right apical nodule since prior. No new or  suspicious pulmonary nodules.     Evaluation of the upper abdomen is limited. Gastric bypass. No acute  or suspicious osseous abnormality. Degenerative changes of the spine.                                                                      IMPRESSION:  1. Stable 6 mm right middle lobe nodule dating back to at least  10/12/2017.  2. Decrease of the tiny right apical groundglass nodule. No follow-up  is required.     I have personally reviewed the examination and initial  interpretation  and I agree with the findings.     NAYELY WISE MD     Disposition:  Follow-up in 2 - 3 weeks.      Bandar Merrill MD  Internal Medicine  [facility] Team

## 2022-12-01 NOTE — NURSING NOTE
Prior to immunization administration, verified patients identity using patient s name and date of birth. Please see Immunization Activity for additional information.     Screening Questionnaire for Pediatric Immunization    Is the child sick today?   No   Does the child have allergies to medications, food, a vaccine component, or latex?   No   Has the child had a serious reaction to a vaccine in the past?   No   Does the child have a long-term health problem with lung, heart, kidney or metabolic disease (e.g., diabetes), asthma, a blood disorder, no spleen, complement component deficiency, a cochlear implant, or a spinal fluid leak?  Is he/she on long-term aspirin therapy?   No   If the child to be vaccinated is 2 through 4 years of age, has a healthcare provider told you that the child had wheezing or asthma in the  past 12 months?   No   If your child is a baby, have you ever been told he or she has had intussusception?   No   Has the child, sibling or parent had a seizure, has the child had brain or other nervous system problems?   No   Does the child have cancer, leukemia, AIDS, or any immune system         problem?   No   Does the child have a parent, brother, or sister with an immune system problem?   No   In the past 3 months, has the child taken medications that affect the immune system such as prednisone, other steroids, or anticancer drugs; drugs for the treatment of rheumatoid arthritis, Crohn s disease, or psoriasis; or had radiation treatments?   No   In the past year, has the child received a transfusion of blood or blood products, or been given immune (gamma) globulin or an antiviral drug?   No   Is the child/teen pregnant or is there a chance that she could become       pregnant during the next month?   No   Has the child received any vaccinations in the past 4 weeks?   No      Immunization questionnaire answers were all negative.       Per orders of Dr. Merrill, injection of PCV20 given by Tiesha  Mo MCCARTY. Patient instructed to remain in clinic for 15 minutes afterwards, and to report any adverse reaction to me immediately.    Screening performed by Tiesha Hassan MA on 12/1/2022 at 4:32 PM.

## 2022-12-05 ENCOUNTER — TELEPHONE (OUTPATIENT)
Dept: FAMILY MEDICINE | Facility: CLINIC | Age: 67
End: 2022-12-05

## 2022-12-05 RX ORDER — TELMISARTAN 20 MG/1
20 TABLET ORAL DAILY
Qty: 15 TABLET | Refills: 0 | Status: SHIPPED | OUTPATIENT
Start: 2022-12-05 | End: 2022-12-23

## 2022-12-05 NOTE — TELEPHONE ENCOUNTER
Fax from pharmacy:    Due to unbreakable pack size we can only dispense in multiples of 30.    If long term med, please clarify qty/ds.    If short term, please send to local pharmacy.

## 2022-12-05 NOTE — TELEPHONE ENCOUNTER
Outpatient Medication Detail     Disp Refills Start End FORTINO   telmisartan (MICARDIS) 20 MG tablet 15 tablet 0 12/5/2022  No   Sig - Route: Take 1 tablet (20 mg) by mouth daily - Oral   Sent to pharmacy as: Telmisartan 20 MG Oral Tablet (MICARDIS)   Class: E-Prescribe   Order: [id number]   E-Prescribing Status: Receipt confirmed by pharmacy (12/5/2022  5:09 PM CST)

## 2022-12-14 ENCOUNTER — ANCILLARY PROCEDURE (OUTPATIENT)
Dept: CT IMAGING | Facility: CLINIC | Age: 67
End: 2022-12-14
Attending: INTERNAL MEDICINE
Payer: COMMERCIAL

## 2022-12-14 ENCOUNTER — ANCILLARY PROCEDURE (OUTPATIENT)
Dept: ULTRASOUND IMAGING | Facility: CLINIC | Age: 67
End: 2022-12-14
Attending: INTERNAL MEDICINE
Payer: COMMERCIAL

## 2022-12-14 ENCOUNTER — ANCILLARY PROCEDURE (OUTPATIENT)
Dept: MAMMOGRAPHY | Facility: CLINIC | Age: 67
End: 2022-12-14
Attending: INTERNAL MEDICINE
Payer: COMMERCIAL

## 2022-12-14 DIAGNOSIS — R91.1 SOLITARY LUNG NODULE: ICD-10-CM

## 2022-12-14 DIAGNOSIS — N64.4 PAIN OF LEFT BREAST: ICD-10-CM

## 2022-12-14 PROCEDURE — 71250 CT THORAX DX C-: CPT | Mod: GC | Performed by: RADIOLOGY

## 2022-12-14 PROCEDURE — G0279 TOMOSYNTHESIS, MAMMO: HCPCS | Performed by: RADIOLOGY

## 2022-12-14 PROCEDURE — 76642 ULTRASOUND BREAST LIMITED: CPT | Mod: LT | Performed by: RADIOLOGY

## 2022-12-14 PROCEDURE — 77066 DX MAMMO INCL CAD BI: CPT | Performed by: RADIOLOGY

## 2022-12-23 ENCOUNTER — MYC REFILL (OUTPATIENT)
Dept: FAMILY MEDICINE | Facility: CLINIC | Age: 67
End: 2022-12-23

## 2022-12-23 DIAGNOSIS — E78.5 HYPERLIPIDEMIA LDL GOAL <70: Primary | ICD-10-CM

## 2022-12-23 DIAGNOSIS — I10 HTN, GOAL BELOW 140/90: ICD-10-CM

## 2022-12-26 RX ORDER — ATORVASTATIN CALCIUM 10 MG/1
10 TABLET, FILM COATED ORAL DAILY
Qty: 90 TABLET | Refills: 3 | Status: SHIPPED | OUTPATIENT
Start: 2022-12-26 | End: 2023-12-19

## 2022-12-26 RX ORDER — TELMISARTAN 20 MG/1
20 TABLET ORAL DAILY
Qty: 90 TABLET | Refills: 0 | Status: SHIPPED | OUTPATIENT
Start: 2022-12-26 | End: 2023-03-27

## 2023-01-16 ENCOUNTER — OFFICE VISIT (OUTPATIENT)
Dept: NEUROLOGY | Facility: CLINIC | Age: 68
End: 2023-01-16
Attending: INTERNAL MEDICINE
Payer: COMMERCIAL

## 2023-01-16 DIAGNOSIS — G62.9 SENSORY NEUROPATHY: Primary | ICD-10-CM

## 2023-01-16 DIAGNOSIS — R94.131 ABNORMAL EMG: ICD-10-CM

## 2023-01-16 DIAGNOSIS — R20.2 PARESTHESIA OF BOTH FEET: ICD-10-CM

## 2023-01-16 PROCEDURE — 95911 NRV CNDJ TEST 9-10 STUDIES: CPT | Performed by: PSYCHIATRY & NEUROLOGY

## 2023-01-16 PROCEDURE — 95886 MUSC TEST DONE W/N TEST COMP: CPT | Performed by: PSYCHIATRY & NEUROLOGY

## 2023-01-16 NOTE — LETTER
2023         RE: Marlee Tobar  [address] [facility] [address]        Dear Colleague,    Thank you for referring your patient, Marlee Tobar, to the [facility] NEUROLOGY CLINIC [place]. Please see a copy of my visit note below.                        [facility]  Electrodiagnostic Laboratory                 Department of Neurology                                                                                                         Test Date:  2023    Patient: Marlee Tobar : 1955 Physician: Gray Taylor MD   Sex: Female AGE: 67 year Ref Phys:    ID#: [id number]   Technician: Mukul Ambrocio     History and Examination:  Marlee Tobar is a 67 year old woman with paresthesias in the feet and pain in the back of the left thigh. She is referred for evaluation of possible sensory polyneuropathy and lumbosacral radiculopathy.    Techniques:  Motor conduction studies were done with surface recording electrodes. Sensory conduction studies were performed with surface electrodes, unless indicated otherwise by (n), designating the use of subdermal recording electrodes. Temperature was monitored and recorded throughout the study. EMG was done with a concentric needle electrode.     Results:  Sural sensory nerve action potentials were mildly attenuated bilaterally. Bilateral superficial fibular and right radial sensory conduction studies were normal. Bilateral fibular and tibial motor conduction studies were normal. Electromyography of the left lower limb was normal.     Interpretation:  This is an abnormal study, demonstrating electrophysiologic evidence of the followin. Possible mild sensory neuropathy affecting bilateral lower limbs. Specifically, sural sensory nerve action potential amplitudes are slightly below this laboratory's lower limit of the normal range for a person of this age, but at the lower limit based upon one study of nerve conduction norms in  this age group (Muscle Nerve 49:564-569, 2014).  2. No evidence of lumbosacral radiculopathy.    _____________________________  Gray Taylor MD  Board Certified in Clinical Neurophysiology and Neuromuscular Medicine        Nerve Conduction Studies  Motor Sites      Latency Amplitude Neg. Amp Diff Distance Velocity Neg. Dur Neg Area Diff Temperature Comment   Site (ms) Norm (mV) Norm % cm m/s Norm ms %  C    Left Fibular (EDB) Motor   Ankle 4.0  < 6.0 3.4  > 2.0  8   5.3  30.8    Bel Fib Head 10.7 - 3.7 - 8.8 30.5 46  > 38 6.1 11.7 31    Pop Fossa 12.5 - 3.7 - 0 8 44  > 38 5.8 0 31    Right Fibular (EDB) Motor   Ankle 3.9  < 6.0 4.1  > 2.0  8   6.3  31    Bel Fib Head 10.7 - 4.1 - 0 31.8 47  > 38 6.9 4.1 31.1    Pop Fossa 12.2 - 4.0 - -2.4 7.5 50  > 38 6.9 -1.99 31.1    Left Tibial (AHB) Motor   Ankle 4.3  < 6.5 9.6  > 4.4  8   6.9  31.6    Knee 13.8 - 7.2 - -25.0 37 39  > 38 7.7 -6.0 32.1    Right Tibial (AHB) Motor   Ankle 4.4  < 6.5 9.0  > 4.4  8   6.4  31.2    Knee 13.4 - 7.1 - -21.1 35.5 39  > 38 7.5 -6.1 31      Sensory Sites      Onset Lat Peak Lat Amp (O-P) Amp (P-P) Segment Distance Velocity Temperature   Site ms ms  V Norm  V  cm m/s Norm  C   Right Radial Sensory   Forearm-Wrist 1.65 2.4 24  > 15 29 Forearm-Wrist 10 61 - 32   Left Superficial Fibular Sensory   14 cm-Ankle 2.6 3.4 5  > 3 5 14 cm-Ankle 12.5 48  > 38 31.5   Right Superficial Fibular Sensory   14 cm-Ankle 2.4 3.5 5  > 3 5 14 cm-Ankle 12.5 52  > 38 31.1   Left Sural Sensory   Calf-Lat Mall 3.4 4.4 4  > 5 3 Calf-Lat Mall 14 41  > 38 31.6   Right Sural Sensory   Calf-Lat Mall 2.7 3.7 3  > 5 4 Calf-Lat Mall 14 52  > 38 30.2       Electromyography     Side Muscle Ins Act Fibs/PSW Fasc HF Amp Dur Poly Recrt Int Pat   Left Tib Anterior Nml None Nml 0 Nml Nml 0 Nml Nml   Left Gastroc MH Nml None Nml 0 Nml Nml 0 Nml Nml   Left Vastus Lat Nml None Nml 0 Nml Nml 0 Nml Nml   Left Biceps Fem SH Nml None Nml 0 Nml Nml 0 Nml Nml   Left Gluteus Med Nml None  Nml 0 Nml Nml 0 Nml Nml   Left L5 Parasp Nml None Nml 0              NCS Waveforms:    Motor                Sensory                              Again, thank you for allowing me to participate in the care of your patient.        Sincerely,        Gray Taylor MD

## 2023-01-16 NOTE — PROGRESS NOTES
[facility]  Electrodiagnostic Laboratory                 Department of Neurology                                                                                                         Test Date:  2023    Patient: Marlee Tobar : 1955 Physician: Gray Taylor MD   Sex: Female AGE: 67 year Ref Phys:    ID#: [id number]   Technician: Mukul Ambrocio     History and Examination:  Marlee Tobar is a 67 year old woman with paresthesias in the feet and pain in the back of the left thigh. She is referred for evaluation of possible sensory polyneuropathy and lumbosacral radiculopathy.    Techniques:  Motor conduction studies were done with surface recording electrodes. Sensory conduction studies were performed with surface electrodes, unless indicated otherwise by (n), designating the use of subdermal recording electrodes. Temperature was monitored and recorded throughout the study. EMG was done with a concentric needle electrode.     Results:  Sural sensory nerve action potentials were mildly attenuated bilaterally. Bilateral superficial fibular and right radial sensory conduction studies were normal. Bilateral fibular and tibial motor conduction studies were normal. Electromyography of the left lower limb was normal.     Interpretation:  This is an abnormal study, demonstrating electrophysiologic evidence of the followin. Possible mild sensory neuropathy affecting bilateral lower limbs. Specifically, sural sensory nerve action potential amplitudes are slightly below this laboratory's lower limit of the normal range for a person of this age, but at the lower limit based upon one study of nerve conduction norms in this age group (Muscle Nerve 49:564-569, 2014).  2. No evidence of lumbosacral radiculopathy.    _____________________________  Gray Taylor MD  Board Certified in Clinical Neurophysiology and Neuromuscular Medicine        Nerve Conduction Studies  Motor Sites       Latency Amplitude Neg. Amp Diff Distance Velocity Neg. Dur Neg Area Diff Temperature Comment   Site (ms) Norm (mV) Norm % cm m/s Norm ms %  C    Left Fibular (EDB) Motor   Ankle 4.0  < 6.0 3.4  > 2.0  8   5.3  30.8    Bel Fib Head 10.7 - 3.7 - 8.8 30.5 46  > 38 6.1 11.7 31    Pop Fossa 12.5 - 3.7 - 0 8 44  > 38 5.8 0 31    Right Fibular (EDB) Motor   Ankle 3.9  < 6.0 4.1  > 2.0  8   6.3  31    Bel Fib Head 10.7 - 4.1 - 0 31.8 47  > 38 6.9 4.1 31.1    Pop Fossa 12.2 - 4.0 - -2.4 7.5 50  > 38 6.9 -1.99 31.1    Left Tibial (AHB) Motor   Ankle 4.3  < 6.5 9.6  > 4.4  8   6.9  31.6    Knee 13.8 - 7.2 - -25.0 37 39  > 38 7.7 -6.0 32.1    Right Tibial (AHB) Motor   Ankle 4.4  < 6.5 9.0  > 4.4  8   6.4  31.2    Knee 13.4 - 7.1 - -21.1 35.5 39  > 38 7.5 -6.1 31      Sensory Sites      Onset Lat Peak Lat Amp (O-P) Amp (P-P) Segment Distance Velocity Temperature   Site ms ms  V Norm  V  cm m/s Norm  C   Right Radial Sensory   Forearm-Wrist 1.65 2.4 24  > 15 29 Forearm-Wrist 10 61 - 32   Left Superficial Fibular Sensory   14 cm-Ankle 2.6 3.4 5  > 3 5 14 cm-Ankle 12.5 48  > 38 31.5   Right Superficial Fibular Sensory   14 cm-Ankle 2.4 3.5 5  > 3 5 14 cm-Ankle 12.5 52  > 38 31.1   Left Sural Sensory   Calf-Lat Mall 3.4 4.4 4  > 5 3 Calf-Lat Mall 14 41  > 38 31.6   Right Sural Sensory   Calf-Lat Mall 2.7 3.7 3  > 5 4 Calf-Lat Mall 14 52  > 38 30.2       Electromyography     Side Muscle Ins Act Fibs/PSW Fasc HF Amp Dur Poly Recrt Int Pat   Left Tib Anterior Nml None Nml 0 Nml Nml 0 Nml Nml   Left Gastroc MH Nml None Nml 0 Nml Nml 0 Nml Nml   Left Vastus Lat Nml None Nml 0 Nml Nml 0 Nml Nml   Left Biceps Fem SH Nml None Nml 0 Nml Nml 0 Nml Nml   Left Gluteus Med Nml None Nml 0 Nml Nml 0 Nml Nml   Left L5 Parasp Nml None Nml 0              NCS Waveforms:    Motor                Sensory

## 2023-01-17 ENCOUNTER — MYC MEDICAL ADVICE (OUTPATIENT)
Dept: FAMILY MEDICINE | Facility: CLINIC | Age: 68
End: 2023-01-17
Payer: COMMERCIAL

## 2023-01-17 DIAGNOSIS — F40.240 CLAUSTROPHOBIA: ICD-10-CM

## 2023-01-17 DIAGNOSIS — R94.131 ABNORMAL EMG: ICD-10-CM

## 2023-01-17 DIAGNOSIS — M54.10 RADICULAR PAIN OF LEFT LOWER EXTREMITY: ICD-10-CM

## 2023-01-17 DIAGNOSIS — G62.9 SENSORY NEUROPATHY: Primary | ICD-10-CM

## 2023-01-17 DIAGNOSIS — M54.17 LUMBOSACRAL RADICULOPATHY: ICD-10-CM

## 2023-01-17 NOTE — TELEPHONE ENCOUNTER
Routing to provider.      Pt has questions and is not able to get in into neurology till June.      Chanda Saunders RN  [facility]

## 2023-01-24 NOTE — TELEPHONE ENCOUNTER
Patient is calling to check on the status of this request she states she would like a call back in regards to this    none

## 2023-01-25 NOTE — TELEPHONE ENCOUNTER
Patient is following up on [employer] messages regarding a referral for neurology clinics.     She is also wondering if an MRI can be ordered as she is continuing to have bad leg pain.     RN sent message to provider via teams 1/25/23.     Patient requesting a call from the care team with an update.     EBENEZER Rangel, RN

## 2023-01-26 RX ORDER — DIAZEPAM 5 MG
5 TABLET ORAL ONCE
Qty: 1 TABLET | Refills: 2 | Status: SHIPPED | OUTPATIENT
Start: 2023-01-26 | End: 2023-01-26

## 2023-01-26 NOTE — TELEPHONE ENCOUNTER
Patient requesting medication for MRI please see message below per Isabelat      Routed to I'm on the phone with imaging and let them know I'm claustrophobic and need to be sedated for an MRI...  it sounds like you would have to adjust the order to include the sedation    Donna CABRAL RN, BSN   [facility]

## 2023-01-26 NOTE — TELEPHONE ENCOUNTER
PDF referral for [facility] Neu[facility] completed and placed in TC basket for faxing (please include face sheet).

## 2023-01-26 NOTE — TELEPHONE ENCOUNTER
This is patient's 3rd call to see if Dr Merrill has put a referral in for either of the 2 neurology offices he mentioned. ([facility] is booked out too far).  Patient responded that her insurance covers both [facility] Neurology and [facility] and wants a referral to one of these locations.    RN pended new location of [facility] of Neurology.   Routing to provider and sending Teams message.    Lotus MORGANN, RN

## 2023-01-26 NOTE — TELEPHONE ENCOUNTER
Form faxed to [facility] [employer] [phone] along with patient face sheet. Copy of form placed in abstract and original placed in tc bin.

## 2023-02-10 ENCOUNTER — ANCILLARY PROCEDURE (OUTPATIENT)
Dept: MRI IMAGING | Facility: CLINIC | Age: 68
End: 2023-02-10
Attending: INTERNAL MEDICINE
Payer: COMMERCIAL

## 2023-02-10 DIAGNOSIS — M54.17 LUMBOSACRAL RADICULOPATHY: ICD-10-CM

## 2023-02-10 PROCEDURE — 72148 MRI LUMBAR SPINE W/O DYE: CPT | Performed by: STUDENT IN AN ORGANIZED HEALTH CARE EDUCATION/TRAINING PROGRAM

## 2023-02-20 ENCOUNTER — VIRTUAL VISIT (OUTPATIENT)
Dept: FAMILY MEDICINE | Facility: CLINIC | Age: 68
End: 2023-02-20
Payer: COMMERCIAL

## 2023-02-20 DIAGNOSIS — M54.16 BILATERAL LUMBAR RADICULOPATHY: Primary | ICD-10-CM

## 2023-02-20 DIAGNOSIS — F33.41 MAJOR DEPRESSIVE DISORDER, RECURRENT EPISODE, IN PARTIAL REMISSION (H): ICD-10-CM

## 2023-02-20 DIAGNOSIS — M43.16 ANTEROLISTHESIS OF LUMBAR SPINE: ICD-10-CM

## 2023-02-20 DIAGNOSIS — M48.061 NEURAL FORAMINAL STENOSIS OF LUMBAR SPINE: ICD-10-CM

## 2023-02-20 DIAGNOSIS — M47.816 LUMBAR FACET ARTHROPATHY: ICD-10-CM

## 2023-02-20 PROCEDURE — 96127 BRIEF EMOTIONAL/BEHAV ASSMT: CPT | Mod: VID | Performed by: INTERNAL MEDICINE

## 2023-02-20 PROCEDURE — 99213 OFFICE O/P EST LOW 20 MIN: CPT | Mod: VID | Performed by: INTERNAL MEDICINE

## 2023-02-20 ASSESSMENT — PATIENT HEALTH QUESTIONNAIRE - PHQ9
SUM OF ALL RESPONSES TO PHQ QUESTIONS 1-9: 3
SUM OF ALL RESPONSES TO PHQ QUESTIONS 1-9: 3
10. IF YOU CHECKED OFF ANY PROBLEMS, HOW DIFFICULT HAVE THESE PROBLEMS MADE IT FOR YOU TO DO YOUR WORK, TAKE CARE OF THINGS AT HOME, OR GET ALONG WITH OTHER PEOPLE: NOT DIFFICULT AT ALL

## 2023-02-20 NOTE — PROGRESS NOTES
Camille is a 67 year old who is being evaluated via a billable video visit.      How would you like to obtain your AVS? MyChart  If the video visit is dropped, the invitation should be resent by: Send to e-mail at: [email or web address]  Will anyone else be joining your video visit? Patient , no additional link needed      SUBJECTIVE  Reason for visit:  Review test results  Test: MRI of lumbar spine  Indication: lumbar radiculopathy and paresthesia of both feet  Precipitating factor: injury at home while helping  with [employer] project  Complications: none  Treatment: Gabapentin (beneficial)    Today's PHQ-9         PHQ-9 Total Score: 3  PHQ-9 Q9 Thoughts of better off dead/self-harm past 2 weeks :   Not at all  How difficult have these problems made it for you to do your work, take care of things at home, or get along with other people: Not difficult at all      REVIEW OF SYSTEMS   CONSTITUTIONAL: NEGATIVE for fever, chills, change in weight  INTEGUMENTARY/SKIN: NEGATIVE for worrisome rashes, moles or lesions  EYES: NEGATIVE for vision changes or irritation  ENT/MOUTH: NEGATIVE for ear, mouth and throat problems  RESP: NEGATIVE for significant cough or SOB  BREAST: NEGATIVE for masses, tenderness or discharge  CV: NEGATIVE for chest pain, palpitations or peripheral edema  GI: NEGATIVE for nausea, abdominal pain, heartburn, or change in bowel habits  : NEGATIVE for frequency, dysuria, or hematuria  MUSCULOSKELETAL:POSITIVE  for low back pain.  NEURO: NEGATIVE for HX CVA and HX TIA  ENDOCRINE: NEGATIVE for temperature intolerance, skin/hair changes  HEME: NEGATIVE for bleeding problems  PSYCHIATRIC: NEGATIVE for changes in mood or affect      OBJECTIVE  Vitals:  No vitals were obtained today due to virtual visit.  Physical Exam   GENERAL: Healthy, alert and no distress  EYES: Eyes grossly normal to inspection and conjunctivae and sclerae normal  HENT: normal cephalic/atraumatic  RESP: No audible  wheezes.  NEURO: mentation intact  PSYCH: Mentation appears normal, affect normal/bright, judgement and insight intact, normal speech and appearance well-groomed.    DIAGNOSTICS    MR LUMBAR SPINE W/O CONTRAST 2/10/2023 4:15 PM     History: Lumbosacral radiculopathy.     ICD-10: Lumbosacral radiculopathy     Comparison: Radiograph 9 26,022     Technique: Sagittal STIR, T1-weighted turbo spin-echo, 3-D volumetric  T2-weighted and axial reconstructed T2-weighted images of the lumbar  spine were obtained without intravenous contrast.      Findings: Presumed 5 lumbar-type vertebra. Minimal scoliosis with  convexity to the right. Trace anterolisthesis at L4-5. Disc height  loss and disc degeneration at L2-3, L3-4 and L4-5. No bone marrow  edema. Conus is at L1-2. No vertebral body height loss. No pars defect  identified. Bone marrow signal intensity is within normal limits and  no abnormal signal is visible.      On a level by level basis:     L1-2: No significant spinal canal or foraminal narrowing.     L2-3: Disc bulge and facet hypertrophy without significant spinal  canal or foraminal narrowing.     L3-4: Disc bulge and facet hypertrophy without significant spinal  canal or foraminal narrowing.     L4-5: Anterolisthesis, facet hypertrophy and disc bulge. Moderate left  mild right neural foraminal narrowing. No right spinal canal stenosis.  L5-S1: Small central disc protrusion without significant spinal canal  or foraminal stenosis.     Retroperitoneal structures are unremarkable.                                                                      Impression: Degenerative changes as above. Most significantly there is  trace anterolisthesis at L4-5 with superimposed facet hypertrophy and  disc bulge leading to moderate left neural foraminal stenosis.     LUPE SHANNON MD      ASSESSMENT/PLAN  Bilateral lumbar radiculopathy  - Spine  Referral; Future  - Physical Therapy Referral; Future    Anterolisthesis of  lumbar spine  - Spine  Referral; Future  - Physical Therapy Referral; Future    Lumbar facet arthropathy  - Spine  Referral; Future  - Physical Therapy Referral; Future    Neural foraminal stenosis of lumbar spine  - Spine  Referral; Future    Major depressive disorder, recurrent episode, in partial remission (H)  Stable with Wellbutrin XL.    Disposition:  Follow-up in 4 weeks or as needed.    Bandar Merrill MD  Internal Medicine    Video-Visit Details    Type of service:  Video Visit     Joined the call at 2/20/2023, 7:15:39 am.  Left the call at 2/20/2023, 7:28:16 am.  You were on the call for 12 minutes 37 seconds .    Originating Location (pt. Location): Home    Distant Location (provider location):  On-site  Platform used for Video Visit: [employer]

## 2023-02-20 NOTE — TELEPHONE ENCOUNTER
SPINE PATIENTS - NEW PROTOCOL PREVISIT    RECORDS RECEIVED FROM: Internal   REASON FOR VISIT: Bilateral lumbar radiculopathy   Date of Appt: 04/04/2023   NOTES (FOR ALL VISITS) STATUS DETAILS   OFFICE NOTE from referring provider Internal 02/20/2023 Dr Merrill [facility]    OFFICE NOTE from other specialist N/A    DISCHARGE SUMMARY from hospital N/A    DISCHARGE REPORT from ER N/A    EMG REPORT N/A    MEDICATION LIST N/A    IMAGING  (FOR ALL VISITS)     MRI (HEAD, NECK, SPINE) Internal 02/10/2023 lumbar spine   XRAY (SPINE) *NEUROSURGERY* Internal 09/26/2023 lumbar spine   CT (HEAD, NECK, SPINE) N/A

## 2023-03-01 ENCOUNTER — TELEPHONE (OUTPATIENT)
Dept: FAMILY MEDICINE | Facility: CLINIC | Age: 68
End: 2023-03-01
Payer: COMMERCIAL

## 2023-03-11 PROBLEM — M54.16 BILATERAL LUMBAR RADICULOPATHY: Status: ACTIVE | Noted: 2023-03-11

## 2023-03-11 PROBLEM — M48.061 NEURAL FORAMINAL STENOSIS OF LUMBAR SPINE: Status: ACTIVE | Noted: 2023-03-11

## 2023-03-11 PROBLEM — M47.816 LUMBAR FACET ARTHROPATHY: Status: ACTIVE | Noted: 2023-03-11

## 2023-03-11 PROBLEM — M43.16 ANTEROLISTHESIS OF LUMBAR SPINE: Status: ACTIVE | Noted: 2023-03-11

## 2023-03-23 NOTE — PROGRESS NOTES
SUBJECTIVE:  HPI:  Marlee Tobar  Is a 67 year old female who presents today for new patient evaluation of low back pain and reported bilateral radiculopathy upon referral from Dr. Bandar Merrill.    Camille indicates that her pain began without inciting event around August 2022.  The pain is actually not so much back pain but posterior distal lateral left thigh pain.  It does not feel like it is connected to some underlying low back pain that she has had for 3 to 4 months which is noticed particularly when she wakes up in the morning or if when she sits too long.  These appear to be separate issues for her.  She denies knee swelling, buckling, or locking.  She has recently been diagnosed with bilateral leg peripheral neuropathies by her neurologist with stocking distribution tingling, but she denies other leg numbness, tingling, weakness, tripping.  She uses banisters when she is climbing stairs and her ankles hurt but she is not truly weak.  She denies bowel or bladder dysfunction, and saddle anesthesia or other constitutional symptoms.  Recently she had abnormal glucose tolerance test and she is going to be following up with her family doctor for that and her blood pressure this week.  Her neurologist just recently ordered some physical therapy, but she is not exactly sure what condition that was to be treating and she wanted to see me before she started any PT to make sure that all her treatment was aligned properly.  Otherwise no treatment thus far.      Regarding her blood pressure, she is known to have whitecoat hypertension and monitors her blood pressure at home and it runs 120s over 90s.    Pain score and diagram reviewed.  See questionnaire.      ROS: .  Otherwise negative for bowel/bladder dysfunction, balance changes, headache, leg pain/numbness/weakness, fevers, chills, night sweats, unexplained weight loss;  otherwise unremarkable.   See the patient's intake questionnaire from today for  details.    Treatment to Date: None    MEDICATIONS:  Reviewed.    ALLERGIES:  Reviewed.     Reviewed past medical, surgical, and family history.    Pertinent for bilateral foot paresthesias, polyarthralgias, depression, positive TIFFANI, pulmonary nodules, status post gastric bypass, status post GI hemorrhage, overweight, pancreatitis, GERD, gallstones, hypertension, anxiety    SOCIAL HX: She is  with 2 adult children and 1 grandchild.  She works as a .  Sports hobbies and activities: Motorcycling, snowmobiling, camping, reading, construction projects      OBJECTIVE:    --CONSTITUTIONAL:   No acute distress.  The patient is well nourished and well groomed.  Transitions well and moves fluidly.  BMI is modestly increased.  No obvious asymmetry of leg alignment or musculature.  --PSYCHIATRIC:  Appropriate mood and affect. The patient is awake, alert, oriented to person, place, time and answering questions appropriately with clear speech.    --SKIN:  Skin over the face, bilateral lower extremities, and posterior torso is clean, dry, intact without rashes.    --RESPIRATORY: Normal respiratory excursion and effort, and no dyspnea.   --GAIT:  is non-antalgic. Flat foot, heel and toe walking:  normal   .  Squat and rise   normal    .  --STANDING EXAMINATION:    Symmetry of spine/pelvis   unremarkable   .      Range of motion full.  There is some symmetric hamstring tightness with flexion but not reproduction of her left thigh pain.  She gets back pain without radiation with extension and left sidebending and left rotation.  2006 left knee arthroscopy for meniscus tear.   Standing flexion    positive left.    Kendall's sign   negative    .     Stork test   negative    .  Kendall finger sign bilateral SI joints  --NEUROLOGICAL:     ROMBERG, TANDEM WALK, PRONATOR DRIFT:   Normal.   .  SENSATION to light touch is diminished in a stocking distribution in both feet but otherwise normal in bilateral thighs,  lower legs.  REFLEXES:  patellar 1+, and achilles 1+.  Babinski is negative. No clonus.  MANUAL MOTOR TESTING:  L3- S1 Myotomes, Femoral, Obturator, Peroneal and Tibial nerves 5/5 and completely painless especially when testing knee flexion  DURAL STRETCH TESTS:  SLR negative.  Femoral Stretch Test not done.   --PELVIC/HIP JOINTS:                Long Sitting   negative   but fixed 1/2 cm left long.    Hip scour   negative   .    Hip Impingement   negative   however if I use the shin for leverage on external rotation it recreates her left lateral knee thigh pain but if I only use the femur it does not.   BLAKE   negative   for back pain but positive left for knee pain.     Piriformis   negative   .   Spring testing negative spine and SI joints.      PELVIC ALIGNMENT neutral.   --LUMBAR/GLUTEAL MUSCLES: No tenderness spasms or trigger point  Left hip: Stinchfield and logroll negative..  Left knee localized tenderness exactly reproducing her chief complaint consistently in the posterolateral corner.  No effusion.  Negative patellofemoral grind.  No other tenderness.  David for the lateral compartment reproduces her pain.  Negative for the medial compartment.  Bounce home Apley grind and Apley distraction test negative.  No collateral or cruciate laxity.  --ABDOMINAL:  Non-distended.  Nontender  --VASCULAR: Femoral pulses 2+. Lower extremity capillary refill, temperature and color normal.         IMAGING/TESTS: Images and reports reviewed.    MR LUMBAR SPINE W/O CONTRAST 2/10/2023    Minimal scoliosis with convexity to the right. Trace anterolisthesis at L4-5. Disc height  loss and disc degeneration at L2-3, L3-4 and L4-5. No bone marrow  edema. Conus is at L1-2. No vertebral body height loss. No pars defect  Identified.    L2-3: Disc bulge and facet hypertrophy without significant spinal  canal or foraminal narrowing.     L3-4: Disc bulge and facet hypertrophy without significant spinal  canal or foraminal  narrowing.     L4-5: Anterolisthesis, facet hypertrophy and disc bulge. Moderate left  mild right neural foraminal narrowing. No right spinal canal stenosis.  L5-S1: Small central disc protrusion without significant spinal canal  or foraminal stenosis.     pression: Degenerative changes as above. Most significantly there is  trace anterolisthesis at L4-5 with superimposed facet hypertrophy and  disc bulge leading to moderate left neural foraminal stenosis.    LUMBAR SPINE TWO TO THREE VIEWS 9/26/2022                                                                  IMPRESSION: Five lumbar type vertebrae. Mild right convex curvature of  the lumbar spine centered at L3. Alignment otherwise normal. Vertebral  body heights normal. No fractures. Loss of disc space height and  degenerative endplate spurring at L2-L3, L3-L4, and L4-L5. Facet  arthropathy at L4-L5 and L5-S1.    Nerve Conduction Studies of bilateral lower extremities  01/16/2023 showed Possible mild sensory neuropathy affecting bilateral lower limbs. Specifically, sural sensory nerve action potential amplitudes are slightly below this laboratory's lower limit.EMG in January did not show up do not show any lumbosacral radiculopathy    ASSESSMENT: Marlee LANIE Tobar is a 67 year old female who presents  today for new patient evaluation of:      History of GI bleed contraindicating NSAIDs    Multilevel degenerative lumbar disc disease likely responsible for nonradicular low back pain-mild    Left thigh and lateral knee pain suspected to be a posterolateral corner injury    2006 left knee arthroscopy for meniscus tear    Underlying peripheral neuropathy but otherwise normal neurologic exam    No evidence for Pelvic Joint Dysfunction    No evidence for radiculopathy      PLAN:  Follow-up this week with PCP regarding uncontrolled hypertension-warned  For the back this is a degenerative process that is not rising to the level where it requires an intervention with  injections or surgery.  She can certainly go ahead and have some generic therapy for her low back and it sounds like her neurologist is already ordered that.  Because I think her chief complaint actually is regarding knee pathology I am going to order an x-ray and MRI of her left knee and have her follow-up with sports medicine/orthopedics to discuss the results and manage any further interventions that may be indicated.    I would like to thank Dr. Merrill for the opportunity to participate in the care of this very pleasant patient.    Advised patient to call or return early if symptoms worsen, or having problems controlling bladder and bowel function or worsening leg weakness.     Please note: Voice recognition software was used in this dictation.  It may therefore contain typographical errors.    Alcides Walsh MD

## 2023-03-25 DIAGNOSIS — I10 HTN, GOAL BELOW 140/90: ICD-10-CM

## 2023-03-27 RX ORDER — TELMISARTAN 20 MG/1
TABLET ORAL
Qty: 90 TABLET | Refills: 1 | Status: SHIPPED | OUTPATIENT
Start: 2023-03-27 | End: 2023-08-14

## 2023-04-02 ENCOUNTER — HEALTH MAINTENANCE LETTER (OUTPATIENT)
Age: 68
End: 2023-04-02

## 2023-04-03 NOTE — TELEPHONE ENCOUNTER
RECORDS RECEIVED FROM: Internal   REASON FOR VISIT: Sensory neuropath / abnormal EMG    Date of Appt: 06/29/2023   NOTES (FOR ALL VISITS) STATUS DETAILS   OFFICE NOTE from referring provider Internal 01/17/2023 Dr Desirae mckeon [facility]   OFFICE NOTE from other specialist Care Everywhere 03/21/2023 [facility]   DISCHARGE SUMMARY from hospital N/A    DISCHARGE REPORT from the ER N/A    OPERATIVE REPORT N/A    [facility] ([facility] ONLY) N/A    EMG Internal 01/16/2023 Dr Taylor [facility]    EEG N/A    MEDICATION LIST N/A    IMAGING  (FOR ALL VISITS)     LUMBAR PUNCTURE N/A    LUCIEN SCAN N/A    ULTRASOUND (CAROTID BILAT) *VASCULAR* N/A    MRI (HEAD, NECK, SPINE) Internal 02/10/2023 lumbar spine   CT (HEAD, NECK, SPINE) N/A

## 2023-04-04 ENCOUNTER — OFFICE VISIT (OUTPATIENT)
Dept: NEUROSURGERY | Facility: CLINIC | Age: 68
End: 2023-04-04
Attending: INTERNAL MEDICINE
Payer: COMMERCIAL

## 2023-04-04 ENCOUNTER — PRE VISIT (OUTPATIENT)
Dept: NEUROSURGERY | Facility: CLINIC | Age: 68
End: 2023-04-04

## 2023-04-04 ENCOUNTER — ANCILLARY PROCEDURE (OUTPATIENT)
Dept: GENERAL RADIOLOGY | Facility: CLINIC | Age: 68
End: 2023-04-04
Attending: PREVENTIVE MEDICINE
Payer: COMMERCIAL

## 2023-04-04 VITALS
SYSTOLIC BLOOD PRESSURE: 176 MMHG | DIASTOLIC BLOOD PRESSURE: 95 MMHG | BODY MASS INDEX: 27.97 KG/M2 | WEIGHT: 173 LBS | HEART RATE: 68 BPM

## 2023-04-04 DIAGNOSIS — M48.061 NEURAL FORAMINAL STENOSIS OF LUMBAR SPINE: ICD-10-CM

## 2023-04-04 DIAGNOSIS — M43.16 ANTEROLISTHESIS OF LUMBAR SPINE: ICD-10-CM

## 2023-04-04 DIAGNOSIS — M25.562 LEFT LATERAL KNEE PAIN: ICD-10-CM

## 2023-04-04 DIAGNOSIS — M47.816 LUMBAR FACET ARTHROPATHY: ICD-10-CM

## 2023-04-04 DIAGNOSIS — F40.240 CLAUSTROPHOBIA: ICD-10-CM

## 2023-04-04 DIAGNOSIS — M25.562 LEFT LATERAL KNEE PAIN: Primary | ICD-10-CM

## 2023-04-04 PROCEDURE — 99204 OFFICE O/P NEW MOD 45 MIN: CPT | Performed by: PREVENTIVE MEDICINE

## 2023-04-04 PROCEDURE — 73562 X-RAY EXAM OF KNEE 3: CPT | Mod: LT | Performed by: RADIOLOGY

## 2023-04-04 RX ORDER — DIAZEPAM 5 MG
TABLET ORAL
Qty: 2 TABLET | Refills: 0 | Status: SHIPPED | OUTPATIENT
Start: 2023-04-04 | End: 2023-04-18

## 2023-04-04 ASSESSMENT — PAIN SCALES - GENERAL: PAINLEVEL: MILD PAIN (3)

## 2023-04-04 NOTE — PATIENT INSTRUCTIONS
Ca is very nice to meet you.  Your back shows that your discs are aging and I think that accounts for your pain for which a little bit of physical therapy may be helpful but most of this is just an aging process.  I does not causing pain down your leg which is confirmed by my exam as well as the nerve study.  I do however think you have a problem with your left knee so we will start the work-up and I will cover you with some Valium for the MRI and then have the orthopedic or sports medicine people review the test results with you.  See the assessment and plan below for further details of our discussion today.    ASSESSMENT: Marlee Tobar is a 67 year old female who presents  today for new patient evaluation of:    History of GI bleed contraindicating NSAIDs  Multilevel degenerative lumbar disc disease likely responsible for nonradicular low back pain-mild  Left thigh and lateral knee pain suspected to be a posterolateral corner injury  2006 left knee arthroscopy for meniscus tear  Underlying peripheral neuropathy but otherwise normal neurologic exam  No evidence for Pelvic Joint Dysfunction  No evidence for radiculopathy      PLAN:  Follow-up this week with PCP regarding uncontrolled hypertension-warned  For the back this is a degenerative process that is not rising to the level where it requires an intervention with injections or surgery.  She can certainly go ahead and have some generic therapy for her low back and it sounds like her neurologist is already ordered that.  Because I think her chief complaint actually is regarding knee pathology I am going to order an x-ray and MRI of her left knee and have her follow-up with sports medicine/orthopedics to discuss the results and manage any further interventions that may be indicated.

## 2023-04-04 NOTE — NURSING NOTE
Reason For Visit:   Chief Complaint   Patient presents with     Consult     Left leg/back pain          Occupation:   Currently working? Yes.  Work status?  Full time.    Sports: n  Activities: walking, exercise             BP (!) 185/112   Pulse 68   Wt 78.5 kg (173 lb)   LMP 06/27/2010 (Exact Date)   BMI 27.97 kg/m        Allergies   Allergen Reactions     Doxycycline Rash     Contrast Dye      Happened when patient was ~ 18 years ago during a test for 'kidneys'.  Patient thinks she might have developed a rash, couldn't remember.     Diatrizoate Itching     Iodine      Vitamin K Swelling     facial       Current Outpatient Medications   Medication     ALPRAZolam (XANAX) 0.25 MG tablet     Ascorbic Acid (VITAMIN C PO)     atorvastatin (LIPITOR) 10 MG tablet     Biotin 5000 MCG CAPS     buPROPion (WELLBUTRIN XL) 150 MG 24 hr tablet     CALCIUM CITRATE PO     carvedilol (COREG) 12.5 MG tablet     Cholecalciferol (VITAMIN D PO)     gabapentin (NEURONTIN) 100 MG capsule     Glucosamine-Chondroit-Vit C-Mn (GLUCOSAMINE CHONDR 1500 COMPLX PO)     hydrALAZINE (APRESOLINE) 25 MG tablet     Iron-vit C-vit B12-folic acid 100-250-0.025-1 MG TABS     Multiple Vitamins-Minerals (MULTIVITAL) TABS     pantoprazole (PROTONIX) 40 MG EC tablet     telmisartan (MICARDIS) 20 MG tablet     triamcinolone (KENALOG) 0.1 % external cream     No current facility-administered medications for this visit.         Darla Severin-Brown, LPN

## 2023-04-04 NOTE — LETTER
4/4/2023         RE: Marlee Tobar  [address] [facility] [address]        Dear Colleague,    Thank you for referring your patient, Marlee Tobar, to the [facility] [place]. Please see a copy of my visit note below.        SUBJECTIVE:  HPI:  Marlee Tobar  Is a 67 year old female who presents today for new patient evaluation of low back pain and reported bilateral radiculopathy upon referral from Dr. Bandar Merrill.    Camille indicates that her pain began without inciting event around August 2022.  The pain is actually not so much back pain but posterior distal lateral left thigh pain.  It does not feel like it is connected to some underlying low back pain that she has had for 3 to 4 months which is noticed particularly when she wakes up in the morning or if when she sits too long.  These appear to be separate issues for her.  She denies knee swelling, buckling, or locking.  She has recently been diagnosed with bilateral leg peripheral neuropathies by her neurologist with stocking distribution tingling, but she denies other leg numbness, tingling, weakness, tripping.  She uses banisters when she is climbing stairs and her ankles hurt but she is not truly weak.  She denies bowel or bladder dysfunction, and saddle anesthesia or other constitutional symptoms.  Recently she had abnormal glucose tolerance test and she is going to be following up with her family doctor for that and her blood pressure this week.  Her neurologist just recently ordered some physical therapy, but she is not exactly sure what condition that was to be treating and she wanted to see me before she started any PT to make sure that all her treatment was aligned properly.  Otherwise no treatment thus far.      Regarding her blood pressure, she is known to have whitecoat hypertension and monitors her blood pressure at home and it runs 120s over 90s.    Pain score and diagram reviewed.  See questionnaire.       ROS: .  Otherwise negative for bowel/bladder dysfunction, balance changes, headache, leg pain/numbness/weakness, fevers, chills, night sweats, unexplained weight loss;  otherwise unremarkable.   See the patient's intake questionnaire from today for details.    Treatment to Date: None    MEDICATIONS:  Reviewed.    ALLERGIES:  Reviewed.     Reviewed past medical, surgical, and family history.    Pertinent for bilateral foot paresthesias, polyarthralgias, depression, positive TIFFANI, pulmonary nodules, status post gastric bypass, status post GI hemorrhage, overweight, pancreatitis, GERD, gallstones, hypertension, anxiety    SOCIAL HX: She is  with 2 adult children and 1 grandchild.  She works as a .  Sports hobbies and activities: Motorcycling, snowmobiling, camping, reading, construction projects      OBJECTIVE:    --CONSTITUTIONAL:   No acute distress.  The patient is well nourished and well groomed.  Transitions well and moves fluidly.  BMI is modestly increased.  No obvious asymmetry of leg alignment or musculature.  --PSYCHIATRIC:  Appropriate mood and affect. The patient is awake, alert, oriented to person, place, time and answering questions appropriately with clear speech.    --SKIN:  Skin over the face, bilateral lower extremities, and posterior torso is clean, dry, intact without rashes.    --RESPIRATORY: Normal respiratory excursion and effort, and no dyspnea.   --GAIT:  is non-antalgic. Flat foot, heel and toe walking:  normal   .  Squat and rise   normal    .  --STANDING EXAMINATION:    Symmetry of spine/pelvis   unremarkable   .      Range of motion full.  There is some symmetric hamstring tightness with flexion but not reproduction of her left thigh pain.  She gets back pain without radiation with extension and left sidebending and left rotation.  2006 left knee arthroscopy for meniscus tear.   Standing flexion    positive left.    Kendall's sign   negative    .     Stork test    negative    .  Kendall finger sign bilateral SI joints  --NEUROLOGICAL:     ROMBERG, TANDEM WALK, PRONATOR DRIFT:   Normal.   .  SENSATION to light touch is diminished in a stocking distribution in both feet but otherwise normal in bilateral thighs, lower legs.  REFLEXES:  patellar 1+, and achilles 1+.  Babinski is negative. No clonus.  MANUAL MOTOR TESTING:  L3- S1 Myotomes, Femoral, Obturator, Peroneal and Tibial nerves 5/5 and completely painless especially when testing knee flexion  DURAL STRETCH TESTS:  SLR negative.  Femoral Stretch Test not done.   --PELVIC/HIP JOINTS:                Long Sitting   negative   but fixed 1/2 cm left long.    Hip scour   negative   .    Hip Impingement   negative   however if I use the shin for leverage on external rotation it recreates her left lateral knee thigh pain but if I only use the femur it does not.   BLAKE   negative   for back pain but positive left for knee pain.     Piriformis   negative   .   Spring testing negative spine and SI joints.      PELVIC ALIGNMENT neutral.   --LUMBAR/GLUTEAL MUSCLES: No tenderness spasms or trigger point  Left hip: Stinchfield and logroll negative..  Left knee localized tenderness exactly reproducing her chief complaint consistently in the posterolateral corner.  No effusion.  Negative patellofemoral grind.  No other tenderness.  David for the lateral compartment reproduces her pain.  Negative for the medial compartment.  Bounce home Apley grind and Apley distraction test negative.  No collateral or cruciate laxity.  --ABDOMINAL:  Non-distended.  Nontender  --VASCULAR: Femoral pulses 2+. Lower extremity capillary refill, temperature and color normal.         IMAGING/TESTS: Images and reports reviewed.    MR LUMBAR SPINE W/O CONTRAST 2/10/2023    Minimal scoliosis with convexity to the right. Trace anterolisthesis at L4-5. Disc height  loss and disc degeneration at L2-3, L3-4 and L4-5. No bone marrow  edema. Conus is at L1-2. No  vertebral body height loss. No pars defect  Identified.    L2-3: Disc bulge and facet hypertrophy without significant spinal  canal or foraminal narrowing.     L3-4: Disc bulge and facet hypertrophy without significant spinal  canal or foraminal narrowing.     L4-5: Anterolisthesis, facet hypertrophy and disc bulge. Moderate left  mild right neural foraminal narrowing. No right spinal canal stenosis.  L5-S1: Small central disc protrusion without significant spinal canal  or foraminal stenosis.     pression: Degenerative changes as above. Most significantly there is  trace anterolisthesis at L4-5 with superimposed facet hypertrophy and  disc bulge leading to moderate left neural foraminal stenosis.    LUMBAR SPINE TWO TO THREE VIEWS 9/26/2022                                                                  IMPRESSION: Five lumbar type vertebrae. Mild right convex curvature of  the lumbar spine centered at L3. Alignment otherwise normal. Vertebral  body heights normal. No fractures. Loss of disc space height and  degenerative endplate spurring at L2-L3, L3-L4, and L4-L5. Facet  arthropathy at L4-L5 and L5-S1.    Nerve Conduction Studies of bilateral lower extremities  01/16/2023 showed Possible mild sensory neuropathy affecting bilateral lower limbs. Specifically, sural sensory nerve action potential amplitudes are slightly below this laboratory's lower limit.EMG in January did not show up do not show any lumbosacral radiculopathy    ASSESSMENT: Marlee LANIE Tobar is a 67 year old female who presents  today for new patient evaluation of:    History of GI bleed contraindicating NSAIDs  Multilevel degenerative lumbar disc disease likely responsible for nonradicular low back pain-mild  Left thigh and lateral knee pain suspected to be a posterolateral corner injury  2006 left knee arthroscopy for meniscus tear  Underlying peripheral neuropathy but otherwise normal neurologic exam  No evidence for Pelvic Joint  Dysfunction  No evidence for radiculopathy      PLAN:  Follow-up this week with PCP regarding uncontrolled hypertension-warned  For the back this is a degenerative process that is not rising to the level where it requires an intervention with injections or surgery.  She can certainly go ahead and have some generic therapy for her low back and it sounds like her neurologist is already ordered that.  Because I think her chief complaint actually is regarding knee pathology I am going to order an x-ray and MRI of her left knee and have her follow-up with sports medicine/orthopedics to discuss the results and manage any further interventions that may be indicated.    I would like to thank Dr. Merrill for the opportunity to participate in the care of this very pleasant patient.    Advised patient to call or return early if symptoms worsen, or having problems controlling bladder and bowel function or worsening leg weakness.     Please note: Voice recognition software was used in this dictation.  It may therefore contain typographical errors.    Alcides Walsh MD             Again, thank you for allowing me to participate in the care of your patient.        Sincerely,        Alcides Walsh MD

## 2023-04-04 NOTE — TELEPHONE ENCOUNTER
DIAGNOSIS: LFT knee   APPOINTMENT DATE: 04/26/2023   NOTES STATUS DETAILS   OFFICE NOTE from referring provider Internal 04/04/2023 Dr Walsh [facility]    OFFICE NOTE from other specialist N/A    DISCHARGE SUMMARY from hospital N/A    DISCHARGE REPORT from the ER N/A    OPERATIVE REPORT N/A    EMG report N/A    MEDICATION LIST N/A    MRI Internal 04/05/2023 LFT knee   DEXA (osteoporosis/bone health) N/A    CT SCAN N/A    XRAYS (IMAGES & REPORTS) Internal 04/04/2023 LFT knee

## 2023-04-05 ENCOUNTER — ANCILLARY PROCEDURE (OUTPATIENT)
Dept: MRI IMAGING | Facility: CLINIC | Age: 68
End: 2023-04-05
Attending: PREVENTIVE MEDICINE
Payer: COMMERCIAL

## 2023-04-05 DIAGNOSIS — M25.562 LEFT LATERAL KNEE PAIN: ICD-10-CM

## 2023-04-05 PROCEDURE — 73721 MRI JNT OF LWR EXTRE W/O DYE: CPT | Mod: LT | Performed by: RADIOLOGY

## 2023-04-06 ENCOUNTER — OFFICE VISIT (OUTPATIENT)
Dept: FAMILY MEDICINE | Facility: CLINIC | Age: 68
End: 2023-04-06
Payer: COMMERCIAL

## 2023-04-06 VITALS
HEIGHT: 66 IN | BODY MASS INDEX: 28.61 KG/M2 | SYSTOLIC BLOOD PRESSURE: 163 MMHG | DIASTOLIC BLOOD PRESSURE: 78 MMHG | WEIGHT: 178 LBS | TEMPERATURE: 97.1 F | OXYGEN SATURATION: 99 % | HEART RATE: 72 BPM | RESPIRATION RATE: 18 BRPM

## 2023-04-06 DIAGNOSIS — G60.9 IDIOPATHIC PERIPHERAL NEUROPATHY: Primary | ICD-10-CM

## 2023-04-06 DIAGNOSIS — I10 HTN, GOAL BELOW 130/80: ICD-10-CM

## 2023-04-06 PROBLEM — R20.2 PARESTHESIA OF BOTH FEET: Status: RESOLVED | Noted: 2022-10-19 | Resolved: 2023-04-06

## 2023-04-06 PROCEDURE — 99214 OFFICE O/P EST MOD 30 MIN: CPT | Performed by: INTERNAL MEDICINE

## 2023-04-06 ASSESSMENT — PAIN SCALES - GENERAL: PAINLEVEL: MODERATE PAIN (4)

## 2023-04-06 NOTE — PATIENT INSTRUCTIONS
At [facility], we strive to deliver an exceptional experience to you, every time we see you. If you receive a survey, please complete it as we do value your feedback.  If you have MyChart, you can expect to receive results automatically within 24 hours of their completion.  Your provider will send a note interpreting your results as well.   If you do not have MyChart, you should receive your results in about a week by mail.    Your care team:                            Family Medicine Internal Medicine   MD Bandar Abraham MD Shantel Branch-Fleming, MD Srinivasa Vaka, MD Katya Belousova, PABONILLA Kennedy CNP, MD ([facility]   Marcos Ellison, MD Franchesca Esqueda MD Amelia Massimini APRN CNP   Berkley Ceballos APRN MD Rebeka French MD          Clinic hours: Monday - Thursday 7 am-6 pm; Fridays 7 am-5 pm.   Urgent care: Monday - Friday 10 am- 8 pm; Saturday and Sunday 9 am-5 pm.    Clinic: [phone]       [facility]: Monday - Thursday 8 am - 7 pm; Friday 8 am - 6 pm  [facility]: [phone]     Patient Education   Personalized Prevention Plan  You are due for the preventive services outlined below.  Your care team is available to assist you in scheduling these services.  If you have already completed any of these items, please share that information with your care team to update in your medical record.  Health Maintenance Due   Topic Date Due     Osteoporosis Screening  Never done     Annual Wellness Visit  02/22/2023     Kidney Microalbumin Urine Test  02/22/2023     Colorectal Cancer Screening  03/15/2023

## 2023-04-06 NOTE — PROGRESS NOTES
[facility] Internal Medicine Progress Note           Assessment and Plan:     Idiopathic peripheral neuropathy  EMG last January 2023 is normal. No metabolic causes such as diabetes, hypertriglyceridemia nor hypothyroidism. Continue Gabapentin every evening. Modify lifestyle such as reduction of carbonated beverage consumption. Patient is concerned whether aspartame causes her neuropathy.    HTN, goal below 130/80  Adjust Telmisartan to 40 mg once daily if blood pressures remain elevated. Continue Hydralazine and Carvedilol at the same doses.         Interval History:   Neuropathy      Duration: chronic    Course: same    Distribution: both feet and legs    Description         Paresthesia: yes         Pain: no         Numbness: no         Weakness: no         Autonomic symptoms: no    Intensity:  mild    History (similar episodes/previous evaluation):          Diabetes: no         Hypothyroidism: no         Vitamin B12 deficiency: no         Multiple myeloma: no         Toxin exposure: no         Sexual history: no         Recreational drug use: no         Smoking: no         Alcohol intake/Dietary habits: yes         EMG: yes - January 16, 2023    Alleviating factors: none    Therapies tried and outcome: Gabapentin.          Hypertension Follow-up      Outpatient blood pressures monitoring: yes    Low Salt Diet: no    Adverse effects: none from Telmisartan, Carvedilol and Hydralazine    Compliance: good    Secondary causes: none    Chronic kidney disease: no    Hyperthyroidism: no    Anxiety: no    Decongestants: no    Substance abuse: no    Diabetes: no    Ischemic heart disease: unknown    Stroke: no    Hyperlipidemia: no            Significant Problems:   Patient Active Problem List   Diagnosis     iamJOINT PAIN-LOWER LEG     iamPOSTSURGICAL STATES NEC     CARDIOVASCULAR SCREENING; LDL GOAL LESS THAN 130     HTN, goal below 130/80     Gallstones     Cholelithiases     GERD (gastroesophageal reflux  disease)     Post-menopausal     Health Care Home     Pancreatitis     Varicose vein of leg     Torn earlobe     Dyspareunia     Microalbuminuria     Major depressive disorder, recurrent episode, in partial remission (H)     Situational anxiety     Overweight     Syncope, near     Advance care planning     H/O gastric bypass     H/O gastrointestinal hemorrhage     Personal history of PE (pulmonary embolism)     Insomnia, unspecified type     Pulmonary nodules     Polyarthralgia     Positive TIFFANI (antinuclear antibody)     Overactive bladder     Bilateral lumbar radiculopathy     Anterolisthesis of lumbar spine     Lumbar facet arthropathy     Neural foraminal stenosis of lumbar spine     Idiopathic peripheral neuropathy              Review of Systems:   CONSTITUTIONAL: NEGATIVE for fever, chills, change in weight  INTEGUMENTARY/SKIN: NEGATIVE for worrisome rashes, moles or lesions  EYES: NEGATIVE for vision changes or irritation  ENT/MOUTH: NEGATIVE for ear, mouth and throat problems  RESP: NEGATIVE for significant cough or SOB  BREAST: NEGATIVE for masses, tenderness or discharge  CV: NEGATIVE for chest pain, palpitations or peripheral edema  GI: NEGATIVE for nausea, abdominal pain, heartburn, or change in bowel habits  : NEGATIVE for frequency, dysuria, or hematuria  MUSCULOSKELETAL: NEGATIVE for significant arthralgias or myalgia  NEURO: NEGATIVE for HX CVA and HX TIA  ENDOCRINE: NEGATIVE for temperature intolerance, skin/hair changes  HEME: NEGATIVE for bleeding problems  PSYCHIATRIC: NEGATIVE for changes in mood or affect            Medications:     Current Outpatient Medications   Medication Sig     ALPRAZolam (XANAX) 0.25 MG tablet Take 1-2 tablets (0.25-0.5 mg) by mouth 3 times daily as needed for anxiety     Ascorbic Acid (VITAMIN C PO)      atorvastatin (LIPITOR) 10 MG tablet Take 1 tablet (10 mg) by mouth daily     Biotin 5000 MCG CAPS Take 1 tablet by mouth daily.     buPROPion (WELLBUTRIN XL) 150 MG 24  "hr tablet Take 1 tablet (150 mg) by mouth every morning     CALCIUM CITRATE PO Take 3 capsules by mouth 3 times daily     carvedilol (COREG) 12.5 MG tablet Take 1 tablet (12.5 mg) by mouth 2 times daily (with meals)     Cholecalciferol (VITAMIN D PO)      diazepam (VALIUM) 5 MG tablet Take one pill 60 minutes before the MRI, and you may take the second pill as needed for anxiety.  Make sure you have a  to and from the facility.     gabapentin (NEURONTIN) 100 MG capsule Take 1 capsule (100 mg) by mouth 3 times daily     Glucosamine-Chondroit-Vit C-Mn (GLUCOSAMINE CHONDR 1500 COMPLX PO) Take 1 tablet by mouth daily     hydrALAZINE (APRESOLINE) 25 MG tablet Take 1 tablet (25 mg) by mouth 2 times daily     Iron-vit C-vit B12-folic acid 100-250-0.025-1 MG TABS Take  by mouth.     Multiple Vitamins-Minerals (MULTIVITAL) TABS Take 2 tablets by mouth daily.     pantoprazole (PROTONIX) 40 MG EC tablet TAKE ONE TABLET BY MOUTH TWICE DAILY     telmisartan (MICARDIS) 20 MG tablet TAKE ONE TABLET BY MOUTH ONE TIME DAILY     triamcinolone (KENALOG) 0.1 % external cream Apply thin layer to affected area twice daily     No current facility-administered medications for this visit.             Physical Exam:   BP (!) 163/78   Pulse 72   Temp 97.1  F (36.2  C) (Tympanic)   Resp 18   Ht 1.675 m (5' 5.95\")   Wt 80.7 kg (178 lb)   LMP 06/27/2010 (Exact Date)   SpO2 99%   BMI 28.77 kg/m    Constitutional: Awake, alert, cooperative, no apparent distress, and appears stated age.  Eyes: extra-ocular muscles intact, sclera clear and conjunctiva normal  ENT: normocepalic, without obvious abnormality  Lungs: no increased work of breathing and no retractions  Neurologic: Mental Status Exam:  Level of Alertness:   awake  Orientation:   person, place, time  Memory:   normal  Fund of Knowledge:  normal  Attention/Concentration:  normal  Language:  normal  Neuropsychiatric: Orientation: oriented to self, place, time and situation       "    Data:   Patient: Marlee Tobar : 1955 Physician: Gray Taylor MD   Sex: Female AGE: 67 year Ref Phys:     ID#: [id number]     Technician: Mukul Ambrocio       History and Examination:   Marlee Tobar is a 67 year old woman with paresthesias in the feet and pain in the back of the left thigh. She is referred for evaluation of possible sensory polyneuropathy and lumbosacral radiculopathy.       Techniques:   Motor conduction studies were done with surface recording electrodes. Sensory conduction studies were performed with surface electrodes, unless indicated otherwise by (n), designating the use of subdermal recording electrodes. Temperature was monitored and recorded throughout the study. EMG was done with a concentric needle electrode.       Results:   Sural sensory nerve action potentials were mildly attenuated bilaterally. Bilateral superficial fibular and right radial sensory conduction studies were normal. Bilateral fibular and tibial motor conduction studies were normal. Electromyography of the left lower limb was normal.       Interpretation:   This is an abnormal study, demonstrating electrophysiologic evidence of the followin. Possible mild sensory neuropathy affecting bilateral lower limbs. Specifically, sural sensory nerve action potential amplitudes are slightly below this laboratory's lower limit of the normal range for a person of this age, but at the lower limit based upon one study of nerve conduction norms in this age group (Muscle Nerve 49:564-569, 2014).   2. No evidence of lumbosacral radiculopathy.     MR LUMBAR SPINE W/O CONTRAST 2/10/2023 4:15 PM     History: Lumbosacral radiculopathy.     ICD-10: Lumbosacral radiculopathy     Comparison: Radiograph 9      Technique: Sagittal STIR, T1-weighted turbo spin-echo, 3-D volumetric  T2-weighted and axial reconstructed T2-weighted images of the lumbar  spine were obtained without intravenous contrast.      Findings: Presumed 5  lumbar-type vertebra. Minimal scoliosis with  convexity to the right. Trace anterolisthesis at L4-5. Disc height  loss and disc degeneration at L2-3, L3-4 and L4-5. No bone marrow  edema. Conus is at L1-2. No vertebral body height loss. No pars defect  identified. Bone marrow signal intensity is within normal limits and  no abnormal signal is visible.      On a level by level basis:     L1-2: No significant spinal canal or foraminal narrowing.     L2-3: Disc bulge and facet hypertrophy without significant spinal  canal or foraminal narrowing.     L3-4: Disc bulge and facet hypertrophy without significant spinal  canal or foraminal narrowing.     L4-5: Anterolisthesis, facet hypertrophy and disc bulge. Moderate left  mild right neural foraminal narrowing. No right spinal canal stenosis.  L5-S1: Small central disc protrusion without significant spinal canal  or foraminal stenosis.     Retroperitoneal structures are unremarkable.                                                                      Impression: Degenerative changes as above. Most significantly there is  trace anterolisthesis at L4-5 with superimposed facet hypertrophy and  disc bulge leading to moderate left neural foraminal stenosis.     LUPE SHANNON MD     Disposition:  Follow-up in 4 weeks.      Bandar Merrill MD  Internal Medicine  [facility] Team

## 2023-04-10 ENCOUNTER — MYC MEDICAL ADVICE (OUTPATIENT)
Dept: FAMILY MEDICINE | Facility: CLINIC | Age: 68
End: 2023-04-10
Payer: COMMERCIAL

## 2023-04-13 NOTE — TELEPHONE ENCOUNTER
Pt reaching out again requesting repeat labs. See [employer] message for details.     Briana Franks RN

## 2023-04-18 DIAGNOSIS — F40.240 CLAUSTROPHOBIA: ICD-10-CM

## 2023-04-18 RX ORDER — DIAZEPAM 5 MG
TABLET ORAL
Qty: 1 TABLET | Refills: 0 | Status: SHIPPED | OUTPATIENT
Start: 2023-04-18

## 2023-04-25 ENCOUNTER — LAB (OUTPATIENT)
Dept: LAB | Facility: CLINIC | Age: 68
End: 2023-04-25
Payer: COMMERCIAL

## 2023-04-25 DIAGNOSIS — G60.9 IDIOPATHIC PERIPHERAL NEUROPATHY: ICD-10-CM

## 2023-04-25 LAB
NON GESTATIONAL GTT 2 HR POST DOSE: 51 MG/DL (ref 60–199)
NON GESTATIONAL GTT FASTING: 92 MG/DL (ref 60–125)

## 2023-04-25 PROCEDURE — 36415 COLL VENOUS BLD VENIPUNCTURE: CPT

## 2023-04-25 PROCEDURE — 82950 GLUCOSE TEST: CPT

## 2023-04-25 PROCEDURE — 82947 ASSAY GLUCOSE BLOOD QUANT: CPT

## 2023-04-26 ENCOUNTER — PRE VISIT (OUTPATIENT)
Dept: ORTHOPEDICS | Facility: CLINIC | Age: 68
End: 2023-04-26

## 2023-04-26 ENCOUNTER — MYC MEDICAL ADVICE (OUTPATIENT)
Dept: FAMILY MEDICINE | Facility: CLINIC | Age: 68
End: 2023-04-26

## 2023-04-26 ENCOUNTER — OFFICE VISIT (OUTPATIENT)
Dept: ORTHOPEDICS | Facility: CLINIC | Age: 68
End: 2023-04-26
Payer: COMMERCIAL

## 2023-04-26 DIAGNOSIS — M17.12 ARTHRITIS OF LEFT KNEE: Primary | ICD-10-CM

## 2023-04-26 DIAGNOSIS — M54.16 LUMBAR RADICULOPATHY: ICD-10-CM

## 2023-04-26 DIAGNOSIS — M25.562 LEFT LATERAL KNEE PAIN: ICD-10-CM

## 2023-04-26 DIAGNOSIS — M51.369 DDD (DEGENERATIVE DISC DISEASE), LUMBAR: ICD-10-CM

## 2023-04-26 PROCEDURE — 99204 OFFICE O/P NEW MOD 45 MIN: CPT | Mod: 25 | Performed by: PREVENTIVE MEDICINE

## 2023-04-26 PROCEDURE — 20610 DRAIN/INJ JOINT/BURSA W/O US: CPT | Mod: LT | Performed by: PREVENTIVE MEDICINE

## 2023-04-26 RX ORDER — TRIAMCINOLONE ACETONIDE 40 MG/ML
40 INJECTION, SUSPENSION INTRA-ARTICULAR; INTRAMUSCULAR
Status: DISCONTINUED | OUTPATIENT
Start: 2023-04-26 | End: 2023-08-22

## 2023-04-26 RX ADMIN — TRIAMCINOLONE ACETONIDE 40 MG: 40 INJECTION, SUSPENSION INTRA-ARTICULAR; INTRAMUSCULAR at 16:54

## 2023-04-26 NOTE — PROGRESS NOTES
HISTORY OF PRESENT ILLNESS  Ms. Tobar is a pleasant 67 year old year old female who presents to clinic today with the following:  What problem are you here for? Left knee pain   And low back pain that radiates into her left leg  How long have you had this problem? 6 months    Have you had any recent imaging of this problem? Xrays/MRI/CT scans? yes    Have you had treatments for this problem in the past?  -Medications? ibuprofen  -Physical therapy?  Yes 2006  -Injections? yes  -Surgery? Meniscus repair    How severe is this problem today? 0-10 scale?3      How severe has this problem been at WORST in the past? 0-10 scale? 8-9    What do you think caused this problem? Torn meniscus, states her mri shows something with ligaments    Does this problem or its symptoms cause difficulty for you falling asleep or staying asleep? yes    Anything else you want us to know about this problem? Wants to discuss PT and treatement options          MEDICAL HISTORY  Patient Active Problem List   Diagnosis     iamJOINT PAIN-LOWER LEG     iamPOSTSURGICAL STATES NEC     CARDIOVASCULAR SCREENING; LDL GOAL LESS THAN 130     HTN, goal below 130/80     Gallstones     Cholelithiases     GERD (gastroesophageal reflux disease)     Varicose vein of leg     Dyspareunia     Microalbuminuria     Major depressive disorder, recurrent episode, in partial remission (H)     Situational anxiety     Overweight     Advance care planning     H/O gastric bypass     H/O gastrointestinal hemorrhage     Personal history of PE (pulmonary embolism)     Insomnia, unspecified type     Pulmonary nodules     Polyarthralgia     Positive TIFFANI (antinuclear antibody)     Overactive bladder     Bilateral lumbar radiculopathy     Anterolisthesis of lumbar spine     Lumbar facet arthropathy     Neural foraminal stenosis of lumbar spine     Idiopathic peripheral neuropathy       Current Outpatient Medications   Medication Sig Dispense Refill     ALPRAZolam (XANAX) 0.25 MG  tablet Take 1-2 tablets (0.25-0.5 mg) by mouth 3 times daily as needed for anxiety 30 tablet 5     Ascorbic Acid (VITAMIN C PO)        atorvastatin (LIPITOR) 10 MG tablet Take 1 tablet (10 mg) by mouth daily 90 tablet 3     Biotin 5000 MCG CAPS Take 1 tablet by mouth daily.       buPROPion (WELLBUTRIN XL) 150 MG 24 hr tablet Take 1 tablet (150 mg) by mouth every morning 90 tablet 3     CALCIUM CITRATE PO Take 3 capsules by mouth 3 times daily       carvedilol (COREG) 12.5 MG tablet Take 1 tablet (12.5 mg) by mouth 2 times daily (with meals) 180 tablet 1     Cholecalciferol (VITAMIN D PO)        diazepam (VALIUM) 5 MG tablet Take 1 tablet (5 mg) by mouth once for 1 dose 1 tablet 0     gabapentin (NEURONTIN) 100 MG capsule Take 1 capsule (100 mg) by mouth 3 times daily 270 capsule 1     Glucosamine-Chondroit-Vit C-Mn (GLUCOSAMINE CHONDR 1500 COMPLX PO) Take 1 tablet by mouth daily       hydrALAZINE (APRESOLINE) 25 MG tablet Take 1 tablet (25 mg) by mouth 2 times daily 180 tablet 1     Iron-vit C-vit B12-folic acid 100-250-0.025-1 MG TABS Take  by mouth.       Multiple Vitamins-Minerals (MULTIVITAL) TABS Take 2 tablets by mouth daily.       pantoprazole (PROTONIX) 40 MG EC tablet TAKE ONE TABLET BY MOUTH TWICE DAILY 180 tablet 2     telmisartan (MICARDIS) 20 MG tablet TAKE ONE TABLET BY MOUTH ONE TIME DAILY 90 tablet 1     triamcinolone (KENALOG) 0.1 % external cream Apply thin layer to affected area twice daily 30 g 0       Allergies   Allergen Reactions     Doxycycline Rash     Contrast Dye      Happened when patient was ~ 18 years ago during a test for 'kidneys'.  Patient thinks she might have developed a rash, couldn't remember.     Diatrizoate Itching     Iodine      Vitamin K Swelling     facial       Family History   Problem Relation Age of Onset     Hypertension Mother      Arthritis Mother      Cancer Mother         Hodgkins disease     Obesity Mother      Cerebrovascular Disease Father      Alcohol/Drug  Father         recovered alcoholic     Alcohol/Drug Sister      Gynecology Sister         history of abnormal paps--LEEPs done     Lipids Sister      Thyroid Disease Sister      Cardiovascular Sister         mitral valve prolapse     Alcohol/Drug Son         alcoholic     Depression Son      Hypertension Son      Psychotic Disorder Son      Hypertension Brother      Cardiovascular Brother      Heart Disease Brother 58        MI     Obesity Brother      Genitourinary Problems Daughter      Gynecology Daughter         Protein S deficiency     Unknown/Adopted Maternal Grandfather      Unknown/Adopted Paternal Grandmother      Unknown/Adopted Paternal Grandfather      Glaucoma No family hx of      Macular Degeneration No family hx of      Social History     Socioeconomic History     Marital status:    Occupational History     Occupation: Full time   Tobacco Use     Smoking status: Never     Passive exposure: Never     Smokeless tobacco: Never   Vaping Use     Vaping status: Never Used   Substance and Sexual Activity     Alcohol use: Yes     Comment: very rare     Drug use: No     Sexual activity: Yes     Partners: Male     Birth control/protection: None     Comment: postmenopausal   Other Topics Concern     Parent/sibling w/ CABG, MI or angioplasty before 65F 55M? Yes     Comment: Brother 55     Social Determinants of Health     Financial Resource Strain: Low Risk  (5/20/2020)    Overall Financial Resource Strain (CARDIA)      Difficulty of Paying Living Expenses: Not hard at all   Food Insecurity: No Food Insecurity (5/20/2020)    Hunger Vital Sign      Worried About Running Out of Food in the Last Year: Never true      Ran Out of Food in the Last Year: Never true   Transportation Needs: No Transportation Needs (5/20/2020)    PRAPARE - Transportation      Lack of Transportation (Medical): No      Lack of Transportation (Non-Medical): No   Stress: No Stress Concern Present (5/20/2020)    [facility]  Occupational Health - Occupational Stress Questionnaire      Feeling of Stress : Only a little   Social Connections: Unknown (5/20/2020)    Social Connection and Isolation Panel [NHANES]      Frequency of Communication with Friends and Family: More than three times a week   Intimate Partner Violence: Unknown (5/20/2020)    Humiliation, Afraid, Rape, and Kick questionnaire      Fear of Current or Ex-Partner: No       Additional medical/Social/Surgical histories reviewed in EPIC and updated as appropriate.     REVIEW OF SYSTEMS (4/26/2023)  10 point ROS of systems including Constitutional, Eyes, Respiratory, Cardiovascular, Gastroenterology, Genitourinary, Integumentary, Musculoskeletal, Psychiatric, Allergic/Immunologic were all negative except for pertinent positives noted in my HPI.     PHYSICAL EXAM  VSS    General  - normal appearance, in no obvious distress  HEENT  - conjunctivae not injected, moist mucous membranes, normocephalic/atraumatic head, ears normal appearance, no lesions, mouth normal appearance, no scars, normal dentition and teeth present  CV  - normal popliteal pulse  Pulm  - normal respiratory pattern, non-labored  Musculoskeletal - left knee  - stance: mildly antalgic gait, genu varum  - inspection: no generalized swelling, trace effusion  - palpation: medial and lateral joint line tenderness, patella and patellar tendon non-tender, normal popliteal pulse  - ROM: 100 degrees flexion, 0 degrees extension, painful active ROM  - strength: 5/5 in flexion, 5/5 in extension  - neuro: no sensory or motor deficit  - special tests:  (-) Lachman  (-) anterior drawer  (-) posterior drawer  (-) pivot shift  (-) varus at 0 and 30 degrees flexion  (-) valgus at 0 and 30 degrees flexion  (+) Stanislaw s compression test  (-) patellar apprehension  Neuro  - no sensory or motor deficit, grossly normal coordination, normal muscle tone  Skin  - no ecchymosis, erythema, warmth, or induration, no obvious rash  Psych  -  interactive, appropriate, normal mood and affect  Lumbar: has some pain with flexion/extension, slightly positive SLR on left  ASSESSMENT & PLAN  66 yo female with severe left knee djd, arthritis and lumbar ddd, disc herniations, radicular pain    I independently reviewed the following imaging studies:  Lumbar MRI: shows ddd, disc herniations  Left knee MRI; shows severe degenerative meniscus tears, arthritis  After a 20 minute discussion and examination, we decided to perform a same day injection for diagnostic and therapeutic purposes for left knee  Given HEP  Consider PT  Consider NOAM if radicular pain worsens  Ordered HA injections for knee  Patient has been doing home exercise physical therapy program for this problem      Appropriate PPE was utilized for prevention of spread of Covid-19.  Gray Rome MD, CAM  PROCEDURE:           Left  Knee joint injection   The patient was apprised of the risks and the benefits of the procedure and consented and a written consent was signed.   The patient s  KNEE was sterilely prepped with chloraprep.   40 mg of triamcinolone suspension was drawn up into a 5 mL syringe with 4 mL of 1% lidocaine  The patient was injected into the knee with a 1.5-inch 22-gauge needle at the_superiorlateral aspect of their knee joint  There were no complications. The patient tolerated the procedure well. There was minimal bleeding.   The patient was instructed to ice the knee upon leaving clinic and refrain from overuse over the next 3 days.   The patient was instructed to go to the emergency room with any usual pain, swelling, or redness occurred in the injected area.   The patient was given a followup appointment to evaluate response to the injection to their increased range of motion and reduction of pain.  Follow up PRN  Dr Gray Rome

## 2023-04-26 NOTE — LETTER
4/26/2023         RE: Marlee Tobar  [address] [facility] [address]        Dear Colleague,    Thank you for referring your patient, Marlee Tobar, to the [facility] [place]. Please see a copy of my visit note below.    HISTORY OF PRESENT ILLNESS  Ms. Tobar is a pleasant 67 year old year old female who presents to clinic today with the following:  What problem are you here for? Left knee pain   And low back pain that radiates into her left leg  How long have you had this problem? 6 months    Have you had any recent imaging of this problem? Xrays/MRI/CT scans? yes    Have you had treatments for this problem in the past?  -Medications? ibuprofen  -Physical therapy?  Yes 2006  -Injections? yes  -Surgery? Meniscus repair    How severe is this problem today? 0-10 scale?3      How severe has this problem been at WORST in the past? 0-10 scale? 8-9    What do you think caused this problem? Torn meniscus, states her mri shows something with ligaments    Does this problem or its symptoms cause difficulty for you falling asleep or staying asleep? yes    Anything else you want us to know about this problem? Wants to discuss PT and treatement options          MEDICAL HISTORY  Patient Active Problem List   Diagnosis     iamJOINT PAIN-LOWER LEG     iamPOSTSURGICAL STATES NEC     CARDIOVASCULAR SCREENING; LDL GOAL LESS THAN 130     HTN, goal below 130/80     Gallstones     Cholelithiases     GERD (gastroesophageal reflux disease)     Varicose vein of leg     Dyspareunia     Microalbuminuria     Major depressive disorder, recurrent episode, in partial remission (H)     Situational anxiety     Overweight     Advance care planning     H/O gastric bypass     H/O gastrointestinal hemorrhage     Personal history of PE (pulmonary embolism)     Insomnia, unspecified type     Pulmonary nodules     Polyarthralgia     Positive TIFFANI (antinuclear antibody)     Overactive bladder     Bilateral  lumbar radiculopathy     Anterolisthesis of lumbar spine     Lumbar facet arthropathy     Neural foraminal stenosis of lumbar spine     Idiopathic peripheral neuropathy       Current Outpatient Medications   Medication Sig Dispense Refill     ALPRAZolam (XANAX) 0.25 MG tablet Take 1-2 tablets (0.25-0.5 mg) by mouth 3 times daily as needed for anxiety 30 tablet 5     Ascorbic Acid (VITAMIN C PO)        atorvastatin (LIPITOR) 10 MG tablet Take 1 tablet (10 mg) by mouth daily 90 tablet 3     Biotin 5000 MCG CAPS Take 1 tablet by mouth daily.       buPROPion (WELLBUTRIN XL) 150 MG 24 hr tablet Take 1 tablet (150 mg) by mouth every morning 90 tablet 3     CALCIUM CITRATE PO Take 3 capsules by mouth 3 times daily       carvedilol (COREG) 12.5 MG tablet Take 1 tablet (12.5 mg) by mouth 2 times daily (with meals) 180 tablet 1     Cholecalciferol (VITAMIN D PO)        diazepam (VALIUM) 5 MG tablet Take 1 tablet (5 mg) by mouth once for 1 dose 1 tablet 0     gabapentin (NEURONTIN) 100 MG capsule Take 1 capsule (100 mg) by mouth 3 times daily 270 capsule 1     Glucosamine-Chondroit-Vit C-Mn (GLUCOSAMINE CHONDR 1500 COMPLX PO) Take 1 tablet by mouth daily       hydrALAZINE (APRESOLINE) 25 MG tablet Take 1 tablet (25 mg) by mouth 2 times daily 180 tablet 1     Iron-vit C-vit B12-folic acid 100-250-0.025-1 MG TABS Take  by mouth.       Multiple Vitamins-Minerals (MULTIVITAL) TABS Take 2 tablets by mouth daily.       pantoprazole (PROTONIX) 40 MG EC tablet TAKE ONE TABLET BY MOUTH TWICE DAILY 180 tablet 2     telmisartan (MICARDIS) 20 MG tablet TAKE ONE TABLET BY MOUTH ONE TIME DAILY 90 tablet 1     triamcinolone (KENALOG) 0.1 % external cream Apply thin layer to affected area twice daily 30 g 0       Allergies   Allergen Reactions     Doxycycline Rash     Contrast Dye      Happened when patient was ~ 18 years ago during a test for 'kidneys'.  Patient thinks she might have developed a rash, couldn't remember.     Diatrizoate  Itching     Iodine      Vitamin K Swelling     facial       Family History   Problem Relation Age of Onset     Hypertension Mother      Arthritis Mother      Cancer Mother         Hodgkins disease     Obesity Mother      Cerebrovascular Disease Father      Alcohol/Drug Father         recovered alcoholic     Alcohol/Drug Sister      Gynecology Sister         history of abnormal paps--LEEPs done     Lipids Sister      Thyroid Disease Sister      Cardiovascular Sister         mitral valve prolapse     Alcohol/Drug Son         alcoholic     Depression Son      Hypertension Son      Psychotic Disorder Son      Hypertension Brother      Cardiovascular Brother      Heart Disease Brother 58        MI     Obesity Brother      Genitourinary Problems Daughter      Gynecology Daughter         Protein S deficiency     Unknown/Adopted Maternal Grandfather      Unknown/Adopted Paternal Grandmother      Unknown/Adopted Paternal Grandfather      Glaucoma No family hx of      Macular Degeneration No family hx of      Social History     Socioeconomic History     Marital status:    Occupational History     Occupation: Full time   Tobacco Use     Smoking status: Never     Passive exposure: Never     Smokeless tobacco: Never   Vaping Use     Vaping status: Never Used   Substance and Sexual Activity     Alcohol use: Yes     Comment: very rare     Drug use: No     Sexual activity: Yes     Partners: Male     Birth control/protection: None     Comment: postmenopausal   Other Topics Concern     Parent/sibling w/ CABG, MI or angioplasty before 65F 55M? Yes     Comment: Brother 55     Social Determinants of Health     Financial Resource Strain: Low Risk  (5/20/2020)    Overall Financial Resource Strain (CARDIA)      Difficulty of Paying Living Expenses: Not hard at all   Food Insecurity: No Food Insecurity (5/20/2020)    Hunger Vital Sign      Worried About Running Out of Food in the Last Year: Never true      Ran Out of Food in the Last  Year: Never true   Transportation Needs: No Transportation Needs (5/20/2020)    PRAPARE - Transportation      Lack of Transportation (Medical): No      Lack of Transportation (Non-Medical): No   Stress: No Stress Concern Present (5/20/2020)    [employer] - Occupational Stress Questionnaire      Feeling of Stress : Only a little   Social Connections: Unknown (5/20/2020)    Social Connection and Isolation Panel [NHANES]      Frequency of Communication with Friends and Family: More than three times a week   Intimate Partner Violence: Unknown (5/20/2020)    Humiliation, Afraid, Rape, and Kick questionnaire      Fear of Current or Ex-Partner: No       Additional medical/Social/Surgical histories reviewed in EPIC and updated as appropriate.     REVIEW OF SYSTEMS (4/26/2023)  10 point ROS of systems including Constitutional, Eyes, Respiratory, Cardiovascular, Gastroenterology, Genitourinary, Integumentary, Musculoskeletal, Psychiatric, Allergic/Immunologic were all negative except for pertinent positives noted in my HPI.     PHYSICAL EXAM  VSS    General  - normal appearance, in no obvious distress  HEENT  - conjunctivae not injected, moist mucous membranes, normocephalic/atraumatic head, ears normal appearance, no lesions, mouth normal appearance, no scars, normal dentition and teeth present  CV  - normal popliteal pulse  Pulm  - normal respiratory pattern, non-labored  Musculoskeletal - left knee  - stance: mildly antalgic gait, genu varum  - inspection: no generalized swelling, trace effusion  - palpation: medial and lateral joint line tenderness, patella and patellar tendon non-tender, normal popliteal pulse  - ROM: 100 degrees flexion, 0 degrees extension, painful active ROM  - strength: 5/5 in flexion, 5/5 in extension  - neuro: no sensory or motor deficit  - special tests:  (-) Lachman  (-) anterior drawer  (-) posterior drawer  (-) pivot shift  (-) varus at 0 and 30 degrees  flexion  (-) valgus at 0 and 30 degrees flexion  (+) Stanislaw s compression test  (-) patellar apprehension  Neuro  - no sensory or motor deficit, grossly normal coordination, normal muscle tone  Skin  - no ecchymosis, erythema, warmth, or induration, no obvious rash  Psych  - interactive, appropriate, normal mood and affect  Lumbar: has some pain with flexion/extension, slightly positive SLR on left  ASSESSMENT & PLAN  68 yo female with severe left knee djd, arthritis and lumbar ddd, disc herniations, radicular pain    I independently reviewed the following imaging studies:  Lumbar MRI: shows ddd, disc herniations  Left knee MRI; shows severe degenerative meniscus tears, arthritis  After a 20 minute discussion and examination, we decided to perform a same day injection for diagnostic and therapeutic purposes for left knee  Given HEP  Consider PT  Consider NOAM if radicular pain worsens  Ordered HA injections for knee  Patient has been doing home exercise physical therapy program for this problem      Appropriate PPE was utilized for prevention of spread of Covid-19.  Gray Rome MD, CAQSM  PROCEDURE:           Left  Knee joint injection   The patient was apprised of the risks and the benefits of the procedure and consented and a written consent was signed.   The patient s  KNEE was sterilely prepped with chloraprep.   40 mg of triamcinolone suspension was drawn up into a 5 mL syringe with 4 mL of 1% lidocaine  The patient was injected into the knee with a 1.5-inch 22-gauge needle at the_superiorlateral aspect of their knee joint  There were no complications. The patient tolerated the procedure well. There was minimal bleeding.   The patient was instructed to ice the knee upon leaving clinic and refrain from overuse over the next 3 days.   The patient was instructed to go to the emergency room with any usual pain, swelling, or redness occurred in the injected area.   The patient was given a followup appointment to  evaluate response to the injection to their increased range of motion and reduction of pain.  Follow up PRN  Dr Gray Rome      Large Joint Injection/Arthocentesis: L knee joint    Date/Time: 4/26/2023 4:54 PM    Performed by: Gray Rome MD  Authorized by: Gray Rome MD    Indications:  Pain, osteoarthritis and joint swelling  Needle Size:  22 G  Guidance: landmark guided    Approach:  Superolateral  Location:  Knee      Medications:  40 mg triamcinolone 40 MG/ML  Outcome:  Tolerated well, no immediate complications  Procedure discussed: discussed risks, benefits, and alternatives    Consent Given by:  Patient  Timeout: timeout called immediately prior to procedure    Prep: patient was prepped and draped in usual sterile fashion            Again, thank you for allowing me to participate in the care of your patient.        Sincerely,        Gray Rome MD

## 2023-04-26 NOTE — PROGRESS NOTES
Large Joint Injection/Arthocentesis: L knee joint    Date/Time: 4/26/2023 4:54 PM    Performed by: Gray Rome MD  Authorized by: Gray Rome MD    Indications:  Pain, osteoarthritis and joint swelling  Needle Size:  22 G  Guidance: landmark guided    Approach:  Superolateral  Location:  Knee      Medications:  40 mg triamcinolone 40 MG/ML  Outcome:  Tolerated well, no immediate complications  Procedure discussed: discussed risks, benefits, and alternatives    Consent Given by:  Patient  Timeout: timeout called immediately prior to procedure    Prep: patient was prepped and draped in usual sterile fashion

## 2023-04-28 DIAGNOSIS — R20.2 PARESTHESIA OF BOTH FEET: ICD-10-CM

## 2023-04-28 DIAGNOSIS — I10 HTN, GOAL BELOW 140/90: ICD-10-CM

## 2023-05-01 RX ORDER — HYDRALAZINE HYDROCHLORIDE 25 MG/1
TABLET, FILM COATED ORAL
Qty: 180 TABLET | Refills: 1 | Status: SHIPPED | OUTPATIENT
Start: 2023-05-01 | End: 2023-10-06

## 2023-05-01 RX ORDER — CARVEDILOL 12.5 MG/1
TABLET ORAL
Qty: 180 TABLET | Refills: 1 | Status: SHIPPED | OUTPATIENT
Start: 2023-05-01 | End: 2023-10-06

## 2023-05-01 RX ORDER — GABAPENTIN 100 MG/1
CAPSULE ORAL
Qty: 270 CAPSULE | Refills: 1 | Status: SHIPPED | OUTPATIENT
Start: 2023-05-01 | End: 2023-05-01

## 2023-05-01 NOTE — ADDENDUM NOTE
Addended by: LEONEL ARELLANO on: 5/1/2023 04:45 PM     Modules accepted: Orders     Subjective:       Patient ID: Carlos A Ruff is a 63 y.o. male.    Chief Complaint: Urinary Frequency (referred by )    HPI patient is here for lower urinary tract symptoms.  He has decreased force in caliber stream but empties his bladder. He has hesitancy and sometimes urgency.  He was placed on Flomax a week ago his symptoms have improved.  He needs a PSA    Past Medical History:   Diagnosis Date    Anticoagulant long-term use     Coronary artery disease     Hypercholesteremia     Hypertension     MI (myocardial infarction)        Past Surgical History:   Procedure Laterality Date    blood clots      cardiac stents         History reviewed. No pertinent family history.    Social History     Social History    Marital status: Single     Spouse name: N/A    Number of children: N/A    Years of education: N/A     Occupational History    Not on file.     Social History Main Topics    Smoking status: Current Some Day Smoker     Packs/day: 0.25     Types: Cigarettes    Smokeless tobacco: Never Used    Alcohol use Yes      Comment: occasionally     Drug use: No    Sexual activity: Not Currently     Other Topics Concern    Not on file     Social History Narrative    No narrative on file       Allergies:  Patient has no known allergies.    Medications:    Current Outpatient Prescriptions:     ascorbic acid, vitamin C, (VITAMIN C) 100 MG tablet, Take 100 mg by mouth once daily., Disp: , Rfl:     aspirin (ECOTRIN) 81 MG EC tablet, Take 81 mg by mouth once daily., Disp: , Rfl:     atorvastatin (LIPITOR) 40 MG tablet, Take 1 tablet (40 mg total) by mouth once daily., Disp: 90 tablet, Rfl: 1    clopidogrel (PLAVIX) 75 mg tablet, Take 1 tablet (75 mg total) by mouth once daily., Disp: 90 tablet, Rfl: 1    ergocalciferol (VITAMIN D2) 50,000 unit Cap, Take 50,000 Units by mouth every 7 days., Disp: , Rfl:     isosorbide mononitrate (IMDUR) 30 MG 24 hr tablet, Take 1 tablet (30 mg total) by mouth once  daily., Disp: 30 tablet, Rfl: 11    losartan-hydrochlorothiazide 50-12.5 mg (HYZAAR) 50-12.5 mg per tablet, Take 1 tablet by mouth once daily., Disp: 90 tablet, Rfl: 3    metoprolol succinate (TOPROL-XL) 25 MG 24 hr tablet, Take 1 tablet (25 mg total) by mouth once daily., Disp: 30 tablet, Rfl: 11    multivitamin (THERAGRAN) per tablet, Take 1 tablet by mouth once daily., Disp: , Rfl:     nitroGLYCERIN (NITROSTAT) 0.4 MG SL tablet, Place 0.4 mg under the tongue every 5 (five) minutes as needed for Chest pain., Disp: , Rfl:     tamsulosin (FLOMAX) 0.4 mg Cp24, Take 1 capsule (0.4 mg total) by mouth once daily., Disp: 30 capsule, Rfl: 11    Review of Systems    Objective:      Physical Exam   Constitutional: He appears well-developed.   HENT:   Head: Normocephalic.   Cardiovascular: Normal rate.    Pulmonary/Chest: Effort normal.   Abdominal: Soft.   Genitourinary: Prostate normal.   Genitourinary Comments: 30 g benign   Neurological: He is alert.   Skin: Skin is warm.     Psychiatric: He has a normal mood and affect.       Assessment:       1. Hyperplasia of prostate with lower urinary tract symptoms (LUTS)        Plan:       Carlos A was seen today for urinary frequency.    Diagnoses and all orders for this visit:    Hyperplasia of prostate with lower urinary tract symptoms (LUTS)  -     US Retroperitoneal Complete (Kidney and; Future  -     Cystoscopy; Future  -     Prostate Specific Antigen, Diagnostic; Future

## 2023-05-01 NOTE — TELEPHONE ENCOUNTER
Transmission failed to send to pharmacy. Please resend prescription.   Isela García RN    [facility]- Primary Care

## 2023-05-03 RX ORDER — GABAPENTIN 100 MG/1
100 CAPSULE ORAL 3 TIMES DAILY
Qty: 270 CAPSULE | Refills: 1 | Status: SHIPPED | OUTPATIENT
Start: 2023-05-03 | End: 2024-01-02

## 2023-05-05 ENCOUNTER — OFFICE VISIT (OUTPATIENT)
Dept: OPHTHALMOLOGY | Facility: CLINIC | Age: 68
End: 2023-05-05
Payer: COMMERCIAL

## 2023-05-05 DIAGNOSIS — H52.203 HYPEROPIA OF BOTH EYES WITH ASTIGMATISM AND PRESBYOPIA: Primary | ICD-10-CM

## 2023-05-05 DIAGNOSIS — H52.4 HYPEROPIA OF BOTH EYES WITH ASTIGMATISM AND PRESBYOPIA: Primary | ICD-10-CM

## 2023-05-05 DIAGNOSIS — H52.03 HYPEROPIA OF BOTH EYES WITH ASTIGMATISM AND PRESBYOPIA: Primary | ICD-10-CM

## 2023-05-05 DIAGNOSIS — H04.123 DRY EYE SYNDROME OF BOTH EYES: ICD-10-CM

## 2023-05-05 PROCEDURE — 92004 COMPRE OPH EXAM NEW PT 1/>: CPT | Performed by: OPHTHALMOLOGY

## 2023-05-05 ASSESSMENT — CONF VISUAL FIELD
OD_INFERIOR_TEMPORAL_RESTRICTION: 0
OD_SUPERIOR_TEMPORAL_RESTRICTION: 0
OD_NORMAL: 1
OS_INFERIOR_TEMPORAL_RESTRICTION: 0
OD_SUPERIOR_NASAL_RESTRICTION: 0
METHOD: COUNTING FINGERS
OD_INFERIOR_NASAL_RESTRICTION: 0
OS_INFERIOR_NASAL_RESTRICTION: 0
OS_SUPERIOR_NASAL_RESTRICTION: 0
OS_SUPERIOR_TEMPORAL_RESTRICTION: 0
OS_NORMAL: 1

## 2023-05-05 ASSESSMENT — SLIT LAMP EXAM - LIDS
COMMENTS: NORMAL
COMMENTS: NORMAL

## 2023-05-05 ASSESSMENT — VISUAL ACUITY
METHOD: SNELLEN - LINEAR
OS_SC: 20/25
OS_SC+: -1
OD_SC: 20/25

## 2023-05-05 ASSESSMENT — TONOMETRY
IOP_METHOD: ICARE
OS_IOP_MMHG: 10
OD_IOP_MMHG: 10

## 2023-05-05 ASSESSMENT — REFRACTION_MANIFEST
OD_ADD: +2.00
OS_ADD: +2.00
OS_CYLINDER: SPHERE
OS_SPHERE: +0.75
OD_SPHERE: +0.50
OD_CYLINDER: SPHERE

## 2023-05-05 ASSESSMENT — CUP TO DISC RATIO
OD_RATIO: 0.05
OS_RATIO: 0.1

## 2023-05-05 NOTE — PROGRESS NOTES
HPI     Dry Eye(s)    In both eyes.           Comments    Patient presents with complaint of dry eyes. Eyes seem dry all day, worse at night. Pt finds that she rubs the eyes a lot. No drop use.   Vision blurred off and on.   Uses OTC readers +2.00    Last exam 1.5 years ago-[facility]          Last edited by Gabriella Hebert COT on 5/5/2023 12:41 PM.         Review of systems for the eyes was negative other than the pertinent positives/negatives listed in the HPI.      Assessment & Plan    HPI:  Marlee Tobar is a 67 year old female with history of HLD, GERD, HTN presents for annual exam. Last exam 1 year ago. Noticing increased tearing both eyes. Fearful of starting drops due to [employer] recall.     POHx: hyperopia with presbyopia  PMHx: HLD, GERD, HTN  Current Medications: ALPRAZolam (XANAX) 0.25 MG tablet, Take 1-2 tablets (0.25-0.5 mg) by mouth 3 times daily as needed for anxiety  Ascorbic Acid (VITAMIN C PO),   atorvastatin (LIPITOR) 10 MG tablet, Take 1 tablet (10 mg) by mouth daily  Biotin 5000 MCG CAPS, Take 1 tablet by mouth daily.  buPROPion (WELLBUTRIN XL) 150 MG 24 hr tablet, Take 1 tablet (150 mg) by mouth every morning  CALCIUM CITRATE PO, Take 3 capsules by mouth 3 times daily  carvedilol (COREG) 12.5 MG tablet, TAKE 1 TABLET BY MOUTH TWICE DAILY WITH MEALS  Cholecalciferol (VITAMIN D PO),   diazepam (VALIUM) 5 MG tablet, Take 1 tablet (5 mg) by mouth once for 1 dose  gabapentin (NEURONTIN) 100 MG capsule, Take 1 capsule (100 mg) by mouth 3 times daily  Glucosamine-Chondroit-Vit C-Mn (GLUCOSAMINE CHONDR 1500 COMPLX PO), Take 1 tablet by mouth daily  hydrALAZINE (APRESOLINE) 25 MG tablet, TAKE 1 TABLET BY MOUTH TWICE DAILY  Iron-vit C-vit B12-folic acid 100-250-0.025-1 MG TABS, Take  by mouth.  Multiple Vitamins-Minerals (MULTIVITAL) TABS, Take 2 tablets by mouth daily.  pantoprazole (PROTONIX) 40 MG EC tablet, TAKE ONE TABLET BY MOUTH TWICE DAILY  telmisartan (MICARDIS) 20 MG tablet,  TAKE ONE TABLET BY MOUTH ONE TIME DAILY  triamcinolone (KENALOG) 0.1 % external cream, Apply thin layer to affected area twice daily    triamcinolone (KENALOG-40) injection 40 mg      FHx: denies family history of ocular conditions   PSHx: denies history of ocular surgeries       Current Eye Medications:      Assessment & Plan:  (H52.03,  H52.203,  H52.4) Hyperopia of both eyes with astigmatism and presbyopia  (primary encounter diagnosis)  Patient has minimal hyperopia that does not require treatment at this time  Presbyopia is difficulty seeing up close and is treated with bifocals or over the counter reading glasses    (H04.123) Dry eye syndrome of both eyes  Recommend AT four times a day      Return in about 2 years (around 5/5/2025) for Annual Visit.        Bang West MD     Attending Physician Attestation:  Complete documentation of historical and exam elements from today's encounter can be found in the full encounter summary report (not reduplicated in this progress note).  I personally obtained the chief complaint(s) and history of present illness.  I confirmed and edited as necessary the review of systems, past medical/surgical history, family history, social history, and examination findings as documented by others; and I examined the patient myself.  I personally reviewed the relevant tests, images, and reports as documented above.  I formulated and edited as necessary the assessment and plan and discussed the findings and management plan with the patient and family. - Bang West MD

## 2023-05-05 NOTE — NURSING NOTE
Chief Complaints and History of Present Illnesses   Patient presents with     Dry Eye(s)       Chief Complaint(s) and History of Present Illness(es)     Dry Eye(s)            Laterality: both eyes          Comments    Patient presents with complaint of dry eyes. Eyes seem dry all day, worse at night. Pt finds that she rubs the eyes a lot. No drop use.   Vision blurred off and on.   Uses OTC readers +2.00    Last exam 1.5 years ago-[facility]                 Gabriella Hebert, COT

## 2023-05-05 NOTE — PATIENT INSTRUCTIONS
Use one drop of artificial tears both eyes 3-4 x daily.  Continue to use the drops regardless if your eyes are comfortable.  Artificial tears work best as a preventative and not as well after your eyes are starting to bother you.  Preservative-free drops are best if you will be using them more than 6x daily. Some brands include: Celluvisc, Refresh, Systane, Blink, Optive, iVizia.     It may take 4-6 weeks of using the drops before you notice improvement.  If after that time you are still having problems schedule an appointment for an evaluation and discussion of different treatments which may include medicated eye drops, punctal plugs to keep the tears that are made and supplemented on the surface of the eye longer, or other treatments. Dry eyes are a chronic condition and you may have more symptoms at certain times of the year.     Use over the counter reading glasses of power +2.50

## 2023-05-26 ENCOUNTER — TELEPHONE (OUTPATIENT)
Dept: ORTHOPEDICS | Facility: CLINIC | Age: 68
End: 2023-05-26

## 2023-05-26 NOTE — TELEPHONE ENCOUNTER
5/26 Called and left voicemail. Provided phone number [phone] to schedule 3 procedure appointment with Dr. Rome for left knee euflexxa injections.     These appointments need to be schedule at least 1 week apart.     Siri sifuentes Procedure   Orthopedics, Podiatry, Sports Medicine, Ent ,Eye , Audiology, Adult Endocrine & Diabetes, Nutrition & Medication Therapy Management Specialties   [facility]

## 2023-06-01 ENCOUNTER — MYC MEDICAL ADVICE (OUTPATIENT)
Dept: ORTHOPEDICS | Facility: CLINIC | Age: 68
End: 2023-06-01
Payer: COMMERCIAL

## 2023-06-06 ENCOUNTER — TELEPHONE (OUTPATIENT)
Dept: FAMILY MEDICINE | Facility: CLINIC | Age: 68
End: 2023-06-06
Payer: COMMERCIAL

## 2023-06-06 NOTE — TELEPHONE ENCOUNTER
Patient Quality Outreach    Patient is due for the following:   Hypertension -  BP check  Colon Cancer Screening  Physical Annual Wellness Visit    Next Steps:   Schedule a Annual Wellness Visit    Type of outreach:    Sent [employer] message.      Questions for provider review:    None           Monie Tang MA

## 2023-06-14 ENCOUNTER — OFFICE VISIT (OUTPATIENT)
Dept: ORTHOPEDICS | Facility: CLINIC | Age: 68
End: 2023-06-14
Payer: COMMERCIAL

## 2023-06-14 DIAGNOSIS — M51.16 LUMBAR DISC HERNIATION WITH RADICULOPATHY: ICD-10-CM

## 2023-06-14 DIAGNOSIS — M51.369 DDD (DEGENERATIVE DISC DISEASE), LUMBAR: Primary | ICD-10-CM

## 2023-06-14 DIAGNOSIS — Z91.041 CONTRAST MEDIA ALLERGY: ICD-10-CM

## 2023-06-14 DIAGNOSIS — M17.12 ARTHRITIS OF LEFT KNEE: ICD-10-CM

## 2023-06-14 PROCEDURE — 20610 DRAIN/INJ JOINT/BURSA W/O US: CPT | Mod: LT | Performed by: PREVENTIVE MEDICINE

## 2023-06-14 PROCEDURE — 99214 OFFICE O/P EST MOD 30 MIN: CPT | Mod: 25 | Performed by: PREVENTIVE MEDICINE

## 2023-06-14 RX ORDER — METHYLPREDNISOLONE 16 MG/1
TABLET ORAL
Qty: 4 TABLET | Refills: 0 | Status: SHIPPED | OUTPATIENT
Start: 2023-06-14 | End: 2023-09-26

## 2023-06-14 RX ORDER — HYALURONATE SODIUM 10 MG/ML
20 SYRINGE (ML) INTRAARTICULAR
Status: DISCONTINUED | OUTPATIENT
Start: 2023-06-14 | End: 2023-08-22

## 2023-06-14 RX ADMIN — Medication 20 MG: at 15:22

## 2023-06-14 NOTE — PROGRESS NOTES
HISTORY OF PRESENT ILLNESS  Ms. Tobar is a pleasant 67 year old year old female who presents to clinic today with the following:  What problem are you here for? Left knee euflexxa #1   And wants to discuss her lumbar MRI and her chronic low back and sciatica symptoms and on/off numbness in feet  She was evaluated for peripheral neuropathy and put on gabapentin but not offered any treatments for her lumbar spine problem  How long have you had this problem? 8 months     Have you had any recent imaging of this problem? Xrays/MRI/CT scans? Yes     Have you had treatments for this problem in the past?  -Medications? Yes   -Physical therapy? yes  -Injections? yes  -Surgery?  Meniscus repair     How severe is this problem today? 0-10 scale? 4    How severe has this problem been at WORST in the past? 0-10 scale? 10    What do you think caused this problem? na    Does this problem or its symptoms cause difficulty for you falling asleep or staying asleep? no    Anything else you want us to know about this problem? no          MEDICAL HISTORY  Patient Active Problem List   Diagnosis     iamJOINT PAIN-LOWER LEG     iamPOSTSURGICAL STATES NEC     CARDIOVASCULAR SCREENING; LDL GOAL LESS THAN 130     HTN, goal below 130/80     Gallstones     Cholelithiases     GERD (gastroesophageal reflux disease)     Varicose vein of leg     Dyspareunia     Microalbuminuria     Major depressive disorder, recurrent episode, in partial remission (H)     Situational anxiety     Overweight     Advance care planning     H/O gastric bypass     H/O gastrointestinal hemorrhage     Personal history of PE (pulmonary embolism)     Insomnia, unspecified type     Pulmonary nodules     Polyarthralgia     Positive TIFFANI (antinuclear antibody)     Overactive bladder     Bilateral lumbar radiculopathy     Anterolisthesis of lumbar spine     Lumbar facet arthropathy     Neural foraminal stenosis of lumbar spine     Idiopathic peripheral neuropathy       Current  Outpatient Medications   Medication Sig Dispense Refill     ALPRAZolam (XANAX) 0.25 MG tablet Take 1-2 tablets (0.25-0.5 mg) by mouth 3 times daily as needed for anxiety 30 tablet 5     Ascorbic Acid (VITAMIN C PO)        atorvastatin (LIPITOR) 10 MG tablet Take 1 tablet (10 mg) by mouth daily 90 tablet 3     Biotin 5000 MCG CAPS Take 1 tablet by mouth daily.       buPROPion (WELLBUTRIN XL) 150 MG 24 hr tablet Take 1 tablet (150 mg) by mouth every morning 90 tablet 3     CALCIUM CITRATE PO Take 3 capsules by mouth 3 times daily       carvedilol (COREG) 12.5 MG tablet TAKE 1 TABLET BY MOUTH TWICE DAILY WITH MEALS 180 tablet 1     Cholecalciferol (VITAMIN D PO)        diazepam (VALIUM) 5 MG tablet Take 1 tablet (5 mg) by mouth once for 1 dose 1 tablet 0     gabapentin (NEURONTIN) 100 MG capsule Take 1 capsule (100 mg) by mouth 3 times daily 270 capsule 1     Glucosamine-Chondroit-Vit C-Mn (GLUCOSAMINE CHONDR 1500 COMPLX PO) Take 1 tablet by mouth daily       hydrALAZINE (APRESOLINE) 25 MG tablet TAKE 1 TABLET BY MOUTH TWICE DAILY 180 tablet 1     Iron-vit C-vit B12-folic acid 100-250-0.025-1 MG TABS Take  by mouth.       Multiple Vitamins-Minerals (MULTIVITAL) TABS Take 2 tablets by mouth daily.       pantoprazole (PROTONIX) 40 MG EC tablet TAKE ONE TABLET BY MOUTH TWICE DAILY 180 tablet 2     telmisartan (MICARDIS) 20 MG tablet TAKE ONE TABLET BY MOUTH ONE TIME DAILY 90 tablet 1     triamcinolone (KENALOG) 0.1 % external cream Apply thin layer to affected area twice daily 30 g 0       Allergies   Allergen Reactions     Doxycycline Rash     Contrast Dye      Happened when patient was ~ 18 years ago during a test for 'kidneys'.  Patient thinks she might have developed a rash, couldn't remember.     Diatrizoate Itching     Iodine      Vitamin K Swelling     facial       Family History   Problem Relation Age of Onset     Hypertension Mother      Arthritis Mother      Cancer Mother         Hodgkins disease     Obesity  Mother      Cerebrovascular Disease Father      Alcohol/Drug Father         recovered alcoholic     Alcohol/Drug Sister      Gynecology Sister         history of abnormal paps--LEEPs done     Lipids Sister      Thyroid Disease Sister      Cardiovascular Sister         mitral valve prolapse     Alcohol/Drug Son         alcoholic     Depression Son      Hypertension Son      Psychotic Disorder Son      Hypertension Brother      Cardiovascular Brother      Heart Disease Brother 58        MI     Obesity Brother      Genitourinary Problems Daughter      Gynecology Daughter         Protein S deficiency     Unknown/Adopted Maternal Grandfather      Unknown/Adopted Paternal Grandmother      Unknown/Adopted Paternal Grandfather      Glaucoma No family hx of      Macular Degeneration No family hx of      Social History     Socioeconomic History     Marital status:    Occupational History     Occupation: Full time   Tobacco Use     Smoking status: Never     Passive exposure: Never     Smokeless tobacco: Never   Vaping Use     Vaping status: Never Used   Substance and Sexual Activity     Alcohol use: Yes     Comment: Infrequently     Drug use: No     Sexual activity: Yes     Partners: Male     Birth control/protection: None     Comment: postmenopausal   Other Topics Concern     Parent/sibling w/ CABG, MI or angioplasty before 65F 55M? Yes     Comment: Brother 55     Social Determinants of Health     Financial Resource Strain: Low Risk  (5/20/2020)    Overall Financial Resource Strain (CARDIA)      Difficulty of Paying Living Expenses: Not hard at all   Food Insecurity: No Food Insecurity (5/20/2020)    Hunger Vital Sign      Worried About Running Out of Food in the Last Year: Never true      Ran Out of Food in the Last Year: Never true   Transportation Needs: No Transportation Needs (5/20/2020)    PRAPARE - Transportation      Lack of Transportation (Medical): No      Lack of Transportation (Non-Medical): No   Stress: No  Stress Concern Present (5/20/2020)    Citizen of the [place] of Occupational Health - Occupational Stress Questionnaire      Feeling of Stress : Only a little   Social Connections: Unknown (5/20/2020)    Social Connection and Isolation Panel [NHANES]      Frequency of Communication with Friends and Family: More than three times a week   Intimate Partner Violence: Unknown (5/20/2020)    Humiliation, Afraid, Rape, and Kick questionnaire      Fear of Current or Ex-Partner: No       Additional medical/Social/Surgical histories reviewed in EPIC and updated as appropriate.     REVIEW OF SYSTEMS (6/14/2023)  10 point ROS of systems including Constitutional, Eyes, Respiratory, Cardiovascular, Gastroenterology, Genitourinary, Integumentary, Musculoskeletal, Psychiatric, Allergic/Immunologic were all negative except for pertinent positives noted in my HPI.     PHYSICAL EXAM  VSS    General  - normal appearance, in no obvious distress  HEENT  - conjunctivae not injected, moist mucous membranes, normocephalic/atraumatic head, ears normal appearance, no lesions, mouth normal appearance, no scars, normal dentition and teeth present  CV  - normal peripheral perfusion  Pulm  - normal respiratory pattern, non-labored  Musculoskeletal - lumbar spine  - stance: normal gait without limp, no obvious leg length discrepancy, normal heel and toe walk  - inspection: normal bone and joint alignment, no obvious scoliosis  - palpation: no paravertebral or bony tenderness  - ROM: flexion exacerbates pain, normal extension, sidebending, rotation  - strength: lower extremities 5/5 in all planes  - special tests:  (+) straight leg raise  (+) slump test  Neuro  - patellar and Achilles DTRs 2+ bilaterally,bilateral lower extremity sensory deficit throughout L5 distribution, grossly normal coordination, normal muscle tone  Skin  - no ecchymosis, erythema, warmth, or induration, no obvious rash  Psych  - interactive, appropriate, normal mood and  affect    ASSESSMENT & PLAN  68 yo female with lumbar ddd, disc herniations, radicular pain    I independently reviewed the following imaging studies:  Lumbar MRI: shows ddd, disc herniations  Discussed and ordered lumbar NOAM due to chronic neuropathy in L5 distribution and sciatica since last year  After a 20 minute discussion and examination, we decided to perform a same day injection for  therapeutic purposes for  Left knee with euflexxa  Patient has been doing home exercise physical therapy program for this problem      Appropriate PPE was utilized for prevention of spread of Covid-19.  Gray Rome MD, [facility]  PROCEDURE:       left     Knee joint injection with euflexxa    The patient was apprised of the risks and the benefits of the procedure and consented and a written consent was signed.   The patient s  KNEE was sterilely prepped with chloraprep.     The patient was injected with euflexxa syringe into the knee with a 1.5-inch 22-gauge needle at the_superiorlateral aspect of their knee joint    There were no complications. The patient tolerated the procedure well. There was minimal bleeding.   The patient was instructed to ice the knee upon leaving clinic and refrain from overuse over the next 3 days.   The patient was instructed to go to the emergency room with any usual pain, swelling, or redness occurred in the injected area.   The patient was given a followup appointment to evaluate response to the injection to their increased range of motion and reduction of pain.  Follow up for euflexxa  Dr Gray Rome      Large Joint Injection/Arthocentesis: L knee joint    Date/Time: 6/14/2023 3:22 PM    Performed by: Gray Rome MD  Authorized by: Gray Rome MD    Indications:  Osteoarthritis and pain  Needle Size:  22 G  Guidance: landmark guided    Approach:  Superolateral  Location:  Knee      Medications:  20 mg sodium hyaluronate 20 MG/2ML  Outcome:  Tolerated well, no immediate  complications  Procedure discussed: discussed risks, benefits, and alternatives    Consent Given by:  Patient  Timeout: timeout called immediately prior to procedure    Prep: patient was prepped and draped in usual sterile fashion

## 2023-06-15 ENCOUNTER — TELEPHONE (OUTPATIENT)
Dept: ORTHOPEDICS | Facility: CLINIC | Age: 68
End: 2023-06-15
Payer: COMMERCIAL

## 2023-06-15 ENCOUNTER — MYC MEDICAL ADVICE (OUTPATIENT)
Dept: ORTHOPEDICS | Facility: CLINIC | Age: 68
End: 2023-06-15
Payer: COMMERCIAL

## 2023-06-15 NOTE — TELEPHONE ENCOUNTER
Left Voicemail (1st Attempt) for the patient to call back and schedule the following:    Appointment type: return  Provider: dr. esparza  Return date: 2 weeks after epidural injection  Specialty phone number: [phone]  Additional appointment(s) needed: epidural injection prior     Siri sifuentes Procedure   Orthopedics, Podiatry, Sports Medicine, Ent ,Eye , Audiology, Adult Endocrine & Diabetes, Nutrition & Medication Therapy Management Specialties   [facility]

## 2023-06-21 ENCOUNTER — OFFICE VISIT (OUTPATIENT)
Dept: ORTHOPEDICS | Facility: CLINIC | Age: 68
End: 2023-06-21
Payer: COMMERCIAL

## 2023-06-21 DIAGNOSIS — M25.561 RIGHT KNEE PAIN: Primary | ICD-10-CM

## 2023-06-21 DIAGNOSIS — M17.12 ARTHRITIS OF LEFT KNEE: ICD-10-CM

## 2023-06-21 PROCEDURE — 20610 DRAIN/INJ JOINT/BURSA W/O US: CPT | Mod: LT | Performed by: PREVENTIVE MEDICINE

## 2023-06-21 RX ORDER — HYALURONATE SODIUM 10 MG/ML
20 SYRINGE (ML) INTRAARTICULAR
Status: DISCONTINUED | OUTPATIENT
Start: 2023-06-21 | End: 2023-08-22

## 2023-06-21 RX ADMIN — Medication 20 MG: at 07:10

## 2023-06-21 NOTE — PROGRESS NOTES
HISTORY OF PRESENT ILLNESS  Ms. Tobar is a pleasant 67 year old year old female who presents to clinic today with the following:  What problem are you here for? Left knee euflexxa #2  Doing better  How long have you had this problem?  8 months     Have you had any recent imaging of this problem? Xrays/MRI/CT scans? yes    Have you had treatments for this problem in the past?  -Medications? yes  -Physical therapy? yes  -Injections? yes  -Surgery? no    How severe is this problem today? 0-10 scale? 1-2    How severe has this problem been at WORST in the past? 0-10 scale? 8-9    What do you think caused this problem? Knees have been worsening over the years    Does this problem or its symptoms cause difficulty for you falling asleep or staying asleep? no    Anything else you want us to know about this problem? no    Diagnosis: left knee arthritis    PROCEDURE:       Left      Knee joint injection with euflexxa #2    The patient was apprised of the risks and the benefits of the procedure and consented and a written consent was signed.   The patient s  KNEE was sterilely prepped with chloraprep.     The patient was injected with euflexxa syringe into the knee with a 1.5-inch 22-gauge needle at the_superiorlateral aspect of their knee joint    There were no complications. The patient tolerated the procedure well. There was minimal bleeding.   The patient was instructed to ice the knee upon leaving clinic and refrain from overuse over the next 3 days.   The patient was instructed to go to the emergency room with any usual pain, swelling, or redness occurred in the injected area.   The patient was given a followup appointment to evaluate response to the injection to their increased range of motion and reduction of pain.  Follow up for euflexxa  Dr Gray Rome  Large Joint Injection/Arthocentesis: L knee joint    Date/Time: 6/21/2023 7:10 AM    Performed by: Gray Rome MD  Authorized by: Gray Rome  MD [place]    Indications:  Pain and osteoarthritis  Needle Size:  22 G  Guidance: landmark guided    Approach:  Superolateral  Location:  Knee      Medications:  20 mg sodium hyaluronate 20 MG/2ML  Outcome:  Tolerated well, no immediate complications  Procedure discussed: discussed risks, benefits, and alternatives    Consent Given by:  Patient  Timeout: timeout called immediately prior to procedure    Prep: patient was prepped and draped in usual sterile fashion

## 2023-06-21 NOTE — LETTER
6/21/2023         RE: Marlee Tobar  [address] [facility] [address]        Dear Colleague,    Thank you for referring your patient, Marlee Tobar, to the [facility] [place]. Please see a copy of my visit note below.    HISTORY OF PRESENT ILLNESS  Ms. Tobar is a pleasant 67 year old year old female who presents to clinic today with the following:  What problem are you here for? Left knee euflexxa #2  Doing better  How long have you had this problem?  8 months     Have you had any recent imaging of this problem? Xrays/MRI/CT scans? yes    Have you had treatments for this problem in the past?  -Medications? yes  -Physical therapy? yes  -Injections? yes  -Surgery? no    How severe is this problem today? 0-10 scale? 1-2    How severe has this problem been at WORST in the past? 0-10 scale? 8-9    What do you think caused this problem? Knees have been worsening over the years    Does this problem or its symptoms cause difficulty for you falling asleep or staying asleep? no    Anything else you want us to know about this problem? no    Diagnosis: left knee arthritis    PROCEDURE:       Left      Knee joint injection with euflexxa #2    The patient was apprised of the risks and the benefits of the procedure and consented and a written consent was signed.   The patient s  KNEE was sterilely prepped with chloraprep.     The patient was injected with euflexxa syringe into the knee with a 1.5-inch 22-gauge needle at the_superiorlateral aspect of their knee joint    There were no complications. The patient tolerated the procedure well. There was minimal bleeding.   The patient was instructed to ice the knee upon leaving clinic and refrain from overuse over the next 3 days.   The patient was instructed to go to the emergency room with any usual pain, swelling, or redness occurred in the injected area.   The patient was given a followup appointment to evaluate response to the  injection to their increased range of motion and reduction of pain.  Follow up for euflexxa  Dr Gray Rome  Large Joint Injection/Arthocentesis: L knee joint    Date/Time: 6/21/2023 7:10 AM    Performed by: Gray Rome MD  Authorized by: Gray Rome MD    Indications:  Pain and osteoarthritis  Needle Size:  22 G  Guidance: landmark guided    Approach:  Superolateral  Location:  Knee      Medications:  20 mg sodium hyaluronate 20 MG/2ML  Outcome:  Tolerated well, no immediate complications  Procedure discussed: discussed risks, benefits, and alternatives    Consent Given by:  Patient  Timeout: timeout called immediately prior to procedure    Prep: patient was prepped and draped in usual sterile fashion            Again, thank you for allowing me to participate in the care of your patient.        Sincerely,        Gray Rome MD

## 2023-06-28 ENCOUNTER — OFFICE VISIT (OUTPATIENT)
Dept: ORTHOPEDICS | Facility: CLINIC | Age: 68
End: 2023-06-28
Payer: COMMERCIAL

## 2023-06-28 ENCOUNTER — ANCILLARY PROCEDURE (OUTPATIENT)
Dept: GENERAL RADIOLOGY | Facility: CLINIC | Age: 68
End: 2023-06-28
Attending: PREVENTIVE MEDICINE
Payer: COMMERCIAL

## 2023-06-28 DIAGNOSIS — M17.12 ARTHRITIS OF LEFT KNEE: ICD-10-CM

## 2023-06-28 DIAGNOSIS — M17.11 ARTHRITIS OF RIGHT KNEE: Primary | ICD-10-CM

## 2023-06-28 DIAGNOSIS — M25.561 RIGHT KNEE PAIN: ICD-10-CM

## 2023-06-28 PROCEDURE — 99214 OFFICE O/P EST MOD 30 MIN: CPT | Mod: 25 | Performed by: PREVENTIVE MEDICINE

## 2023-06-28 PROCEDURE — 20610 DRAIN/INJ JOINT/BURSA W/O US: CPT | Mod: 50 | Performed by: PREVENTIVE MEDICINE

## 2023-06-28 PROCEDURE — 73562 X-RAY EXAM OF KNEE 3: CPT | Mod: RT | Performed by: RADIOLOGY

## 2023-06-28 RX ORDER — HYALURONATE SODIUM 10 MG/ML
20 SYRINGE (ML) INTRAARTICULAR
Status: DISCONTINUED | OUTPATIENT
Start: 2023-06-28 | End: 2023-08-22

## 2023-06-28 RX ORDER — TRIAMCINOLONE ACETONIDE 40 MG/ML
40 INJECTION, SUSPENSION INTRA-ARTICULAR; INTRAMUSCULAR
Status: DISCONTINUED | OUTPATIENT
Start: 2023-06-28 | End: 2023-08-22

## 2023-06-28 RX ADMIN — TRIAMCINOLONE ACETONIDE 40 MG: 40 INJECTION, SUSPENSION INTRA-ARTICULAR; INTRAMUSCULAR at 08:19

## 2023-06-28 RX ADMIN — Medication 20 MG: at 08:06

## 2023-06-28 NOTE — PROGRESS NOTES
HISTORY OF PRESENT ILLNESS  Ms. Tobar is a pleasant 67 year old year old female who presents to clinic today with the following:  What problem are you here for? Left knee euflexxa #3  And right knee pain  Wants to review xrays for right knee as it has been bothering her   And wants to consider injection for right knee today as well  Left knee feeling better  How long have you had this problem? 8 months     Have you had any recent imaging of this problem? Xrays/MRI/CT scans? yes    Have you had treatments for this problem in the past?  -Medications? yes  -Physical therapy? yes  -Injections? yes  -Surgery? no    How severe is this problem today? 0-10 scale? 1    How severe has this problem been at WORST in the past? 0-10 scale? 8    What do you think caused this problem? na    Does this problem or its symptoms cause difficulty for you falling asleep or staying asleep? no    Anything else you want us to know about this problem? no          MEDICAL HISTORY  Patient Active Problem List   Diagnosis     iamJOINT PAIN-LOWER LEG     iamPOSTSURGICAL STATES NEC     CARDIOVASCULAR SCREENING; LDL GOAL LESS THAN 130     HTN, goal below 130/80     Gallstones     Cholelithiases     GERD (gastroesophageal reflux disease)     Varicose vein of leg     Dyspareunia     Microalbuminuria     Major depressive disorder, recurrent episode, in partial remission (H)     Situational anxiety     Overweight     Advance care planning     H/O gastric bypass     H/O gastrointestinal hemorrhage     Personal history of PE (pulmonary embolism)     Insomnia, unspecified type     Pulmonary nodules     Polyarthralgia     Positive TIFFANI (antinuclear antibody)     Overactive bladder     Bilateral lumbar radiculopathy     Anterolisthesis of lumbar spine     Lumbar facet arthropathy     Neural foraminal stenosis of lumbar spine     Idiopathic peripheral neuropathy       Current Outpatient Medications   Medication Sig Dispense Refill     ALPRAZolam (XANAX)  0.25 MG tablet Take 1-2 tablets (0.25-0.5 mg) by mouth 3 times daily as needed for anxiety 30 tablet 5     Ascorbic Acid (VITAMIN C PO)        atorvastatin (LIPITOR) 10 MG tablet Take 1 tablet (10 mg) by mouth daily 90 tablet 3     Biotin 5000 MCG CAPS Take 1 tablet by mouth daily.       buPROPion (WELLBUTRIN XL) 150 MG 24 hr tablet Take 1 tablet (150 mg) by mouth every morning 90 tablet 3     CALCIUM CITRATE PO Take 3 capsules by mouth 3 times daily       carvedilol (COREG) 12.5 MG tablet TAKE 1 TABLET BY MOUTH TWICE DAILY WITH MEALS 180 tablet 1     Cholecalciferol (VITAMIN D PO)        diazepam (VALIUM) 5 MG tablet Take 1 tablet (5 mg) by mouth once for 1 dose 1 tablet 0     gabapentin (NEURONTIN) 100 MG capsule Take 1 capsule (100 mg) by mouth 3 times daily 270 capsule 1     Glucosamine-Chondroit-Vit C-Mn (GLUCOSAMINE CHONDR 1500 COMPLX PO) Take 1 tablet by mouth daily       hydrALAZINE (APRESOLINE) 25 MG tablet TAKE 1 TABLET BY MOUTH TWICE DAILY 180 tablet 1     Iron-vit C-vit B12-folic acid 100-250-0.025-1 MG TABS Take  by mouth.       methylPREDNISolone (MEDROL) 16 MG tablet Take 32 mg by mouth 12 hours before the procedure and repeat 32 mg by mouth 2 hours before the procedure to prevent contrast reaction. 4 tablet 0     Multiple Vitamins-Minerals (MULTIVITAL) TABS Take 2 tablets by mouth daily.       pantoprazole (PROTONIX) 40 MG EC tablet TAKE ONE TABLET BY MOUTH TWICE DAILY 180 tablet 2     telmisartan (MICARDIS) 20 MG tablet TAKE ONE TABLET BY MOUTH ONE TIME DAILY 90 tablet 1     triamcinolone (KENALOG) 0.1 % external cream Apply thin layer to affected area twice daily 30 g 0       Allergies   Allergen Reactions     Doxycycline Rash     Contrast Dye      Happened when patient was ~ 18 years ago during a test for 'kidneys'.  Patient thinks she might have developed a rash, couldn't remember.     Diatrizoate Itching     Iodine      Vitamin K Swelling     facial       Family History   Problem Relation Age of  Onset     Hypertension Mother      Arthritis Mother      Cancer Mother         Hodgkins disease     Obesity Mother      Cerebrovascular Disease Father      Alcohol/Drug Father         recovered alcoholic     Alcohol/Drug Sister      Gynecology Sister         history of abnormal paps--LEEPs done     Lipids Sister      Thyroid Disease Sister      Cardiovascular Sister         mitral valve prolapse     Alcohol/Drug Son         alcoholic     Depression Son      Hypertension Son      Psychotic Disorder Son      Hypertension Brother      Cardiovascular Brother      Heart Disease Brother 58        MI     Obesity Brother      Genitourinary Problems Daughter      Gynecology Daughter         Protein S deficiency     Unknown/Adopted Maternal Grandfather      Unknown/Adopted Paternal Grandmother      Unknown/Adopted Paternal Grandfather      Glaucoma No family hx of      Macular Degeneration No family hx of      Social History     Socioeconomic History     Marital status:    Occupational History     Occupation: Full time   Tobacco Use     Smoking status: Never     Passive exposure: Never     Smokeless tobacco: Never   Vaping Use     Vaping Use: Never used   Substance and Sexual Activity     Alcohol use: Yes     Comment: Infrequently     Drug use: No     Sexual activity: Yes     Partners: Male     Birth control/protection: None     Comment: postmenopausal   Other Topics Concern     Parent/sibling w/ CABG, MI or angioplasty before 65F 55M? Yes     Comment: Brother 55     Social Determinants of Health     Financial Resource Strain: Low Risk  (5/20/2020)    Overall Financial Resource Strain (CARDIA)      Difficulty of Paying Living Expenses: Not hard at all   Food Insecurity: No Food Insecurity (5/20/2020)    Hunger Vital Sign      Worried About Running Out of Food in the Last Year: Never true      Ran Out of Food in the Last Year: Never true   Transportation Needs: No Transportation Needs (5/20/2020)    PRAPARE -  Transportation      Lack of Transportation (Medical): No      Lack of Transportation (Non-Medical): No   Stress: No Stress Concern Present (5/20/2020)    [employer] - Occupational Stress Questionnaire      Feeling of Stress : Only a little   Social Connections: Unknown (5/20/2020)    Social Connection and Isolation Panel [NHANES]      Frequency of Communication with Friends and Family: More than three times a week   Intimate Partner Violence: Unknown (5/20/2020)    Humiliation, Afraid, Rape, and Kick questionnaire      Fear of Current or Ex-Partner: No       Additional medical/Social/Surgical histories reviewed in EPIC and updated as appropriate.     REVIEW OF SYSTEMS (6/28/2023)  10 point ROS of systems including Constitutional, Eyes, Respiratory, Cardiovascular, Gastroenterology, Genitourinary, Integumentary, Musculoskeletal, Psychiatric, Allergic/Immunologic were all negative except for pertinent positives noted in my HPI.     PHYSICAL EXAM  VSS    General  - normal appearance, in no obvious distress  HEENT  - conjunctivae not injected, moist mucous membranes, normocephalic/atraumatic head, ears normal appearance, no lesions, mouth normal appearance, no scars, normal dentition and teeth present  CV  - normal popliteal pulse  Pulm  - normal respiratory pattern, non-labored  Musculoskeletal - right knee  - stance: mildly antalgic gait, genu varum  - inspection: some generalized swelling, trace effusion  - palpation: medial joint line tenderness, patella and patellar tendon non-tender, normal popliteal pulse  - ROM: 100 degrees flexion, 0 degrees extension, painful active ROM  - strength: 5/5 in flexion, 5/5 in extension  - neuro: no sensory or motor deficit  - special tests:  (-) Lachman  (-) anterior drawer  (-) posterior drawer  (-) pivot shift  (-) David  (-) Thessaly  (-) varus at 0 and 30 degrees flexion  (-) valgus at 0 and 30 degrees flexion  (+) Stanislaw s compression test  (-)  patellar apprehension  Neuro  - no sensory or motor deficit, grossly normal coordination, normal muscle tone  Skin  - no ecchymosis, erythema, warmth, or induration, no obvious rash  Psych  - interactive, appropriate, normal mood and affect    ASSESSMENT & PLAN  66 yo female with right knee arthritis, and left knee arthritis, here for euflexxa as planned    I independently reviewed the following imaging studies:  Right knee xray: shows arthritis  After a 20 minute discussion and examination, we decided to perform a same day injection for diagnostic and therapeutic purposes for  Right knee   And performed left knee euflexxa   Cont. HEP  Discussed and will order euflexxa for right knee as well  Has lumbar injection planned in a few weeks and will f/u after that  Patient has been doing home exercise physical therapy program for this problem      Appropriate PPE was utilized for prevention of spread of Covid-19.  Gray Rome MD, CAQSM  PROCEDURE:          Left   Knee joint injection with euflexxa #3    The patient was apprised of the risks and the benefits of the procedure and consented and a written consent was signed.   The patient s  KNEE was sterilely prepped with chloraprep.     The patient was injected with euflexxa syringe into the knee with a 1.5-inch 22-gauge needle at the_superiorlateral aspect of their knee joint    There were no complications. The patient tolerated the procedure well. There was minimal bleeding.   The patient was instructed to ice the knee upon leaving clinic and refrain from overuse over the next 3 days.   The patient was instructed to go to the emergency room with any usual pain, swelling, or redness occurred in the injected area.   The patient was given a followup appointment to evaluate response to the injection to their increased range of motion and reduction of pain.  Follow up for euflexnikki Rome  PROCEDURE:        right    Knee joint injection   The patient was apprised  of the risks and the benefits of the procedure and consented and a written consent was signed.   The patient s  KNEE was sterilely prepped with chloraprep.   40 mg of triamcinolone suspension was drawn up into a 5 mL syringe with 4 mL of 1% lidocaine  The patient was injected into the knee with a 1.5-inch 22-gauge needle at the_superiorlateral aspect of their knee joint  There were no complications. The patient tolerated the procedure well. There was minimal bleeding.   The patient was instructed to ice the knee upon leaving clinic and refrain from overuse over the next 3 days.   The patient was instructed to go to the emergency room with any usual pain, swelling, or redness occurred in the injected area.   The patient was given a followup appointment to evaluate response to the injection to their increased range of motion and reduction of pain.  Follow up PRN  Dr Gray Rome    Large Joint Injection/Arthocentesis: L knee joint    Date/Time: 6/28/2023 8:06 AM    Performed by: Gray Rome MD  Authorized by: Gray Rome MD    Indications:  Osteoarthritis and pain  Needle Size:  22 G  Guidance: landmark guided    Approach:  Superolateral  Location:  Knee      Medications:  20 mg sodium hyaluronate 20 MG/2ML  Outcome:  Tolerated well, no immediate complications  Procedure discussed: discussed risks, benefits, and alternatives    Consent Given by:  Patient  Timeout: timeout called immediately prior to procedure    Prep: patient was prepped and draped in usual sterile fashion    Large Joint Injection/Arthocentesis: R knee joint    Date/Time: 6/28/2023 8:19 AM    Performed by: Gray Rome MD  Authorized by: Gray Rome MD    Indications:  Osteoarthritis and pain  Needle Size:  22 G  Guidance: landmark guided    Approach:  Superolateral  Location:  Knee      Medications:  40 mg triamcinolone 40 MG/ML  Outcome:  Tolerated well, no immediate complications  Procedure discussed:  discussed risks, benefits, and alternatives    Consent Given by:  Patient  Timeout: timeout called immediately prior to procedure    Prep: patient was prepped and draped in usual sterile fashion

## 2023-06-29 ENCOUNTER — PRE VISIT (OUTPATIENT)
Dept: NEUROLOGY | Facility: CLINIC | Age: 68
End: 2023-06-29

## 2023-07-15 ENCOUNTER — TRANSFERRED RECORDS (OUTPATIENT)
Dept: HEALTH INFORMATION MANAGEMENT | Facility: CLINIC | Age: 68
End: 2023-07-15
Payer: COMMERCIAL

## 2023-08-15 ASSESSMENT — ENCOUNTER SYMPTOMS
NAUSEA: 0
DIZZINESS: 0
HEMATURIA: 0
PALPITATIONS: 0
CHILLS: 0
ARTHRALGIAS: 0
COUGH: 0
HEMATOCHEZIA: 0
ABDOMINAL PAIN: 0
MYALGIAS: 0
SHORTNESS OF BREATH: 0
FEVER: 0
BREAST MASS: 0
HEADACHES: 0
DYSURIA: 0
CONSTIPATION: 0
DIARRHEA: 0
NERVOUS/ANXIOUS: 1
FREQUENCY: 0
EYE PAIN: 0
PARESTHESIAS: 0
SORE THROAT: 0
JOINT SWELLING: 0
HEARTBURN: 0
WEAKNESS: 0

## 2023-08-15 ASSESSMENT — ACTIVITIES OF DAILY LIVING (ADL): CURRENT_FUNCTION: NO ASSISTANCE NEEDED

## 2023-08-22 ENCOUNTER — OFFICE VISIT (OUTPATIENT)
Dept: FAMILY MEDICINE | Facility: CLINIC | Age: 68
End: 2023-08-22
Payer: COMMERCIAL

## 2023-08-22 VITALS
SYSTOLIC BLOOD PRESSURE: 138 MMHG | RESPIRATION RATE: 12 BRPM | HEART RATE: 82 BPM | BODY MASS INDEX: 28 KG/M2 | HEIGHT: 66 IN | DIASTOLIC BLOOD PRESSURE: 88 MMHG | TEMPERATURE: 98.3 F | OXYGEN SATURATION: 97 % | WEIGHT: 174.2 LBS

## 2023-08-22 DIAGNOSIS — I10 HTN, GOAL BELOW 130/80: ICD-10-CM

## 2023-08-22 DIAGNOSIS — Z00.00 ENCOUNTER FOR MEDICARE ANNUAL WELLNESS EXAM: Primary | ICD-10-CM

## 2023-08-22 DIAGNOSIS — F41.8 SITUATIONAL ANXIETY: ICD-10-CM

## 2023-08-22 DIAGNOSIS — R91.8 PULMONARY NODULES: ICD-10-CM

## 2023-08-22 DIAGNOSIS — M79.2 THORACIC NEURALGIA: ICD-10-CM

## 2023-08-22 DIAGNOSIS — Z13.6 CARDIOVASCULAR SCREENING; LDL GOAL LESS THAN 100: ICD-10-CM

## 2023-08-22 DIAGNOSIS — M54.16 LUMBAR RADICULOPATHY, CHRONIC: ICD-10-CM

## 2023-08-22 DIAGNOSIS — Z12.11 SCREEN FOR COLON CANCER: ICD-10-CM

## 2023-08-22 DIAGNOSIS — Z13.6 SCREENING, ISCHEMIC HEART DISEASE: ICD-10-CM

## 2023-08-22 DIAGNOSIS — G47.00 INSOMNIA, UNSPECIFIED TYPE: ICD-10-CM

## 2023-08-22 PROCEDURE — G0438 PPPS, INITIAL VISIT: HCPCS | Performed by: INTERNAL MEDICINE

## 2023-08-22 RX ORDER — LIDOCAINE 40 MG/G
CREAM TOPICAL
COMMUNITY
Start: 2023-08-22 | End: 2023-09-26

## 2023-08-22 RX ORDER — ALPRAZOLAM 0.25 MG
.25-.5 TABLET ORAL 3 TIMES DAILY PRN
Qty: 30 TABLET | Refills: 2 | Status: SHIPPED | OUTPATIENT
Start: 2023-08-22 | End: 2023-08-23

## 2023-08-22 ASSESSMENT — ENCOUNTER SYMPTOMS
HEMATURIA: 0
PALPITATIONS: 0
NAUSEA: 0
CONSTIPATION: 0
HEADACHES: 0
BREAST MASS: 0
FREQUENCY: 0
ARTHRALGIAS: 0
SHORTNESS OF BREATH: 0
EYE PAIN: 0
DIARRHEA: 0
ABDOMINAL PAIN: 0
DIZZINESS: 0
MYALGIAS: 0
HEMATOCHEZIA: 0
JOINT SWELLING: 0
CHILLS: 0
WEAKNESS: 0
DYSURIA: 0
SORE THROAT: 0
NERVOUS/ANXIOUS: 1
FEVER: 0
PARESTHESIAS: 0
HEARTBURN: 0
COUGH: 0

## 2023-08-22 ASSESSMENT — PATIENT HEALTH QUESTIONNAIRE - PHQ9
SUM OF ALL RESPONSES TO PHQ QUESTIONS 1-9: 3
10. IF YOU CHECKED OFF ANY PROBLEMS, HOW DIFFICULT HAVE THESE PROBLEMS MADE IT FOR YOU TO DO YOUR WORK, TAKE CARE OF THINGS AT HOME, OR GET ALONG WITH OTHER PEOPLE: NOT DIFFICULT AT ALL
SUM OF ALL RESPONSES TO PHQ QUESTIONS 1-9: 3

## 2023-08-22 ASSESSMENT — ACTIVITIES OF DAILY LIVING (ADL): CURRENT_FUNCTION: NO ASSISTANCE NEEDED

## 2023-08-22 NOTE — PATIENT INSTRUCTIONS
Patient Education   Personalized Prevention Plan  You are due for the preventive services outlined below.  Your care team is available to assist you in scheduling these services.  If you have already completed any of these items, please share that information with your care team to update in your medical record.  Health Maintenance Due   Topic Date Due     Osteoporosis Screening  Never done     COVID-19 Vaccine (6 - Pfizer series) 02/19/2023     Annual Wellness Visit  02/22/2023     Basic Metabolic Panel  02/22/2023     Kidney Microalbumin Urine Test  02/22/2023     Colorectal Cancer Screening  03/15/2023
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
## 2023-08-23 ENCOUNTER — TELEPHONE (OUTPATIENT)
Dept: FAMILY MEDICINE | Facility: CLINIC | Age: 68
End: 2023-08-23
Payer: COMMERCIAL

## 2023-08-23 RX ORDER — ALPRAZOLAM 0.25 MG
TABLET ORAL
Qty: 12 TABLET | Refills: 0 | Status: SHIPPED | OUTPATIENT
Start: 2023-08-23 | End: 2023-10-06

## 2023-08-23 NOTE — TELEPHONE ENCOUNTER
Plan does not cover ALPRAZolam (XANAX) 0.25 MG tablet .  Please go to [email or web address] to initiate Prior Auth or switch to alternative medication.    Insurance type and ID number: Key:  [id number]      Additional Information:     Sienna Ceballos

## 2023-08-28 ENCOUNTER — ANCILLARY PROCEDURE (OUTPATIENT)
Dept: CT IMAGING | Facility: CLINIC | Age: 68
End: 2023-08-28
Attending: INTERNAL MEDICINE
Payer: COMMERCIAL

## 2023-08-28 DIAGNOSIS — R91.8 PULMONARY NODULES: ICD-10-CM

## 2023-08-28 PROCEDURE — 71250 CT THORAX DX C-: CPT | Performed by: RADIOLOGY

## 2023-08-29 ENCOUNTER — MYC MEDICAL ADVICE (OUTPATIENT)
Dept: FAMILY MEDICINE | Facility: CLINIC | Age: 68
End: 2023-08-29

## 2023-08-29 ENCOUNTER — LAB (OUTPATIENT)
Dept: LAB | Facility: OTHER | Age: 68
End: 2023-08-29
Payer: COMMERCIAL

## 2023-08-29 DIAGNOSIS — I10 HTN, GOAL BELOW 130/80: ICD-10-CM

## 2023-08-29 DIAGNOSIS — Z13.6 CARDIOVASCULAR SCREENING; LDL GOAL LESS THAN 100: ICD-10-CM

## 2023-08-29 DIAGNOSIS — Z00.00 ENCOUNTER FOR MEDICARE ANNUAL WELLNESS EXAM: ICD-10-CM

## 2023-08-29 LAB
ALBUMIN SERPL BCG-MCNC: 4.1 G/DL (ref 3.5–5.2)
ALP SERPL-CCNC: 85 U/L (ref 35–104)
ALT SERPL W P-5'-P-CCNC: 33 U/L (ref 0–50)
ANION GAP SERPL CALCULATED.3IONS-SCNC: 10 MMOL/L (ref 7–15)
AST SERPL W P-5'-P-CCNC: 35 U/L (ref 0–45)
BASOPHILS # BLD AUTO: 0 10E3/UL (ref 0–0.2)
BASOPHILS NFR BLD AUTO: 1 %
BILIRUB SERPL-MCNC: 0.7 MG/DL
BUN SERPL-MCNC: 15.4 MG/DL (ref 8–23)
CALCIUM SERPL-MCNC: 9.5 MG/DL (ref 8.8–10.2)
CHLORIDE SERPL-SCNC: 105 MMOL/L (ref 98–107)
CHOLEST SERPL-MCNC: 131 MG/DL
CREAT SERPL-MCNC: 0.87 MG/DL (ref 0.51–0.95)
CREAT UR-MCNC: 301.4 MG/DL
DEPRECATED HCO3 PLAS-SCNC: 27 MMOL/L (ref 22–29)
EOSINOPHIL # BLD AUTO: 0.2 10E3/UL (ref 0–0.7)
EOSINOPHIL NFR BLD AUTO: 3 %
ERYTHROCYTE [DISTWIDTH] IN BLOOD BY AUTOMATED COUNT: 13.3 % (ref 10–15)
GFR SERPL CREATININE-BSD FRML MDRD: 73 ML/MIN/1.73M2
GLUCOSE SERPL-MCNC: 101 MG/DL (ref 70–99)
HCT VFR BLD AUTO: 39.4 % (ref 35–47)
HDLC SERPL-MCNC: 64 MG/DL
HGB BLD-MCNC: 13.1 G/DL (ref 11.7–15.7)
IMM GRANULOCYTES # BLD: 0 10E3/UL
IMM GRANULOCYTES NFR BLD: 0 %
LDLC SERPL CALC-MCNC: 57 MG/DL
LYMPHOCYTES # BLD AUTO: 1.5 10E3/UL (ref 0.8–5.3)
LYMPHOCYTES NFR BLD AUTO: 31 %
MCH RBC QN AUTO: 32.3 PG (ref 26.5–33)
MCHC RBC AUTO-ENTMCNC: 33.2 G/DL (ref 31.5–36.5)
MCV RBC AUTO: 97 FL (ref 78–100)
MICROALBUMIN UR-MCNC: 13.8 MG/L
MICROALBUMIN/CREAT UR: 4.58 MG/G CR (ref 0–25)
MONOCYTES # BLD AUTO: 0.6 10E3/UL (ref 0–1.3)
MONOCYTES NFR BLD AUTO: 12 %
NEUTROPHILS # BLD AUTO: 2.5 10E3/UL (ref 1.6–8.3)
NEUTROPHILS NFR BLD AUTO: 52 %
NONHDLC SERPL-MCNC: 67 MG/DL
PLATELET # BLD AUTO: 189 10E3/UL (ref 150–450)
POTASSIUM SERPL-SCNC: 4 MMOL/L (ref 3.4–5.3)
PROT SERPL-MCNC: 6.5 G/DL (ref 6.4–8.3)
RBC # BLD AUTO: 4.05 10E6/UL (ref 3.8–5.2)
SODIUM SERPL-SCNC: 142 MMOL/L (ref 136–145)
TRIGL SERPL-MCNC: 48 MG/DL
WBC # BLD AUTO: 4.8 10E3/UL (ref 4–11)

## 2023-08-29 PROCEDURE — 36415 COLL VENOUS BLD VENIPUNCTURE: CPT | Performed by: INTERNAL MEDICINE

## 2023-08-29 PROCEDURE — 82043 UR ALBUMIN QUANTITATIVE: CPT

## 2023-08-29 PROCEDURE — 82570 ASSAY OF URINE CREATININE: CPT

## 2023-08-29 PROCEDURE — 80053 COMPREHEN METABOLIC PANEL: CPT | Performed by: INTERNAL MEDICINE

## 2023-08-29 PROCEDURE — 80061 LIPID PANEL: CPT

## 2023-08-29 PROCEDURE — 85025 COMPLETE CBC W/AUTO DIFF WBC: CPT

## 2023-08-29 NOTE — TELEPHONE ENCOUNTER
Patient is reaching out about her CT results. Patient saw the results in Mychart and wanting to understand these better.    Routing to provider to review.    Naila TIDWELL RN, BSN  [facility]

## 2023-09-14 ENCOUNTER — MYC MEDICAL ADVICE (OUTPATIENT)
Dept: FAMILY MEDICINE | Facility: CLINIC | Age: 68
End: 2023-09-14
Payer: COMMERCIAL

## 2023-09-14 DIAGNOSIS — F40.240 CLAUSTROPHOBIA: Primary | ICD-10-CM

## 2023-09-14 DIAGNOSIS — R20.2 PARESTHESIA OF BOTH FEET: Primary | ICD-10-CM

## 2023-09-14 RX ORDER — DIAZEPAM 5 MG
5 TABLET ORAL ONCE
Qty: 1 TABLET | Refills: 0 | Status: SHIPPED | OUTPATIENT
Start: 2023-09-14 | End: 2023-09-14

## 2023-09-14 NOTE — TELEPHONE ENCOUNTER
See [employer] message, routing to provider to review and advise.     DARRYL Capone  [facility]

## 2023-09-14 NOTE — TELEPHONE ENCOUNTER
Patient requesting 300 mg gapapentin script. Was previously taking 100 mg.      Shiva Finnegan, EMT

## 2023-09-16 ENCOUNTER — NURSE TRIAGE (OUTPATIENT)
Dept: NURSING | Facility: CLINIC | Age: 68
End: 2023-09-16
Payer: COMMERCIAL

## 2023-09-16 NOTE — TELEPHONE ENCOUNTER
189/102 blood pressure. Just woke up and tried to catch breath. Feeling anxious. Missed last night's dose and took this morning's dose. She said she's having a celebration of life at their house today so doesn't know if she can get in for evaluation. She will take her blood pressure in one hour, to see what the reading is. She had just taken her morning dose of her b/p medication so will see how she responds to that. She understands she has to be seen.  Regina Leo RN  [facility]    Reason for Disposition   Systolic BP  >= 180 OR Diastolic >= 110    Additional Information   Negative: Difficult to awaken or acting confused (e.g., disoriented, slurred speech)   Negative: SEVERE difficulty breathing (e.g., struggling for each breath, speaks in single words)   Negative: [1] Weakness of the face, arm or leg on one side of the body AND [2] new-onset   Negative: [1] Numbness (i.e., loss of sensation) of the face, arm or leg on one side of the body AND [2] new-onset   Negative: [1] Chest pain lasts > 5 minutes AND [2] history of heart disease (i.e., heart attack, bypass surgery, angina, angioplasty, CHF)   Negative: [1] Chest pain AND [2] took nitrogylcerin AND [3] pain was not relieved   Negative: Sounds like a life-threatening emergency to the triager   Negative: Symptom is main concern (e.g., headache, chest pain)   Negative: Low blood pressure is main concern   Negative: [1] Systolic BP  >= 160 OR Diastolic >= 100 AND [2] cardiac (e.g., breathing difficulty, chest pain) or neurologic symptoms (e.g., new-onset blurred or double vision, unsteady gait)   Negative: [1] Pregnant 20 or more weeks (or postpartum < 6 weeks) AND [2] new hand or face swelling   Negative: [1] Pregnant 20 or more weeks (or postpartum < 6 weeks) AND [2] Systolic BP >= 160 OR Diastolic >= 110   Negative: [1] Systolic BP  >= 200 OR Diastolic >= 120 AND [2] having NO cardiac or neurologic symptoms   Negative: [1] Pregnant 20 or more  weeks (or postpartum < 6 weeks) AND [2] Systolic BP  >= 140 OR Diastolic >= 90   Negative: [1] Systolic BP  >= 180 OR Diastolic >= 110 AND [2] missed most recent dose of blood pressure medication    Protocols used: Blood Pressure - High-A-AH

## 2023-09-17 ENCOUNTER — OFFICE VISIT (OUTPATIENT)
Dept: URGENT CARE | Facility: URGENT CARE | Age: 68
End: 2023-09-17
Payer: COMMERCIAL

## 2023-09-17 VITALS
TEMPERATURE: 97.8 F | SYSTOLIC BLOOD PRESSURE: 176 MMHG | BODY MASS INDEX: 27.7 KG/M2 | OXYGEN SATURATION: 98 % | DIASTOLIC BLOOD PRESSURE: 92 MMHG | WEIGHT: 169 LBS | RESPIRATION RATE: 16 BRPM | HEART RATE: 72 BPM

## 2023-09-17 DIAGNOSIS — Z86.711 PERSONAL HISTORY OF PE (PULMONARY EMBOLISM): ICD-10-CM

## 2023-09-17 DIAGNOSIS — I10 ELEVATED BLOOD PRESSURE READING WITH DIAGNOSIS OF HYPERTENSION: ICD-10-CM

## 2023-09-17 DIAGNOSIS — I10 HTN, GOAL BELOW 130/80: Primary | ICD-10-CM

## 2023-09-17 DIAGNOSIS — F41.8 SITUATIONAL ANXIETY: ICD-10-CM

## 2023-09-17 PROBLEM — E56.9 MULTIPLE VITAMIN DEFICIENCY DISEASE: Status: ACTIVE | Noted: 2023-03-21

## 2023-09-17 PROBLEM — M54.50 CHRONIC LEFT-SIDED LOW BACK PAIN WITHOUT SCIATICA: Status: ACTIVE | Noted: 2023-03-21

## 2023-09-17 PROBLEM — M79.10 TRIGGER POINT OF LEFT SIDE OF BODY: Status: ACTIVE | Noted: 2023-03-21

## 2023-09-17 PROBLEM — G89.29 CHRONIC LEFT-SIDED LOW BACK PAIN WITHOUT SCIATICA: Status: ACTIVE | Noted: 2023-03-21

## 2023-09-17 PROCEDURE — 99214 OFFICE O/P EST MOD 30 MIN: CPT | Mod: 25 | Performed by: NURSE PRACTITIONER

## 2023-09-17 PROCEDURE — 93000 ELECTROCARDIOGRAM COMPLETE: CPT | Performed by: NURSE PRACTITIONER

## 2023-09-17 NOTE — PROGRESS NOTES
Assessment & Plan     ICD-10-CM    1. HTN, goal below 130/80  I10 EKG 12-lead complete w/read - Clinics      2. Personal history of PE (pulmonary embolism)  Z86.711       3. Situational anxiety  F41.8       4. Elevated blood pressure reading with diagnosis of hypertension  I10 EKG 12-lead complete w/read - Clinics        Overall feel this may be anxiety related although she states that she has not had any new stressors in her life having a family related celebration of life recently she still has family member staying with her at this time.  She has not been eating well due to taking care of others stating that in the last several days she has had a handful of pretzels.  Recommending that she increase her fluids and smaller more frequent meals.  Parameters for monitoring for hypertensive crisis discussed with patient.  Patient verbalized understanding and will monitor for symptoms of hypertensive crisis.  Seek emergency care if hypertensive crisis or cause.  Calcium scan scheduled for Tuesday.  Reviewed medication list.  Patient will continue to monitor her blood pressure recommending 3 times daily at home along with heart rate close follow-up with her primary care provider in 2 to 3 days with virtual visit if not able to be seen in person.  If symptoms of hypertensive crisis should arise or patient is having symptomatic chest pain and recommend being seen through emergency department patient verbalized understanding was agreement with this plan.      30 minutes spent on the date of the encounter doing chart review, review of test results, interpretation of tests, patient visit, and documentation       I saw and evaluated the patient and agree with the findings and plan of care as documented in the note.    BONILLA [facility] PRASHANTSTEVE Obrien is a 68 year old female who presents to clinic today for the following health issues: Elevated blood pressure.   Patient reports feeling off on Friday and checking her blood pressure and that day.  She recorded a systolic of 180 and diastolic of 95.  She had a celebration of life in her home on Saturday and checked her blood pressure twice that day.  Values were within the same range as that of Friday's values.  Value for the third blood pressure check in Saturday was systolic of 200 and diastolic of 104.  She proceeded to calling the nurse line was asked to take half dose of her hydralazine.  Blood pressure recheck was systolic of 165 and diastolic of 75.  She reports checking her blood pressure this morning before the visit and the values where a systolic of 158 and diastolic of 72.  She denies any dizziness, change in vision, shortness of breath, chest tightness, headache, and vision changes.  She reports decreased appetite and hydration which has been ongoing for about a week.    Chief Complaint   Patient presents with    Hypertension     Elevating over the past few days      HPI  Patient presents today for evaluation of elevated blood pressure with onset on Friday 9/15/23.  Differentials include hypertensive Crisis and anxiety.  EKG ordered to rule out any structural issues with heart.  Patient advised to follow-up with primary.  Calcium scan scheduled for Tuesday per patient.  No changes made to meds at this visit.  Patient advised to stay hydrated and eat little meals.  Patient denies chest pain, headache, shortness of breath.  Patient does have a history of PE she currently is not taking any anticoagulant.  She also has history of anxiety.  As well as hypertension she currently is taking Coreg 12.5 mg, hydralazine 25 mg, and Micardis 20 mg daily.  She denies recent increased anxiety or stressors although she has a lot of company staying at her house and states that she has not had an appetite or been able to eat she states this is quite normal for her when she has to entertain.  She states over the past several days  the most she has eaten is a handful of pretzels.  She feels she has been staying hydrated.    Current Outpatient Medications   Medication    ALPRAZolam (XANAX) 0.25 MG tablet    Ascorbic Acid (VITAMIN C PO)    Biotin 5000 MCG CAPS    buPROPion (WELLBUTRIN XL) 150 MG 24 hr tablet    CALCIUM CITRATE PO    carvedilol (COREG) 12.5 MG tablet    Cholecalciferol (VITAMIN D PO)    diazepam (VALIUM) 5 MG tablet    gabapentin (NEURONTIN) 100 MG capsule    Glucosamine-Chondroit-Vit C-Mn (GLUCOSAMINE CHONDR 1500 COMPLX PO)    hydrALAZINE (APRESOLINE) 25 MG tablet    Iron-vit C-vit B12-folic acid 100-250-0.025-1 MG TABS    Multiple Vitamins-Minerals (MULTIVITAL) TABS    pantoprazole (PROTONIX) 40 MG EC tablet    telmisartan (MICARDIS) 20 MG tablet    atorvastatin (LIPITOR) 10 MG tablet    lidocaine (LMX4) 4 % external cream    methylPREDNISolone (MEDROL) 16 MG tablet    triamcinolone (KENALOG) 0.1 % external cream     No current facility-administered medications for this visit.         Review of Systems  Constitutional, HEENT, cardiovascular, pulmonary, gi and gu systems are negative, except as otherwise noted.      Objective    BP (!) 170/95   Pulse 69   Temp 97.8  F (36.6  C) (Tympanic)   Resp 16   Wt 76.7 kg (169 lb)   LMP 06/27/2010 (Exact Date)   SpO2 98%   BMI 27.70 kg/m       Physical Exam   GENERAL: healthy, alert and no distress  NECK: no adenopathy, no asymmetry, masses, or scars and thyroid normal to palpation  RESP: lungs clear to auscultation - no rales, rhonchi or wheezes  CV: regular rate and rhythm, normal S1 S2, no S3 or S4, no murmur, click or rub, no peripheral edema and peripheral pulses strong  MS: no gross musculoskeletal defects noted, no edema  NEURO: Normal strength and tone, mentation intact and speech normal  PSYCH: mentation appears normal, affect normal/bright    EKG completed at this visit.   No changes to medication regimen  Patient to follow up with primary.

## 2023-09-17 NOTE — TELEPHONE ENCOUNTER
Caller:   patient    Situation:   High blood pressure  On blood pressure meds  180/89    BP: 211/104  Recheck at 9:42 pm during call:   186/89, pulse 72    No symptoms    Took an extra 1/2 tablet of hydralazine  Has a twinge of pain in her chest - mild, intermittent, every 1/2 to 45 min; last one second    Denies shortness of breath, confusion, or body weakness/numbness    BP: 180/75; 69    Background:  Missed last night's dose  Appointment on Tuesday  PCP: Bandar Vocal    Assessment  Needs second level triage per protocol    9:56 pm, paged on-call provider, Dr. Morales via Smart Web to call FNA back.    Call back from Dr. Morales - recommendation: take another 1/2 tablet of hydralazine; recheck BP after she empties her bladder, sit in quiet room w/ lights off; if systolic is over 190 - then go to ED; if drops to 160/150's ok to wait until tomorrow; if symptomatic, she should be seen.     Recommendation:  Disposition: See recommendation above  Reviewed care advise with patient.   Informed to call back w/ any questions or new concerns.  Caller verbalized understanding of care advice.  Caller agrees with advise/plan and will call back if she has any questions.        Dereje Husain RN, BSN  Triage Nurse Advisor      Reason for Disposition   [1] Systolic BP  >= 200 OR Diastolic >= 120 AND [2] having NO cardiac or neurologic symptoms    Additional Information   Negative: Difficult to awaken or acting confused (e.g., disoriented, slurred speech)   Negative: SEVERE difficulty breathing (e.g., struggling for each breath, speaks in single words)   Negative: [1] Weakness of the face, arm or leg on one side of the body AND [2] new-onset   Negative: [1] Numbness (i.e., loss of sensation) of the face, arm or leg on one side of the body AND [2] new-onset   Negative: [1] Chest pain lasts > 5 minutes AND [2] history of heart disease (i.e., heart attack, bypass surgery, angina, angioplasty, CHF)   Negative: [1] Chest pain AND [2]  took nitrogylcerin AND [3] pain was not relieved   Negative: Sounds like a life-threatening emergency to the triager   Negative: [1] Systolic BP  >= 160 OR Diastolic >= 100 AND [2] cardiac (e.g., breathing difficulty, chest pain) or neurologic symptoms (e.g., new-onset blurred or double vision, unsteady gait)   Negative: [1] Pregnant 20 or more weeks (or postpartum < 6 weeks) AND [2] new hand or face swelling   Negative: [1] Pregnant 20 or more weeks (or postpartum < 6 weeks) AND [2] Systolic BP >= 160 OR Diastolic >= 110    Protocols used: Blood Pressure - High-A-

## 2023-09-19 ENCOUNTER — ANCILLARY PROCEDURE (OUTPATIENT)
Dept: CT IMAGING | Facility: CLINIC | Age: 68
End: 2023-09-19
Attending: INTERNAL MEDICINE

## 2023-09-19 DIAGNOSIS — Z13.6 SCREENING, ISCHEMIC HEART DISEASE: ICD-10-CM

## 2023-09-19 PROCEDURE — 75571 CT HRT W/O DYE W/CA TEST: CPT | Performed by: INTERNAL MEDICINE

## 2023-09-20 RX ORDER — GABAPENTIN 300 MG/1
300 CAPSULE ORAL 3 TIMES DAILY
Qty: 270 CAPSULE | Refills: 1 | Status: SHIPPED | OUTPATIENT
Start: 2023-09-20 | End: 2024-03-26

## 2023-09-26 ENCOUNTER — VIRTUAL VISIT (OUTPATIENT)
Dept: FAMILY MEDICINE | Facility: CLINIC | Age: 68
End: 2023-09-26
Payer: COMMERCIAL

## 2023-09-26 DIAGNOSIS — I10 BENIGN ESSENTIAL HYPERTENSION: Primary | ICD-10-CM

## 2023-09-26 PROCEDURE — 99207 PR NO CHARGE LOS: CPT | Mod: 95 | Performed by: INTERNAL MEDICINE

## 2023-09-26 NOTE — PROGRESS NOTES
Camille is a 68 year old who is being evaluated via a billable video visit.      How would you like to obtain your AVS? MyChart  If the video visit is dropped, the invitation should be resent by: Text to cell phone: [phone]  Will anyone else be joining your video visit? No    Patient left prior to completion of this visit.  NO charge for this visit.

## 2023-09-27 ENCOUNTER — TELEPHONE (OUTPATIENT)
Dept: FAMILY MEDICINE | Facility: CLINIC | Age: 68
End: 2023-09-27
Payer: COMMERCIAL

## 2023-09-27 NOTE — TELEPHONE ENCOUNTER
Reason for Call:  Appointment Request    Patient requesting this type of appt: Chronic Diease Management/Medication/Follow-Up    Requested provider: Bandar Merrill    Reason patient unable to be scheduled: Not within requested timeframe    When does patient want to be seen/preferred time: 1-2 weeks    Comments: Pt would like call back from care team if pcp can see pt in person in the next two weeks for follow up. Pt has virtual scheduled for Friday but would rather be seen in person. No openings currently. Please call pt back to discuss.    Could we send this information to you in [employer] or would you prefer to receive a phone call?:   Patient would prefer a phone call   Okay to leave a detailed message?: Yes at Cell number on file:    Telephone Information:   Mobile [phone]       Call taken on 9/27/2023 at 7:43 AM by Chey Larsen

## 2023-09-27 NOTE — TELEPHONE ENCOUNTER
No appts available. Pt can schedule next available OV appt for 11/17/23. And can be placed on wait list. Called pt and lvm relaying message. Please relay message to pt when she calls back.

## 2023-10-06 ENCOUNTER — OFFICE VISIT (OUTPATIENT)
Dept: FAMILY MEDICINE | Facility: CLINIC | Age: 68
End: 2023-10-06
Payer: COMMERCIAL

## 2023-10-06 VITALS
RESPIRATION RATE: 20 BRPM | OXYGEN SATURATION: 100 % | HEART RATE: 73 BPM | WEIGHT: 174 LBS | SYSTOLIC BLOOD PRESSURE: 160 MMHG | TEMPERATURE: 97.5 F | BODY MASS INDEX: 28.51 KG/M2 | DIASTOLIC BLOOD PRESSURE: 90 MMHG

## 2023-10-06 DIAGNOSIS — Z98.84 HISTORY OF ROUX-EN-Y GASTRIC BYPASS: ICD-10-CM

## 2023-10-06 DIAGNOSIS — I77.810 DILATATION OF THORACIC AORTA (H): ICD-10-CM

## 2023-10-06 DIAGNOSIS — I10 HYPERTENSION GOAL BP (BLOOD PRESSURE) < 130/80: Primary | ICD-10-CM

## 2023-10-06 DIAGNOSIS — F41.1 GAD (GENERALIZED ANXIETY DISORDER): ICD-10-CM

## 2023-10-06 DIAGNOSIS — L82.1 SEBORRHEIC KERATOSES: ICD-10-CM

## 2023-10-06 DIAGNOSIS — R93.1 AGATSTON CAC SCORE, <100: ICD-10-CM

## 2023-10-06 PROCEDURE — 99214 OFFICE O/P EST MOD 30 MIN: CPT | Performed by: INTERNAL MEDICINE

## 2023-10-06 RX ORDER — BISOPROLOL FUMARATE AND HYDROCHLOROTHIAZIDE 10; 6.25 MG/1; MG/1
1 TABLET ORAL EVERY MORNING
Qty: 90 TABLET | Refills: 1 | Status: SHIPPED | OUTPATIENT
Start: 2023-10-06 | End: 2023-10-06

## 2023-10-06 RX ORDER — ALPRAZOLAM 0.25 MG
0.25 TABLET ORAL 3 TIMES DAILY PRN
Qty: 30 TABLET | Refills: 1 | Status: SHIPPED | OUTPATIENT
Start: 2023-10-06 | End: 2024-08-29

## 2023-10-06 RX ORDER — BISOPROLOL FUMARATE AND HYDROCHLOROTHIAZIDE 10; 6.25 MG/1; MG/1
1 TABLET ORAL EVERY MORNING
Qty: 90 TABLET | Refills: 3 | Status: SHIPPED | OUTPATIENT
Start: 2023-10-06 | End: 2024-01-12

## 2023-10-06 RX ORDER — DIAPER,BRIEF,INFANT-TODD,DISP
EACH MISCELLANEOUS 2 TIMES DAILY
COMMUNITY
Start: 2023-10-06

## 2023-10-06 RX ORDER — AMLODIPINE BESYLATE 5 MG/1
5 TABLET ORAL EVERY MORNING
Qty: 90 TABLET | Refills: 3 | Status: SHIPPED | OUTPATIENT
Start: 2023-10-06 | End: 2024-01-12

## 2023-10-06 RX ORDER — AMLODIPINE BESYLATE 5 MG/1
5 TABLET ORAL EVERY MORNING
Qty: 90 TABLET | Refills: 1 | Status: SHIPPED | OUTPATIENT
Start: 2023-10-06 | End: 2023-10-06

## 2023-10-06 ASSESSMENT — PAIN SCALES - GENERAL: PAINLEVEL: NO PAIN (0)
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
## 2023-10-06 NOTE — PATIENT INSTRUCTIONS
At [facility], we strive to deliver an exceptional experience to you, every time we see you. If you receive a survey, please complete it as we do value your feedback.  If you have MyChart, you can expect to receive results automatically within 24 hours of their completion.  Your provider will send a note interpreting your results as well.   If you do not have MyChart, you should receive your results in about a week by mail.    Your care team:                            Family Medicine Internal Medicine   MD Bandar Abraham MD Shantel Branch-Fleming, MD Srinivasa Vaka, MD Katya Belousova, PALIVE Albert, MD Pediatrics   Marcos Ellison, PAMD Franchesca Paez MD Amelia Massimini APRN CNP   BONILLA Henry CNP, MD Charanya Pasupathi, MD Kathleen Widmer, NP coming October 2023 Same-Day (No follow up visit)    KAMILLE Mendez PA coming Oct 2023     Clinic hours: Monday - Thursday 7 am-6 pm; Fridays 7 am-5 pm.   Urgent care: Monday - Friday 10 am- 8 pm; Saturday and Sunday 9 am-5 pm.    Clinic: [phone]       [facility]: Monday - Thursday 8 am - 7 pm; Friday 8 am - 6 pm  [facility]: [phone]

## 2023-10-13 ENCOUNTER — MYC MEDICAL ADVICE (OUTPATIENT)
Dept: FAMILY MEDICINE | Facility: CLINIC | Age: 68
End: 2023-10-13
Payer: COMMERCIAL

## 2023-10-26 ENCOUNTER — TELEPHONE (OUTPATIENT)
Dept: FAMILY MEDICINE | Facility: CLINIC | Age: 68
End: 2023-10-26
Payer: COMMERCIAL

## 2023-10-26 NOTE — TELEPHONE ENCOUNTER
Patient Quality Outreach    Patient is due for the following:   Hypertension -  BP check  Colon Cancer Screening      Topic Date Due    Flu Vaccine (1) 09/01/2023    COVID-19 Vaccine (6 - 2023-24 season) 09/01/2023       Next Steps:   Schedule a nurse only visit for bp check and immunization update.     Type of outreach:    Sent [employer] message.      Questions for provider review:    None           Monie Tang MA

## 2023-10-31 DIAGNOSIS — F33.41 MAJOR DEPRESSIVE DISORDER, RECURRENT EPISODE, IN PARTIAL REMISSION (H): ICD-10-CM

## 2023-10-31 RX ORDER — BUPROPION HYDROCHLORIDE 150 MG/1
150 TABLET ORAL EVERY MORNING
Qty: 90 TABLET | Refills: 3 | Status: SHIPPED | OUTPATIENT
Start: 2023-10-31

## 2023-11-14 ENCOUNTER — PATIENT OUTREACH (OUTPATIENT)
Dept: CARE COORDINATION | Facility: CLINIC | Age: 68
End: 2023-11-14
Payer: COMMERCIAL

## 2023-11-15 ENCOUNTER — MYC MEDICAL ADVICE (OUTPATIENT)
Dept: FAMILY MEDICINE | Facility: CLINIC | Age: 68
End: 2023-11-15
Payer: COMMERCIAL

## 2023-11-15 DIAGNOSIS — I10 HTN, GOAL BELOW 140/90: ICD-10-CM

## 2023-11-30 NOTE — TELEPHONE ENCOUNTER
General Call    Contacts         Type Contact Phone/Fax    11/30/2023 02:25 PM CST Phone (Incoming) Camille Tobar (Self) [phone] (H)          Reason for Call:  follow up below message    What are your questions or concerns:  Patient calling in to check the status on this.   She is getting concerned because she is almost out of medication, and was under the impression that the medication was changing?  This change was going to happen after she returned after her vacation, but she sent a message stating that she had returned.   She is asking for 60day prescription of BP medications, then use her appointment on 1/12/23 to discuss how this medication is going.  Camille is also wondering if she could get a handicap placecard, could you help with this? Or does sports medicine do this for her?  Please advise,    Date of last appointment with provider: 10/6/2023    Could we send this information to you in [employer] or would you prefer to receive a phone call?:   Patient would prefer a phone call   Okay to leave a detailed message?: Yes at Cell number on file:    Telephone Information:   Mobile [phone]

## 2023-12-06 RX ORDER — TELMISARTAN 40 MG/1
40 TABLET ORAL DAILY
Qty: 60 TABLET | Refills: 0 | Status: SHIPPED | OUTPATIENT
Start: 2023-12-06 | End: 2024-01-12

## 2023-12-07 ENCOUNTER — TELEPHONE (OUTPATIENT)
Dept: FAMILY MEDICINE | Facility: CLINIC | Age: 68
End: 2023-12-07
Payer: COMMERCIAL

## 2023-12-07 NOTE — TELEPHONE ENCOUNTER
Forms/Letter Request    Type of form/letter: Handicap Parking permit    Have you been seen for this request: Yes 10/2023    Do we have the form/letter: Yes:     Who is the form from? [employer] (if other please explain)    Where did/will the form come from? Patient or family brought in       When is form/letter needed by: ASAP    How would you like the form/letter returned: Mail  Is this the correct address?: Yes  [address] [facility] [address]    Patient Notified form requests are processed in 3-5 business days:Yes    Could we send this information to you in [employer] or would you prefer to receive a phone call?:   Patient would prefer a phone call   Okay to leave a detailed message?: Yes at Cell number on file:    Telephone Information:   Mobile [phone]

## 2023-12-12 ENCOUNTER — PATIENT OUTREACH (OUTPATIENT)
Dept: CARE COORDINATION | Facility: CLINIC | Age: 68
End: 2023-12-12
Payer: COMMERCIAL

## 2023-12-13 ENCOUNTER — OFFICE VISIT (OUTPATIENT)
Dept: ORTHOPEDICS | Facility: CLINIC | Age: 68
End: 2023-12-13
Payer: COMMERCIAL

## 2023-12-13 DIAGNOSIS — M17.11 ARTHRITIS OF RIGHT KNEE: Primary | ICD-10-CM

## 2023-12-13 PROCEDURE — 99207 PR DROP WITH A PROCEDURE: CPT | Performed by: PREVENTIVE MEDICINE

## 2023-12-13 PROCEDURE — 20610 DRAIN/INJ JOINT/BURSA W/O US: CPT | Mod: RT | Performed by: PREVENTIVE MEDICINE

## 2023-12-13 RX ORDER — HYALURONATE SODIUM 10 MG/ML
20 SYRINGE (ML) INTRAARTICULAR
Status: SHIPPED | OUTPATIENT
Start: 2023-12-13

## 2023-12-13 RX ADMIN — Medication 20 MG: at 16:08

## 2023-12-13 NOTE — LETTER
12/13/2023         RE: Marlee Tobar  [address] [facility] [address]        Dear Colleague,    Thank you for referring your patient, Marlee Tobar, to the [facility] [place]. Please see a copy of my visit note below.    Camille returns for right knee euflexxa injection #1 as planned  Diagnosis: right knee arthritis    Diagnosis: right knee arthritis  PROCEDURE:          right Knee joint injection with euflexxa    The patient was apprised of the risks and the benefits of the procedure and consented and a written consent was signed.   The patient s  KNEE was sterilely prepped with chloraprep.     The patient was injected with euflexxa syringe into the        right     knee with a 1.5-inch 22-gauge needle at the_superiorlateral aspect of their knee joint    There were no complications. The patient tolerated the procedure well. There was minimal bleeding.   The patient was instructed to ice the knee upon leaving clinic and refrain from overuse over the next 3 days.   The patient was instructed to go to the emergency room with any usual pain, swelling, or redness occurred in the injected area.   The patient was given a followup appointment to evaluate response to the injection to their increased range of motion and reduction of pain.  Follow up for euflexxa  Dr Gray Rome      Large Joint Injection/Arthocentesis: R knee joint    Date/Time: 12/13/2023 4:08 PM    Performed by: Gray Rome MD  Authorized by: Gray Rome MD    Indications:  Pain and osteoarthritis  Needle Size:  22 G  Guidance: landmark guided    Approach:  Superolateral  Location:  Knee      Medications:  20 mg sodium hyaluronate 20 MG/2ML  Outcome:  Tolerated well, no immediate complications  Procedure discussed: discussed risks, benefits, and alternatives    Consent Given by:  Patient  Timeout: timeout called immediately prior to procedure    Prep: patient was prepped and draped in  usual sterile [facility]   ORTHOPEDICS & SPORTS MEDICINE  [address] [place] [address]  Dept: [phone]  ______________________________________________________________________________    Patient: Marlee Tobar   : 1955   MRN: [id number]   2023    INVASIVE PROCEDURE SAFETY CHECKLIST    Date: 23  Procedure:right knee euflexxa #1  Patient Name: Marlee Tobar  MRN: [id number]  YOB: 1955    Action: Complete sections as appropriate. Any discrepancy results in a HARD COPY until resolved.     PRE PROCEDURE:  Patient ID verified with 2 identifiers (name and  or MRN): Yes  Procedure and site verified with patient/designee (when able): Yes  Accurate consent documentation in medical record: Yes  H&P (or appropriate assessment) documented in medical record: Yes  H&P must be up to 20 days prior to procedure and updates within 24 hours of procedure as applicable: Yes  Relevant diagnostic and radiology test results appropriately labeled and displayed as applicable: Yes  Procedure site(s) marked with provider initials: Yes    TIMEOUT:  Time-Out performed immediately prior to starting procedure, including verbal and active participation of all team members addressing the following:Yes  * Correct patient identify  * Confirmed that the correct side and site are marked  * An accurate procedure consent form  * Agreement on the procedure to be done  * Correct patient position  * Relevant images and results are properly labeled and appropriately displayed  * The need to administer antibiotics or fluids for irrigation purposes during the procedure as applicable   * Safety precautions based on patient history or medication use    DURING PROCEDURE: Verification of correct person, site, and procedures any time the responsibility for care of the patient is transferred to another member of the care team.       Prior to injection, verified patient identity  using patient's name and date of birth.  Due to injection administration, patient instructed to remain in clinic for 15 minutes  afterwards, and to report any adverse reaction to me immediately.    Joint injection was performed.      Drug Amount Wasted:  None.  Vial/Syringe: Syringe  Expiration Date:  12/3/24      JUAN Min  December 13, 2023      Again, thank you for allowing me to participate in the care of your patient.        Sincerely,        Gray Rome MD

## 2023-12-13 NOTE — PROGRESS NOTES
Camille returns for right knee euflexxa injection #1 as planned  Diagnosis: right knee arthritis    Diagnosis: right knee arthritis  PROCEDURE:          right Knee joint injection with euflexxa    The patient was apprised of the risks and the benefits of the procedure and consented and a written consent was signed.   The patient s  KNEE was sterilely prepped with chloraprep.     The patient was injected with euflexxa syringe into the        right     knee with a 1.5-inch 22-gauge needle at the_superiorlateral aspect of their knee joint    There were no complications. The patient tolerated the procedure well. There was minimal bleeding.   The patient was instructed to ice the knee upon leaving clinic and refrain from overuse over the next 3 days.   The patient was instructed to go to the emergency room with any usual pain, swelling, or redness occurred in the injected area.   The patient was given a followup appointment to evaluate response to the injection to their increased range of motion and reduction of pain.  Follow up for euflexxa  Dr Gray Rome      Large Joint Injection/Arthocentesis: R knee joint    Date/Time: 2023 4:08 PM    Performed by: Gray Rome MD  Authorized by: Gray Rome MD    Indications:  Pain and osteoarthritis  Needle Size:  22 G  Guidance: landmark guided    Approach:  Superolateral  Location:  Knee      Medications:  20 mg sodium hyaluronate 20 MG/2ML  Outcome:  Tolerated well, no immediate complications  Procedure discussed: discussed risks, benefits, and alternatives    Consent Given by:  Patient  Timeout: timeout called immediately prior to procedure    Prep: patient was prepped and draped in usual sterile fashion        [facility]   ORTHOPEDICS & SPORTS MEDICINE  [address] [place] [address]  Dept: [phone]  ______________________________________________________________________________    Patient: Marlee Tobar   :  1955   MRN: [id number]   2023    INVASIVE PROCEDURE SAFETY CHECKLIST    Date: 23  Procedure:right knee euflexxa #1  Patient Name: Marlee Tobar  MRN: [id number]  YOB: 1955    Action: Complete sections as appropriate. Any discrepancy results in a HARD COPY until resolved.     PRE PROCEDURE:  Patient ID verified with 2 identifiers (name and  or MRN): Yes  Procedure and site verified with patient/designee (when able): Yes  Accurate consent documentation in medical record: Yes  H&P (or appropriate assessment) documented in medical record: Yes  H&P must be up to 20 days prior to procedure and updates within 24 hours of procedure as applicable: Yes  Relevant diagnostic and radiology test results appropriately labeled and displayed as applicable: Yes  Procedure site(s) marked with provider initials: Yes    TIMEOUT:  Time-Out performed immediately prior to starting procedure, including verbal and active participation of all team members addressing the following:Yes  * Correct patient identify  * Confirmed that the correct side and site are marked  * An accurate procedure consent form  * Agreement on the procedure to be done  * Correct patient position  * Relevant images and results are properly labeled and appropriately displayed  * The need to administer antibiotics or fluids for irrigation purposes during the procedure as applicable   * Safety precautions based on patient history or medication use    DURING PROCEDURE: Verification of correct person, site, and procedures any time the responsibility for care of the patient is transferred to another member of the care team.       Prior to injection, verified patient identity using patient's name and date of birth.  Due to injection administration, patient instructed to remain in clinic for 15 minutes  afterwards, and to report any adverse reaction to me immediately.    Joint injection was performed.      Drug Amount Wasted:   None.  Vial/Syringe: Syringe  Expiration Date:  12/3/24      JUAN Min  December 13, 2023

## 2023-12-18 DIAGNOSIS — E78.5 HYPERLIPIDEMIA LDL GOAL <70: ICD-10-CM

## 2023-12-18 DIAGNOSIS — K21.9 GASTROESOPHAGEAL REFLUX DISEASE WITHOUT ESOPHAGITIS: ICD-10-CM

## 2023-12-19 RX ORDER — ATORVASTATIN CALCIUM 10 MG/1
10 TABLET, FILM COATED ORAL DAILY
Qty: 90 TABLET | Refills: 0 | Status: SHIPPED | OUTPATIENT
Start: 2023-12-19 | End: 2024-03-11

## 2023-12-19 RX ORDER — PANTOPRAZOLE SODIUM 40 MG/1
TABLET, DELAYED RELEASE ORAL
Qty: 180 TABLET | Refills: 0 | Status: SHIPPED | OUTPATIENT
Start: 2023-12-19 | End: 2024-03-11

## 2023-12-20 ENCOUNTER — OFFICE VISIT (OUTPATIENT)
Dept: ORTHOPEDICS | Facility: CLINIC | Age: 68
End: 2023-12-20
Payer: COMMERCIAL

## 2023-12-20 DIAGNOSIS — M51.369 DDD (DEGENERATIVE DISC DISEASE), LUMBAR: Primary | ICD-10-CM

## 2023-12-20 DIAGNOSIS — M17.11 ARTHRITIS OF RIGHT KNEE: ICD-10-CM

## 2023-12-20 DIAGNOSIS — M51.16 LUMBAR DISC HERNIATION WITH RADICULOPATHY: ICD-10-CM

## 2023-12-20 PROCEDURE — 20610 DRAIN/INJ JOINT/BURSA W/O US: CPT | Mod: RT | Performed by: PREVENTIVE MEDICINE

## 2023-12-20 PROCEDURE — 99207 PR DROP WITH A PROCEDURE: CPT | Performed by: PREVENTIVE MEDICINE

## 2023-12-20 RX ORDER — HYALURONATE SODIUM 10 MG/ML
20 SYRINGE (ML) INTRAARTICULAR
Status: SHIPPED | OUTPATIENT
Start: 2023-12-20

## 2023-12-20 RX ORDER — CELECOXIB 100 MG/1
100 CAPSULE ORAL 2 TIMES DAILY
Qty: 60 CAPSULE | Refills: 0 | Status: SHIPPED | OUTPATIENT
Start: 2023-12-20 | End: 2024-03-26

## 2023-12-20 RX ADMIN — Medication 20 MG: at 16:04

## 2023-12-20 NOTE — PROGRESS NOTES
Camille returns for right knee injection with euflexxa #2  Diagnosis: right knee arthritis  PROCEDURE:          right  Knee joint injection with euflexxa    The patient was apprised of the risks and the benefits of the procedure and consented and a written consent was signed.   The patient s  KNEE was sterilely prepped with chloraprep.     The patient was injected with euflexxa syringe into the        right      knee with a 1.5-inch 22-gauge needle at the_superiorlateral aspect of their knee joint    There were no complications. The patient tolerated the procedure well. There was minimal bleeding.   The patient was instructed to ice the knee upon leaving clinic and refrain from overuse over the next 3 days.   The patient was instructed to go to the emergency room with any usual pain, swelling, or redness occurred in the injected area.   The patient was given a followup appointment to evaluate response to the injection to their increased range of motion and reduction of pain.  Follow up for euflexxa  Dr Gray Rome        Large Joint Injection/Arthocentesis: R knee joint    Date/Time: 2023 4:04 PM    Performed by: Gray Rome MD  Authorized by: Gray Rome MD    Indications:  Osteoarthritis, pain and joint swelling  Needle Size:  22 G  Guidance: landmark guided    Approach:  Superolateral  Location:  Knee      Medications:  20 mg sodium hyaluronate 20 MG/2ML  Outcome:  Tolerated well, no immediate complications  Procedure discussed: discussed risks, benefits, and alternatives    Consent Given by:  Patient  Timeout: timeout called immediately prior to procedure    Prep: patient was prepped and draped in usual sterile fashion      [facility]   ORTHOPEDICS & SPORTS MEDICINE  [address] [place] [address]  Dept: [phone]  ______________________________________________________________________________    Patient: Marlee DELA CRUZ Amadou   : 1955   MRN: [id number]   December  2023    INVASIVE PROCEDURE SAFETY CHECKLIST    Date: 23  Procedure:right knee euflexxa #2  Patient Name: Marlee Tobar  MRN: [id number]  YOB: 1955    Action: Complete sections as appropriate. Any discrepancy results in a HARD COPY until resolved.     PRE PROCEDURE:  Patient ID verified with 2 identifiers (name and  or MRN): Yes  Procedure and site verified with patient/designee (when able): Yes  Accurate consent documentation in medical record: Yes  H&P (or appropriate assessment) documented in medical record: Yes  H&P must be up to 20 days prior to procedure and updates within 24 hours of procedure as applicable: Yes  Relevant diagnostic and radiology test results appropriately labeled and displayed as applicable: Yes  Procedure site(s) marked with provider initials: Yes    TIMEOUT:  Time-Out performed immediately prior to starting procedure, including verbal and active participation of all team members addressing the following:Yes  * Correct patient identify  * Confirmed that the correct side and site are marked  * An accurate procedure consent form  * Agreement on the procedure to be done  * Correct patient position  * Relevant images and results are properly labeled and appropriately displayed  * The need to administer antibiotics or fluids for irrigation purposes during the procedure as applicable   * Safety precautions based on patient history or medication use    DURING PROCEDURE: Verification of correct person, site, and procedures any time the responsibility for care of the patient is transferred to another member of the care team.       Prior to injection, verified patient identity using patient's name and date of birth.  Due to injection administration, patient instructed to remain in clinic for 15 minutes  afterwards, and to report any adverse reaction to me immediately.    Joint injection was performed.      Drug Amount Wasted:  None.  Vial/Syringe: Syringe  Expiration  Date:  12/3/24      Shiva Finnegan, EMT  December 20, 2023

## 2023-12-20 NOTE — LETTER
12/20/2023         RE: Marlee Tobar  [address] [facility] [address]        Dear Colleague,    Thank you for referring your patient, Marlee Tobar, to the [facility] [place]. Please see a copy of my visit note below.    Camille returns for right knee injection with euflexxa #2  Diagnosis: right knee arthritis  PROCEDURE:          right  Knee joint injection with euflexxa    The patient was apprised of the risks and the benefits of the procedure and consented and a written consent was signed.   The patient s  KNEE was sterilely prepped with chloraprep.     The patient was injected with euflexxa syringe into the        right      knee with a 1.5-inch 22-gauge needle at the_superiorlateral aspect of their knee joint    There were no complications. The patient tolerated the procedure well. There was minimal bleeding.   The patient was instructed to ice the knee upon leaving clinic and refrain from overuse over the next 3 days.   The patient was instructed to go to the emergency room with any usual pain, swelling, or redness occurred in the injected area.   The patient was given a followup appointment to evaluate response to the injection to their increased range of motion and reduction of pain.  Follow up for euflexxa  Dr Gray Rome        Large Joint Injection/Arthocentesis: R knee joint    Date/Time: 12/20/2023 4:04 PM    Performed by: Gray Rome MD  Authorized by: Gray Rome MD    Indications:  Osteoarthritis, pain and joint swelling  Needle Size:  22 G  Guidance: landmark guided    Approach:  Superolateral  Location:  Knee      Medications:  20 mg sodium hyaluronate 20 MG/2ML  Outcome:  Tolerated well, no immediate complications  Procedure discussed: discussed risks, benefits, and alternatives    Consent Given by:  Patient  Timeout: timeout called immediately prior to procedure    Prep: patient was prepped and draped in usual sterile fashion       [facility]   ORTHOPEDICS & SPORTS MEDICINE  [address] [place] [address]  Dept: [phone]  ______________________________________________________________________________    Patient: Marlee Tobar   : 1955   MRN: [id number]   2023    INVASIVE PROCEDURE SAFETY CHECKLIST    Date: 23  Procedure:right knee euflexxa #2  Patient Name: Marlee Tobar  MRN: [id number]  YOB: 1955    Action: Complete sections as appropriate. Any discrepancy results in a HARD COPY until resolved.     PRE PROCEDURE:  Patient ID verified with 2 identifiers (name and  or MRN): Yes  Procedure and site verified with patient/designee (when able): Yes  Accurate consent documentation in medical record: Yes  H&P (or appropriate assessment) documented in medical record: Yes  H&P must be up to 20 days prior to procedure and updates within 24 hours of procedure as applicable: Yes  Relevant diagnostic and radiology test results appropriately labeled and displayed as applicable: Yes  Procedure site(s) marked with provider initials: Yes    TIMEOUT:  Time-Out performed immediately prior to starting procedure, including verbal and active participation of all team members addressing the following:Yes  * Correct patient identify  * Confirmed that the correct side and site are marked  * An accurate procedure consent form  * Agreement on the procedure to be done  * Correct patient position  * Relevant images and results are properly labeled and appropriately displayed  * The need to administer antibiotics or fluids for irrigation purposes during the procedure as applicable   * Safety precautions based on patient history or medication use    DURING PROCEDURE: Verification of correct person, site, and procedures any time the responsibility for care of the patient is transferred to another member of the care team.       Prior to injection, verified patient identity using patient's name and date  of birth.  Due to injection administration, patient instructed to remain in clinic for 15 minutes  afterwards, and to report any adverse reaction to me immediately.    Joint injection was performed.      Drug Amount Wasted:  None.  Vial/Syringe: Syringe  Expiration Date:  12/3/24      JUAN Min  December 20, 2023      Again, thank you for allowing me to participate in the care of your patient.        Sincerely,        Gray Rome MD

## 2023-12-27 ENCOUNTER — OFFICE VISIT (OUTPATIENT)
Dept: ORTHOPEDICS | Facility: CLINIC | Age: 68
End: 2023-12-27
Payer: COMMERCIAL

## 2023-12-27 DIAGNOSIS — M17.11 ARTHRITIS OF RIGHT KNEE: Primary | ICD-10-CM

## 2023-12-27 PROCEDURE — 20610 DRAIN/INJ JOINT/BURSA W/O US: CPT | Mod: RT | Performed by: PREVENTIVE MEDICINE

## 2023-12-27 PROCEDURE — 99207 PR DROP WITH A PROCEDURE: CPT | Performed by: PREVENTIVE MEDICINE

## 2023-12-27 RX ORDER — HYALURONATE SODIUM 10 MG/ML
20 SYRINGE (ML) INTRAARTICULAR
Status: SHIPPED | OUTPATIENT
Start: 2023-12-27

## 2023-12-27 RX ADMIN — Medication 20 MG: at 08:33

## 2023-12-27 ASSESSMENT — PAIN SCALES - GENERAL: PAINLEVEL: NO PAIN (0)

## 2023-12-27 NOTE — NURSING NOTE
Chief Complaint   Patient presents with    Right Knee - Pain, Follow Up     Euflexxa #3       There were no vitals filed for this visit.    There is no height or weight on file to calculate BMI.      CHRISTIAN Howell NREMT

## 2023-12-27 NOTE — PROGRESS NOTES
Camille returns for Euflexxa #3    Diagnosis: right knee arthritis  PROCEDURE:          right   Knee joint injection with euflexxa    The patient was apprised of the risks and the benefits of the procedure and consented and a written consent was signed.   The patient s  KNEE was sterilely prepped with chloraprep.     The patient was injected with euflexxa syringe into the right knee with a 1.5-inch 22-gauge needle at the_superiorlateral aspect of their knee joint    There were no complications. The patient tolerated the procedure well. There was minimal bleeding.   The patient was instructed to ice the knees upon leaving clinic and refrain from overuse over the next 3 days.   The patient was instructed to go to the emergency room with any usual pain, swelling, or redness occurred in the injected area.   The patient was given a followup appointment to evaluate response to the injection to their increased range of motion and reduction of pain.  Follow up for euflexxa  Dr Gray Rome           Large Joint Injection/Arthocentesis: R knee joint    Date/Time: 12/27/2023 8:33 AM    Performed by: Gray Rome MD  Authorized by: Gray Rome MD    Indications:  Pain and osteoarthritis  Needle Size:  22 G  Guidance: landmark guided    Approach:  Superolateral  Location:  Knee      Medications:  20 mg sodium hyaluronate 20 MG/2ML  Outcome:  Tolerated well, no immediate complications  Procedure discussed: discussed risks, benefits, and alternatives    Consent Given by:  Patient  Timeout: timeout called immediately prior to procedure    Prep: patient was prepped and draped in usual sterile fashion

## 2023-12-27 NOTE — NURSING NOTE
[facility]   ORTHOPEDICS & SPORTS MEDICINE  [address] [place] [address]  Dept: [phone]  ______________________________________________________________________________    Patient: Marlee Tobar   : 1955   MRN: [id number]   2023    INVASIVE PROCEDURE SAFETY CHECKLIST    Date: 2023   Procedure: Right knee injection  Patient Name: Marlee Tobar  MRN: [id number]  YOB: 1955    Action: Complete sections as appropriate. Any discrepancy results in a HARD COPY until resolved.     PRE PROCEDURE:  Patient ID verified with 2 identifiers (name and  or MRN): Yes  Procedure and site verified with patient/designee (when able): Yes  Accurate consent documentation in medical record: Yes  H&P (or appropriate assessment) documented in medical record: Yes  H&P must be up to 20 days prior to procedure and updates within 24 hours of procedure as applicable: NA  Relevant diagnostic and radiology test results appropriately labeled and displayed as applicable: NA  Procedure site(s) marked with provider initials: NA    TIMEOUT:  Time-Out performed immediately prior to starting procedure, including verbal and active participation of all team members addressing the following:Yes  * Correct patient identify  * Confirmed that the correct side and site are marked  * An accurate procedure consent form  * Agreement on the procedure to be done  * Correct patient position  * Relevant images and results are properly labeled and appropriately displayed  * The need to administer antibiotics or fluids for irrigation purposes during the procedure as applicable   * Safety precautions based on patient history or medication use    DURING PROCEDURE: Verification of correct person, site, and procedures any time the responsibility for care of the patient is transferred to another member of the care team.       Prior to injection, verified patient identity using patient's name and date of  birth.  Due to injection administration, patient instructed to remain in clinic for 15 minutes  afterwards, and to report any adverse reaction to me immediately.    Joint injection was performed.      Drug Amount Wasted:  None.  Vial/Syringe: Syringe  Expiration Date:  12/3/2024      Dante Balderas, EMT  December 27, 2023

## 2023-12-27 NOTE — LETTER
12/27/2023         RE: Marlee Tobar  [address] [facility] [address]        Dear Colleague,    Thank you for referring your patient, Marlee Tobar, to the [facility] [place]. Please see a copy of my visit note below.    Camille returns for Euflexxa #3    Diagnosis: right knee arthritis  PROCEDURE:          right   Knee joint injection with euflexxa    The patient was apprised of the risks and the benefits of the procedure and consented and a written consent was signed.   The patient s  KNEE was sterilely prepped with chloraprep.     The patient was injected with euflexxa syringe into the right knee with a 1.5-inch 22-gauge needle at the_superiorlateral aspect of their knee joint    There were no complications. The patient tolerated the procedure well. There was minimal bleeding.   The patient was instructed to ice the knees upon leaving clinic and refrain from overuse over the next 3 days.   The patient was instructed to go to the emergency room with any usual pain, swelling, or redness occurred in the injected area.   The patient was given a followup appointment to evaluate response to the injection to their increased range of motion and reduction of pain.  Follow up for euflexxa  Dr Gray Rome           Large Joint Injection/Arthocentesis: R knee joint    Date/Time: 12/27/2023 8:33 AM    Performed by: Gray Rome MD  Authorized by: Gray Rome MD    Indications:  Pain and osteoarthritis  Needle Size:  22 G  Guidance: landmark guided    Approach:  Superolateral  Location:  Knee      Medications:  20 mg sodium hyaluronate 20 MG/2ML  Outcome:  Tolerated well, no immediate complications  Procedure discussed: discussed risks, benefits, and alternatives    Consent Given by:  Patient  Timeout: timeout called immediately prior to procedure    Prep: patient was prepped and draped in usual sterile fashion          Again, thank you for allowing me to  participate in the care of your patient.        Sincerely,        Gray Rome MD

## 2024-01-02 ENCOUNTER — MYC MEDICAL ADVICE (OUTPATIENT)
Dept: FAMILY MEDICINE | Facility: CLINIC | Age: 69
End: 2024-01-02
Payer: COMMERCIAL

## 2024-01-02 ENCOUNTER — MYC REFILL (OUTPATIENT)
Dept: FAMILY MEDICINE | Facility: CLINIC | Age: 69
End: 2024-01-02
Payer: COMMERCIAL

## 2024-01-02 DIAGNOSIS — R93.1 AGATSTON CAC SCORE, <100: Primary | ICD-10-CM

## 2024-01-02 DIAGNOSIS — R20.2 PARESTHESIA OF BOTH FEET: ICD-10-CM

## 2024-01-02 DIAGNOSIS — I25.10 ATHEROSCLEROSIS OF NATIVE CORONARY ARTERY OF NATIVE HEART WITHOUT ANGINA PECTORIS: ICD-10-CM

## 2024-01-02 RX ORDER — GABAPENTIN 100 MG/1
100 CAPSULE ORAL 3 TIMES DAILY
Qty: 270 CAPSULE | Refills: 1 | Status: SHIPPED | OUTPATIENT
Start: 2024-01-02 | End: 2024-06-13

## 2024-01-04 ENCOUNTER — ANCILLARY PROCEDURE (OUTPATIENT)
Dept: MAMMOGRAPHY | Facility: OTHER | Age: 69
End: 2024-01-04
Attending: INTERNAL MEDICINE
Payer: COMMERCIAL

## 2024-01-04 DIAGNOSIS — Z12.31 VISIT FOR SCREENING MAMMOGRAM: ICD-10-CM

## 2024-01-04 PROCEDURE — 77063 BREAST TOMOSYNTHESIS BI: CPT | Mod: TC | Performed by: RADIOLOGY

## 2024-01-04 PROCEDURE — 77067 SCR MAMMO BI INCL CAD: CPT | Mod: TC | Performed by: RADIOLOGY

## 2024-01-08 ENCOUNTER — TELEPHONE (OUTPATIENT)
Dept: FAMILY MEDICINE | Facility: CLINIC | Age: 69
End: 2024-01-08
Payer: COMMERCIAL

## 2024-01-08 NOTE — TELEPHONE ENCOUNTER
Pharmacy requesting refill on discontinued meds:    carvedilol (COREG) 12.5 MG tablet (Discontinued) 180 tablet 1 5/1/2023 10/6/2023       hydrALAZINE (APRESOLINE) 25 MG tablet (Discontinued) 180 tablet 1 5/1/2023 10/6/2023

## 2024-01-12 ENCOUNTER — OFFICE VISIT (OUTPATIENT)
Dept: FAMILY MEDICINE | Facility: CLINIC | Age: 69
End: 2024-01-12
Payer: COMMERCIAL

## 2024-01-12 VITALS
WEIGHT: 167.2 LBS | SYSTOLIC BLOOD PRESSURE: 146 MMHG | DIASTOLIC BLOOD PRESSURE: 82 MMHG | BODY MASS INDEX: 27.86 KG/M2 | TEMPERATURE: 97.6 F | RESPIRATION RATE: 20 BRPM | HEART RATE: 85 BPM | HEIGHT: 65 IN | OXYGEN SATURATION: 97 %

## 2024-01-12 DIAGNOSIS — F33.41 MAJOR DEPRESSIVE DISORDER, RECURRENT EPISODE, IN PARTIAL REMISSION (H): ICD-10-CM

## 2024-01-12 DIAGNOSIS — Z23 NEED FOR COVID-19 VACCINE: ICD-10-CM

## 2024-01-12 DIAGNOSIS — I77.810 DILATATION OF THORACIC AORTA (H): ICD-10-CM

## 2024-01-12 DIAGNOSIS — I10 HTN, GOAL BELOW 140/90: Primary | ICD-10-CM

## 2024-01-12 DIAGNOSIS — Z23 NEED FOR PROPHYLACTIC VACCINATION AND INOCULATION AGAINST INFLUENZA: ICD-10-CM

## 2024-01-12 PROCEDURE — 90662 IIV NO PRSV INCREASED AG IM: CPT | Performed by: INTERNAL MEDICINE

## 2024-01-12 PROCEDURE — G0008 ADMIN INFLUENZA VIRUS VAC: HCPCS | Performed by: INTERNAL MEDICINE

## 2024-01-12 PROCEDURE — 99207 PR NO CHARGE LOS: CPT | Mod: 25 | Performed by: INTERNAL MEDICINE

## 2024-01-12 PROCEDURE — 90480 ADMN SARSCOV2 VAC 1/ONLY CMP: CPT | Performed by: INTERNAL MEDICINE

## 2024-01-12 PROCEDURE — 91320 SARSCV2 VAC 30MCG TRS-SUC IM: CPT | Performed by: INTERNAL MEDICINE

## 2024-01-12 RX ORDER — HYDRALAZINE HYDROCHLORIDE 25 MG/1
25 TABLET, FILM COATED ORAL 2 TIMES DAILY
Qty: 180 TABLET | Refills: 3 | Status: SHIPPED | OUTPATIENT
Start: 2024-01-12 | End: 2024-01-16

## 2024-01-12 RX ORDER — HYDRALAZINE HYDROCHLORIDE 25 MG/1
25 TABLET, FILM COATED ORAL 2 TIMES DAILY
Qty: 30 TABLET | Refills: 0 | Status: SHIPPED | OUTPATIENT
Start: 2024-01-12 | End: 2024-01-16

## 2024-01-12 RX ORDER — CARVEDILOL 12.5 MG/1
12.5 TABLET ORAL 2 TIMES DAILY WITH MEALS
Qty: 180 TABLET | Refills: 3 | Status: SHIPPED | OUTPATIENT
Start: 2024-01-12 | End: 2024-08-13

## 2024-01-12 RX ORDER — RESPIRATORY SYNCYTIAL VIRUS VACCINE 120MCG/0.5
0.5 KIT INTRAMUSCULAR ONCE
Qty: 1 EACH | Refills: 0 | Status: CANCELLED | OUTPATIENT
Start: 2024-01-12 | End: 2024-01-12

## 2024-01-12 RX ORDER — CARVEDILOL 12.5 MG/1
12.5 TABLET ORAL 2 TIMES DAILY WITH MEALS
Qty: 180 TABLET | Refills: 3 | Status: SHIPPED | OUTPATIENT
Start: 2024-01-12 | End: 2024-01-12

## 2024-01-12 RX ORDER — HYDRALAZINE HYDROCHLORIDE 25 MG/1
25 TABLET, FILM COATED ORAL 2 TIMES DAILY
Qty: 180 TABLET | Refills: 3 | Status: SHIPPED | OUTPATIENT
Start: 2024-01-12 | End: 2024-01-12

## 2024-01-12 RX ORDER — TELMISARTAN 40 MG/1
40 TABLET ORAL EVERY MORNING
Qty: 90 TABLET | Refills: 3 | Status: SHIPPED | OUTPATIENT
Start: 2024-01-12 | End: 2024-01-12

## 2024-01-12 RX ORDER — TELMISARTAN 40 MG/1
40 TABLET ORAL EVERY MORNING
Qty: 90 TABLET | Refills: 3 | Status: SHIPPED | OUTPATIENT
Start: 2024-01-12

## 2024-01-12 RX ORDER — CARVEDILOL 12.5 MG/1
12.5 TABLET ORAL 2 TIMES DAILY WITH MEALS
Qty: 30 TABLET | Refills: 0 | Status: SHIPPED | OUTPATIENT
Start: 2024-01-12 | End: 2024-01-16

## 2024-01-12 ASSESSMENT — PAIN SCALES - GENERAL: PAINLEVEL: NO PAIN (0)
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
## 2024-01-16 ENCOUNTER — OFFICE VISIT (OUTPATIENT)
Dept: CARDIOLOGY | Facility: CLINIC | Age: 69
End: 2024-01-16
Payer: COMMERCIAL

## 2024-01-16 VITALS
DIASTOLIC BLOOD PRESSURE: 88 MMHG | RESPIRATION RATE: 18 BRPM | SYSTOLIC BLOOD PRESSURE: 148 MMHG | HEIGHT: 65 IN | OXYGEN SATURATION: 97 % | HEART RATE: 79 BPM | WEIGHT: 167 LBS | BODY MASS INDEX: 27.82 KG/M2

## 2024-01-16 DIAGNOSIS — I77.810 MILD DILATION OF ASCENDING AORTA (H): ICD-10-CM

## 2024-01-16 DIAGNOSIS — I10 HYPERTENSION GOAL BP (BLOOD PRESSURE) < 130/80: Primary | ICD-10-CM

## 2024-01-16 DIAGNOSIS — I25.10 CORONARY ARTERY DISEASE INVOLVING NATIVE CORONARY ARTERY OF NATIVE HEART WITHOUT ANGINA PECTORIS: ICD-10-CM

## 2024-01-16 DIAGNOSIS — E78.00 HYPERCHOLESTEROLEMIA: ICD-10-CM

## 2024-01-16 PROCEDURE — 99204 OFFICE O/P NEW MOD 45 MIN: CPT | Performed by: INTERNAL MEDICINE

## 2024-01-16 RX ORDER — AMLODIPINE BESYLATE 5 MG/1
5 TABLET ORAL DAILY
Qty: 90 TABLET | Refills: 3 | Status: SHIPPED | OUTPATIENT
Start: 2024-01-16 | End: 2024-09-10

## 2024-01-16 ASSESSMENT — PAIN SCALES - GENERAL: PAINLEVEL: NO PAIN (0)
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
## 2024-01-24 ENCOUNTER — TELEPHONE (OUTPATIENT)
Dept: FAMILY MEDICINE | Facility: CLINIC | Age: 69
End: 2024-01-24
Payer: COMMERCIAL

## 2024-01-24 NOTE — TELEPHONE ENCOUNTER
Patient Quality Outreach    Patient is due for the following:   Hypertension -  BP check  Colon Cancer Screening    Next Steps:   Schedule a nurse only visit for bp check    Type of outreach:    Sent [employer] message.      Questions for provider review:    None           Monie Tang MA

## 2024-01-26 ENCOUNTER — DOCUMENTATION ONLY (OUTPATIENT)
Dept: OTHER | Facility: CLINIC | Age: 69
End: 2024-01-26
Payer: COMMERCIAL

## 2024-01-26 PROBLEM — Z71.89 ADVANCE CARE PLANNING: Chronic | Status: RESOLVED | Noted: 2017-01-06 | Resolved: 2024-01-26

## 2024-01-31 ENCOUNTER — HOSPITAL ENCOUNTER (OUTPATIENT)
Dept: CARDIOLOGY | Facility: CLINIC | Age: 69
Discharge: HOME | End: 2024-01-31
Attending: INTERNAL MEDICINE | Admitting: INTERNAL MEDICINE
Payer: COMMERCIAL

## 2024-01-31 DIAGNOSIS — I25.10 CORONARY ARTERY DISEASE INVOLVING NATIVE CORONARY ARTERY OF NATIVE HEART WITHOUT ANGINA PECTORIS: ICD-10-CM

## 2024-01-31 PROCEDURE — 93018 CV STRESS TEST I&R ONLY: CPT | Performed by: INTERNAL MEDICINE

## 2024-01-31 PROCEDURE — 93325 DOPPLER ECHO COLOR FLOW MAPG: CPT | Mod: TC

## 2024-01-31 PROCEDURE — 93325 DOPPLER ECHO COLOR FLOW MAPG: CPT | Mod: 26 | Performed by: INTERNAL MEDICINE

## 2024-01-31 PROCEDURE — 93016 CV STRESS TEST SUPVJ ONLY: CPT | Performed by: INTERNAL MEDICINE

## 2024-01-31 PROCEDURE — 93321 DOPPLER ECHO F-UP/LMTD STD: CPT | Mod: 26 | Performed by: INTERNAL MEDICINE

## 2024-01-31 PROCEDURE — 93350 STRESS TTE ONLY: CPT | Mod: 26 | Performed by: INTERNAL MEDICINE

## 2024-02-01 ENCOUNTER — CARE COORDINATION (OUTPATIENT)
Dept: CARDIOLOGY | Facility: CLINIC | Age: 69
End: 2024-02-01
Payer: COMMERCIAL

## 2024-02-01 NOTE — PROGRESS NOTES
Stress echo results reviewed. Based on test results, will follow Dr. Camarillo's plan outlined in his OV note 1/16/24: pt to follow up w/PCP for BP management and return to see cardiology with an echo in 1 year for follow up of aorta. Results to Dr. Camarillo to review/sign off. Chey Ferguson RN on 2/1/2024 at 4:36 PM        BSA: 1.8 m2  Height: 65 in  Weight: 167 lb  HR: 78  BP: 154/80 mmHg  ______________________________________________________________________________  Procedure  Stress Echo Complete.  ______________________________________________________________________________  Interpretation Summary  This was a normal stress echocardiogram with no evidence of stress-induced  ischemia.  ______________________________________________________________________________  Stress  Exercise was stopped due to dyspnea.  The patient did not exhibit any symptoms during exercise.  There was a normal BP response to exercise.  The patient exhibited no chest pain during exercise.  Target Heart Rate was achieved.  Occasional premature ventricular contractions.  The EKG portion of this stress test was negative for inducible ischemia (see  echo results below).  Normal resting wall motion and no stress-induced wall motion abnormality.  Left ventricular cavity size decreases with exercise.  Global LV systolic function augments with exercise.

## 2024-02-02 NOTE — PROGRESS NOTES
Javon Camarillo MD  [facility] Heart Team 77 minutes ago (12:41 PM)     EE  Stress echo normal. Agree with plan as outlined. EE

## 2024-03-10 DIAGNOSIS — M51.16 LUMBAR DISC HERNIATION WITH RADICULOPATHY: ICD-10-CM

## 2024-03-10 DIAGNOSIS — M51.369 DDD (DEGENERATIVE DISC DISEASE), LUMBAR: ICD-10-CM

## 2024-03-10 DIAGNOSIS — E78.5 HYPERLIPIDEMIA LDL GOAL <70: ICD-10-CM

## 2024-03-10 DIAGNOSIS — K21.9 GASTROESOPHAGEAL REFLUX DISEASE WITHOUT ESOPHAGITIS: ICD-10-CM

## 2024-03-10 DIAGNOSIS — R20.2 PARESTHESIA OF BOTH FEET: ICD-10-CM

## 2024-03-11 RX ORDER — PANTOPRAZOLE SODIUM 40 MG/1
TABLET, DELAYED RELEASE ORAL
Qty: 180 TABLET | Refills: 1 | Status: SHIPPED | OUTPATIENT
Start: 2024-03-11 | End: 2024-09-20

## 2024-03-11 RX ORDER — ATORVASTATIN CALCIUM 10 MG/1
10 TABLET, FILM COATED ORAL DAILY
Qty: 90 TABLET | Refills: 1 | Status: SHIPPED | OUTPATIENT
Start: 2024-03-11 | End: 2024-09-20

## 2024-03-11 NOTE — TELEPHONE ENCOUNTER
Prescription refill requested for:   gabapentin (NEURONTIN) 100 MG capsule       Last Written Prescription Date:  1/2/24  Last Fill Quantity: 270 capsule,   # refills: 1  Last Office Visit: 12/27/23  Future Office visit:         Prescription refill requested for:   celecoxib (CELEBREX) 100 MG capsule       Last Written Prescription Date:  12/20/23  Last Fill Quantity: 60 capsule,   # refills: 0  Last Office Visit: 12/27/23  Future Office visit:           Jessica Call LPN

## 2024-03-20 NOTE — TELEPHONE ENCOUNTER
Left VM for Pt to clarify current use of Celebrex or Gabapentin due to pharmacy refill request.

## 2024-03-22 NOTE — CONFIDENTIAL NOTE
Medication Question or concern regarding medication   Prescription Clarification  Name of Medication: Gabapentin  Prescribing Provider: Rolando Rome     Pt takes Gabapentin 300 mg 3x a day and the 100 mg as needed, so she takes both.  Pt said its nice to have the celebrex on hand and has just a few left. Would like a refill of 1 a day of Celebrex.  She is needing a refill of the Gabapentin 300 mg 3x a day from Dr. Rome.  Her 100 mg as needed is with a different provider.       What on the order needs clarification? See above.        Could we send this information to you in [employer] or would you prefer to receive a phone call?:   Patient would like to be contacted via [employer]

## 2024-03-26 RX ORDER — CELECOXIB 100 MG/1
100 CAPSULE ORAL 2 TIMES DAILY
Qty: 60 CAPSULE | Refills: 0 | Status: SHIPPED | OUTPATIENT
Start: 2024-03-26

## 2024-03-26 RX ORDER — GABAPENTIN 300 MG/1
300 CAPSULE ORAL 3 TIMES DAILY
Qty: 270 CAPSULE | Refills: 0 | Status: SHIPPED | OUTPATIENT
Start: 2024-03-26 | End: 2024-10-04

## 2024-04-01 ENCOUNTER — MYC MEDICAL ADVICE (OUTPATIENT)
Dept: FAMILY MEDICINE | Facility: CLINIC | Age: 69
End: 2024-04-01
Payer: COMMERCIAL

## 2024-04-01 DIAGNOSIS — M81.0 POSTMENOPAUSAL OSTEOPOROSIS: Primary | ICD-10-CM

## 2024-04-02 ENCOUNTER — MYC MEDICAL ADVICE (OUTPATIENT)
Dept: FAMILY MEDICINE | Facility: CLINIC | Age: 69
End: 2024-04-02
Payer: COMMERCIAL

## 2024-04-02 NOTE — TELEPHONE ENCOUNTER
See [employer] message below. RN notes:     bisoprolol-hydrochlorothiazide  Discontinued 1/12/2024  Discontinued - bisoprolol-hydrochlorothiazide (ZIAC) 10-6.25 MG tablet    Take 1 tablet by mouth every morning, Disp-90 tablet, R-3, E-Prescribe   Dispense: 90 tablet   Refills: 3 ordered   Pharmacy: [facility] ([facility]IL [facility]) [facility] [address] [place] [address] (Ph: [phone])   Order Details  Ordered on: 10/06/23   Associated Dx: Agatston CAC score, <100; Hypertension goal BP (blood pressure) < 130/80   Discontinued on: 01/12/24   Discontinue reason: Therapy completed (No AVS)   Authorizing provider: Bandar Merrill MD   History   Start Date End Date   bisoprolol-hydrochlorothiazide (ZIAC) 10-6.25 MG tablet 10/06/23 01/12/24   bisoprolol-hydrochlorothiazide (ZIAC) 10-6.25 MG tablet 10/06/23 10/06/23     Routing to provider to review and advise.    Bee Watson, EDDIEN, RN  [facility]

## 2024-04-10 ENCOUNTER — MYC MEDICAL ADVICE (OUTPATIENT)
Dept: FAMILY MEDICINE | Facility: CLINIC | Age: 69
End: 2024-04-10
Payer: COMMERCIAL

## 2024-04-10 DIAGNOSIS — M81.8 AGE-RELATED OSTEOPOROSIS WITHOUT FRACTURE: Primary | ICD-10-CM

## 2024-04-10 DIAGNOSIS — M81.0 AGE-RELATED OSTEOPOROSIS WITHOUT CURRENT PATHOLOGICAL FRACTURE: ICD-10-CM

## 2024-04-16 NOTE — PATIENT INSTRUCTIONS
If you have labs or imaging done, the results will automatically release in [employer] without an interpretation.  Your health care professional will review those results and send an interpretation with recommendations as soon as possible, but this may be 1-3 business days.    If you have any questions regarding your visit, please contact your care team.     [employer]: [phone]  [facility] CLINIC HOURS TELEPHONE NUMBER   Dayan Larsen, HEATH Carranza-DARRYL Garcia-DARRYL Gooden-Surgery Scheduler  Kelin-       Monday- [place]  8:00 am-4:00 pm    Tuesday- [place]  8:00 am-4:00 pm    Wednesday- [place] 8:00 am-4:00 pm    Thursday- [place] 8:00 am-4:00 pm    Friday- [place]  8:00 am-4:00 pm [facility]  [address] [place]  [place] [address]  PH: [phone]  Fax: [phone]    Imaging Scheduling all locations  PH: [phone]     [facility]  [address] [place] [address] Dr.  [place], [place] [address] [phone]    [facility]  [address] [place] [address]  PH: [phone]     **Surgeries** Our Surgery Schedulers will contact you to schedule. If you do not receive a call within 3 business days, please call [phone].  Urgent Care locations:  [facility]       Monday-Friday   10 am - 8 pm    Saturday and Sunday   9 am - 5 pm   [phone]   If you need a medication refill, please contact your pharmacy. Please allow 3 business days for your refill to be completed.  As always, Thank you for trusting us with your healthcare needs!

## 2024-04-17 ENCOUNTER — OFFICE VISIT (OUTPATIENT)
Dept: OBGYN | Facility: CLINIC | Age: 69
End: 2024-04-17
Payer: COMMERCIAL

## 2024-04-17 VITALS
SYSTOLIC BLOOD PRESSURE: 137 MMHG | BODY MASS INDEX: 28.46 KG/M2 | HEART RATE: 65 BPM | OXYGEN SATURATION: 98 % | DIASTOLIC BLOOD PRESSURE: 82 MMHG | WEIGHT: 171 LBS

## 2024-04-17 DIAGNOSIS — N95.2 VAGINAL ATROPHY: Primary | ICD-10-CM

## 2024-04-17 DIAGNOSIS — N94.10 DYSPAREUNIA IN FEMALE: ICD-10-CM

## 2024-04-17 DIAGNOSIS — Z12.4 SCREENING FOR CERVICAL CANCER: ICD-10-CM

## 2024-04-17 DIAGNOSIS — N39.3 FEMALE STRESS INCONTINENCE: ICD-10-CM

## 2024-04-17 PROCEDURE — G0145 SCR C/V CYTO,THINLAYER,RESCR: HCPCS

## 2024-04-17 PROCEDURE — 2894A HPV HIGH RISK TYPES DNA CERVICAL: CPT | Mod: GZ

## 2024-04-17 PROCEDURE — 99204 OFFICE O/P NEW MOD 45 MIN: CPT

## 2024-04-17 RX ORDER — ESTRADIOL 10 UG/1
INSERT VAGINAL
Qty: 34 TABLET | Refills: 2 | Status: SHIPPED | OUTPATIENT
Start: 2024-04-18 | End: 2024-08-13
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
## 2024-04-19 LAB
BKR LAB AP GYN ADEQUACY: NORMAL
BKR LAB AP GYN INTERPRETATION: NORMAL
BKR LAB AP HPV REFLEX: NORMAL
BKR LAB AP PREVIOUS ABNORMAL: NORMAL
PATH REPORT.COMMENTS IMP SPEC: NORMAL
PATH REPORT.COMMENTS IMP SPEC: NORMAL
PATH REPORT.RELEVANT HX SPEC: NORMAL

## 2024-04-22 LAB
HUMAN PAPILLOMA VIRUS 16 DNA: NEGATIVE
HUMAN PAPILLOMA VIRUS 18 DNA: NEGATIVE
HUMAN PAPILLOMA VIRUS FINAL DIAGNOSIS: NORMAL
HUMAN PAPILLOMA VIRUS OTHER HR: NEGATIVE

## 2024-05-14 ENCOUNTER — ANCILLARY PROCEDURE (OUTPATIENT)
Dept: BONE DENSITY | Facility: CLINIC | Age: 69
End: 2024-05-14
Attending: INTERNAL MEDICINE
Payer: COMMERCIAL

## 2024-05-14 DIAGNOSIS — M81.8 AGE-RELATED OSTEOPOROSIS WITHOUT FRACTURE: ICD-10-CM

## 2024-05-14 PROCEDURE — 77080 DXA BONE DENSITY AXIAL: CPT | Performed by: RADIOLOGY

## 2024-05-16 ENCOUNTER — MYC MEDICAL ADVICE (OUTPATIENT)
Dept: FAMILY MEDICINE | Facility: CLINIC | Age: 69
End: 2024-05-16
Payer: COMMERCIAL

## 2024-05-22 ENCOUNTER — THERAPY VISIT (OUTPATIENT)
Dept: PHYSICAL THERAPY | Facility: CLINIC | Age: 69
End: 2024-05-22
Payer: COMMERCIAL

## 2024-05-22 DIAGNOSIS — N95.2 VAGINAL ATROPHY: ICD-10-CM

## 2024-05-22 DIAGNOSIS — N94.10 DYSPAREUNIA IN FEMALE: ICD-10-CM

## 2024-05-22 DIAGNOSIS — N39.3 FEMALE STRESS INCONTINENCE: ICD-10-CM

## 2024-05-22 PROCEDURE — 97112 NEUROMUSCULAR REEDUCATION: CPT | Mod: GP | Performed by: PHYSICAL THERAPIST

## 2024-05-22 PROCEDURE — 97140 MANUAL THERAPY 1/> REGIONS: CPT | Mod: GP | Performed by: PHYSICAL THERAPIST

## 2024-05-22 PROCEDURE — 97162 PT EVAL MOD COMPLEX 30 MIN: CPT | Mod: GP | Performed by: PHYSICAL THERAPIST

## 2024-05-22 PROCEDURE — 97530 THERAPEUTIC ACTIVITIES: CPT | Mod: GP | Performed by: PHYSICAL THERAPIST
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
## 2024-06-05 ENCOUNTER — THERAPY VISIT (OUTPATIENT)
Dept: PHYSICAL THERAPY | Facility: CLINIC | Age: 69
End: 2024-06-05
Payer: COMMERCIAL

## 2024-06-05 DIAGNOSIS — N39.3 FEMALE STRESS INCONTINENCE: ICD-10-CM

## 2024-06-05 DIAGNOSIS — N95.2 VAGINAL ATROPHY: Primary | ICD-10-CM

## 2024-06-05 DIAGNOSIS — N94.10 DYSPAREUNIA IN FEMALE: ICD-10-CM

## 2024-06-05 PROCEDURE — 97530 THERAPEUTIC ACTIVITIES: CPT | Mod: GP | Performed by: PHYSICAL THERAPIST

## 2024-06-05 PROCEDURE — 97140 MANUAL THERAPY 1/> REGIONS: CPT | Mod: GP | Performed by: PHYSICAL THERAPIST

## 2024-06-05 PROCEDURE — 97112 NEUROMUSCULAR REEDUCATION: CPT | Mod: GP | Performed by: PHYSICAL THERAPIST

## 2024-06-12 ENCOUNTER — THERAPY VISIT (OUTPATIENT)
Dept: PHYSICAL THERAPY | Facility: CLINIC | Age: 69
End: 2024-06-12
Payer: COMMERCIAL

## 2024-06-12 DIAGNOSIS — N94.10 DYSPAREUNIA IN FEMALE: ICD-10-CM

## 2024-06-12 DIAGNOSIS — R20.2 PARESTHESIA OF BOTH FEET: ICD-10-CM

## 2024-06-12 DIAGNOSIS — N95.2 VAGINAL ATROPHY: Primary | ICD-10-CM

## 2024-06-12 DIAGNOSIS — N39.3 FEMALE STRESS INCONTINENCE: ICD-10-CM

## 2024-06-12 PROCEDURE — 97140 MANUAL THERAPY 1/> REGIONS: CPT | Mod: GP | Performed by: PHYSICAL THERAPIST

## 2024-06-12 PROCEDURE — 97110 THERAPEUTIC EXERCISES: CPT | Mod: GP | Performed by: PHYSICAL THERAPIST

## 2024-06-12 PROCEDURE — 97112 NEUROMUSCULAR REEDUCATION: CPT | Mod: GP | Performed by: PHYSICAL THERAPIST

## 2024-06-13 RX ORDER — GABAPENTIN 100 MG/1
100 CAPSULE ORAL 3 TIMES DAILY
Qty: 270 CAPSULE | Refills: 1 | Status: SHIPPED | OUTPATIENT
Start: 2024-06-13 | End: 2024-06-18

## 2024-06-17 DIAGNOSIS — R20.2 PARESTHESIA OF BOTH FEET: ICD-10-CM

## 2024-06-18 RX ORDER — GABAPENTIN 100 MG/1
100 CAPSULE ORAL 3 TIMES DAILY
Qty: 270 CAPSULE | Refills: 1 | Status: SHIPPED | OUTPATIENT
Start: 2024-06-18

## 2024-06-26 ENCOUNTER — THERAPY VISIT (OUTPATIENT)
Dept: PHYSICAL THERAPY | Facility: CLINIC | Age: 69
End: 2024-06-26
Payer: COMMERCIAL

## 2024-06-26 DIAGNOSIS — N39.3 FEMALE STRESS INCONTINENCE: ICD-10-CM

## 2024-06-26 DIAGNOSIS — N94.10 DYSPAREUNIA IN FEMALE: ICD-10-CM

## 2024-06-26 DIAGNOSIS — N95.2 VAGINAL ATROPHY: Primary | ICD-10-CM

## 2024-06-26 PROCEDURE — 97140 MANUAL THERAPY 1/> REGIONS: CPT | Mod: GP | Performed by: PHYSICAL THERAPIST

## 2024-06-26 PROCEDURE — 97112 NEUROMUSCULAR REEDUCATION: CPT | Mod: GP | Performed by: PHYSICAL THERAPIST

## 2024-06-26 PROCEDURE — 97110 THERAPEUTIC EXERCISES: CPT | Mod: GP | Performed by: PHYSICAL THERAPIST

## 2024-07-17 ENCOUNTER — TRANSFERRED RECORDS (OUTPATIENT)
Dept: HEALTH INFORMATION MANAGEMENT | Facility: CLINIC | Age: 69
End: 2024-07-17
Payer: COMMERCIAL

## 2024-07-25 ENCOUNTER — THERAPY VISIT (OUTPATIENT)
Dept: PHYSICAL THERAPY | Facility: CLINIC | Age: 69
End: 2024-07-25
Payer: COMMERCIAL

## 2024-07-25 DIAGNOSIS — N39.3 FEMALE STRESS INCONTINENCE: ICD-10-CM

## 2024-07-25 DIAGNOSIS — N95.2 VAGINAL ATROPHY: Primary | ICD-10-CM

## 2024-07-25 DIAGNOSIS — N94.10 DYSPAREUNIA IN FEMALE: ICD-10-CM

## 2024-07-25 PROCEDURE — 97112 NEUROMUSCULAR REEDUCATION: CPT | Mod: GP | Performed by: PHYSICAL THERAPIST

## 2024-07-25 PROCEDURE — 97140 MANUAL THERAPY 1/> REGIONS: CPT | Mod: GP | Performed by: PHYSICAL THERAPIST

## 2024-07-25 PROCEDURE — 97110 THERAPEUTIC EXERCISES: CPT | Mod: GP | Performed by: PHYSICAL THERAPIST
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
## 2024-08-05 ENCOUNTER — THERAPY VISIT (OUTPATIENT)
Dept: PHYSICAL THERAPY | Facility: CLINIC | Age: 69
End: 2024-08-05
Payer: COMMERCIAL

## 2024-08-05 DIAGNOSIS — N94.10 DYSPAREUNIA IN FEMALE: ICD-10-CM

## 2024-08-05 DIAGNOSIS — N39.3 FEMALE STRESS INCONTINENCE: ICD-10-CM

## 2024-08-05 DIAGNOSIS — N95.2 VAGINAL ATROPHY: Primary | ICD-10-CM

## 2024-08-05 PROCEDURE — 97140 MANUAL THERAPY 1/> REGIONS: CPT | Mod: GP | Performed by: PHYSICAL THERAPIST

## 2024-08-05 PROCEDURE — 97112 NEUROMUSCULAR REEDUCATION: CPT | Mod: GP | Performed by: PHYSICAL THERAPIST

## 2024-08-05 PROCEDURE — 97530 THERAPEUTIC ACTIVITIES: CPT | Mod: GP | Performed by: PHYSICAL THERAPIST

## 2024-08-12 ENCOUNTER — THERAPY VISIT (OUTPATIENT)
Dept: PHYSICAL THERAPY | Facility: CLINIC | Age: 69
End: 2024-08-12
Payer: COMMERCIAL

## 2024-08-12 DIAGNOSIS — N95.2 VAGINAL ATROPHY: Primary | ICD-10-CM

## 2024-08-12 DIAGNOSIS — N39.3 FEMALE STRESS INCONTINENCE: ICD-10-CM

## 2024-08-12 DIAGNOSIS — N94.10 DYSPAREUNIA IN FEMALE: ICD-10-CM

## 2024-08-12 PROCEDURE — 97140 MANUAL THERAPY 1/> REGIONS: CPT | Mod: GP | Performed by: PHYSICAL THERAPIST

## 2024-08-12 PROCEDURE — 97112 NEUROMUSCULAR REEDUCATION: CPT | Mod: GP | Performed by: PHYSICAL THERAPIST

## 2024-08-12 ASSESSMENT — PATIENT HEALTH QUESTIONNAIRE - PHQ9
SUM OF ALL RESPONSES TO PHQ QUESTIONS 1-9: 1
10. IF YOU CHECKED OFF ANY PROBLEMS, HOW DIFFICULT HAVE THESE PROBLEMS MADE IT FOR YOU TO DO YOUR WORK, TAKE CARE OF THINGS AT HOME, OR GET ALONG WITH OTHER PEOPLE: NOT DIFFICULT AT ALL
SUM OF ALL RESPONSES TO PHQ QUESTIONS 1-9: 1

## 2024-08-13 ENCOUNTER — OFFICE VISIT (OUTPATIENT)
Dept: FAMILY MEDICINE | Facility: OTHER | Age: 69
End: 2024-08-13
Payer: COMMERCIAL

## 2024-08-13 VITALS
HEART RATE: 63 BPM | WEIGHT: 172 LBS | BODY MASS INDEX: 27.64 KG/M2 | RESPIRATION RATE: 16 BRPM | DIASTOLIC BLOOD PRESSURE: 82 MMHG | OXYGEN SATURATION: 98 % | HEIGHT: 66 IN | SYSTOLIC BLOOD PRESSURE: 124 MMHG | TEMPERATURE: 98.2 F

## 2024-08-13 DIAGNOSIS — K21.9 GASTROESOPHAGEAL REFLUX DISEASE WITHOUT ESOPHAGITIS: ICD-10-CM

## 2024-08-13 DIAGNOSIS — H04.123 DRY EYES: ICD-10-CM

## 2024-08-13 DIAGNOSIS — R20.2 PARESTHESIA OF BOTH FEET: Primary | ICD-10-CM

## 2024-08-13 DIAGNOSIS — F33.41 MAJOR DEPRESSIVE DISORDER, RECURRENT EPISODE, IN PARTIAL REMISSION (H): ICD-10-CM

## 2024-08-13 DIAGNOSIS — I10 HTN, GOAL BELOW 140/90: ICD-10-CM

## 2024-08-13 DIAGNOSIS — Z12.11 SPECIAL SCREENING FOR MALIGNANT NEOPLASMS, COLON: ICD-10-CM

## 2024-08-13 PROCEDURE — 99214 OFFICE O/P EST MOD 30 MIN: CPT | Performed by: PHYSICIAN ASSISTANT

## 2024-08-13 RX ORDER — GABAPENTIN 300 MG/1
300 CAPSULE ORAL 3 TIMES DAILY
Qty: 270 CAPSULE | Refills: 0 | Status: CANCELLED | OUTPATIENT
Start: 2024-08-13

## 2024-08-13 RX ORDER — BISOPROLOL FUMARATE AND HYDROCHLOROTHIAZIDE 10; 6.25 MG/1; MG/1
1 TABLET ORAL EVERY MORNING
COMMUNITY
Start: 2024-06-12

## 2024-08-13 RX ORDER — HYDRALAZINE HYDROCHLORIDE 25 MG/1
1 TABLET, FILM COATED ORAL
COMMUNITY
Start: 2024-03-28 | End: 2024-08-13

## 2024-08-13 NOTE — PROGRESS NOTES
"  Assessment & Plan     1. Paresthesia of both feet    2. Gastroesophageal reflux disease without esophagitis    3. HTN, goal below 140/90    4. Dry eyes    5. Major depressive disorder, recurrent episode, in partial remission (H24)    6. Special screening for malignant neoplasms, colon      Patient is a pleasant 69 year old female who presents today with goal to review medication list to see if there is any medication which may not be needed. We reviewed each medication, purpose/MOA and possible adverse effects. Patient has noticed that since beginning her bisoprolol/hydrochlorothiazide her eyes have been quite dry in the AM. Discussed possible etiology for this including AC/dry air in home, allergies, the hydrochlorothiazide or bupropion or other cause (sjorgen, etc). Patient plans to discuss with PCP trial off of the hydrochlorothiazide while continuing a bisoprolol to see if the dry eyes improve and BP remains well controlled. She is less suspicious of bupropion as she has been on this medication for some time.     Reviewed the health maintenance and discussed vaccinations, colon cancer screening as well as upcoming labs. She has scheduled annual preventative with PCP later this month and would like to wait until that visit to complete these. Patient did express preference to wait on colon cancer screening until senior care in March 2025. We discussed alternative such as FIT test or cologuard. She will see what PCP says at upcoming visit.         BMI  Estimated body mass index is 27.76 kg/m  as calculated from the following:    Height as of this encounter: 1.676 m (5' 6\").    Weight as of this encounter: 78 kg (172 lb).   Weight management plan: Discussed healthy diet and exercise guidelines      Jb Obrien is a 69 year old, presenting for the following health issues:  Recheck Medication      8/13/2024     7:02 AM   Additional Questions   Roomed by Dayan ARREGUIN   Accompanied by self     History of Present " "Illness       Hypertension: She presents for follow up of hypertension.  She does check blood pressure  regularly outside of the clinic. Outside blood pressures have been over 140/90. She does not follow a low salt diet.     She eats 0-1 servings of fruits and vegetables daily.She consumes 1 sweetened beverage(s) daily.She exercises with enough effort to increase her heart rate 10 to 19 minutes per day.  She exercises with enough effort to increase her heart rate 3 or less days per week.   She is taking medications regularly.       Review of Systems  Constitutional, HEENT, cardiovascular, pulmonary, gi and gu systems are negative, except as otherwise noted.      Objective    /82   Pulse 63   Temp 98.2  F (36.8  C) (Temporal)   Resp 16   Ht 1.676 m (5' 6\")   Wt 78 kg (172 lb)   LMP 06/27/2010 (Exact Date)   SpO2 98%   BMI 27.76 kg/m    Body mass index is 27.76 kg/m .  Physical Exam   GENERAL: alert and no distress  RESP: lungs clear to auscultation - no rales, rhonchi or wheezes  CV: regular rate and rhythm, normal S1 S2, no S3 or S4, no murmur, click or rub, no peripheral edema  MS: no gross musculoskeletal defects noted, no edema  PSYCH: mentation appears normal, affect normal/bright            Signed Electronically by: Cali Cam PA-C    "

## 2024-08-28 ENCOUNTER — OFFICE VISIT (OUTPATIENT)
Dept: ORTHOPEDICS | Facility: CLINIC | Age: 69
End: 2024-08-28
Payer: COMMERCIAL

## 2024-08-28 ENCOUNTER — ANCILLARY PROCEDURE (OUTPATIENT)
Dept: CT IMAGING | Facility: CLINIC | Age: 69
End: 2024-08-28
Attending: INTERNAL MEDICINE
Payer: COMMERCIAL

## 2024-08-28 DIAGNOSIS — R91.8 PULMONARY NODULES: ICD-10-CM

## 2024-08-28 DIAGNOSIS — M17.0 BILATERAL PRIMARY OSTEOARTHRITIS OF KNEE: ICD-10-CM

## 2024-08-28 DIAGNOSIS — M51.369 DDD (DEGENERATIVE DISC DISEASE), LUMBAR: Primary | ICD-10-CM

## 2024-08-28 DIAGNOSIS — M17.0 ARTHRITIS OF BOTH KNEES: ICD-10-CM

## 2024-08-28 PROCEDURE — 99214 OFFICE O/P EST MOD 30 MIN: CPT | Performed by: PREVENTIVE MEDICINE

## 2024-08-28 PROCEDURE — 71250 CT THORAX DX C-: CPT | Mod: GC | Performed by: RADIOLOGY

## 2024-08-28 RX ORDER — GABAPENTIN 100 MG/1
100 CAPSULE ORAL 3 TIMES DAILY
Qty: 270 CAPSULE | Refills: 1 | Status: SHIPPED | OUTPATIENT
Start: 2024-08-28 | End: 2024-09-20

## 2024-08-28 NOTE — LETTER
8/28/2024      Marlee Tobar  [address] [facility] [address]      Dear Colleague,    Thank you for referring your patient, aMrlee Tobar, to the [facility] [place]. Please see a copy of my visit note below.    HISTORY OF PRESENT ILLNESS  Ms. Tobar is a pleasant 69 year old year old female who presents to clinic today with the following:  What problem are you here for: follow up for bilateral knee and medication refills of gabapentin.     She is prescribed 100 mg tablets of gabapentin by Dr. Merrill (primary care provider)    How long have you had this problem: Chronic     Have you had any recent imaging of this problem? Xrays/MRI/CT scans:  - XR of right knee completed on 6/28/2023  - XR of left knee completed on 4/4/2023  - MRI of left knee completed on 4/5/2023  - MRI of lumbar spine completed on 2/10/2023    Have you had treatments for this problem in the past?  -Medications: Celebrex prn  -Physical therapy: [facility]   -Injections:   3rd Dose Euflexxa Right knee 12/27/2023: she reports these injections have continued to provide over 80% decreased pain in her right knee.   3rd Dose Euflexxa Left knee 6/28/2023: she reports these injections have continued to provide over 80% decreased pain in her left knee.   - the patient reports the use of HA injections into each knee has allowed her to decrease the need for OTC pain medication, she is able to be more active  and complete her every day living activities with more ease.       MEDICAL HISTORY  Patient Active Problem List   Diagnosis     iamJOINT PAIN-LOWER LEG     iamPOSTSURGICAL STATES NEC     CARDIOVASCULAR SCREENING; LDL GOAL LESS THAN 130     HTN, goal below 140/90     Gallstones     Cholelithiases     GERD (gastroesophageal reflux disease)     Varicose vein of leg     Dyspareunia     Microalbuminuria     Major depressive disorder, recurrent episode, in partial remission (H24)     Situational anxiety     Overweight      H/O gastric bypass     H/O gastrointestinal hemorrhage     Personal history of PE (pulmonary embolism)     Insomnia, unspecified type     Pulmonary nodules     Polyarthralgia     Positive TIFFANI (antinuclear antibody)     Overactive bladder     Bilateral lumbar radiculopathy     Anterolisthesis of lumbar spine     Lumbar facet arthropathy     Neural foraminal stenosis of lumbar spine     Idiopathic peripheral neuropathy     Chronic left-sided low back pain without sciatica     Multiple vitamin deficiency disease     Trigger point of left side of body     Agatston CAC score, <100     Dilatation of thoracic aorta (H24)     NEETA (generalized anxiety disorder)     Vaginal atrophy     Dyspareunia in female     Female stress incontinence       Current Outpatient Medications   Medication Sig Dispense Refill     ALPRAZolam (XANAX) 0.25 MG tablet Take 1 tablet (0.25 mg) by mouth 3 times daily as needed for anxiety T (Patient not taking: Reported on 8/13/2024) 30 tablet 1     amLODIPine (NORVASC) 5 MG tablet Take 1 tablet (5 mg) by mouth daily 90 tablet 3     Ascorbic Acid (VITAMIN C PO)        ASPIRIN NOT PRESCRIBED (INTENTIONAL) Please choose reason not prescribed from choices below.       atorvastatin (LIPITOR) 10 MG tablet TAKE ONE TABLET BY MOUTH ONE TIME DAILY 90 tablet 1     Biotin 5000 MCG CAPS Take 1 tablet by mouth daily.       bisoprolol-hydrochlorothiazide (ZIAC) 10-6.25 MG tablet Take 1 tablet by mouth every morning       buPROPion (WELLBUTRIN XL) 150 MG 24 hr tablet TAKE 1 TABLET BY MOUTH EVERY MORNING 90 tablet 3     CALCIUM CITRATE PO Take 3 capsules by mouth 3 times daily       celecoxib (CELEBREX) 100 MG capsule TAKE ONE CAPSULE BY MOUTH TWICE DAILY (Patient not taking: Reported on 8/13/2024) 60 capsule 0     Cholecalciferol (VITAMIN D PO)        diazepam (VALIUM) 5 MG tablet Take 1 tablet (5 mg) by mouth once for 1 dose (Patient not taking: Reported on 8/13/2024) 1 tablet 0     gabapentin (NEURONTIN) 100 MG  capsule TAKE 1 CAPSULE BY MOUTH THREE TIMES DAILY 270 capsule 1     gabapentin (NEURONTIN) 300 MG capsule TAKE ONE CAPSULE BY MOUTH THREE TIMES DAILY 270 capsule 0     Glucosamine-Chondroit-Vit C-Mn (GLUCOSAMINE CHONDR 1500 COMPLX PO) Take 1 tablet by mouth daily       hydrocortisone (CORTAID) 0.5 % external cream Apply topically 2 times daily Do not use more than two weeks at a time.       Iron-vit C-vit B12-folic acid 100-250-0.025-1 MG TABS Take  by mouth.       Multiple Vitamins-Minerals (MULTIVITAL) TABS Take 2 tablets by mouth daily.       pantoprazole (PROTONIX) 40 MG EC tablet TAKE ONE TABLET BY MOUTH TWICE DAILY 180 tablet 1     telmisartan (MICARDIS) 40 MG tablet Take 1 tablet (40 mg) by mouth every morning 90 tablet 3       Allergies   Allergen Reactions     Doxycycline Rash     Contrast Dye      Happened when patient was ~ 18 years ago during a test for 'kidneys'.  Patient thinks she might have developed a rash, couldn't remember.     Diatrizoate Itching     Iodine      Vitamin K Swelling     facial       Family History   Problem Relation Age of Onset     Hypertension Mother      Arthritis Mother      Cancer Mother         Hodgkins disease     Obesity Mother      Cerebrovascular Disease Father      Alcohol/Drug Father         recovered alcoholic     Alcohol/Drug Sister      Gynecology Sister         history of abnormal paps--LEEPs done     Lipids Sister      Thyroid Disease Sister      Cardiovascular Sister         mitral valve prolapse     Alcohol/Drug Son         alcoholic     Depression Son      Hypertension Son      Psychotic Disorder Son      Hypertension Brother      Cardiovascular Brother      Heart Disease Brother 58        MI     Obesity Brother      Genitourinary Problems Daughter      Gynecology Daughter         Protein S deficiency     Unknown/Adopted Maternal Grandfather      Unknown/Adopted Paternal Grandmother      Unknown/Adopted Paternal Grandfather      Glaucoma No family hx of       Macular Degeneration No family hx of      Social History     Socioeconomic History     Marital status:    Occupational History     Occupation: Full time   Tobacco Use     Smoking status: Never     Passive exposure: Never     Smokeless tobacco: Never   Vaping Use     Vaping status: Never Used   Substance and Sexual Activity     Alcohol use: Yes     Comment: Infrequently     Drug use: No     Sexual activity: Yes     Partners: Male     Birth control/protection: None     Comment: postmenopausal   Other Topics Concern     Parent/sibling w/ CABG, MI or angioplasty before 65F 55M? Yes     Comment: Brother 55     Social Determinants of Health     Financial Resource Strain: Low Risk  (8/27/2024)    Financial Resource Strain      Within the past 12 months, have you or your family members you live with been unable to get utilities (heat, electricity) when it was really needed?: No   Food Insecurity: Low Risk  (8/27/2024)    Food Insecurity      Within the past 12 months, did you worry that your food would run out before you got money to buy more?: No      Within the past 12 months, did the food you bought just not last and you didn t have money to get more?: No   Transportation Needs: Low Risk  (8/27/2024)    Transportation Needs      Within the past 12 months, has lack of transportation kept you from medical appointments, getting your medicines, non-medical meetings or appointments, work, or from getting things that you need?: No   Physical Activity: Insufficiently Active (8/27/2024)    Exercise Vital Sign      Days of Exercise per Week: 3 days      Minutes of Exercise per Session: 30 min   Stress: Stress Concern Present (8/27/2024)    [employer] - Occupational Stress Questionnaire      Feeling of Stress : Rather much   Social Connections: Unknown (8/27/2024)    Social Connection and Isolation Panel [NHANES]      Frequency of Social Gatherings with Friends and Family: Three times a week    Interpersonal Safety: Low Risk  (8/13/2024)    Interpersonal Safety      Do you feel physically and emotionally safe where you currently live?: Yes      Within the past 12 months, have you been hit, slapped, kicked or otherwise physically hurt by someone?: No      Within the past 12 months, have you been humiliated or emotionally abused in other ways by your partner or ex-partner?: No   Housing Stability: Low Risk  (8/27/2024)    Housing Stability      Do you have housing? : Yes      Are you worried about losing your housing?: No       Additional medical/Social/Surgical histories reviewed in [facility] and updated as appropriate.     REVIEW OF SYSTEMS (8/28/2024)  10 point ROS of systems including Constitutional, Eyes, Respiratory, Cardiovascular, Gastroenterology, Genitourinary, Integumentary, Musculoskeletal, Psychiatric, Allergic/Immunologic were all negative except for pertinent positives noted in my HPI.     PHYSICAL EXAM  VSS  General  - normal appearance, in no obvious distress  HEENT  - conjunctivae not injected, moist mucous membranes, normocephalic/atraumatic head, ears normal appearance, no lesions, mouth normal appearance, no scars, normal dentition and teeth present  CV  - normal popliteal pulse  Pulm  - normal respiratory pattern, non-labored  Musculoskeletal - bilateral knee  - stance: normal gait without limp, no obvious leg length discrepancy, single-leg squat exhibits knee valgus, internal rotation of the hip, contralateral hip drop  - inspection: no swelling or effusion, normal muscle tone, normal bone and joint alignment, no obvious deformity  - palpation: no joint line tenderness, patellar tendon non-tender, tender medial patellar facet  - ROM: 135 degrees flexion, -5 degrees extension, not painful, crepitus with weight-bearing flexion  - strength: 5/5 in flexion, 5/5 in extension  - neuro: no sensory or motor deficit  - special tests:  (-) Lachman  (-) anterior drawer  (-) David  (-)  Thessaly  (-) varus at 0 and 30 degrees flexion  (-) valgus at 0 and 30 degrees flexion  (+) Stanislaw s compression test  (+) patellar grind  (-) patellar apprehension  Neuro  - no sensory or motor deficit, grossly normal coordination, normal muscle tone  Skin  - no ecchymosis, erythema, warmth, or induration, no obvious rash  Psych  - interactive, appropriate, normal mood and affect     ASSESSMENT & PLAN  70 yo female with bilateral knee arthritis, stable, not resolved, and lumbar ddd, radicular pain, stable    I independently reviewed the following imaging studies:  Bilateral knee xrays: shows arthritis  Discussed planning euflexxa injections again this fall  Refilled gabapentin  Cont. Celebrex PRN  Followup for euflexxa  Cont. [facility]  Patient has been doing home exercise physical therapy program for this problem      Appropriate PPE was utilized for prevention of spread of Covid-19.  Gray Rome MD, CAQSM      Again, thank you for allowing me to participate in the care of your patient.        Sincerely,        Gray Rome MD

## 2024-08-28 NOTE — PROGRESS NOTES
HISTORY OF PRESENT ILLNESS  Ms. Tobar is a pleasant 69 year old year old female who presents to clinic today with the following:  What problem are you here for: follow up for bilateral knee and medication refills of gabapentin.     She is prescribed 100 mg tablets of gabapentin by Dr. Merrill (primary care provider)    How long have you had this problem: Chronic     Have you had any recent imaging of this problem? Xrays/MRI/CT scans:  - XR of right knee completed on 6/28/2023  - XR of left knee completed on 4/4/2023  - MRI of left knee completed on 4/5/2023  - MRI of lumbar spine completed on 2/10/2023    Have you had treatments for this problem in the past?  -Medications: Celebrex prn  -Physical therapy: [facility]   -Injections:   3rd Dose Euflexxa Right knee 12/27/2023: she reports these injections have continued to provide over 80% decreased pain in her right knee.   3rd Dose Euflexxa Left knee 6/28/2023: she reports these injections have continued to provide over 80% decreased pain in her left knee.   - the patient reports the use of HA injections into each knee has allowed her to decrease the need for OTC pain medication, she is able to be more active  and complete her every day living activities with more ease.       MEDICAL HISTORY  Patient Active Problem List   Diagnosis    iamJOINT PAIN-LOWER LEG    iamPOSTSURGICAL STATES NEC    CARDIOVASCULAR SCREENING; LDL GOAL LESS THAN 130    HTN, goal below 140/90    Gallstones    Cholelithiases    GERD (gastroesophageal reflux disease)    Varicose vein of leg    Dyspareunia    Microalbuminuria    Major depressive disorder, recurrent episode, in partial remission (H24)    Situational anxiety    Overweight    H/O gastric bypass    H/O gastrointestinal hemorrhage    Personal history of PE (pulmonary embolism)    Insomnia, unspecified type    Pulmonary nodules    Polyarthralgia    Positive TIFFANI (antinuclear antibody)    Overactive bladder    Bilateral lumbar radiculopathy     Anterolisthesis of lumbar spine    Lumbar facet arthropathy    Neural foraminal stenosis of lumbar spine    Idiopathic peripheral neuropathy    Chronic left-sided low back pain without sciatica    Multiple vitamin deficiency disease    Trigger point of left side of body    Agatston CAC score, <100    Dilatation of thoracic aorta (H24)    NEETA (generalized anxiety disorder)    Vaginal atrophy    Dyspareunia in female    Female stress incontinence       Current Outpatient Medications   Medication Sig Dispense Refill    ALPRAZolam (XANAX) 0.25 MG tablet Take 1 tablet (0.25 mg) by mouth 3 times daily as needed for anxiety T (Patient not taking: Reported on 8/13/2024) 30 tablet 1    amLODIPine (NORVASC) 5 MG tablet Take 1 tablet (5 mg) by mouth daily 90 tablet 3    Ascorbic Acid (VITAMIN C PO)       ASPIRIN NOT PRESCRIBED (INTENTIONAL) Please choose reason not prescribed from choices below.      atorvastatin (LIPITOR) 10 MG tablet TAKE ONE TABLET BY MOUTH ONE TIME DAILY 90 tablet 1    Biotin 5000 MCG CAPS Take 1 tablet by mouth daily.      bisoprolol-hydrochlorothiazide (ZIAC) 10-6.25 MG tablet Take 1 tablet by mouth every morning      buPROPion (WELLBUTRIN XL) 150 MG 24 hr tablet TAKE 1 TABLET BY MOUTH EVERY MORNING 90 tablet 3    CALCIUM CITRATE PO Take 3 capsules by mouth 3 times daily      celecoxib (CELEBREX) 100 MG capsule TAKE ONE CAPSULE BY MOUTH TWICE DAILY (Patient not taking: Reported on 8/13/2024) 60 capsule 0    Cholecalciferol (VITAMIN D PO)       diazepam (VALIUM) 5 MG tablet Take 1 tablet (5 mg) by mouth once for 1 dose (Patient not taking: Reported on 8/13/2024) 1 tablet 0    gabapentin (NEURONTIN) 100 MG capsule TAKE 1 CAPSULE BY MOUTH THREE TIMES DAILY 270 capsule 1    gabapentin (NEURONTIN) 300 MG capsule TAKE ONE CAPSULE BY MOUTH THREE TIMES DAILY 270 capsule 0    Glucosamine-Chondroit-Vit C-Mn (GLUCOSAMINE CHONDR 1500 COMPLX PO) Take 1 tablet by mouth daily      hydrocortisone (CORTAID) 0.5 %  external cream Apply topically 2 times daily Do not use more than two weeks at a time.      Iron-vit C-vit B12-folic acid 100-250-0.025-1 MG TABS Take  by mouth.      Multiple Vitamins-Minerals (MULTIVITAL) TABS Take 2 tablets by mouth daily.      pantoprazole (PROTONIX) 40 MG EC tablet TAKE ONE TABLET BY MOUTH TWICE DAILY 180 tablet 1    telmisartan (MICARDIS) 40 MG tablet Take 1 tablet (40 mg) by mouth every morning 90 tablet 3       Allergies   Allergen Reactions    Doxycycline Rash    Contrast Dye      Happened when patient was ~ 18 years ago during a test for 'kidneys'.  Patient thinks she might have developed a rash, couldn't remember.    Diatrizoate Itching    Iodine     Vitamin K Swelling     facial       Family History   Problem Relation Age of Onset    Hypertension Mother     Arthritis Mother     Cancer Mother         Hodgkins disease    Obesity Mother     Cerebrovascular Disease Father     Alcohol/Drug Father         recovered alcoholic    Alcohol/Drug Sister     Gynecology Sister         history of abnormal paps--LEEPs done    Lipids Sister     Thyroid Disease Sister     Cardiovascular Sister         mitral valve prolapse    Alcohol/Drug Son         alcoholic    Depression Son     Hypertension Son     Psychotic Disorder Son     Hypertension Brother     Cardiovascular Brother     Heart Disease Brother 58        MI    Obesity Brother     Genitourinary Problems Daughter     Gynecology Daughter         Protein S deficiency    Unknown/Adopted Maternal Grandfather     Unknown/Adopted Paternal Grandmother     Unknown/Adopted Paternal Grandfather     Glaucoma No family hx of     Macular Degeneration No family hx of      Social History     Socioeconomic History    Marital status:    Occupational History    Occupation: Full time   Tobacco Use    Smoking status: Never     Passive exposure: Never    Smokeless tobacco: Never   Vaping Use    Vaping status: Never Used   Substance and Sexual Activity    Alcohol  use: Yes     Comment: Infrequently    Drug use: No    Sexual activity: Yes     Partners: Male     Birth control/protection: None     Comment: postmenopausal   Other Topics Concern    Parent/sibling w/ CABG, MI or angioplasty before 65F 55M? Yes     Comment: Brother 55     Social Determinants of Health     Financial Resource Strain: Low Risk  (8/27/2024)    Financial Resource Strain     Within the past 12 months, have you or your family members you live with been unable to get utilities (heat, electricity) when it was really needed?: No   Food Insecurity: Low Risk  (8/27/2024)    Food Insecurity     Within the past 12 months, did you worry that your food would run out before you got money to buy more?: No     Within the past 12 months, did the food you bought just not last and you didn t have money to get more?: No   Transportation Needs: Low Risk  (8/27/2024)    Transportation Needs     Within the past 12 months, has lack of transportation kept you from medical appointments, getting your medicines, non-medical meetings or appointments, work, or from getting things that you need?: No   Physical Activity: Insufficiently Active (8/27/2024)    Exercise Vital Sign     Days of Exercise per Week: 3 days     Minutes of Exercise per Session: 30 min   Stress: Stress Concern Present (8/27/2024)    [employer] - Occupational Stress Questionnaire     Feeling of Stress : Rather much   Social Connections: Unknown (8/27/2024)    Social Connection and Isolation Panel [NHANES]     Frequency of Social Gatherings with Friends and Family: Three times a week   Interpersonal Safety: Low Risk  (8/13/2024)    Interpersonal Safety     Do you feel physically and emotionally safe where you currently live?: Yes     Within the past 12 months, have you been hit, slapped, kicked or otherwise physically hurt by someone?: No     Within the past 12 months, have you been humiliated or emotionally abused in other ways by  your partner or ex-partner?: No   Housing Stability: Low Risk  (8/27/2024)    Housing Stability     Do you have housing? : Yes     Are you worried about losing your housing?: No       Additional medical/Social/Surgical histories reviewed in [facility] and updated as appropriate.     REVIEW OF SYSTEMS (8/28/2024)  10 point ROS of systems including Constitutional, Eyes, Respiratory, Cardiovascular, Gastroenterology, Genitourinary, Integumentary, Musculoskeletal, Psychiatric, Allergic/Immunologic were all negative except for pertinent positives noted in my HPI.     PHYSICAL EXAM  VSS  General  - normal appearance, in no obvious distress  HEENT  - conjunctivae not injected, moist mucous membranes, normocephalic/atraumatic head, ears normal appearance, no lesions, mouth normal appearance, no scars, normal dentition and teeth present  CV  - normal popliteal pulse  Pulm  - normal respiratory pattern, non-labored  Musculoskeletal - bilateral knee  - stance: normal gait without limp, no obvious leg length discrepancy, single-leg squat exhibits knee valgus, internal rotation of the hip, contralateral hip drop  - inspection: no swelling or effusion, normal muscle tone, normal bone and joint alignment, no obvious deformity  - palpation: no joint line tenderness, patellar tendon non-tender, tender medial patellar facet  - ROM: 135 degrees flexion, -5 degrees extension, not painful, crepitus with weight-bearing flexion  - strength: 5/5 in flexion, 5/5 in extension  - neuro: no sensory or motor deficit  - special tests:  (-) Lachman  (-) anterior drawer  (-) David  (-) Thessaly  (-) varus at 0 and 30 degrees flexion  (-) valgus at 0 and 30 degrees flexion  (+) Stanislaw s compression test  (+) patellar grind  (-) patellar apprehension  Neuro  - no sensory or motor deficit, grossly normal coordination, normal muscle tone  Skin  - no ecchymosis, erythema, warmth, or induration, no obvious rash  Psych  - interactive, appropriate, normal  mood and affect     ASSESSMENT & PLAN  70 yo female with bilateral knee arthritis, stable, not resolved, and lumbar ddd, radicular pain, stable    I independently reviewed the following imaging studies:  Bilateral knee xrays: shows arthritis  Discussed planning euflexxa injections again this fall  Refilled gabapentin  Cont. Celebrex PRN  Followup for euflexxa  Cont. [facility]  Patient has been doing home exercise physical therapy program for this problem      Appropriate PPE was utilized for prevention of spread of Covid-19.  Gray Rome MD, CAM

## 2024-08-29 ENCOUNTER — OFFICE VISIT (OUTPATIENT)
Dept: FAMILY MEDICINE | Facility: CLINIC | Age: 69
End: 2024-08-29
Attending: INTERNAL MEDICINE
Payer: COMMERCIAL

## 2024-08-29 VITALS
WEIGHT: 172.2 LBS | TEMPERATURE: 97.6 F | DIASTOLIC BLOOD PRESSURE: 75 MMHG | HEIGHT: 66 IN | BODY MASS INDEX: 27.68 KG/M2 | OXYGEN SATURATION: 99 % | HEART RATE: 69 BPM | SYSTOLIC BLOOD PRESSURE: 138 MMHG | RESPIRATION RATE: 16 BRPM

## 2024-08-29 DIAGNOSIS — I10 HTN, GOAL BELOW 140/90: ICD-10-CM

## 2024-08-29 DIAGNOSIS — Z12.11 SCREEN FOR COLON CANCER: ICD-10-CM

## 2024-08-29 DIAGNOSIS — Z00.00 ENCOUNTER FOR MEDICARE ANNUAL WELLNESS EXAM: Primary | ICD-10-CM

## 2024-08-29 DIAGNOSIS — G60.8 IDIOPATHIC SMALL FIBER SENSORY NEUROPATHY: ICD-10-CM

## 2024-08-29 DIAGNOSIS — F41.1 GAD (GENERALIZED ANXIETY DISORDER): ICD-10-CM

## 2024-08-29 DIAGNOSIS — Z87.440 PERSONAL HISTORY OF URINARY TRACT INFECTION: ICD-10-CM

## 2024-08-29 DIAGNOSIS — E78.5 HYPERLIPIDEMIA LDL GOAL <70: ICD-10-CM

## 2024-08-29 DIAGNOSIS — Z98.84 S/P GASTRIC BYPASS: ICD-10-CM

## 2024-08-29 LAB
ALBUMIN SERPL BCG-MCNC: 4.6 G/DL (ref 3.5–5.2)
ALP SERPL-CCNC: 92 U/L (ref 40–150)
ALT SERPL W P-5'-P-CCNC: 30 U/L (ref 0–50)
ANION GAP SERPL CALCULATED.3IONS-SCNC: 10 MMOL/L (ref 7–15)
AST SERPL W P-5'-P-CCNC: 37 U/L (ref 0–45)
BASOPHILS # BLD AUTO: 0.1 10E3/UL (ref 0–0.2)
BASOPHILS NFR BLD AUTO: 1 %
BILIRUB SERPL-MCNC: 0.6 MG/DL
BUN SERPL-MCNC: 10.5 MG/DL (ref 8–23)
CALCIUM SERPL-MCNC: 10 MG/DL (ref 8.8–10.4)
CHLORIDE SERPL-SCNC: 100 MMOL/L (ref 98–107)
CHOLEST SERPL-MCNC: 155 MG/DL
CREAT SERPL-MCNC: 0.75 MG/DL (ref 0.51–0.95)
CREAT UR-MCNC: 13.7 MG/DL
EGFRCR SERPLBLD CKD-EPI 2021: 87 ML/MIN/1.73M2
EOSINOPHIL # BLD AUTO: 0.2 10E3/UL (ref 0–0.7)
EOSINOPHIL NFR BLD AUTO: 3 %
ERYTHROCYTE [DISTWIDTH] IN BLOOD BY AUTOMATED COUNT: 13.1 % (ref 10–15)
FASTING STATUS PATIENT QL REPORTED: ABNORMAL
FASTING STATUS PATIENT QL REPORTED: NORMAL
GLUCOSE SERPL-MCNC: 95 MG/DL (ref 70–99)
HCO3 SERPL-SCNC: 30 MMOL/L (ref 22–29)
HCT VFR BLD AUTO: 40.4 % (ref 35–47)
HDLC SERPL-MCNC: 66 MG/DL
HGB BLD-MCNC: 13.3 G/DL (ref 11.7–15.7)
IMM GRANULOCYTES # BLD: 0 10E3/UL
IMM GRANULOCYTES NFR BLD: 0 %
LDLC SERPL CALC-MCNC: 76 MG/DL
LYMPHOCYTES # BLD AUTO: 1.6 10E3/UL (ref 0.8–5.3)
LYMPHOCYTES NFR BLD AUTO: 26 %
MCH RBC QN AUTO: 31.5 PG (ref 26.5–33)
MCHC RBC AUTO-ENTMCNC: 32.9 G/DL (ref 31.5–36.5)
MCV RBC AUTO: 96 FL (ref 78–100)
MICROALBUMIN UR-MCNC: <12 MG/L
MICROALBUMIN/CREAT UR: NORMAL MG/G{CREAT}
MONOCYTES # BLD AUTO: 0.6 10E3/UL (ref 0–1.3)
MONOCYTES NFR BLD AUTO: 11 %
NEUTROPHILS # BLD AUTO: 3.7 10E3/UL (ref 1.6–8.3)
NEUTROPHILS NFR BLD AUTO: 60 %
NONHDLC SERPL-MCNC: 89 MG/DL
PLATELET # BLD AUTO: 188 10E3/UL (ref 150–450)
POTASSIUM SERPL-SCNC: 4.3 MMOL/L (ref 3.4–5.3)
PROT SERPL-MCNC: 7.3 G/DL (ref 6.4–8.3)
RBC # BLD AUTO: 4.22 10E6/UL (ref 3.8–5.2)
SODIUM SERPL-SCNC: 140 MMOL/L (ref 135–145)
TRIGL SERPL-MCNC: 65 MG/DL
WBC # BLD AUTO: 6.1 10E3/UL (ref 4–11)

## 2024-08-29 PROCEDURE — 36415 COLL VENOUS BLD VENIPUNCTURE: CPT | Performed by: INTERNAL MEDICINE

## 2024-08-29 PROCEDURE — 84591 ASSAY OF NOS VITAMIN: CPT | Mod: 90 | Performed by: INTERNAL MEDICINE

## 2024-08-29 PROCEDURE — 80053 COMPREHEN METABOLIC PANEL: CPT | Performed by: INTERNAL MEDICINE

## 2024-08-29 PROCEDURE — 85025 COMPLETE CBC W/AUTO DIFF WBC: CPT | Performed by: INTERNAL MEDICINE

## 2024-08-29 PROCEDURE — G0439 PPPS, SUBSEQ VISIT: HCPCS | Performed by: INTERNAL MEDICINE

## 2024-08-29 PROCEDURE — 82043 UR ALBUMIN QUANTITATIVE: CPT | Performed by: INTERNAL MEDICINE

## 2024-08-29 PROCEDURE — 84207 ASSAY OF VITAMIN B-6: CPT | Mod: 90 | Performed by: INTERNAL MEDICINE

## 2024-08-29 PROCEDURE — 99000 SPECIMEN HANDLING OFFICE-LAB: CPT | Performed by: INTERNAL MEDICINE

## 2024-08-29 PROCEDURE — 82570 ASSAY OF URINE CREATININE: CPT | Performed by: INTERNAL MEDICINE

## 2024-08-29 PROCEDURE — 80061 LIPID PANEL: CPT | Performed by: INTERNAL MEDICINE

## 2024-08-29 RX ORDER — SULFAMETHOXAZOLE/TRIMETHOPRIM 800-160 MG
1 TABLET ORAL 2 TIMES DAILY
Qty: 6 TABLET | Refills: 1 | Status: SHIPPED | OUTPATIENT
Start: 2024-08-29 | End: 2024-09-01

## 2024-08-29 RX ORDER — ALPRAZOLAM 0.25 MG
0.25 TABLET ORAL 3 TIMES DAILY PRN
Qty: 30 TABLET | Refills: 1 | Status: SHIPPED | OUTPATIENT
Start: 2024-08-29
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
## 2024-08-29 NOTE — PATIENT INSTRUCTIONS
At [facility], we strive to deliver an exceptional experience to you, every time we see you. If you receive a survey, please let us know what we are doing well and/or what we could improve upon, as we do value your feedback.  If you have MyChart, you can expect to receive results automatically within 24 hours of their completion.  Your provider will send a note interpreting your results as well.   If you do not have MyChart, you should receive your results in about a week by mail.    Your care team:                            Family Medicine Internal Medicine   MD Bandar Abraham, MD Elsy Cruz, MD Bandar Chan, MD Heather Mohr, PAMelissaC    Cesar Albert, MD Pediatrics   Criselda Napier, MD Franchesca George, MD Berkley Ceballos, APRN CNP Mirna Lau APRN CNP   MD Helena Garcia, MD Rocio Bond, CNP     Jone Christensen, CNP Same-Day Provider (No follow-up visits)   BONILLA Serrato, DNP Gilma Roe, BONILLA Borrero, FNP, BC JUANA GamezC     Clinic hours: Monday - Thursday 7 am-6 pm; Fridays 7 am-5 pm.   Urgent care: Monday - Friday 10 am- 8 pm; Saturday and Sunday 9 am-5 pm.    Clinic: [phone]       [facility]: Monday - Thursday 8 am - 7 pm; Friday 8 am - 6 pm  [facility]: [phone]

## 2024-09-01 LAB — PYRIDOXAL PHOS SERPL-SCNC: 73.5 NMOL/L

## 2024-09-07 LAB
NIACIN SERPL-MCNC: NORMAL NG/ML
NICOTINAMIDE SERPL-MCNC: 28 NG/ML
NICOTINURATE SERPL-MCNC: NORMAL NG/ML
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
## 2024-09-10 DIAGNOSIS — I10 HYPERTENSION GOAL BP (BLOOD PRESSURE) < 130/80: ICD-10-CM

## 2024-09-10 RX ORDER — AMLODIPINE BESYLATE 5 MG/1
5 TABLET ORAL DAILY
Qty: 90 TABLET | Refills: 3 | Status: SHIPPED | OUTPATIENT
Start: 2024-09-10

## 2024-09-10 NOTE — TELEPHONE ENCOUNTER
Last Written Prescription Date:   1/16/24  Last Fill Quantity: 90,  # refills: 3   Last office visit: 1/16/2024   Future Office Visit: per PAUL to be seen back in 1 year     Erin Young on 9/10/2024 at 8:32 AM

## 2024-09-17 ENCOUNTER — MYC MEDICAL ADVICE (OUTPATIENT)
Dept: ORTHOPEDICS | Facility: CLINIC | Age: 69
End: 2024-09-17
Payer: COMMERCIAL

## 2024-09-17 DIAGNOSIS — M51.369 DDD (DEGENERATIVE DISC DISEASE), LUMBAR: ICD-10-CM

## 2024-09-20 ENCOUNTER — MYC MEDICAL ADVICE (OUTPATIENT)
Dept: FAMILY MEDICINE | Facility: CLINIC | Age: 69
End: 2024-09-20
Payer: COMMERCIAL

## 2024-09-20 DIAGNOSIS — K21.9 GASTROESOPHAGEAL REFLUX DISEASE WITHOUT ESOPHAGITIS: ICD-10-CM

## 2024-09-20 DIAGNOSIS — E78.5 HYPERLIPIDEMIA LDL GOAL <70: ICD-10-CM

## 2024-09-20 RX ORDER — ATORVASTATIN CALCIUM 10 MG/1
10 TABLET, FILM COATED ORAL DAILY
Qty: 90 TABLET | Refills: 1 | Status: SHIPPED | OUTPATIENT
Start: 2024-09-20

## 2024-09-20 RX ORDER — PANTOPRAZOLE SODIUM 40 MG/1
40 TABLET, DELAYED RELEASE ORAL 2 TIMES DAILY
Qty: 180 TABLET | Refills: 1 | Status: SHIPPED | OUTPATIENT
Start: 2024-09-20

## 2024-09-20 NOTE — TELEPHONE ENCOUNTER
Prescription refill requested for:      gabapentin (NEURONTIN) 100 MG capsule    Last Written Prescription Date:  8/28/2024  Last Fill Quantity: 270,   # refills: 1  Last Office Visit: 8/28/2024  Future Office visit:   10/3/2024    Vianney Juárez, ATC

## 2024-09-22 RX ORDER — GABAPENTIN 100 MG/1
100 CAPSULE ORAL 3 TIMES DAILY
Qty: 270 CAPSULE | Refills: 1 | Status: SHIPPED | OUTPATIENT
Start: 2024-09-22

## 2024-10-03 ENCOUNTER — TELEPHONE (OUTPATIENT)
Dept: ORTHOPEDICS | Facility: CLINIC | Age: 69
End: 2024-10-03

## 2024-10-03 NOTE — TELEPHONE ENCOUNTER
Pt called in regard to her Gabapentin script. Pt's script was sent to the INCORRECT mail service.     Pt's CORRECT mail service is now attached to this TE, under Contacts.    Also, the pt would like the dose to be the same dose that was filled at [facility], in March 2024.    Pt is requesting 300 mg, 3 times daily.    If approved, please send script through the [facility] and also for the requested dose.    This TE is being marked as High Priority, due to this misunderstanding since 8.28.24    Please CALL pt to discuss and follow up.

## 2024-10-04 DIAGNOSIS — R20.2 PARESTHESIA OF BOTH FEET: ICD-10-CM

## 2024-10-07 RX ORDER — GABAPENTIN 300 MG/1
300 CAPSULE ORAL 3 TIMES DAILY
Qty: 270 CAPSULE | Refills: 0 | Status: SHIPPED | OUTPATIENT
Start: 2024-10-07 | End: 2024-10-17

## 2024-10-17 ENCOUNTER — TELEPHONE (OUTPATIENT)
Dept: FAMILY MEDICINE | Facility: CLINIC | Age: 69
End: 2024-10-17

## 2024-10-17 ENCOUNTER — OFFICE VISIT (OUTPATIENT)
Dept: FAMILY MEDICINE | Facility: OTHER | Age: 69
End: 2024-10-17
Payer: COMMERCIAL

## 2024-10-17 VITALS
BODY MASS INDEX: 27.64 KG/M2 | OXYGEN SATURATION: 98 % | SYSTOLIC BLOOD PRESSURE: 130 MMHG | HEART RATE: 60 BPM | RESPIRATION RATE: 16 BRPM | HEIGHT: 66 IN | WEIGHT: 172 LBS | TEMPERATURE: 98.2 F | DIASTOLIC BLOOD PRESSURE: 82 MMHG

## 2024-10-17 DIAGNOSIS — Z29.11 NEED FOR VACCINATION AGAINST RESPIRATORY SYNCYTIAL VIRUS: ICD-10-CM

## 2024-10-17 DIAGNOSIS — M47.816 SPONDYLOSIS OF LUMBAR REGION WITHOUT MYELOPATHY OR RADICULOPATHY: Primary | ICD-10-CM

## 2024-10-17 DIAGNOSIS — K21.9 GASTROESOPHAGEAL REFLUX DISEASE WITHOUT ESOPHAGITIS: Primary | ICD-10-CM

## 2024-10-17 DIAGNOSIS — G44.209 TENSION HEADACHE: ICD-10-CM

## 2024-10-17 DIAGNOSIS — M51.369 DDD (DEGENERATIVE DISC DISEASE), LUMBAR: ICD-10-CM

## 2024-10-17 DIAGNOSIS — R20.2 PARESTHESIA OF BOTH FEET: ICD-10-CM

## 2024-10-17 PROCEDURE — 91320 SARSCV2 VAC 30MCG TRS-SUC IM: CPT | Performed by: PHYSICIAN ASSISTANT

## 2024-10-17 PROCEDURE — G0008 ADMIN INFLUENZA VIRUS VAC: HCPCS | Performed by: PHYSICIAN ASSISTANT

## 2024-10-17 PROCEDURE — 99214 OFFICE O/P EST MOD 30 MIN: CPT | Mod: 25 | Performed by: PHYSICIAN ASSISTANT

## 2024-10-17 PROCEDURE — 90480 ADMN SARSCOV2 VAC 1/ONLY CMP: CPT | Performed by: PHYSICIAN ASSISTANT

## 2024-10-17 PROCEDURE — 90662 IIV NO PRSV INCREASED AG IM: CPT | Performed by: PHYSICIAN ASSISTANT

## 2024-10-17 RX ORDER — GABAPENTIN 300 MG/1
300 CAPSULE ORAL 3 TIMES DAILY
Qty: 270 CAPSULE | Refills: 0 | Status: SHIPPED | OUTPATIENT
Start: 2024-10-17 | End: 2024-10-17

## 2024-10-17 RX ORDER — GABAPENTIN 100 MG/1
100 CAPSULE ORAL 3 TIMES DAILY
Qty: 270 CAPSULE | Refills: 1 | Status: SHIPPED | OUTPATIENT
Start: 2024-10-17

## 2024-10-17 RX ORDER — FAMOTIDINE 20 MG/1
20 TABLET, FILM COATED ORAL 2 TIMES DAILY PRN
Qty: 60 TABLET | Refills: 0 | Status: SHIPPED | OUTPATIENT
Start: 2024-10-17

## 2024-10-17 NOTE — TELEPHONE ENCOUNTER
[facility] pharmacy is calling and stated NAHEED Diaz sent prescription for patient, ABRYSVO.    Pharmacy staff stated they do not have ABRYSVO in stock, but they do have AREXY, which is used for the same diagnosis.  Pharmacy staff is asking provider if he can substitute the ABRYSVO with the AREXY.      Please send new prescription if approved.    Will forward to NAHEED Diaz for review.    Desirae Abbott RN

## 2024-10-17 NOTE — TELEPHONE ENCOUNTER
gabapentin  gabapentin (NEURONTIN) 300 MG capsule      Gabapentin Oral Capsule 100MG?    Patient requests an RX for Gabapentin 100MG caps

## 2024-10-17 NOTE — PROGRESS NOTES
Assessment & Plan     Gastroesophageal reflux disease without esophagitis  Tension Headache  Patient informs me that she had a sinus infection about 1 month ago and was seen at a local urgent care. With the sinus concerns she was treated for skin infection with mupirocin. Following resolution of these concerns she noticed a sour taste in her mouth. She has since seen the dentist who did not feel that the sour taste was due to dental health. She does drink diet coke and has been eating more onions and tomatoes from her garden. No abnormal findings on exam today. Discussed with patient common causes for sour taste including GERD, dietary choices, medications, post covid symptoms, fungal infection or other unknown cause. Given the history of carbonated beverages and acidic foods I am more suspicious of breakthrough GERD. I will have the patient trial pepcid with her protonix for next 2-4 weeks and update me if symptoms do not improve as expected.    Patient's sister was found to have stage 4 cancer affecting tongue, heart and kidneys. Sister had to have partial glossectomy with allograft from thigh and is scheduled to begin both chemo and radiation therapy. Patient is quite close to sister and is feeling quite a bit of stress and concern. She recognizes that this affects her threshold when things feel off in her body as well as contributes the headaches she has been experiencing. We discussed caution with stress, sleep irregularities, dietary choices and OTC medications as these can affect headaches. Patient will monitor for worsening intensity, frequency or additional symptoms and let me know.   - famotidine (PEPCID) 20 MG tablet; Take 1 tablet (20 mg) by mouth 2 times daily as needed (sour taste in mouth).    Paresthesia of both feet  Previous provider had sent out 100mg vs 300mg capsules for patient and she went through them quicker due to keeping up with prescribed dose. As such she requires a refill, will send  out the 300mg capsules for her to continue. PDMP reviewed.   - gabapentin (NEURONTIN) 300 MG capsule; Take 1 capsule (300 mg) by mouth 3 times daily.    Need for vaccination against respiratory syncytial virus  Sent to the pharmacy for the patient, she will coordinate with the pharmacist.   - RSV vaccine, bivalent, ABRYSVO, injection; Inject 0.5 mLs into the muscle once for 1 dose. Pharmacist administered    Subjective   Camille is a 69 year old, presenting for the following health issues:  Constant sour taste in mouth      10/17/2024     6:50 AM   Additional Questions   Roomed by Dayan ARREGUIN   Accompanied by self     History of Present Illness       Reason for visit:  Constant sour taste in mouth, appetite, mild headaches and slight dizziness.    She eats 0-1 servings of fruits and vegetables daily.She consumes 1 sweetened beverage(s) daily.She exercises with enough effort to increase her heart rate 10 to 19 minutes per day.  She exercises with enough effort to increase her heart rate 3 or less days per week.   She is taking medications regularly.     Concern - constant sour taste in mouth  Onset: 1 month/3 weeks  Description: gets worse after she eats or if she gets dehydrated so she is trying to drink more water. She used to have acid reflux and used to have the burning feeling but not anymore, she just has the taste part still  Intensity: moderate  Progression of Symptoms:  same and constant  Accompanying Signs & Symptoms: Her nose had like an infection or something and it was hurting, she went to urgent care and they gave her this ointment (Mupirocin) then this sour taste happened. Everything with her sinus symptoms/nose symptoms now has subsided.  Previous history of similar problem: used to have ulcers - but hasn't been recent and she would have this taste with those but maybe more acidy  Precipitating factors:        Worsened by: being dehydrated, after any food  Alleviating factors:        Improved by: having hard  "candies and drinking more water  Therapies tried and outcome: having hard candies or drinking more water    The mild headaches she mentioned she isn't sure if that is related to this or not but she feels her eyes have changed and she did see the eye doctor roughly a year ago. Sister was just diagnosed with stage 4 cancer so could be stress, they are really close.    Headaches  Duration: more recent/couple weeks  Description  Location: base of head/top of neck - she does also see chiropractor   Character: dull pain  Frequency:  3 times per week  Duration:  takes something they go away right away otherwise last a couple hours  Intensity:  mild  Accompanying signs and symptoms:  Precipitating or Alleviating factors:  Nausea/vomiting: no  Dizziness: no  Weakness or numbness: no  Visual changes: none  Fever: no   Sinus or URI symptoms no   History  Head trauma: no   Family history of migraines: YES- used to have migraines herself in her 30's and she hasn't had them in decades  Previous tests for headaches: no   Neurologist evaluations: YES  Able to do daily activities when headache present: YES  Wake with headaches: no   Daily pain medication use: no - but takes celebrex for knees prn if they are bothering her  Any changes in: family - sister just diagnosed with stage 4 cancer  Precipitating or Alleviating factors (light/sound/sleep/caffeine): none  Therapies tried and outcome: Tylenol or her Celebrex   Outcome - effective  Frequent/daily pain medication use: YES - just the Celebrex for her knees        Review of Systems  Constitutional, HEENT, cardiovascular, pulmonary, gi and gu systems are negative, except as otherwise noted.      Objective    /82   Pulse 60   Temp 98.2  F (36.8  C) (Temporal)   Resp 16   Ht 1.676 m (5' 6\")   Wt 78 kg (172 lb)   LMP 06/27/2010 (Exact Date)   SpO2 98%   BMI 27.76 kg/m    Body mass index is 27.76 kg/m .  Physical Exam   GENERAL: alert and no distress  EYES: Eyes grossly " normal to inspection, PERRL and conjunctivae and sclerae normal  HENT: ear canals and TM's normal, nose and mouth without ulcers or lesions  NECK: no adenopathy, no asymmetry, masses, or scars  RESP: lungs clear to auscultation - no rales, rhonchi or wheezes  CV: regular rate and rhythm, normal S1 S2, no S3 or S4, no murmur, click or rub, no peripheral edema  MS: no gross musculoskeletal defects noted, no edema  PSYCH: mentation appears normal, affect normal/bright    Signed Electronically by: Cali Cam PA-C

## 2024-10-22 ENCOUNTER — MYC MEDICAL ADVICE (OUTPATIENT)
Dept: FAMILY MEDICINE | Facility: CLINIC | Age: 69
End: 2024-10-22
Payer: COMMERCIAL

## 2024-10-30 ENCOUNTER — OFFICE VISIT (OUTPATIENT)
Dept: ORTHOPEDICS | Facility: CLINIC | Age: 69
End: 2024-10-30
Payer: COMMERCIAL

## 2024-10-30 DIAGNOSIS — M17.0 ARTHRITIS OF BOTH KNEES: ICD-10-CM

## 2024-10-30 DIAGNOSIS — M51.362 DEGENERATION OF INTERVERTEBRAL DISC OF LUMBAR REGION WITH DISCOGENIC BACK PAIN AND LOWER EXTREMITY PAIN: Primary | ICD-10-CM

## 2024-10-30 PROCEDURE — 20610 DRAIN/INJ JOINT/BURSA W/O US: CPT | Mod: 50 | Performed by: PREVENTIVE MEDICINE

## 2024-10-30 PROCEDURE — 99207 PR DROP WITH A PROCEDURE: CPT | Performed by: PREVENTIVE MEDICINE

## 2024-10-30 RX ORDER — HYALURONATE SODIUM 10 MG/ML
20 SYRINGE (ML) INTRAARTICULAR
Status: COMPLETED | OUTPATIENT
Start: 2024-10-30 | End: 2024-10-30

## 2024-10-30 RX ORDER — LIDOCAINE HYDROCHLORIDE 10 MG/ML
3 INJECTION, SOLUTION INFILTRATION; PERINEURAL
Status: COMPLETED | OUTPATIENT
Start: 2024-10-30 | End: 2024-10-30

## 2024-10-30 RX ORDER — GABAPENTIN 300 MG/1
300 CAPSULE ORAL 3 TIMES DAILY
Qty: 270 CAPSULE | Refills: 1 | Status: SHIPPED | OUTPATIENT
Start: 2024-10-30

## 2024-10-30 RX ADMIN — LIDOCAINE HYDROCHLORIDE 3 ML: 10 INJECTION, SOLUTION INFILTRATION; PERINEURAL at 16:26

## 2024-10-30 RX ADMIN — Medication 20 MG: at 16:26

## 2024-10-30 NOTE — NURSING NOTE
[address] [address]  Dept: [phone]  ______________________________________________________________________________    Patient: Marlee Tobar   : 1955   MRN: [id number]   2024    INVASIVE PROCEDURE SAFETY CHECKLIST    Date: 2024   Procedure: Bilateral knee joint injections with Euflexxa #1  Patient Name: Marlee Tobar  MRN: [id number]  YOB: 1955    Action: Complete sections as appropriate. Any discrepancy results in a HARD COPY until resolved.     PRE PROCEDURE:  Patient ID verified with 2 identifiers (name and  or MRN): Yes  Procedure and site verified with patient/designee (when able): Yes  Accurate consent documentation in medical record: Yes  H&P (or appropriate assessment) documented in medical record: Yes  H&P must be up to 20 days prior to procedure and updates within 24 hours of procedure as applicable: Yes  Relevant diagnostic and radiology test results appropriately labeled and displayed as applicable: NA  Procedure site(s) marked with provider initials: NA    TIMEOUT:  Time-Out performed immediately prior to starting procedure, including verbal and active participation of all team members addressing the following:Yes  * Correct patient identify  * Confirmed that the correct side and site are marked  * An accurate procedure consent form  * Agreement on the procedure to be done  * Correct patient position  * Relevant images and results are properly labeled and appropriately displayed  * The need to administer antibiotics or fluids for irrigation purposes during the procedure as applicable   * Safety precautions based on patient history or medication use    DURING PROCEDURE: Verification of correct person, site, and procedures any time the responsibility for care of the patient is transferred to another member of the care team.       Prior to injection, verified patient identity using  patient's name and date of birth.  Due to injection administration, patient instructed to remain in clinic for 15 minutes  afterwards, and to report any adverse reaction to me immediately.    Joint injection was performed.      Euflexxa x2  Drug Amount Wasted:  None.  Vial/Syringe: Syringe  Expiration Date:  11/23/2025    Vianney Juárez, ATC  October 30, 2024

## 2024-10-30 NOTE — LETTER
10/30/2024      Marlee Tobar  [address] [facility] [address]      Dear Colleague,    Thank you for referring your patient, Marlee Tobar, to the [facility] [place]. Please see a copy of my visit note below.    Camille returns for bilateral knee injections with euflexxa #1 as planned  Diagnosis: bilateral knee arthritis  PROCEDURE:           bilateral  Knee joint injection with euflexxa #1    The patient was apprised of the risks and the benefits of the procedure and consented and a written consent was signed.   The patient s  KNEEs were sterilely prepped with chloraprep.     The patient was injected with euflexxa syringe into the right and left knee with a 1.5-inch 22-gauge needle at the_superiorlateral aspect of their knee joint    There were no complications. The patient tolerated the procedure well. There was minimal bleeding.   The patient was instructed to ice the knees upon leaving clinic and refrain from overuse over the next 3 days.   The patient was instructed to go to the emergency room with any usual pain, swelling, or redness occurred in the injected area.   The patient was given a followup appointment to evaluate response to the injection to their increased range of motion and reduction of pain.  Follow up for euflexxa  Dr Gray Rome         Large Joint Injection: bilateral knee    Date/Time: 10/30/2024 4:26 PM    Performed by: Gray Rome MD  Authorized by: Gray Rome MD    Indications:  Pain and osteoarthritis  Needle Size:  22 G  Guidance: landmark guided    Approach:  Superolateral  Location:  Knee  Laterality:  Bilateral      Medications (Right):  20 mg sodium hyaluronate 20 MG/2ML; 3 mL lidocaine 1 %  Medications (Left):  20 mg sodium hyaluronate 20 MG/2ML; 3 mL lidocaine 1 %  Outcome:  Tolerated well, no immediate complications  Procedure discussed: discussed risks, benefits, and alternatives    Consent Given by:   Patient  Timeout: timeout called immediately prior to procedure    Prep: patient was prepped and draped in usual sterile fashion          Again, thank you for allowing me to participate in the care of your patient.        Sincerely,        Gray Rome MD

## 2024-10-30 NOTE — PROGRESS NOTES
Camille returns for bilateral knee injections with euflexxa #1 as planned  Diagnosis: bilateral knee arthritis  PROCEDURE:           bilateral  Knee joint injection with euflexxa #1    The patient was apprised of the risks and the benefits of the procedure and consented and a written consent was signed.   The patient s  KNEEs were sterilely prepped with chloraprep.     The patient was injected with euflexxa syringe into the right and left knee with a 1.5-inch 22-gauge needle at the_superiorlateral aspect of their knee joint    There were no complications. The patient tolerated the procedure well. There was minimal bleeding.   The patient was instructed to ice the knees upon leaving clinic and refrain from overuse over the next 3 days.   The patient was instructed to go to the emergency room with any usual pain, swelling, or redness occurred in the injected area.   The patient was given a followup appointment to evaluate response to the injection to their increased range of motion and reduction of pain.  Follow up for euflexxa  Dr Gray Rome         Large Joint Injection: bilateral knee    Date/Time: 10/30/2024 4:26 PM    Performed by: Gray Rome MD  Authorized by: Gray Rome MD    Indications:  Pain and osteoarthritis  Needle Size:  22 G  Guidance: landmark guided    Approach:  Superolateral  Location:  Knee  Laterality:  Bilateral      Medications (Right):  20 mg sodium hyaluronate 20 MG/2ML; 3 mL lidocaine 1 %  Medications (Left):  20 mg sodium hyaluronate 20 MG/2ML; 3 mL lidocaine 1 %  Outcome:  Tolerated well, no immediate complications  Procedure discussed: discussed risks, benefits, and alternatives    Consent Given by:  Patient  Timeout: timeout called immediately prior to procedure    Prep: patient was prepped and draped in usual sterile fashion

## 2024-11-06 ENCOUNTER — OFFICE VISIT (OUTPATIENT)
Dept: ORTHOPEDICS | Facility: CLINIC | Age: 69
End: 2024-11-06
Payer: COMMERCIAL

## 2024-11-06 VITALS — HEIGHT: 66 IN | WEIGHT: 172 LBS | BODY MASS INDEX: 27.64 KG/M2

## 2024-11-06 DIAGNOSIS — M17.0 ARTHRITIS OF BOTH KNEES: Primary | ICD-10-CM

## 2024-11-06 PROCEDURE — 99207 PR DROP WITH A PROCEDURE: CPT | Performed by: PREVENTIVE MEDICINE

## 2024-11-06 PROCEDURE — 20610 DRAIN/INJ JOINT/BURSA W/O US: CPT | Mod: 50 | Performed by: PREVENTIVE MEDICINE

## 2024-11-06 RX ORDER — HYALURONATE SODIUM 10 MG/ML
20 SYRINGE (ML) INTRAARTICULAR
Status: COMPLETED | OUTPATIENT
Start: 2024-11-06 | End: 2024-11-06

## 2024-11-06 RX ORDER — LIDOCAINE HYDROCHLORIDE 10 MG/ML
3 INJECTION, SOLUTION INFILTRATION; PERINEURAL
Status: COMPLETED | OUTPATIENT
Start: 2024-11-06 | End: 2024-11-06

## 2024-11-06 RX ADMIN — LIDOCAINE HYDROCHLORIDE 3 ML: 10 INJECTION, SOLUTION INFILTRATION; PERINEURAL at 16:29

## 2024-11-06 RX ADMIN — Medication 20 MG: at 16:29

## 2024-11-06 NOTE — NURSING NOTE
[address] [address]  Dept: [phone]  ______________________________________________________________________________    Patient: Marlee Tobar   : 1955   MRN: [id number]   2024    INVASIVE PROCEDURE SAFETY CHECKLIST    Date: 2024   Procedure: bilateral knee joint injections with Euflexxa #2  Patient Name: Marlee Tobar  MRN: [id number]  YOB: 1955    Action: Complete sections as appropriate. Any discrepancy results in a HARD COPY until resolved.     PRE PROCEDURE:  Patient ID verified with 2 identifiers (name and  or MRN): Yes  Procedure and site verified with patient/designee (when able): Yes  Accurate consent documentation in medical record: Yes  H&P (or appropriate assessment) documented in medical record: Yes  H&P must be up to 20 days prior to procedure and updates within 24 hours of procedure as applicable: Yes  Relevant diagnostic and radiology test results appropriately labeled and displayed as applicable: NA  Procedure site(s) marked with provider initials: NA    TIMEOUT:  Time-Out performed immediately prior to starting procedure, including verbal and active participation of all team members addressing the following:Yes  * Correct patient identify  * Confirmed that the correct side and site are marked  * An accurate procedure consent form  * Agreement on the procedure to be done  * Correct patient position  * Relevant images and results are properly labeled and appropriately displayed  * The need to administer antibiotics or fluids for irrigation purposes during the procedure as applicable   * Safety precautions based on patient history or medication use    DURING PROCEDURE: Verification of correct person, site, and procedures any time the responsibility for care of the patient is transferred to another member of the care team.       Prior to injection, verified patient identity using  patient's name and date of birth.  Due to injection administration, patient instructed to remain in clinic for 15 minutes  afterwards, and to report any adverse reaction to me immediately.    Joint injection was performed.      Euflexxa x2  Drug Amount Wasted:  None.  Vial/Syringe: Syringe  Expiration Date:  11/23/2025    Vianney Juárez, ATC  November 6, 2024

## 2024-11-06 NOTE — PROGRESS NOTES
Femi returns for bilateral knee injections with euflexxa #2  Doing well  Diagnosis: bilateral knee arthritis  PROCEDURE:           bilateral  Knee joint injection with euflexxa #2    The patient was apprised of the risks and the benefits of the procedure and consented and a written consent was signed.   The patient s  KNEEs were sterilely prepped with chloraprep.     The patient was injected with euflexxa syringe into the right and left knee with a 1.5-inch 22-gauge needle at the_superiorlateral aspect of their knee joint    There were no complications. The patient tolerated the procedure well. There was minimal bleeding.   The patient was instructed to ice the knees upon leaving clinic and refrain from overuse over the next 3 days.   The patient was instructed to go to the emergency room with any usual pain, swelling, or redness occurred in the injected area.   The patient was given a followup appointment to evaluate response to the injection to their increased range of motion and reduction of pain.  Follow up for euflexxa  Dr Gray Rome         Large Joint Injection: bilateral knee    Date/Time: 11/6/2024 4:29 PM    Performed by: Gray Rome MD  Authorized by: Gray Rome MD    Indications:  Pain and osteoarthritis  Needle Size:  22 G  Guidance: landmark guided    Approach:  Superolateral  Location:  Knee  Laterality:  Bilateral      Medications (Right):  20 mg sodium hyaluronate 20 MG/2ML; 3 mL lidocaine 1 %  Medications (Left):  20 mg sodium hyaluronate 20 MG/2ML; 3 mL lidocaine 1 %  Outcome:  Tolerated well, no immediate complications  Procedure discussed: discussed risks, benefits, and alternatives    Consent Given by:  Patient  Timeout: timeout called immediately prior to procedure    Prep: patient was prepped and draped in usual sterile fashion

## 2024-11-06 NOTE — LETTER
11/6/2024      Marlee Tobar  [address] [facility] [address]      Dear Colleague,    Thank you for referring your patient, Marlee Tobar, to the [facility] [place]. Please see a copy of my visit note below.    Femi returns for bilateral knee injections with euflexxa #2  Doing well  Diagnosis: bilateral knee arthritis  PROCEDURE:           bilateral  Knee joint injection with euflexxa #2    The patient was apprised of the risks and the benefits of the procedure and consented and a written consent was signed.   The patient s  KNEEs were sterilely prepped with chloraprep.     The patient was injected with euflexxa syringe into the right and left knee with a 1.5-inch 22-gauge needle at the_superiorlateral aspect of their knee joint    There were no complications. The patient tolerated the procedure well. There was minimal bleeding.   The patient was instructed to ice the knees upon leaving clinic and refrain from overuse over the next 3 days.   The patient was instructed to go to the emergency room with any usual pain, swelling, or redness occurred in the injected area.   The patient was given a followup appointment to evaluate response to the injection to their increased range of motion and reduction of pain.  Follow up for euflexxa  Dr Gray Rome         Large Joint Injection: bilateral knee    Date/Time: 11/6/2024 4:29 PM    Performed by: Gray Rome MD  Authorized by: Gray Rome MD    Indications:  Pain and osteoarthritis  Needle Size:  22 G  Guidance: landmark guided    Approach:  Superolateral  Location:  Knee  Laterality:  Bilateral      Medications (Right):  20 mg sodium hyaluronate 20 MG/2ML; 3 mL lidocaine 1 %  Medications (Left):  20 mg sodium hyaluronate 20 MG/2ML; 3 mL lidocaine 1 %  Outcome:  Tolerated well, no immediate complications  Procedure discussed: discussed risks, benefits, and alternatives    Consent Given by:   Patient  Timeout: timeout called immediately prior to procedure    Prep: patient was prepped and draped in usual sterile fashion          Again, thank you for allowing me to participate in the care of your patient.        Sincerely,        Gray Rome MD

## 2024-11-08 ENCOUNTER — TRANSFERRED RECORDS (OUTPATIENT)
Dept: HEALTH INFORMATION MANAGEMENT | Facility: CLINIC | Age: 69
End: 2024-11-08
Payer: COMMERCIAL

## 2024-11-12 SDOH — HEALTH STABILITY: PHYSICAL HEALTH: ON AVERAGE, HOW MANY MINUTES DO YOU ENGAGE IN EXERCISE AT THIS LEVEL?: 20 MIN

## 2024-11-12 SDOH — HEALTH STABILITY: PHYSICAL HEALTH: ON AVERAGE, HOW MANY DAYS PER WEEK DO YOU ENGAGE IN MODERATE TO STRENUOUS EXERCISE (LIKE A BRISK WALK)?: 3 DAYS

## 2024-11-12 ASSESSMENT — SOCIAL DETERMINANTS OF HEALTH (SDOH): HOW OFTEN DO YOU GET TOGETHER WITH FRIENDS OR RELATIVES?: TWICE A WEEK

## 2024-11-13 ENCOUNTER — OFFICE VISIT (OUTPATIENT)
Dept: ORTHOPEDICS | Facility: CLINIC | Age: 69
End: 2024-11-13
Payer: COMMERCIAL

## 2024-11-13 DIAGNOSIS — M17.0 ARTHRITIS OF BOTH KNEES: Primary | ICD-10-CM

## 2024-11-13 PROCEDURE — 99207 PR DROP WITH A PROCEDURE: CPT | Performed by: PREVENTIVE MEDICINE

## 2024-11-13 PROCEDURE — 20610 DRAIN/INJ JOINT/BURSA W/O US: CPT | Mod: 50 | Performed by: PREVENTIVE MEDICINE

## 2024-11-13 RX ORDER — LIDOCAINE HYDROCHLORIDE 10 MG/ML
3 INJECTION, SOLUTION INFILTRATION; PERINEURAL
Status: COMPLETED | OUTPATIENT
Start: 2024-11-13 | End: 2024-11-13

## 2024-11-13 RX ORDER — HYALURONATE SODIUM 10 MG/ML
20 SYRINGE (ML) INTRAARTICULAR
Status: COMPLETED | OUTPATIENT
Start: 2024-11-13 | End: 2024-11-13

## 2024-11-13 RX ADMIN — LIDOCAINE HYDROCHLORIDE 3 ML: 10 INJECTION, SOLUTION INFILTRATION; PERINEURAL at 16:00

## 2024-11-13 RX ADMIN — Medication 20 MG: at 16:00

## 2024-11-13 NOTE — LETTER
11/13/2024      Marlee Tobar  [address] [facility] [address]      Dear Colleague,    Thank you for referring your patient, Marlee Tobar, to the [facility] [place]. Please see a copy of my visit note below.    Camille returns for bilateral knee injections with euflexxa #3  Doing well  Diagnosis: bilateral knee arthritis  PROCEDURE:           bilateral  Knee joint injection with euflexxa #3    The patient was apprised of the risks and the benefits of the procedure and consented and a written consent was signed.   The patient s  KNEEs were sterilely prepped with chloraprep.     The patient was injected with euflexxa syringe into the right and left knee with a 1.5-inch 22-gauge needle at the_superiorlateral aspect of their knee joint    There were no complications. The patient tolerated the procedure well. There was minimal bleeding.   The patient was instructed to ice the knees upon leaving clinic and refrain from overuse over the next 3 days.   The patient was instructed to go to the emergency room with any usual pain, swelling, or redness occurred in the injected area.   The patient was given a followup appointment to evaluate response to the injection to their increased range of motion and reduction of pain.  Follow up for euflexxa  Dr Gray Rome       Large Joint Injection: bilateral knee    Date/Time: 11/13/2024 4:00 PM    Performed by: Gray Rome MD  Authorized by: Gray Rome MD    Indications:  Pain and osteoarthritis  Needle Size:  22 G  Guidance: landmark guided    Approach:  Superolateral  Location:  Knee  Laterality:  Bilateral      Medications (Right):  20 mg sodium hyaluronate 20 MG/2ML; 3 mL lidocaine 1 %  Medications (Left):  20 mg sodium hyaluronate 20 MG/2ML; 3 mL lidocaine 1 %  Outcome:  Tolerated well, no immediate complications  Procedure discussed: discussed risks, benefits, and alternatives    Consent Given by:   Patient  Timeout: timeout called immediately prior to procedure    Prep: patient was prepped and draped in usual sterile fashion          Again, thank you for allowing me to participate in the care of your patient.        Sincerely,        Gray Rome MD

## 2024-11-13 NOTE — NURSING NOTE
[address] [address]  Dept: [phone]  ______________________________________________________________________________    Patient: Marlee Tobar   : 1955   MRN: [id number]   2024    INVASIVE PROCEDURE SAFETY CHECKLIST    Date: 2024   Procedure: bilateral knee joint injections with Euflexxa #3  Patient Name: Marlee Tobar  MRN: [id number]  YOB: 1955    Action: Complete sections as appropriate. Any discrepancy results in a HARD COPY until resolved.     PRE PROCEDURE:  Patient ID verified with 2 identifiers (name and  or MRN): Yes  Procedure and site verified with patient/designee (when able): Yes  Accurate consent documentation in medical record: Yes  H&P (or appropriate assessment) documented in medical record: Yes  H&P must be up to 20 days prior to procedure and updates within 24 hours of procedure as applicable: Yes  Relevant diagnostic and radiology test results appropriately labeled and displayed as applicable: NA  Procedure site(s) marked with provider initials: NA    TIMEOUT:  Time-Out performed immediately prior to starting procedure, including verbal and active participation of all team members addressing the following:Yes  * Correct patient identify  * Confirmed that the correct side and site are marked  * An accurate procedure consent form  * Agreement on the procedure to be done  * Correct patient position  * Relevant images and results are properly labeled and appropriately displayed  * The need to administer antibiotics or fluids for irrigation purposes during the procedure as applicable   * Safety precautions based on patient history or medication use    DURING PROCEDURE: Verification of correct person, site, and procedures any time the responsibility for care of the patient is transferred to another member of the care team.       Prior to injection, verified patient identity using  patient's name and date of birth.  Due to injection administration, patient instructed to remain in clinic for 15 minutes  afterwards, and to report any adverse reaction to me immediately.    Joint injection was performed.      Euflexxa x2  Drug Amount Wasted:  None.  Vial/Syringe: Syringe  Expiration Date:  11/23/2025    Vianney Juárez, ATC  November 13, 2024

## 2024-11-13 NOTE — PROGRESS NOTES
Camille returns for bilateral knee injections with euflexxa #3  Doing well  Diagnosis: bilateral knee arthritis  PROCEDURE:           bilateral  Knee joint injection with euflexxa #3    The patient was apprised of the risks and the benefits of the procedure and consented and a written consent was signed.   The patient s  KNEEs were sterilely prepped with chloraprep.     The patient was injected with euflexxa syringe into the right and left knee with a 1.5-inch 22-gauge needle at the_superiorlateral aspect of their knee joint    There were no complications. The patient tolerated the procedure well. There was minimal bleeding.   The patient was instructed to ice the knees upon leaving clinic and refrain from overuse over the next 3 days.   The patient was instructed to go to the emergency room with any usual pain, swelling, or redness occurred in the injected area.   The patient was given a followup appointment to evaluate response to the injection to their increased range of motion and reduction of pain.  Follow up for euflexxa  Dr Gray Rome       Large Joint Injection: bilateral knee    Date/Time: 11/13/2024 4:00 PM    Performed by: Gray Rome MD  Authorized by: Gray Rome MD    Indications:  Pain and osteoarthritis  Needle Size:  22 G  Guidance: landmark guided    Approach:  Superolateral  Location:  Knee  Laterality:  Bilateral      Medications (Right):  20 mg sodium hyaluronate 20 MG/2ML; 3 mL lidocaine 1 %  Medications (Left):  20 mg sodium hyaluronate 20 MG/2ML; 3 mL lidocaine 1 %  Outcome:  Tolerated well, no immediate complications  Procedure discussed: discussed risks, benefits, and alternatives    Consent Given by:  Patient  Timeout: timeout called immediately prior to procedure    Prep: patient was prepped and draped in usual sterile fashion

## 2024-11-14 ENCOUNTER — OFFICE VISIT (OUTPATIENT)
Dept: FAMILY MEDICINE | Facility: CLINIC | Age: 69
End: 2024-11-14
Attending: INTERNAL MEDICINE
Payer: COMMERCIAL

## 2024-11-14 VITALS
OXYGEN SATURATION: 100 % | SYSTOLIC BLOOD PRESSURE: 123 MMHG | BODY MASS INDEX: 27.34 KG/M2 | DIASTOLIC BLOOD PRESSURE: 80 MMHG | WEIGHT: 169.38 LBS | HEART RATE: 64 BPM | TEMPERATURE: 97.3 F

## 2024-11-14 DIAGNOSIS — K44.9 HIATAL HERNIA WITH GERD: Primary | ICD-10-CM

## 2024-11-14 DIAGNOSIS — K21.9 HIATAL HERNIA WITH GERD: Primary | ICD-10-CM

## 2024-11-14 PROCEDURE — G2211 COMPLEX E/M VISIT ADD ON: HCPCS | Performed by: INTERNAL MEDICINE

## 2024-11-14 PROCEDURE — 99214 OFFICE O/P EST MOD 30 MIN: CPT | Performed by: INTERNAL MEDICINE

## 2024-11-14 ASSESSMENT — PAIN SCALES - GENERAL: PAINLEVEL_OUTOF10: NO PAIN (0)

## 2024-11-14 NOTE — PATIENT INSTRUCTIONS
At [facility], we strive to deliver an exceptional experience to you, every time we see you. If you receive a survey, please let us know what we are doing well and/or what we could improve upon, as we do value your feedback.  If you have MyChart, you can expect to receive results automatically within 24 hours of their completion.  Your provider will send a note interpreting your results as well.   If you do not have MyChart, you should receive your results in about a week by mail.    Your care team:                            Family Medicine Internal Medicine   MD Bandar Abraham, MD Elsy Cruz, MD Bandar Chan, MD Heather Mohr, PAMelissaC    Cesar Albert, MD Pediatrics   Criselda Napier, MD Franchesca George, MD Berkley Ceballos, APRN CNP Mirna Lau APRN CNP   MD Helena Garcia, MD Rocio Bond, CNP     Jone Christensen, CNP Same-Day Provider (No follow-up visits)   BONILLA Serrato, DNP Gilma Roe, BONILLA Borrero, FNP, BC JUANA GamezC     Clinic hours: Monday - Thursday 7 am-6 pm; Fridays 7 am-5 pm.   Urgent care: Monday - Friday 10 am- 8 pm; Saturday and Sunday 9 am-5 pm.    Clinic: [phone]       [facility]: Monday - Thursday 8 am - 7 pm; Friday 8 am - 6 pm  [facility]: [phone]

## 2024-11-14 NOTE — PROGRESS NOTES
[facility] Internal Medicine Progress Note           Assessment and Plan:     Hiatal hernia with GERD  Discontinue Pantoprazole.  - PRIMARY CARE FOLLOW-UP SCHEDULING  - XR Esophagram w Upper GI; Future  - esomeprazole (NEXIUM) 40 MG DR capsule; Take 1 capsule (40 mg) by mouth every morning (before breakfast). Take 30-60 minutes before eating.          Interval History:     Gerd/Heartburn  Duration of complaint: chronic   Description of symptoms:    food getting stuck: no   nausea/vomiting: no   abdominal pain: YES  black/tarry or bloody stools: no :  worse with no particular food or drink.  current NSAID/Aspirin use: no   Therapies tried: Pantoprazole.            Significant Problems:     Patient Active Problem List   Diagnosis    iamJOINT PAIN-LOWER LEG    iamPOSTSURGICAL STATES NEC    CARDIOVASCULAR SCREENING; LDL GOAL LESS THAN 130    HTN, goal below 140/90    Gallstones    Cholelithiases    GERD (gastroesophageal reflux disease)    Varicose vein of leg    Dyspareunia    Microalbuminuria    Major depressive disorder, recurrent episode, in partial remission (H)    Situational anxiety    Overweight    S/P gastric bypass    H/O gastrointestinal hemorrhage    Personal history of PE (pulmonary embolism)    Insomnia, unspecified type    Pulmonary nodules    Polyarthralgia    Positive TIFFANI (antinuclear antibody)    Overactive bladder    Bilateral lumbar radiculopathy    Anterolisthesis of lumbar spine    Lumbar facet arthropathy    Neural foraminal stenosis of lumbar spine    Idiopathic peripheral neuropathy    Chronic left-sided low back pain without sciatica    Multiple vitamin deficiency disease    Trigger point of left side of body    Agatston CAC score, <100    Dilatation of thoracic aorta (H)    NEETA (generalized anxiety disorder)    Vaginal atrophy    Dyspareunia in female    Female stress incontinence    Idiopathic small fiber sensory neuropathy              Review of Systems:     CONSTITUTIONAL:  NEGATIVE for fever, chills, change in weight  INTEGUMENTARY/SKIN: NEGATIVE for worrisome rashes, moles or lesions  EYES: NEGATIVE for vision changes or irritation  ENT/MOUTH: NEGATIVE for ear, mouth and throat problems  RESP: NEGATIVE for significant cough or SOB  BREAST: NEGATIVE for masses, tenderness or discharge  CV: NEGATIVE for chest pain, palpitations or peripheral edema  GI: POSITIVE for heartburn or reflux and NEGATIVE for hematemesis, hematochezia, jaundice, and melena  : NEGATIVE for frequency, dysuria, or hematuria  MUSCULOSKELETAL: NEGATIVE for significant arthralgias or myalgia  NEURO: NEGATIVE for weakness, dizziness or paresthesias  ENDOCRINE: NEGATIVE for temperature intolerance, skin/hair changes  HEME: NEGATIVE for bleeding problems  PSYCHIATRIC: NEGATIVE for changes in mood or affect             Physical Exam:   /80 (BP Location: Right arm, Patient Position: Sitting, Cuff Size: Adult Regular)   Pulse 64   Temp 97.3  F (36.3  C) (Temporal)   Wt 76.8 kg (169 lb 6 oz)   LMP 06/27/2010 (Exact Date)   SpO2 100%   BMI 27.34 kg/m     Constitutional:   fatigued, alert, cooperative, no apparent distress, and appears stated age     Eyes:   extra-ocular muscles intact and sclera clear     ENT:   normocephalic, without obvious abnormality     Cardiovascular:   regular rate and rhythm     Abdomen:   normal bowel sounds, non-distended, and non-tender     Neurologic:   Mental Status Exam:  Level of Alertness:   awake  Orientation:   person, place, time  Memory:   normal  Fund of Knowledge:  normal  Attention/Concentration:  normal  Language:  normal     Neuropsychiatric:   Affect: normal             Data:   Epic reviewed.     Disposition:  Follow-up in 4 weeks or as needed.    Bandar Merrill MD  Internal Medicine  [facility] Team

## 2024-11-19 ENCOUNTER — OFFICE VISIT (OUTPATIENT)
Dept: OPHTHALMOLOGY | Facility: CLINIC | Age: 69
End: 2024-11-19
Payer: COMMERCIAL

## 2024-11-19 DIAGNOSIS — H52.203 HYPEROPIA OF BOTH EYES WITH ASTIGMATISM AND PRESBYOPIA: Primary | ICD-10-CM

## 2024-11-19 DIAGNOSIS — H04.123 DRY EYE SYNDROME OF BOTH EYES: ICD-10-CM

## 2024-11-19 DIAGNOSIS — H52.4 HYPEROPIA OF BOTH EYES WITH ASTIGMATISM AND PRESBYOPIA: Primary | ICD-10-CM

## 2024-11-19 DIAGNOSIS — H25.813 COMBINED FORMS OF AGE-RELATED CATARACT OF BOTH EYES: ICD-10-CM

## 2024-11-19 DIAGNOSIS — H52.03 HYPEROPIA OF BOTH EYES WITH ASTIGMATISM AND PRESBYOPIA: Primary | ICD-10-CM

## 2024-11-19 PROCEDURE — 92014 COMPRE OPH EXAM EST PT 1/>: CPT | Performed by: OPHTHALMOLOGY

## 2024-11-19 ASSESSMENT — VISUAL ACUITY
OS_SC+: -3
METHOD: SNELLEN - LINEAR
OD_SC+: -2
OD_SC: 20/20
OS_SC: 20/25

## 2024-11-19 ASSESSMENT — TONOMETRY
OD_IOP_MMHG: 9
OS_IOP_MMHG: 8
IOP_METHOD: ICARE

## 2024-11-19 ASSESSMENT — CONF VISUAL FIELD
OS_SUPERIOR_NASAL_RESTRICTION: 0
METHOD: COUNTING FINGERS
OD_NORMAL: 1
OD_SUPERIOR_TEMPORAL_RESTRICTION: 0
OS_INFERIOR_TEMPORAL_RESTRICTION: 0
OS_INFERIOR_NASAL_RESTRICTION: 0
OS_NORMAL: 1
OD_INFERIOR_TEMPORAL_RESTRICTION: 0
OD_SUPERIOR_NASAL_RESTRICTION: 0
OD_INFERIOR_NASAL_RESTRICTION: 0
OS_SUPERIOR_TEMPORAL_RESTRICTION: 0

## 2024-11-19 ASSESSMENT — REFRACTION_MANIFEST
OS_ADD: +2.50
OS_SPHERE: +0.75
OD_CYLINDER: SPHERE
OS_CYLINDER: SPHERE
OD_ADD: +2.50
OD_SPHERE: +0.50

## 2024-11-19 ASSESSMENT — CUP TO DISC RATIO
OS_RATIO: 0.1
OD_RATIO: 0.05

## 2024-11-19 ASSESSMENT — SLIT LAMP EXAM - LIDS
COMMENTS: NORMAL
COMMENTS: NORMAL

## 2024-11-19 NOTE — PROGRESS NOTES
HPI       COMPREHENSIVE EYE EXAM    In both eyes.  Since onset it is gradually worsening.  Associated symptoms include floaters.  Negative for eye pain and flashes.  Treatments tried include no treatments.             Comments    Marlee Tobar is a(n) 69 year old female who presents for a comprehensive exam. Last eye exam was 1 year(s) ago. Since exam, vision has decreased. No lubricating drops. No flashes with occasional floaters. Some irritation without eye pain.     Lab Results       Component                Value               Date                       A1C                      5.3                 10/19/2022              Luis Enrique Rodas COT 8:06 AM November 19, 2024               Last edited by Luis Enrique Rodas on 11/19/2024  8:06 AM.         Review of systems for the eyes was negative other than the pertinent positives/negatives listed in the HPI.      Assessment & Plan    HPI:  Marlee Tobar is a 69 year old female with history of HLD, GERD, HTN presents for annual exam. Notes some Foreign body sensation. Spends some time in [place]      POHx: hyperopia with presbyopia  PMHx: HLD, GERD, HTN  Current Medications:   Current Outpatient Medications   Medication Sig Dispense Refill    ALPRAZolam (XANAX) 0.25 MG tablet Take 1 tablet (0.25 mg) by mouth 3 times daily as needed for anxiety. T 30 tablet 1    amLODIPine (NORVASC) 5 MG tablet Take 1 tablet (5 mg) by mouth daily. 90 tablet 3    Ascorbic Acid (VITAMIN C PO)       ASPIRIN NOT PRESCRIBED (INTENTIONAL) Please choose reason not prescribed from choices below.      atorvastatin (LIPITOR) 10 MG tablet Take 1 tablet (10 mg) by mouth daily. 90 tablet 1    Biotin 5000 MCG CAPS Take 1 tablet by mouth daily.      bisoprolol-hydrochlorothiazide (ZIAC) 10-6.25 MG tablet Take 1 tablet by mouth every morning      buPROPion (WELLBUTRIN XL) 150 MG 24 hr tablet TAKE 1 TABLET BY MOUTH EVERY MORNING 90 tablet 3    CALCIUM CITRATE PO Take 3 capsules by mouth 3 times daily      celecoxib  (CELEBREX) 100 MG capsule TAKE ONE CAPSULE BY MOUTH TWICE DAILY 60 capsule 0    Cholecalciferol (VITAMIN D PO)       diazepam (VALIUM) 5 MG tablet Take 1 tablet (5 mg) by mouth once for 1 dose 1 tablet 0    gabapentin (NEURONTIN) 100 MG capsule Take 1 capsule (100 mg) by mouth 3 times daily. 270 capsule 1    gabapentin (NEURONTIN) 300 MG capsule Take 1 capsule (300 mg) by mouth 3 times daily. 270 capsule 1    Glucosamine-Chondroit-Vit C-Mn (GLUCOSAMINE CHONDR 1500 COMPLX PO) Take 1 tablet by mouth daily      hydrocortisone (CORTAID) 0.5 % external cream Apply topically 2 times daily. Do not use more than two weeks at a time.      Iron-vit C-vit B12-folic acid 100-250-0.025-1 MG TABS Take  by mouth.      Multiple Vitamins-Minerals (MULTIVITAL) TABS Take 2 tablets by mouth daily.      pantoprazole (PROTONIX) 40 MG EC tablet Take 1 tablet (40 mg) by mouth 2 times daily. 180 tablet 1    telmisartan (MICARDIS) 40 MG tablet Take 1 tablet (40 mg) by mouth every morning 90 tablet 3     Current Facility-Administered Medications   Medication Dose Route Frequency Provider Last Rate Last Admin    sodium hyaluronate (EUFLEXXA) injection 20 mg  20 mg   Gray Rome MD   20 mg at 12/27/23 0833    sodium hyaluronate (EUFLEXXA) injection 20 mg  20 mg   Gray Rome MD   20 mg at 12/20/23 1604    sodium hyaluronate (EUFLEXXA) injection 20 mg  20 mg   Gray Rome MD   20 mg at 12/13/23 1608     FHx: denies family history of ocular conditions   PSHx: denies history of ocular surgeries       Current Eye Medications:      Assessment & Plan:  (H52.03,  H52.203,  H52.4) Hyperopia of both eyes with astigmatism and presbyopia  (primary encounter diagnosis)  Patient has minimal hyperopia that does not require treatment at this time  Presbyopia is difficulty seeing up close and is treated with bifocals or over the counter reading glasses    (H04.123) Dry eye syndrome of both eyes  Recommend AT four times a day         (H25.813) Combined forms of age-related cataract of both eyes  Mild cataracts are present and may account for some of the patient's visual complaints. No treatment currently recommended. The patient will monitor for vision changes and contact us with any decrease in vision. Recheck next visit      Return in about 1 year (around 11/19/2025) for Annual Visit-v/t/d/MRx.        Bang West MD     Attending Physician Attestation:  Complete documentation of historical and exam elements from today's encounter can be found in the full encounter summary report (not reduplicated in this progress note).  I personally obtained the chief complaint(s) and history of present illness.  I confirmed and edited as necessary the review of systems, past medical/surgical history, family history, social history, and examination findings as documented by others; and I examined the patient myself.  I personally reviewed the relevant tests, images, and reports as documented above.  I formulated and edited as necessary the assessment and plan and discussed the findings and management plan with the patient and family. - Bang West MD

## 2024-11-22 ENCOUNTER — HOSPITAL ENCOUNTER (OUTPATIENT)
Dept: GENERAL RADIOLOGY | Facility: CLINIC | Age: 69
Discharge: HOME | End: 2024-11-22
Attending: INTERNAL MEDICINE | Admitting: INTERNAL MEDICINE
Payer: COMMERCIAL

## 2024-11-22 DIAGNOSIS — K21.9 GASTROESOPHAGEAL REFLUX DISEASE WITHOUT ESOPHAGITIS: ICD-10-CM

## 2024-11-22 PROCEDURE — 74246 X-RAY XM UPR GI TRC 2CNTRST: CPT

## 2024-11-22 PROCEDURE — 250N000013 HC RX MED GY IP 250 OP 250 PS 637: Performed by: RADIOLOGY

## 2024-11-22 RX ADMIN — ANTACID/ANTIFLATULENT 4 G: 380; 550; 10; 10 GRANULE, EFFERVESCENT ORAL at 09:11

## 2024-11-24 DIAGNOSIS — I10 HTN, GOAL BELOW 140/90: ICD-10-CM

## 2024-11-24 RX ORDER — ESOMEPRAZOLE MAGNESIUM 40 MG/1
40 CAPSULE, DELAYED RELEASE ORAL
Qty: 90 CAPSULE | Refills: 3 | Status: SHIPPED | OUTPATIENT
Start: 2024-11-24

## 2024-11-25 RX ORDER — TELMISARTAN 40 MG/1
TABLET ORAL
Qty: 90 TABLET | Refills: 3 | OUTPATIENT
Start: 2024-11-25

## 2024-11-25 NOTE — TELEPHONE ENCOUNTER
Clinic RN: Please investigate patient's chart or contact patient if the information cannot be found because the medication is listed as historical or discontinued. Confirm patient is taking this medication. Document findings and route refill encounter to provider for approval or denial.    Nadine Quesada RN  [facility]

## 2024-11-27 RX ORDER — BISOPROLOL FUMARATE AND HYDROCHLOROTHIAZIDE 10; 6.25 MG/1; MG/1
1 TABLET ORAL EVERY MORNING
Qty: 90 TABLET | Refills: 3 | Status: SHIPPED | OUTPATIENT
Start: 2024-11-27

## 2024-11-27 NOTE — TELEPHONE ENCOUNTER
Patient responded to MyC message, see MyC 11/25:        MyC message was sent to provider to advise. Also noted MyC 4/3/24, where provider wanted patient to hold the carvedilol and try bisoprolol-hydrochlorothiazide. Will route this as FYI to provider.      Marilee Shrestha, RN, BSN  [facility]

## 2024-12-05 ENCOUNTER — PATIENT OUTREACH (OUTPATIENT)
Dept: CARE COORDINATION | Facility: CLINIC | Age: 69
End: 2024-12-05
Payer: COMMERCIAL

## 2024-12-16 ENCOUNTER — VIRTUAL VISIT (OUTPATIENT)
Dept: ENDOCRINOLOGY | Facility: CLINIC | Age: 69
End: 2024-12-16
Attending: INTERNAL MEDICINE
Payer: COMMERCIAL

## 2024-12-16 DIAGNOSIS — R93.89 ABNORMAL FINDINGS ON DIAGNOSTIC IMAGING OF OTHER SPECIFIED BODY STRUCTURES: ICD-10-CM

## 2024-12-16 DIAGNOSIS — M81.0 AGE-RELATED OSTEOPOROSIS WITHOUT CURRENT PATHOLOGICAL FRACTURE: Primary | ICD-10-CM

## 2024-12-16 PROCEDURE — G2211 COMPLEX E/M VISIT ADD ON: HCPCS | Mod: 95 | Performed by: STUDENT IN AN ORGANIZED HEALTH CARE EDUCATION/TRAINING PROGRAM

## 2024-12-16 PROCEDURE — 99204 OFFICE O/P NEW MOD 45 MIN: CPT | Mod: 95 | Performed by: STUDENT IN AN ORGANIZED HEALTH CARE EDUCATION/TRAINING PROGRAM

## 2024-12-16 RX ORDER — ALBUTEROL SULFATE 90 UG/1
1-2 INHALANT RESPIRATORY (INHALATION)
Start: 2024-12-23

## 2024-12-16 RX ORDER — HEPARIN SODIUM (PORCINE) LOCK FLUSH IV SOLN 100 UNIT/ML 100 UNIT/ML
5 SOLUTION INTRAVENOUS
OUTPATIENT
Start: 2024-12-23

## 2024-12-16 RX ORDER — DIPHENHYDRAMINE HYDROCHLORIDE 50 MG/ML
25 INJECTION INTRAMUSCULAR; INTRAVENOUS
Start: 2024-12-23

## 2024-12-16 RX ORDER — ALBUTEROL SULFATE 0.83 MG/ML
2.5 SOLUTION RESPIRATORY (INHALATION)
OUTPATIENT
Start: 2024-12-23

## 2024-12-16 RX ORDER — METHYLPREDNISOLONE SODIUM SUCCINATE 40 MG/ML
40 INJECTION INTRAMUSCULAR; INTRAVENOUS
Start: 2024-12-23

## 2024-12-16 RX ORDER — EPINEPHRINE 1 MG/ML
0.3 INJECTION, SOLUTION INTRAMUSCULAR; SUBCUTANEOUS EVERY 5 MIN PRN
OUTPATIENT
Start: 2024-12-23

## 2024-12-16 RX ORDER — ZOLEDRONIC ACID 0.05 MG/ML
5 INJECTION, SOLUTION INTRAVENOUS ONCE
Start: 2024-12-23

## 2024-12-16 RX ORDER — MEPERIDINE HYDROCHLORIDE 25 MG/ML
25 INJECTION INTRAMUSCULAR; INTRAVENOUS; SUBCUTANEOUS
OUTPATIENT
Start: 2024-12-23

## 2024-12-16 RX ORDER — ACETAMINOPHEN 325 MG/1
650 TABLET ORAL
OUTPATIENT
Start: 2024-12-23

## 2024-12-16 RX ORDER — HEPARIN SODIUM,PORCINE 10 UNIT/ML
5-20 VIAL (ML) INTRAVENOUS DAILY PRN
OUTPATIENT
Start: 2024-12-23

## 2024-12-16 RX ORDER — DIPHENHYDRAMINE HYDROCHLORIDE 50 MG/ML
50 INJECTION INTRAMUSCULAR; INTRAVENOUS
Start: 2024-12-23
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
## 2024-12-16 NOTE — NURSING NOTE
Current patient location: [address] [facility] [address]    Is the patient currently in the state of [place]? YES    Visit mode:VIDEO    If the visit is dropped, the patient can be reconnected by:VIDEO VISIT: Text to cell phone:   Telephone Information:   Mobile [phone]       Will anyone else be joining the visit? NO  (If patient encounters technical issues they should call [phone]    Are changes needed to the allergy or medication list? No and Pt stated no med changes    Are refills needed on medications prescribed by this physician? NO    Rooming Documentation:  Not applicable    Reason for visit: Consult    Fabiola REED

## 2024-12-16 NOTE — PATIENT INSTRUCTIONS
CALCIUM Recommendation 1500 mg/day in divided dose of no more than 500 mg/dose. This includes what is in your food and also what is in any supplements you are taking.      Dietary sources of calcium: These also contain vitamin D  Milk / buttermilk              8 oz            300 mg  Dry milk powder       5 Tbsp             300 mg  Yogurt                          1 cup           400 mg  Ice cream   1/2 cup          100 mg  Hard cheese                     1.5 oz          300 mg  Cottage cheese                1/2 cup        65 mg  Spinach, cooked  1 cup  240 mg  Tofu, soft regular           4 oz          120 to 390 mg  Sardines                       3 oz               370 mg  Mackerel canned         3 oz                250 mg  Canned salmon with bones 3 oz        170-210 mg  Calcium fortified cereals are available  SILK soy and almond milk products are calcium fortified  Orange juice  Fortified with Calcium       8 oz            300 mg  Low fat dairy sources are recommended      Recommended exercise goals:    30 minutes of moderate exercise on most days of the week .  Weight bearing exercise (ie, walking, jogging, hiking, dancing)    Strength training 2 or more times/week in addition to other weight -being exercise.  Strength training -- uses weight or resistance beyond that seen in everyday activities -(pilates, weight training with free weights, weight machines or resistance bands)    OSTEOPOROSIS of the spine: avoid exercise machines that incorporate spinal flexion, trunk rotation, or forward bending   Specifically avoid abdominal exercisers, stationary bikes with moving handles, cross-country ski machines, rowing machines, and bicep curl machines  Spinal fractures: AVOID activities that place significant force on the spine such as horse back riding,  tennis, golf or bowling    Bone density DXA may be performed every 2 years.  It must be performed on the same machine for comparison.
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
## 2025-01-02 ENCOUNTER — PATIENT OUTREACH (OUTPATIENT)
Dept: CARE COORDINATION | Facility: CLINIC | Age: 70
End: 2025-01-02
Payer: COMMERCIAL

## 2025-01-08 ENCOUNTER — MYC REFILL (OUTPATIENT)
Dept: FAMILY MEDICINE | Facility: CLINIC | Age: 70
End: 2025-01-08

## 2025-01-08 ENCOUNTER — MYC MEDICAL ADVICE (OUTPATIENT)
Dept: FAMILY MEDICINE | Facility: CLINIC | Age: 70
End: 2025-01-08

## 2025-01-08 DIAGNOSIS — K44.9 HIATAL HERNIA WITH GERD: ICD-10-CM

## 2025-01-08 DIAGNOSIS — E78.5 HYPERLIPIDEMIA LDL GOAL <70: ICD-10-CM

## 2025-01-08 DIAGNOSIS — K21.9 HIATAL HERNIA WITH GERD: ICD-10-CM

## 2025-01-08 RX ORDER — FAMOTIDINE 20 MG/1
TABLET, FILM COATED ORAL
Qty: 180 TABLET | Refills: 3 | Status: SHIPPED | OUTPATIENT
Start: 2025-01-08

## 2025-01-08 RX ORDER — ESOMEPRAZOLE MAGNESIUM 40 MG/1
40 CAPSULE, DELAYED RELEASE ORAL
Qty: 90 CAPSULE | Refills: 3 | OUTPATIENT
Start: 2025-01-08

## 2025-01-08 RX ORDER — ESOMEPRAZOLE MAGNESIUM 40 MG/1
40 CAPSULE, DELAYED RELEASE ORAL
Qty: 90 CAPSULE | Refills: 3 | Status: SHIPPED | OUTPATIENT
Start: 2025-01-08

## 2025-01-08 RX ORDER — ATORVASTATIN CALCIUM 10 MG/1
10 TABLET, FILM COATED ORAL DAILY
Qty: 90 TABLET | Refills: 1 | OUTPATIENT
Start: 2025-01-08

## 2025-01-08 NOTE — TELEPHONE ENCOUNTER
See mychart message.     Patient has been taking esomeprazole (NEXIUM) 40 MG DR capsule- before EVERY meal- 3 times daily.   Nothing states in 11/14/24 appointment for patient to take 3 times daily. Patient must have read the instructions incorrectly.    Sig states:  Take 1 capsule (40 mg) by mouth every morning (before breakfast). Take 30-60 minutes before eating    Routing to provider as a FYI, please advise if any appointment is needed.     Due to patient taking 3x daily patient may need a early refill- awaiting mychart response. RN advised patient to only take 1 capsule daily as prescribed.     DARRYL Yoo  [facility]

## 2025-01-09 ENCOUNTER — LAB (OUTPATIENT)
Dept: LAB | Facility: CLINIC | Age: 70
End: 2025-01-09
Payer: COMMERCIAL

## 2025-01-09 ENCOUNTER — TELEPHONE (OUTPATIENT)
Dept: FAMILY MEDICINE | Facility: CLINIC | Age: 70
End: 2025-01-09

## 2025-01-09 DIAGNOSIS — I10 HYPERTENSION GOAL BP (BLOOD PRESSURE) < 130/80: ICD-10-CM

## 2025-01-09 DIAGNOSIS — R93.89 ABNORMAL FINDINGS ON DIAGNOSTIC IMAGING OF OTHER SPECIFIED BODY STRUCTURES: ICD-10-CM

## 2025-01-09 DIAGNOSIS — M81.0 AGE-RELATED OSTEOPOROSIS WITHOUT CURRENT PATHOLOGICAL FRACTURE: ICD-10-CM

## 2025-01-09 DIAGNOSIS — E78.00 HYPERCHOLESTEROLEMIA: ICD-10-CM

## 2025-01-09 LAB
ALBUMIN SERPL BCG-MCNC: 4.4 G/DL (ref 3.5–5.2)
ALT SERPL W P-5'-P-CCNC: 28 U/L (ref 0–50)
ANION GAP SERPL CALCULATED.3IONS-SCNC: 7 MMOL/L (ref 7–15)
BUN SERPL-MCNC: 12 MG/DL (ref 8–23)
CALCIUM SERPL-MCNC: 9.5 MG/DL (ref 8.8–10.4)
CALCIUM SERPL-MCNC: 9.5 MG/DL (ref 8.8–10.4)
CHLORIDE SERPL-SCNC: 105 MMOL/L (ref 98–107)
CHOLEST SERPL-MCNC: 175 MG/DL
CREAT SERPL-MCNC: 0.7 MG/DL (ref 0.51–0.95)
EGFRCR SERPLBLD CKD-EPI 2021: >90 ML/MIN/1.73M2
FASTING STATUS PATIENT QL REPORTED: YES
FASTING STATUS PATIENT QL REPORTED: YES
GLUCOSE SERPL-MCNC: 94 MG/DL (ref 70–99)
HCO3 SERPL-SCNC: 31 MMOL/L (ref 22–29)
HDLC SERPL-MCNC: 80 MG/DL
LDLC SERPL CALC-MCNC: 80 MG/DL
NONHDLC SERPL-MCNC: 95 MG/DL
POTASSIUM SERPL-SCNC: 4.1 MMOL/L (ref 3.4–5.3)
PTH-INTACT SERPL-MCNC: 30 PG/ML (ref 15–65)
SODIUM SERPL-SCNC: 143 MMOL/L (ref 135–145)
TRIGL SERPL-MCNC: 76 MG/DL
TSH SERPL DL<=0.005 MIU/L-ACNC: 2.31 UIU/ML (ref 0.3–4.2)

## 2025-01-09 NOTE — TELEPHONE ENCOUNTER
Cidny Bang mail order pharmacist, calling to clarify prescription for esomeprazole 40 mg.     Beti reports pt believes that she should be taking esomeprazole 40 mg three times a day, however, sig states to only take once per day.     Per chart review, provider informed pt that esomeprazole should not be taken 3 times daily and stated that she should only be taking once per day. Provider [employer] message copied below. RN relayed this information, Beti verbalized understanding and will inform pt.     Briana Franks RN

## 2025-01-11 LAB
DEPRECATED CALCIDIOL+CALCIFEROL SERPL-MC: <61 UG/L (ref 20–75)
VITAMIN D2 SERPL-MCNC: <5 UG/L
VITAMIN D3 SERPL-MCNC: 56 UG/L

## 2025-01-14 ENCOUNTER — CARE COORDINATION (OUTPATIENT)
Dept: CARDIOLOGY | Facility: CLINIC | Age: 70
End: 2025-01-14

## 2025-01-14 ENCOUNTER — MYC MEDICAL ADVICE (OUTPATIENT)
Dept: CARDIOLOGY | Facility: CLINIC | Age: 70
End: 2025-01-14

## 2025-01-14 DIAGNOSIS — E78.00 HYPERCHOLESTEROLEMIA: Primary | ICD-10-CM

## 2025-01-14 NOTE — PROGRESS NOTES
Pt had to reschedule her annual visit with  to June since her sister is going on hospice. Pt has hx of mildly elevated CT calcium score, HTN, and ascending aorta dilatation. She wrote a my chart asking if  has any concerns about her recent test results. I will update , and get back to pt with his recommendations.     HR: 58  BP: 128/74 mmHg  ______________________________________________________________________________  Procedure  Echocardiogram with two-dimensional, color and spectral Doppler.  ______________________________________________________________________________  Interpretation Summary     The left ventricle is normal in size.  Left ventricular systolic function is normal.  The visual ejection fraction is 55-60%.  Normal left ventricular wall motion  The right ventricle is normal in structure, function and size.  There is mild (1+) tricuspid regurgitation.  Right ventricle systolic pressure estimate normal  There is no comparison study available.      Component      Latest Ref Rng 1/9/2025  8:33 AM   Sodium      135 - 145 mmol/L 143    Potassium      3.4 - 5.3 mmol/L 4.1    Chloride      98 - 107 mmol/L 105    Carbon Dioxide (CO2)      22 - 29 mmol/L 31 (H)    Anion Gap      7 - 15 mmol/L 7    Urea Nitrogen      8.0 - 23.0 mg/dL 12.0    Creatinine      0.51 - 0.95 mg/dL 0.70    GFR Estimate      >60 mL/min/1.73m2 >90    Calcium      8.8 - 10.4 mg/dL 9.5    Glucose      70 - 99 mg/dL 94    Patient Fasting? Yes    Patient Fasting? Yes    Cholesterol      <200 mg/dL 175    Triglycerides      <150 mg/dL 76    HDL Cholesterol      >=50 mg/dL 80    LDL Cholesterol Calculated      <100 mg/dL 80    Non HDL Cholesterol      <130 mg/dL 95    ALT      0 - 50 U/L 28       Legend:  (H) High

## 2025-01-15 NOTE — TELEPHONE ENCOUNTER
Pt. Was offered sooner visit with DEMETRICE. Pt. Declined and would like to keep visit with .     Erin Young on 1/15/2025 at 7:43 AM

## 2025-01-16 RX ORDER — ATORVASTATIN CALCIUM 20 MG/1
20 TABLET, FILM COATED ORAL DAILY
Qty: 90 TABLET | Refills: 2 | Status: SHIPPED | OUTPATIENT
Start: 2025-01-16

## 2025-01-16 NOTE — TELEPHONE ENCOUNTER
Laboratory studies all look quite good, with the exception of the LDL cholesterol which is still slightly above our goal of 70.  She is taking 10 mg of atorvastatin now, which is quite a low dose, and I would suggest increasing that dose to 20 mg a day to lower the LDL further.  Otherwise test results look great.  Javon Camarillo MD

## 2025-01-27 DIAGNOSIS — F33.41 MAJOR DEPRESSIVE DISORDER, RECURRENT EPISODE, IN PARTIAL REMISSION: ICD-10-CM

## 2025-01-28 ENCOUNTER — MYC REFILL (OUTPATIENT)
Dept: FAMILY MEDICINE | Facility: CLINIC | Age: 70
End: 2025-01-28
Payer: COMMERCIAL

## 2025-01-28 DIAGNOSIS — F33.41 MAJOR DEPRESSIVE DISORDER, RECURRENT EPISODE, IN PARTIAL REMISSION: ICD-10-CM

## 2025-01-28 RX ORDER — BUPROPION HYDROCHLORIDE 150 MG/1
150 TABLET ORAL EVERY MORNING
Qty: 90 TABLET | Refills: 0 | Status: SHIPPED | OUTPATIENT
Start: 2025-01-28

## 2025-01-28 RX ORDER — BUPROPION HYDROCHLORIDE 150 MG/1
150 TABLET ORAL EVERY MORNING
Qty: 90 TABLET | Refills: 3 | Status: SHIPPED | OUTPATIENT
Start: 2025-01-28

## 2025-02-19 ENCOUNTER — OFFICE VISIT (OUTPATIENT)
Dept: FAMILY MEDICINE | Facility: CLINIC | Age: 70
End: 2025-02-19
Payer: COMMERCIAL

## 2025-02-19 VITALS
HEART RATE: 70 BPM | TEMPERATURE: 97.1 F | WEIGHT: 171 LBS | BODY MASS INDEX: 27.48 KG/M2 | HEIGHT: 66 IN | OXYGEN SATURATION: 100 % | DIASTOLIC BLOOD PRESSURE: 84 MMHG | SYSTOLIC BLOOD PRESSURE: 136 MMHG | RESPIRATION RATE: 12 BRPM

## 2025-02-19 DIAGNOSIS — R10.31 ABDOMINAL PAIN, RIGHT LOWER QUADRANT: Primary | ICD-10-CM

## 2025-02-19 LAB
ALBUMIN UR-MCNC: NEGATIVE MG/DL
APPEARANCE UR: CLEAR
BACTERIA #/AREA URNS HPF: ABNORMAL /HPF
BASOPHILS # BLD AUTO: 0.1 10E3/UL (ref 0–0.2)
BASOPHILS NFR BLD AUTO: 1 %
BILIRUB UR QL STRIP: NEGATIVE
COLOR UR AUTO: YELLOW
EOSINOPHIL # BLD AUTO: 0.2 10E3/UL (ref 0–0.7)
EOSINOPHIL NFR BLD AUTO: 2 %
ERYTHROCYTE [DISTWIDTH] IN BLOOD BY AUTOMATED COUNT: 13 % (ref 10–15)
GLUCOSE UR STRIP-MCNC: NEGATIVE MG/DL
HCT VFR BLD AUTO: 40.4 % (ref 35–47)
HGB BLD-MCNC: 13.6 G/DL (ref 11.7–15.7)
HGB UR QL STRIP: NEGATIVE
IMM GRANULOCYTES # BLD: 0 10E3/UL
IMM GRANULOCYTES NFR BLD: 0 %
KETONES UR STRIP-MCNC: NEGATIVE MG/DL
LEUKOCYTE ESTERASE UR QL STRIP: ABNORMAL
LYMPHOCYTES # BLD AUTO: 1.8 10E3/UL (ref 0.8–5.3)
LYMPHOCYTES NFR BLD AUTO: 25 %
MCH RBC QN AUTO: 31.5 PG (ref 26.5–33)
MCHC RBC AUTO-ENTMCNC: 33.7 G/DL (ref 31.5–36.5)
MCV RBC AUTO: 94 FL (ref 78–100)
MONOCYTES # BLD AUTO: 0.7 10E3/UL (ref 0–1.3)
MONOCYTES NFR BLD AUTO: 9 %
NEUTROPHILS # BLD AUTO: 4.8 10E3/UL (ref 1.6–8.3)
NEUTROPHILS NFR BLD AUTO: 64 %
NITRATE UR QL: NEGATIVE
PH UR STRIP: 5.5 [PH] (ref 5–7)
PLATELET # BLD AUTO: 200 10E3/UL (ref 150–450)
RBC # BLD AUTO: 4.32 10E6/UL (ref 3.8–5.2)
RBC #/AREA URNS AUTO: ABNORMAL /HPF
SP GR UR STRIP: <=1.005 (ref 1–1.03)
SQUAMOUS #/AREA URNS AUTO: ABNORMAL /LPF
UROBILINOGEN UR STRIP-ACNC: 0.2 E.U./DL
WBC # BLD AUTO: 7.4 10E3/UL (ref 4–11)
WBC #/AREA URNS AUTO: ABNORMAL /HPF
WBC CLUMPS #/AREA URNS HPF: PRESENT /HPF

## 2025-02-19 PROCEDURE — 81001 URINALYSIS AUTO W/SCOPE: CPT | Performed by: PHYSICIAN ASSISTANT

## 2025-02-19 PROCEDURE — 36415 COLL VENOUS BLD VENIPUNCTURE: CPT | Performed by: PHYSICIAN ASSISTANT

## 2025-02-19 PROCEDURE — 99213 OFFICE O/P EST LOW 20 MIN: CPT | Performed by: PHYSICIAN ASSISTANT

## 2025-02-19 PROCEDURE — 86140 C-REACTIVE PROTEIN: CPT | Performed by: PHYSICIAN ASSISTANT

## 2025-02-19 PROCEDURE — 85025 COMPLETE CBC W/AUTO DIFF WBC: CPT | Performed by: PHYSICIAN ASSISTANT

## 2025-02-19 PROCEDURE — G2211 COMPLEX E/M VISIT ADD ON: HCPCS | Performed by: PHYSICIAN ASSISTANT

## 2025-02-19 ASSESSMENT — PATIENT HEALTH QUESTIONNAIRE - PHQ9
SUM OF ALL RESPONSES TO PHQ QUESTIONS 1-9: 7
SUM OF ALL RESPONSES TO PHQ QUESTIONS 1-9: 7
10. IF YOU CHECKED OFF ANY PROBLEMS, HOW DIFFICULT HAVE THESE PROBLEMS MADE IT FOR YOU TO DO YOUR WORK, TAKE CARE OF THINGS AT HOME, OR GET ALONG WITH OTHER PEOPLE: SOMEWHAT DIFFICULT

## 2025-02-19 ASSESSMENT — PAIN SCALES - GENERAL: PAINLEVEL_OUTOF10: MODERATE PAIN (4)

## 2025-02-19 NOTE — PATIENT INSTRUCTIONS
We will follow up according to the lab results.    Any worsening of pain or you develop fever, vomiting or severe pain your should be seen in the Emergency Room.    It was a pleasure meeting you and we appreciate your choosing the [facility].    Laine Quesada PA-C

## 2025-02-19 NOTE — PROGRESS NOTES
Assessment & Plan     Abdominal pain, right lower quadrant  - US Pelvic Complete with Transvaginal; Future  - CBC with platelets and differential  - CRP inflammation  - UA Macroscopic with reflex to Microscopic and Culture - Lab Collect    Due to mild pain, no appendix, no evidence of obstruction  and history of contrast allergy will get labs today and pelvic US.   Encouraged ED visit is pain worsens or she develops concerning symptoms.        Patient Instructions   We will follow up according to the lab results.    Any worsening of pain or you develop fever, vomiting or severe pain your should be seen in the Emergency Room.    It was a pleasure meeting you and we appreciate your choosing the [facility].    Laine Muhammad   Camille is a 69 year old, presenting for the following health issues:  Abdominal Pain    CHIEF COMPLAINT:  Right lower quadrant abdominal pain for the past 2 days.  Mild in severity.  Present continually but does wax and wane.  No fever, chills.No dysuria or back pain.  Appetite is normal. No nausea, vomiting or diarrhea.  No constipation. Had normal BM yesterday.  Nothing seems to exacerbate.  Nothing makes it better.   Not dependant on position.   Has not prevented her from doing anything.  Has not taken an analgesic.  There has been no recent change in medications.    History of multiple abdominal surgeries; appendectomy, bowel obstruction, Arturo-N Y gastric bypass, cholecystectomy.             2/19/2025     3:46 PM   Additional Questions   Roomed by Kamilah Pang CMA   Accompanied by self         2/19/2025     3:46 PM   Patient Reported Additional Medications   Patient reports taking the following new medications none     History of Present Illness       Reason for visit:  Pain in lower right abdomen  Symptom onset:  1-3 days ago  Symptoms include:  Pain in lower right abdomen...cramping that is increasing in frequency  Symptom intensity:  Moderate  Symptom  "progression:  Worsening  Had these symptoms before:  No  What makes it worse:  Not really  What makes it better:  Not really   She is taking medications regularly.       Pain History:    Where in your body do you have pain? Right lower quadrant  Onset/Duration: yesterday  Description:   Character: Cramping  Location: pelvic region right side  Radiation: None  Intensity: moderate, 3-4/10  Progression of Symptoms:  worsening  Accompanying Signs & Symptoms:  Fever/Chills: No  Gas/Bloating: No  Nausea: No  Vomitting: No  Diarrhea: No  Constipation: No  Dysuria or Hematuria: No  History:   Trauma: No  Previous similar pain: No  Previous tests done: none  Precipitating factors:   Does the pain change with:     Food: No    Bowel Movement: No    Urination: No   Other factors:  No  Therapies tried and outcome: stretching   Patient's last menstrual period was 06/27/2010 (exact date).        Review of Systems  Constitutional, HEENT, cardiovascular, pulmonary, gi and gu systems are negative, except as otherwise noted.      Objective    /84   Pulse 70   Temp 97.1  F (36.2  C) (Temporal)   Resp 12   Ht 1.676 m (5' 5.98\")   Wt 77.6 kg (171 lb)   LMP 06/27/2010 (Exact Date)   SpO2 100%   Breastfeeding No   BMI 27.61 kg/m    Body mass index is 27.61 kg/m .  Physical Exam   GENERAL: alert and no distress  NECK: no adenopathy, no asymmetry, masses, or scars  RESP: lungs clear to auscultation - no rales, rhonchi or wheezes  CV: regular rate and rhythm, normal S1 S2, no S3 or S4, no murmur, click or rub, no peripheral edema  ABDOMEN: soft, without hepatosplenomegaly or masses and bowel sounds normoactive throughout. Tenderness with deep palpation right supra-pubic region.  No rebound or guarding.Midline scar above and below umbilicus.  MS: no gross musculoskeletal defects noted, no edema  No CVAT  SKIN: no suspicious lesions or rashes  PSYCH: mentation appears normal, well dressed and groomed, responses appropriate and " affect sad due to the recent death of her sister.    Labs-pending      Results for orders placed or performed in visit on 02/19/25   CBC with platelets and differential     Status: None   Result Value Ref Range    WBC Count 7.4 4.0 - 11.0 10e3/uL    RBC Count 4.32 3.80 - 5.20 10e6/uL    Hemoglobin 13.6 11.7 - 15.7 g/dL    Hematocrit 40.4 35.0 - 47.0 %    MCV 94 78 - 100 fL    MCH 31.5 26.5 - 33.0 pg    MCHC 33.7 31.5 - 36.5 g/dL    RDW 13.0 10.0 - 15.0 %    Platelet Count 200 150 - 450 10e3/uL    % Neutrophils 64 %    % Lymphocytes 25 %    % Monocytes 9 %    % Eosinophils 2 %    % Basophils 1 %    % Immature Granulocytes 0 %    Absolute Neutrophils 4.8 1.6 - 8.3 10e3/uL    Absolute Lymphocytes 1.8 0.8 - 5.3 10e3/uL    Absolute Monocytes 0.7 0.0 - 1.3 10e3/uL    Absolute Eosinophils 0.2 0.0 - 0.7 10e3/uL    Absolute Basophils 0.1 0.0 - 0.2 10e3/uL    Absolute Immature Granulocytes 0.0 <=0.4 10e3/uL   CBC with platelets and differential     Status: None    Narrative    The following orders were created for panel order CBC with platelets and differential.  Procedure                               Abnormality         Status                     ---------                               -----------         ------                     CBC with platelets and d...[[id number]]                      Final result                 Please view results for these tests on the individual orders.       Signed Electronically by: Mona Quesada PA-C

## 2025-02-19 NOTE — RESULT ENCOUNTER NOTE
Kenroy Obrien,  It was a pleasure meeting you today in the clinic.  Your CBC was completely normal.  Your urine test is showing a small amount of white blood cells and due to this it will be sent for culture.  Please let me know if you have any questions about this.  Laine Quesada PA-C

## 2025-02-20 LAB — CRP SERPL-MCNC: <3 MG/L

## 2025-02-25 ENCOUNTER — ANCILLARY PROCEDURE (OUTPATIENT)
Dept: ULTRASOUND IMAGING | Facility: CLINIC | Age: 70
End: 2025-02-25
Attending: PHYSICIAN ASSISTANT
Payer: COMMERCIAL

## 2025-02-25 DIAGNOSIS — R10.31 ABDOMINAL PAIN, RIGHT LOWER QUADRANT: ICD-10-CM

## 2025-02-25 PROCEDURE — 76830 TRANSVAGINAL US NON-OB: CPT | Performed by: STUDENT IN AN ORGANIZED HEALTH CARE EDUCATION/TRAINING PROGRAM

## 2025-02-25 PROCEDURE — 76856 US EXAM PELVIC COMPLETE: CPT | Performed by: STUDENT IN AN ORGANIZED HEALTH CARE EDUCATION/TRAINING PROGRAM

## 2025-02-26 NOTE — RESULT ENCOUNTER NOTE
Kenroy Obrien,    Your recent pelvic and abdominal ultrasound was normal.  Please make a follow up appointment with your primary care provider if you are having persistent symptoms.  Let us know if you have any questions.  Laine Quesada PA-C

## 2025-03-13 ENCOUNTER — OFFICE VISIT (OUTPATIENT)
Dept: FAMILY MEDICINE | Facility: CLINIC | Age: 70
End: 2025-03-13
Payer: COMMERCIAL

## 2025-03-13 VITALS
DIASTOLIC BLOOD PRESSURE: 83 MMHG | WEIGHT: 169 LBS | BODY MASS INDEX: 27.16 KG/M2 | HEIGHT: 66 IN | OXYGEN SATURATION: 100 % | SYSTOLIC BLOOD PRESSURE: 135 MMHG | RESPIRATION RATE: 19 BRPM | TEMPERATURE: 97.8 F | HEART RATE: 64 BPM

## 2025-03-13 DIAGNOSIS — I10 HYPERTENSION GOAL BP (BLOOD PRESSURE) < 140/90: Primary | ICD-10-CM

## 2025-03-13 PROCEDURE — 3079F DIAST BP 80-89 MM HG: CPT | Performed by: INTERNAL MEDICINE

## 2025-03-13 PROCEDURE — 3075F SYST BP GE 130 - 139MM HG: CPT | Performed by: INTERNAL MEDICINE

## 2025-03-13 PROCEDURE — 99213 OFFICE O/P EST LOW 20 MIN: CPT | Performed by: INTERNAL MEDICINE

## 2025-03-13 PROCEDURE — 1126F AMNT PAIN NOTED NONE PRSNT: CPT | Performed by: INTERNAL MEDICINE

## 2025-03-13 ASSESSMENT — PATIENT HEALTH QUESTIONNAIRE - PHQ9
10. IF YOU CHECKED OFF ANY PROBLEMS, HOW DIFFICULT HAVE THESE PROBLEMS MADE IT FOR YOU TO DO YOUR WORK, TAKE CARE OF THINGS AT HOME, OR GET ALONG WITH OTHER PEOPLE: SOMEWHAT DIFFICULT
SUM OF ALL RESPONSES TO PHQ QUESTIONS 1-9: 1
SUM OF ALL RESPONSES TO PHQ QUESTIONS 1-9: 1

## 2025-03-13 ASSESSMENT — PAIN SCALES - GENERAL: PAINLEVEL_OUTOF10: NO PAIN (0)

## 2025-03-13 NOTE — PATIENT INSTRUCTIONS
At [facility], we strive to deliver an exceptional experience to you, every time we see you. If you receive a survey, please let us know what we are doing well and/or what we could improve upon, as we do value your feedback.  If you have MyChart, you can expect to receive results automatically within 24 hours of their completion.  Your provider will send a note interpreting your results as well.   If you do not have MyChart, you should receive your results in about a week by mail.    Your care team:                            Family Medicine Internal Medicine   MD Bandar Abraham, MD Elsy Cruz, MD Bandar Chan, MD Heather Mohr, PAMelissaC    Cesar Albert, MD Pediatrics   Criselda Napier, MD Franchesca George, MD Berkley Ceballos, APRN CNP Mirna Lau APRN CNP   MD Helena Garcia, MD Rocio Bond, CNP     Jone Christensen, CNP Same-Day Provider (No follow-up visits)   BONILLA Serrato, DNP Gilma Roe, BONILLA Borrero, FNP, BC JUANA GamezC     Clinic hours: Monday - Thursday 7 am-6 pm; Fridays 7 am-5 pm.   Urgent care: Monday - Friday 10 am- 8 pm; Saturday and Sunday 9 am-5 pm.    Clinic: [phone]       [facility]: Monday - Thursday 8 am - 7 pm; Friday 8 am - 6 pm  [facility]: [phone]

## 2025-03-13 NOTE — PROGRESS NOTES
[facility] Internal Medicine Progress Note           Assessment and Plan:     Hypertension goal BP (blood pressure) < 140/90  Continue  Telmisartan and Ziac at the current doses. Previous lab tests discussed with the patient.        Interval History:     Hypertension: She presents for follow up of hypertension.  She does check blood pressure  regularly outside of the clinic. Outpatient blood pressures have not been over 140/90. She does not follow a low salt diet.     She eats 0-1 servings of fruits and vegetables daily.She consumes 1 sweetened beverage(s) daily.She exercises with enough effort to increase her heart rate 9 or less minutes per day.  She exercises with enough effort to increase her heart rate 3 or less days per week.   She is taking medications regularly.           Significant Problems:     Patient Active Problem List   Diagnosis    iamJOINT PAIN-LOWER LEG    iamPOSTSURGICAL STATES NEC    CARDIOVASCULAR SCREENING; LDL GOAL LESS THAN 130    HTN, goal below 140/90    Gallstones    Cholelithiases    GERD (gastroesophageal reflux disease)    Varicose vein of leg    Dyspareunia    Microalbuminuria    Major depressive disorder, recurrent episode, in partial remission    Situational anxiety    Overweight    S/P gastric bypass    H/O gastrointestinal hemorrhage    Personal history of PE (pulmonary embolism)    Insomnia, unspecified type    Pulmonary nodules    Polyarthralgia    Positive TIFFANI (antinuclear antibody)    Overactive bladder    Bilateral lumbar radiculopathy    Anterolisthesis of lumbar spine    Lumbar facet arthropathy    Neural foraminal stenosis of lumbar spine    Idiopathic peripheral neuropathy    Chronic left-sided low back pain without sciatica    Multiple vitamin deficiency disease    Trigger point of left side of body    Agatston CAC score, <100    Dilatation of thoracic aorta    NEETA (generalized anxiety disorder)    Vaginal atrophy    Dyspareunia in female    Female stress  incontinence    Idiopathic small fiber sensory neuropathy    Age-related osteoporosis without current pathological fracture              Review of Systems:     CONSTITUTIONAL: NEGATIVE for fever, chills, change in weight  INTEGUMENTARY/SKIN: NEGATIVE for worrisome rashes, moles or lesions  EYES: NEGATIVE for vision changes or irritation  ENT/MOUTH: NEGATIVE for ear, mouth and throat problems  RESP: NEGATIVE for significant cough or SOB  BREAST: NEGATIVE for masses, tenderness or discharge  CV: NEGATIVE for chest pain, palpitations or peripheral edema  GI: NEGATIVE for nausea, abdominal pain, heartburn, or change in bowel habits  : NEGATIVE for frequency, dysuria, or hematuria  MUSCULOSKELETAL: NEGATIVE for significant arthralgias or myalgia  NEURO: NEGATIVE for weakness, dizziness or paresthesias  ENDOCRINE: NEGATIVE for temperature intolerance, skin/hair changes  HEME: NEGATIVE for bleeding problems  PSYCHIATRIC: NEGATIVE for changes in mood or affect            Medications:     Current Outpatient Medications   Medication Sig Dispense Refill    ALPRAZolam (XANAX) 0.25 MG tablet Take 1 tablet (0.25 mg) by mouth 3 times daily as needed for anxiety. T 30 tablet 1    amLODIPine (NORVASC) 5 MG tablet Take 1 tablet (5 mg) by mouth daily. 90 tablet 3    Ascorbic Acid (VITAMIN C PO)       ASPIRIN NOT PRESCRIBED (INTENTIONAL) Please choose reason not prescribed from choices below.      atorvastatin (LIPITOR) 20 MG tablet Take 1 tablet (20 mg) by mouth daily. 90 tablet 2    Biotin 5000 MCG CAPS Take 1 tablet by mouth daily.      bisoprolol-hydrochlorothiazide (ZIAC) 10-6.25 MG tablet TAKE 1 TABLET BY MOUTH EVERY MORNING 90 tablet 3    buPROPion (WELLBUTRIN XL) 150 MG 24 hr tablet Take 1 tablet (150 mg) by mouth every morning. 90 tablet 3    buPROPion (WELLBUTRIN XL) 150 MG 24 hr tablet Take 1 tablet (150 mg) by mouth every morning. 90 tablet 0    CALCIUM CITRATE PO Take 3 capsules by mouth 3 times daily      celecoxib  "(CELEBREX) 100 MG capsule TAKE ONE CAPSULE BY MOUTH TWICE DAILY 60 capsule 0    Cholecalciferol (VITAMIN D PO)       diazepam (VALIUM) 5 MG tablet Take 1 tablet (5 mg) by mouth once for 1 dose 1 tablet 0    esomeprazole (NEXIUM) 40 MG DR capsule Take 1 capsule (40 mg) by mouth every morning (before breakfast). Take 30-60 minutes before eating. 90 capsule 3    estradiol (ESTRACE) 0.1 MG/GM vaginal cream Apply night for 2 weeks and then decrease to twice weekly application. 42.5 g 1    famotidine (PEPCID) 20 MG tablet Take 1 tablet before lunch and 1 tablet before supper. 180 tablet 3    gabapentin (NEURONTIN) 100 MG capsule Take 1 capsule (100 mg) by mouth 3 times daily. 270 capsule 1    gabapentin (NEURONTIN) 300 MG capsule Take 1 capsule (300 mg) by mouth 3 times daily. 270 capsule 1    Glucosamine-Chondroit-Vit C-Mn (GLUCOSAMINE CHONDR 1500 COMPLX PO) Take 1 tablet by mouth daily      hydrocortisone (CORTAID) 0.5 % external cream Apply topically 2 times daily. Do not use more than two weeks at a time.      Iron-vit C-vit B12-folic acid 100-250-0.025-1 MG TABS Take  by mouth.      Multiple Vitamins-Minerals (MULTIVITAL) TABS Take 2 tablets by mouth daily.      pantoprazole (PROTONIX) 40 MG EC tablet Take 1 tablet (40 mg) by mouth 2 times daily. 180 tablet 1    telmisartan (MICARDIS) 40 MG tablet Take 1 tablet (40 mg) by mouth every morning. 90 tablet 2     No current facility-administered medications for this visit.             Physical Exam:   /83 (BP Location: Left arm, Patient Position: Sitting, Cuff Size: Adult Large)   Pulse 64   Temp 97.8  F (36.6  C) (Temporal)   Resp 19   Ht 1.676 m (5' 6\")   Wt 76.7 kg (169 lb)   LMP 06/27/2010 (Exact Date)   SpO2 100%   BMI 27.28 kg/m     Constitutional:   awake, alert, cooperative, no apparent distress, and appears stated age     Eyes:   extra-ocular muscles intact and sclera clear     ENT:   normocephalic, without obvious abnormality     Lungs:   no " increased work of breathing and no retractions     Cardiovascular:   regular rate and rhythm     Neurologic:   Motor Exam:  moves all extremities well and symmetrically     Neuropsychiatric:   Affect: normal          Data:   Epic reviewed.     Disposition:  Follow-up in 24 weeks or as needed.    Bandar Merrill MD  Internal Medicine  [facility] Team

## 2025-03-24 NOTE — ADDENDUM NOTE
Addended by: HAIR JEFFERSON on: 3/24/2025 05:19 AM     Modules accepted: Orders     Radiology     Dr Whatley from [facility] called IR Dr Jori Summers on Friday 3/19/21 regarding patient Mayra Torres. He wanted to know if Radiology would aspirate an enlarged ovarian cyst.     Mayra is undergoing infertility treatments with Clomid and has had a persistently enlarged ovarian cyst which has not decreased in size. She has had US done at the clinic and the size is ~ 4.5 x 5 x 3 cm. Per Dr Whatley these cysts usually go away in 1-2 months but it hasn't. Mayra can't proceed with further infertility treatments until the ovarian cyst is gone. He asked if IR would aspirate the cyst.     Dr Summers recommended a CT scan to evaluate further which was done at  today at 1330 which was reviewed by Dr Edvin Clarke and Dr Summers and approved with CT guidance.     Tejal  at the clinic was called at  and instructed how to order the aspiration. She was given centralized radiology scheduling's number to call to get fax # to send the order over. She will check to see if the ovarian fluid needs to be send for any diagnostic tests. Mayra will need a pre procedure physical and a .     Thanks [facility] Interventional Radiology CNP ([phone]) (phone [phone])

## 2025-04-13 ENCOUNTER — HEALTH MAINTENANCE LETTER (OUTPATIENT)
Age: 70
End: 2025-04-13

## 2025-05-22 ENCOUNTER — TELEPHONE (OUTPATIENT)
Dept: PHARMACY | Facility: OTHER | Age: 70
End: 2025-05-22
Payer: COMMERCIAL

## 2025-05-22 NOTE — TELEPHONE ENCOUNTER
[facility] Recruitment: [facility]  insurance     Referral outreach attempt #2 on May 22, 2025      Outcome: left voicemail- Call back number [phone]    Ethel Drummond [facility]  -[facility]  [phone]

## 2025-06-11 DIAGNOSIS — M25.551 RIGHT HIP PAIN: Primary | ICD-10-CM

## 2025-06-13 DIAGNOSIS — M17.0 ARTHRITIS OF BOTH KNEES: ICD-10-CM

## 2025-06-13 DIAGNOSIS — M51.362 DEGENERATION OF INTERVERTEBRAL DISC OF LUMBAR REGION WITH DISCOGENIC BACK PAIN AND LOWER EXTREMITY PAIN: ICD-10-CM

## 2025-06-13 SDOH — HEALTH STABILITY: PHYSICAL HEALTH: ON AVERAGE, HOW MANY MINUTES DO YOU ENGAGE IN EXERCISE AT THIS LEVEL?: 20 MIN

## 2025-06-13 SDOH — HEALTH STABILITY: PHYSICAL HEALTH: ON AVERAGE, HOW MANY DAYS PER WEEK DO YOU ENGAGE IN MODERATE TO STRENUOUS EXERCISE (LIKE A BRISK WALK)?: 2 DAYS

## 2025-06-17 RX ORDER — GABAPENTIN 300 MG/1
300 CAPSULE ORAL 3 TIMES DAILY
Qty: 270 CAPSULE | Refills: 1 | Status: SHIPPED | OUTPATIENT
Start: 2025-06-17

## 2025-06-18 ENCOUNTER — ANCILLARY PROCEDURE (OUTPATIENT)
Dept: GENERAL RADIOLOGY | Facility: CLINIC | Age: 70
End: 2025-06-18
Attending: PREVENTIVE MEDICINE
Payer: COMMERCIAL

## 2025-06-18 ENCOUNTER — OFFICE VISIT (OUTPATIENT)
Dept: ORTHOPEDICS | Facility: CLINIC | Age: 70
End: 2025-06-18
Attending: PREVENTIVE MEDICINE
Payer: COMMERCIAL

## 2025-06-18 DIAGNOSIS — M25.551 RIGHT HIP PAIN: ICD-10-CM

## 2025-06-18 DIAGNOSIS — M51.362 DEGENERATION OF INTERVERTEBRAL DISC OF LUMBAR REGION WITH DISCOGENIC BACK PAIN AND LOWER EXTREMITY PAIN: Primary | ICD-10-CM

## 2025-06-18 DIAGNOSIS — M51.16 LUMBAR DISC HERNIATION WITH RADICULOPATHY: ICD-10-CM

## 2025-06-18 PROCEDURE — 73522 X-RAY EXAM HIPS BI 3-4 VIEWS: CPT | Performed by: RADIOLOGY

## 2025-06-18 RX ORDER — CELECOXIB 100 MG/1
100 CAPSULE ORAL 2 TIMES DAILY PRN
Qty: 60 CAPSULE | Refills: 0 | Status: SHIPPED | OUTPATIENT
Start: 2025-06-18

## 2025-06-18 NOTE — LETTER
6/18/2025      Marlee Tobar  [address] [place] [address]      Dear Colleague,    Thank you for referring your patient, Marlee Tobar, to the [facility] [place]. Please see a copy of my visit note below.    HISTORY OF PRESENT ILLNESS  Ms. Tobar is a pleasant 69 year old year old female who presents to clinic today with the following:    What problem are you here for: Evaluation for left-sided low back pain and right ischial tuberosity pain. Symptoms will worsen when she is on her feet for an extended period of time.     How long have you had this problem: 2 months     Have you had any recent imaging of this problem? Xrays/MRI/CT scans:  - XR of bilateral hips taken at appointment today.   - MRI of lumbar spine completed on 2/10/2023    Have you had treatments for this problem in the past?  -Medications: Gabapentin daily and will Tylenol prn.   -Physical therapy: Stretches at home.  -Injections: No injections for her hips.   Lumbar NOAM 7/15/2023 at [facility] Radiology: she states this injection was for sciatic pain that radiated into her legs. This injection was helpful for those symptoms as symptoms presenting today feel different to her.   -Surgery: None     How severe is this problem today? 0-10 scale: 6/10 for left low back and 2/10 for right hip.     What do you think caused this problem: None     Does this problem or its symptoms cause difficulty for you falling asleep or staying asleep: She will have good and bad nights.       MEDICAL HISTORY  Patient Active Problem List   Diagnosis     iamJOINT PAIN-LOWER LEG     iamPOSTSURGICAL STATES NEC     CARDIOVASCULAR SCREENING; LDL GOAL LESS THAN 130     HTN, goal below 140/90     Gallstones     Cholelithiases     GERD (gastroesophageal reflux disease)     Varicose vein of leg     Dyspareunia     Microalbuminuria     Major depressive disorder, recurrent episode, in partial remission     Situational anxiety     Overweight      S/P gastric bypass     H/O gastrointestinal hemorrhage     Personal history of PE (pulmonary embolism)     Insomnia, unspecified type     Pulmonary nodules     Polyarthralgia     Positive TIFFANI (antinuclear antibody)     Overactive bladder     Bilateral lumbar radiculopathy     Anterolisthesis of lumbar spine     Lumbar facet arthropathy     Neural foraminal stenosis of lumbar spine     Idiopathic peripheral neuropathy     Chronic left-sided low back pain without sciatica     Multiple vitamin deficiency disease     Trigger point of left side of body     Agatston CAC score, <100     Dilatation of thoracic aorta     NEETA (generalized anxiety disorder)     Vaginal atrophy     Dyspareunia in female     Female stress incontinence     Idiopathic small fiber sensory neuropathy     Age-related osteoporosis without current pathological fracture       Current Outpatient Medications   Medication Sig Dispense Refill     ALPRAZolam (XANAX) 0.25 MG tablet Take 1 tablet (0.25 mg) by mouth 3 times daily as needed for anxiety. T 30 tablet 1     amLODIPine (NORVASC) 5 MG tablet Take 1 tablet (5 mg) by mouth daily. 90 tablet 3     Ascorbic Acid (VITAMIN C PO)        ASPIRIN NOT PRESCRIBED (INTENTIONAL) Please choose reason not prescribed from choices below. (Patient not taking: Reported on 5/23/2025)       atorvastatin (LIPITOR) 20 MG tablet Take 1 tablet (20 mg) by mouth daily. 90 tablet 2     Biotin 5000 MCG CAPS Take 1 tablet by mouth daily.       bisoprolol-hydrochlorothiazide (ZIAC) 10-6.25 MG tablet TAKE 1 TABLET BY MOUTH EVERY MORNING 90 tablet 3     buPROPion (WELLBUTRIN XL) 150 MG 24 hr tablet Take 1 tablet (150 mg) by mouth every morning. 90 tablet 3     CALCIUM CITRATE PO Take 3 capsules by mouth 3 times daily       celecoxib (CELEBREX) 100 MG capsule TAKE ONE CAPSULE BY MOUTH TWICE DAILY (Patient not taking: Reported on 5/23/2025) 60 capsule 0     Cholecalciferol (VITAMIN D PO)        diazepam (VALIUM) 5 MG tablet Take 1  tablet (5 mg) by mouth once for 1 dose (Patient not taking: Reported on 5/23/2025) 1 tablet 0     esomeprazole (NEXIUM) 40 MG DR capsule Take 1 capsule (40 mg) by mouth every morning (before breakfast). Take 30-60 minutes before eating. 90 capsule 3     estradiol (ESTRACE) 0.1 MG/GM vaginal cream Apply night for 2 weeks and then decrease to twice weekly application. 42.5 g 1     famotidine (PEPCID) 20 MG tablet Take 1 tablet before lunch and 1 tablet before supper. 180 tablet 3     gabapentin (NEURONTIN) 100 MG capsule Take 1 capsule (100 mg) by mouth 3 times daily. 270 capsule 1     gabapentin (NEURONTIN) 300 MG capsule TAKE 1 CAPSULE BY MOUTH 3 TIMES DAILY 270 capsule 1     Glucosamine-Chondroit-Vit C-Mn (GLUCOSAMINE CHONDR 1500 COMPLX PO) Take 1 tablet by mouth daily       Iron-vit C-vit B12-folic acid 100-250-0.025-1 MG TABS Take  by mouth.       Multiple Vitamins-Minerals (MULTIVITAL) TABS Take 2 tablets by mouth daily.       telmisartan (MICARDIS) 40 MG tablet Take 1 tablet (40 mg) by mouth every morning. 90 tablet 2       Allergies   Allergen Reactions     Doxycycline Rash     Contrast Dye      Happened when patient was ~ 18 years ago during a test for 'kidneys'.  Patient thinks she might have developed a rash, couldn't remember.     Diatrizoate Itching     Iodine      Vitamin K Swelling     facial       Family History   Problem Relation Age of Onset     Hypertension Mother      Arthritis Mother      Cancer Mother         Hodgkins disease     Obesity Mother      Cerebrovascular Disease Father      Alcohol/Drug Father         recovered alcoholic     Alcohol/Drug Sister      Gynecology Sister         history of abnormal paps--LEEPs done     Lipids Sister      Thyroid Disease Sister      Cardiovascular Sister         mitral valve prolapse     Alcohol/Drug Son         alcoholic     Depression Son      Hypertension Son      Psychotic Disorder Son      Hypertension Brother      Cardiovascular Brother      Heart  Disease Brother 58        MI     Obesity Brother      Genitourinary Problems Daughter      Gynecology Daughter         Protein S deficiency     Unknown/Adopted Maternal Grandfather      Unknown/Adopted Paternal Grandmother      Unknown/Adopted Paternal Grandfather      Glaucoma No family hx of      Macular Degeneration No family hx of      Social History     Socioeconomic History     Marital status:    Occupational History     Occupation: Full time   Tobacco Use     Smoking status: Never     Passive exposure: Never     Smokeless tobacco: Never   Vaping Use     Vaping status: Never Used   Substance and Sexual Activity     Alcohol use: Yes     Comment: Infrequently     Drug use: No     Sexual activity: Yes     Partners: Male     Birth control/protection: None     Comment: postmenopausal   Other Topics Concern     Parent/sibling w/ CABG, MI or angioplasty before 65F 55M? Yes     Comment: Brother 55     Social Drivers of Health     Financial Resource Strain: Low Risk  (11/12/2024)    Financial Resource Strain      Within the past 12 months, have you or your family members you live with been unable to get utilities (heat, electricity) when it was really needed?: No   Food Insecurity: Low Risk  (11/12/2024)    Food Insecurity      Within the past 12 months, did you worry that your food would run out before you got money to buy more?: No      Within the past 12 months, did the food you bought just not last and you didn t have money to get more?: No   Transportation Needs: Low Risk  (11/12/2024)    Transportation Needs      Within the past 12 months, has lack of transportation kept you from medical appointments, getting your medicines, non-medical meetings or appointments, work, or from getting things that you need?: No   Physical Activity: Insufficiently Active (6/13/2025)    Exercise Vital Sign      Days of Exercise per Week: 2 days      Minutes of Exercise per Session: 20 min   Stress: No Stress Concern Present  (11/12/2024)    [employer] - Occupational Stress Questionnaire      Feeling of Stress : Only a little   Recent Concern: Stress - Stress Concern Present (8/27/2024)    [employer] - Occupational Stress Questionnaire      Feeling of Stress : Rather much   Social Connections: Unknown (11/12/2024)    Social Connection and Isolation Panel [NHANES]      Frequency of Social Gatherings with Friends and Family: Twice a week   Interpersonal Safety: Low Risk  (3/13/2025)    Interpersonal Safety      Do you feel physically and emotionally safe where you currently live?: Yes      Within the past 12 months, have you been hit, slapped, kicked or otherwise physically hurt by someone?: No      Within the past 12 months, have you been humiliated or emotionally abused in other ways by your partner or ex-partner?: No   Housing Stability: High Risk (11/12/2024)    Housing Stability      Do you have housing? : No      Are you worried about losing your housing?: No       Additional medical/Social/Surgical histories reviewed in [facility] and updated as appropriate.     REVIEW OF SYSTEMS (6/18/2025)  10 point ROS of systems including Constitutional, Eyes, Respiratory, Cardiovascular, Gastroenterology, Genitourinary, Integumentary, Musculoskeletal, Psychiatric, Allergic/Immunologic were all negative except for pertinent positives noted in my HPI.     PHYSICAL EXAM  VSS  Vital Signs: LMP 06/27/2010 (Exact Date)  Patient declined being weighed. There is no height or weight on file to calculate BMI.    General  - normal appearance, in no obvious distress  HEENT  - conjunctivae not injected, moist mucous membranes, normocephalic/atraumatic head, ears normal appearance, no lesions, mouth normal appearance, no scars, normal dentition and teeth present  CV  - normal peripheral perfusion  Pulm  - normal respiratory pattern, non-labored  Musculoskeletal - lumbar spine  - stance: normal gait  without limp, no obvious leg length discrepancy, normal heel and toe walk  - inspection: normal bone and joint alignment, no obvious scoliosis  - palpation: no paravertebral or bony tenderness  - ROM: flexion exacerbates pain, normal extension, sidebending, rotation  - strength: lower extremities 5/5 in all planes  - special tests:  (+) straight leg raise- left low back  (+) slump test  Neuro  - patellar and Achilles DTRs 2+ bilaterally, no lower extremity sensory deficit throughout L5 distribution, grossly normal coordination, normal muscle tone  Skin  - no ecchymosis, erythema, warmth, or induration, no obvious rash  Psych  - interactive, appropriate, normal mood and affect  Bilateral hips: has some discomfort with left hip external rotation in posterior hip/low back, but no pain with ROM testing in groin either hip    ASSESSMENT & PLAN  70 yo female with low back pain, and posterior hip pain bilaterally, worse on left that radiates into left buttocks due to lumbar ddd, dis cherniations  I independently reviewed the following imaging studies:  Lumbar MRI from 2023 shows ddd, disc herniations  Discussed and ordered lumbar MRI  Cont. HEP  Rx given for celebrex  And cont. Gabapentin  Followup after MRI and plan to discuss PT vs. NOAM order    Patient has been doing home exercise physical therapy program for this problem      Gray Rome MD, CAQSM      Again, thank you for allowing me to participate in the care of your patient.        Sincerely,        Gray Rome MD    Electronically signed

## 2025-06-18 NOTE — PROGRESS NOTES
HISTORY OF PRESENT ILLNESS  Ms. Tobar is a pleasant 69 year old year old female who presents to clinic today with the following:    What problem are you here for: Evaluation for left-sided low back pain and right ischial tuberosity pain. Symptoms will worsen when she is on her feet for an extended period of time.     How long have you had this problem: 2 months     Have you had any recent imaging of this problem? Xrays/MRI/CT scans:  - XR of bilateral hips taken at appointment today.   - MRI of lumbar spine completed on 2/10/2023    Have you had treatments for this problem in the past?  -Medications: Gabapentin daily and will Tylenol prn.   -Physical therapy: Stretches at home.  -Injections: No injections for her hips.   Lumbar NOAM 7/15/2023 at [email or web address] Radiology: she states this injection was for sciatic pain that radiated into her legs. This injection was helpful for those symptoms as symptoms presenting today feel different to her.   -Surgery: None     How severe is this problem today? 0-10 scale: 6/10 for left low back and 2/10 for right hip.     What do you think caused this problem: None     Does this problem or its symptoms cause difficulty for you falling asleep or staying asleep: She will have good and bad nights.       MEDICAL HISTORY  Patient Active Problem List   Diagnosis    iamJOINT PAIN-LOWER LEG    iamPOSTSURGICAL STATES NEC    CARDIOVASCULAR SCREENING; LDL GOAL LESS THAN 130    HTN, goal below 140/90    Gallstones    Cholelithiases    GERD (gastroesophageal reflux disease)    Varicose vein of leg    Dyspareunia    Microalbuminuria    Major depressive disorder, recurrent episode, in partial remission    Situational anxiety    Overweight    S/P gastric bypass    H/O gastrointestinal hemorrhage    Personal history of PE (pulmonary embolism)    Insomnia, unspecified type    Pulmonary nodules    Polyarthralgia    Positive TIFFANI (antinuclear antibody)    Overactive bladder    Bilateral lumbar radiculopathy     Anterolisthesis of lumbar spine    Lumbar facet arthropathy    Neural foraminal stenosis of lumbar spine    Idiopathic peripheral neuropathy    Chronic left-sided low back pain without sciatica    Multiple vitamin deficiency disease    Trigger point of left side of body    Agatston CAC score, <100    Dilatation of thoracic aorta    NEETA (generalized anxiety disorder)    Vaginal atrophy    Dyspareunia in female    Female stress incontinence    Idiopathic small fiber sensory neuropathy    Age-related osteoporosis without current pathological fracture       Current Outpatient Medications   Medication Sig Dispense Refill    ALPRAZolam (XANAX) 0.25 MG tablet Take 1 tablet (0.25 mg) by mouth 3 times daily as needed for anxiety. T 30 tablet 1    amLODIPine (NORVASC) 5 MG tablet Take 1 tablet (5 mg) by mouth daily. 90 tablet 3    Ascorbic Acid (VITAMIN C PO)       ASPIRIN NOT PRESCRIBED (INTENTIONAL) Please choose reason not prescribed from choices below. (Patient not taking: Reported on 5/23/2025)      atorvastatin (LIPITOR) 20 MG tablet Take 1 tablet (20 mg) by mouth daily. 90 tablet 2    Biotin 5000 MCG CAPS Take 1 tablet by mouth daily.      bisoprolol-hydrochlorothiazide (ZIAC) 10-6.25 MG tablet TAKE 1 TABLET BY MOUTH EVERY MORNING 90 tablet 3    buPROPion (WELLBUTRIN XL) 150 MG 24 hr tablet Take 1 tablet (150 mg) by mouth every morning. 90 tablet 3    CALCIUM CITRATE PO Take 3 capsules by mouth 3 times daily      celecoxib (CELEBREX) 100 MG capsule TAKE ONE CAPSULE BY MOUTH TWICE DAILY (Patient not taking: Reported on 5/23/2025) 60 capsule 0    Cholecalciferol (VITAMIN D PO)       diazepam (VALIUM) 5 MG tablet Take 1 tablet (5 mg) by mouth once for 1 dose (Patient not taking: Reported on 5/23/2025) 1 tablet 0    esomeprazole (NEXIUM) 40 MG DR capsule Take 1 capsule (40 mg) by mouth every morning (before breakfast). Take 30-60 minutes before eating. 90 capsule 3    estradiol (ESTRACE) 0.1 MG/GM vaginal cream Apply  night for 2 weeks and then decrease to twice weekly application. 42.5 g 1    famotidine (PEPCID) 20 MG tablet Take 1 tablet before lunch and 1 tablet before supper. 180 tablet 3    gabapentin (NEURONTIN) 100 MG capsule Take 1 capsule (100 mg) by mouth 3 times daily. 270 capsule 1    gabapentin (NEURONTIN) 300 MG capsule TAKE 1 CAPSULE BY MOUTH 3 TIMES DAILY 270 capsule 1    Glucosamine-Chondroit-Vit C-Mn (GLUCOSAMINE CHONDR 1500 COMPLX PO) Take 1 tablet by mouth daily      Iron-vit C-vit B12-folic acid 100-250-0.025-1 MG TABS Take  by mouth.      Multiple Vitamins-Minerals (MULTIVITAL) TABS Take 2 tablets by mouth daily.      telmisartan (MICARDIS) 40 MG tablet Take 1 tablet (40 mg) by mouth every morning. 90 tablet 2       Allergies   Allergen Reactions    Doxycycline Rash    Contrast Dye      Happened when patient was ~ 20 years ago during a test for 'kidneys'.  Patient thinks she might have developed a rash, couldn't remember.    Diatrizoate Itching    Iodine     Vitamin K Swelling     facial       Family History   Problem Relation Age of Onset    Hypertension Mother     Arthritis Mother     Cancer Mother         Hodgkins disease    Obesity Mother     Cerebrovascular Disease Father     Alcohol/Drug Father         recovered alcoholic    Alcohol/Drug Sister     Gynecology Sister         history of abnormal paps--LEEPs done    Lipids Sister     Thyroid Disease Sister     Cardiovascular Sister         mitral valve prolapse    Alcohol/Drug Son         alcoholic    Depression Son     Hypertension Son     Psychotic Disorder Son     Hypertension Brother     Cardiovascular Brother     Heart Disease Brother 58        MI    Obesity Brother     Genitourinary Problems Daughter     Gynecology Daughter         Protein S deficiency    Unknown/Adopted Maternal Grandfather     Unknown/Adopted Paternal Grandmother     Unknown/Adopted Paternal Grandfather     Glaucoma No family hx of     Macular Degeneration No family hx of       Social History     Socioeconomic History    Marital status:    Occupational History    Occupation: Full time   Tobacco Use    Smoking status: Never     Passive exposure: Never    Smokeless tobacco: Never   Vaping Use    Vaping status: Never Used   Substance and Sexual Activity    Alcohol use: Yes     Comment: Infrequently    Drug use: No    Sexual activity: Yes     Partners: Male     Birth control/protection: None     Comment: postmenopausal   Other Topics Concern    Parent/sibling w/ CABG, MI or angioplasty before 65F 55M? Yes     Comment: Brother 55     Social Drivers of Health     Financial Resource Strain: Low Risk  (11/12/2024)    Financial Resource Strain     Within the past 12 months, have you or your family members you live with been unable to get utilities (heat, electricity) when it was really needed?: No   Food Insecurity: Low Risk  (11/12/2024)    Food Insecurity     Within the past 12 months, did you worry that your food would run out before you got money to buy more?: No     Within the past 12 months, did the food you bought just not last and you didn t have money to get more?: No   Transportation Needs: Low Risk  (11/12/2024)    Transportation Needs     Within the past 12 months, has lack of transportation kept you from medical appointments, getting your medicines, non-medical meetings or appointments, work, or from getting things that you need?: No   Physical Activity: Insufficiently Active (6/13/2025)    Exercise Vital Sign     Days of Exercise per Week: 2 days     Minutes of Exercise per Session: 20 min   Stress: No Stress Concern Present (11/12/2024)    [employer] - Occupational Stress Questionnaire     Feeling of Stress : Only a little   Recent Concern: Stress - Stress Concern Present (8/27/2024)    [employer] - Occupational Stress Questionnaire     Feeling of Stress : Rather much   Social Connections: Unknown (11/12/2024)     Social Connection and Isolation Panel [NHANES]     Frequency of Social Gatherings with Friends and Family: Twice a week   Interpersonal Safety: Low Risk  (3/13/2025)    Interpersonal Safety     Do you feel physically and emotionally safe where you currently live?: Yes     Within the past 12 months, have you been hit, slapped, kicked or otherwise physically hurt by someone?: No     Within the past 12 months, have you been humiliated or emotionally abused in other ways by your partner or ex-partner?: No   Housing Stability: High Risk (11/12/2024)    Housing Stability     Do you have housing? : No     Are you worried about losing your housing?: No       Additional medical/Social/Surgical histories reviewed in [facility] and updated as appropriate.     REVIEW OF SYSTEMS (6/18/2025)  10 point ROS of systems including Constitutional, Eyes, Respiratory, Cardiovascular, Gastroenterology, Genitourinary, Integumentary, Musculoskeletal, Psychiatric, Allergic/Immunologic were all negative except for pertinent positives noted in my HPI.     PHYSICAL EXAM  VSS  Vital Signs: LMP 06/27/2010 (Exact Date)  Patient declined being weighed. There is no height or weight on file to calculate BMI.    General  - normal appearance, in no obvious distress  HEENT  - conjunctivae not injected, moist mucous membranes, normocephalic/atraumatic head, ears normal appearance, no lesions, mouth normal appearance, no scars, normal dentition and teeth present  CV  - normal peripheral perfusion  Pulm  - normal respiratory pattern, non-labored  Musculoskeletal - lumbar spine  - stance: normal gait without limp, no obvious leg length discrepancy, normal heel and toe walk  - inspection: normal bone and joint alignment, no obvious scoliosis  - palpation: no paravertebral or bony tenderness  - ROM: flexion exacerbates pain, normal extension, sidebending, rotation  - strength: lower extremities 5/5 in all planes  - special tests:  (+) straight leg raise- left low  back  (+) slump test  Neuro  - patellar and Achilles DTRs 2+ bilaterally, no lower extremity sensory deficit throughout L5 distribution, grossly normal coordination, normal muscle tone  Skin  - no ecchymosis, erythema, warmth, or induration, no obvious rash  Psych  - interactive, appropriate, normal mood and affect  Bilateral hips: has some discomfort with left hip external rotation in posterior hip/low back, but no pain with ROM testing in groin either hip    ASSESSMENT & PLAN  70 yo female with low back pain, and posterior hip pain bilaterally, worse on left that radiates into left buttocks due to lumbar ddd, dis cherniations  I independently reviewed the following imaging studies:  Lumbar MRI from 2023 shows ddd, disc herniations  Discussed and ordered lumbar MRI  Cont. HEP  Rx given for celebrex  And cont. Gabapentin  Followup after MRI and plan to discuss PT vs. NOAM order    Patient has been doing home exercise physical therapy program for this problem      Gray Rome MD, CAQSM

## 2025-06-23 ENCOUNTER — TELEPHONE (OUTPATIENT)
Dept: PHARMACY | Facility: OTHER | Age: 70
End: 2025-06-23

## 2025-06-23 ENCOUNTER — OFFICE VISIT (OUTPATIENT)
Dept: CARDIOLOGY | Facility: CLINIC | Age: 70
End: 2025-06-23
Payer: COMMERCIAL

## 2025-06-23 VITALS
BODY MASS INDEX: 26.68 KG/M2 | OXYGEN SATURATION: 99 % | WEIGHT: 170 LBS | DIASTOLIC BLOOD PRESSURE: 88 MMHG | HEART RATE: 63 BPM | RESPIRATION RATE: 18 BRPM | SYSTOLIC BLOOD PRESSURE: 136 MMHG | HEIGHT: 67 IN

## 2025-06-23 DIAGNOSIS — I10 HYPERTENSION GOAL BP (BLOOD PRESSURE) < 130/80: ICD-10-CM

## 2025-06-23 DIAGNOSIS — I25.10 CORONARY ARTERY DISEASE INVOLVING NATIVE CORONARY ARTERY OF NATIVE HEART WITHOUT ANGINA PECTORIS: ICD-10-CM

## 2025-06-23 DIAGNOSIS — R45.89 ANXIETY ABOUT HEALTH: Primary | ICD-10-CM

## 2025-06-23 DIAGNOSIS — E78.00 HYPERCHOLESTEROLEMIA: Primary | ICD-10-CM

## 2025-06-23 PROCEDURE — 3075F SYST BP GE 130 - 139MM HG: CPT | Performed by: INTERNAL MEDICINE

## 2025-06-23 PROCEDURE — 99214 OFFICE O/P EST MOD 30 MIN: CPT | Performed by: INTERNAL MEDICINE

## 2025-06-23 PROCEDURE — 3079F DIAST BP 80-89 MM HG: CPT | Performed by: INTERNAL MEDICINE

## 2025-06-23 PROCEDURE — 1125F AMNT PAIN NOTED PAIN PRSNT: CPT | Performed by: INTERNAL MEDICINE

## 2025-06-23 RX ORDER — DIAZEPAM 5 MG/1
5 TABLET ORAL
Qty: 1 TABLET | Refills: 0 | Status: SHIPPED | OUTPATIENT
Start: 2025-06-23

## 2025-06-23 ASSESSMENT — PAIN SCALES - GENERAL: PAINLEVEL_OUTOF10: MODERATE PAIN (5)

## 2025-06-23 NOTE — TELEPHONE ENCOUNTER
[facility] Recruitment: [facility]  insurance     Referral outreach attempt #3 on June 23, 2025      Outcome: patient opted out    Ethel Drummond [facility]  -[facility]  [phone]
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
## 2025-07-09 SDOH — HEALTH STABILITY: PHYSICAL HEALTH: ON AVERAGE, HOW MANY MINUTES DO YOU ENGAGE IN EXERCISE AT THIS LEVEL?: 30 MIN

## 2025-07-09 SDOH — HEALTH STABILITY: PHYSICAL HEALTH: ON AVERAGE, HOW MANY DAYS PER WEEK DO YOU ENGAGE IN MODERATE TO STRENUOUS EXERCISE (LIKE A BRISK WALK)?: 3 DAYS

## 2025-07-10 ENCOUNTER — ANCILLARY PROCEDURE (OUTPATIENT)
Dept: MRI IMAGING | Facility: CLINIC | Age: 70
End: 2025-07-10
Attending: PREVENTIVE MEDICINE
Payer: COMMERCIAL

## 2025-07-10 DIAGNOSIS — M51.16 LUMBAR DISC HERNIATION WITH RADICULOPATHY: ICD-10-CM

## 2025-07-10 DIAGNOSIS — M51.362 DEGENERATION OF INTERVERTEBRAL DISC OF LUMBAR REGION WITH DISCOGENIC BACK PAIN AND LOWER EXTREMITY PAIN: ICD-10-CM

## 2025-07-10 PROCEDURE — 72148 MRI LUMBAR SPINE W/O DYE: CPT | Mod: GC | Performed by: RADIOLOGY

## 2025-07-14 ENCOUNTER — VIRTUAL VISIT (OUTPATIENT)
Dept: ORTHOPEDICS | Facility: CLINIC | Age: 70
End: 2025-07-14
Attending: PREVENTIVE MEDICINE
Payer: COMMERCIAL

## 2025-07-14 DIAGNOSIS — Z91.041 CONTRAST MEDIA ALLERGY: ICD-10-CM

## 2025-07-14 DIAGNOSIS — M51.362 DEGENERATION OF INTERVERTEBRAL DISC OF LUMBAR REGION WITH DISCOGENIC BACK PAIN AND LOWER EXTREMITY PAIN: Primary | ICD-10-CM

## 2025-07-14 DIAGNOSIS — M51.16 LUMBAR DISC HERNIATION WITH RADICULOPATHY: ICD-10-CM

## 2025-07-14 PROCEDURE — 98014 SYNCH AUDIO-ONLY EST MOD 30: CPT | Performed by: PREVENTIVE MEDICINE

## 2025-07-14 RX ORDER — CELECOXIB 100 MG/1
100 CAPSULE ORAL 2 TIMES DAILY PRN
Qty: 180 CAPSULE | Refills: 0 | Status: SHIPPED | OUTPATIENT
Start: 2025-07-14

## 2025-07-14 RX ORDER — METHYLPREDNISOLONE 16 MG/1
TABLET ORAL
Qty: 4 TABLET | Refills: 0 | Status: SHIPPED | OUTPATIENT
Start: 2025-07-14

## 2025-07-14 NOTE — LETTER
7/14/2025      Marlee Tobar  [address] [facility] [address]      Dear Colleague,    Thank you for referring your patient, Marlee Tobar, to the [facility] [place]. Please see a copy of my visit note below.    Camille is a 70 year old who is being evaluated via a billable telephone visit.    What phone number would you like to be contacted at? listed  How would you like to obtain your AVS? Soni  Originating Location (pt. Location): Home    Distant Location (provider location):  On-site  Telephone visit completed due to the patient did not consent to a video visit.  Phone call duration: 20 minutes  Patient is a  70   year old who is being evaluated via a billable telephone visit.      What phone number would you like to be contacted at? CELL  How would you like to obtain your AVS? KASIEHAREVANGELINA        Subjective   Patient is a   70  year old who presents by phone call visit for the following:     HPI   Followup for low back pain and radiculopathy  after havingj lumbar MRI  Celebrex is helping  Wants to discuss ordering NOAM and refill celebrex and ordering PT      Review of Systems   Constitutional, HEENT, cardiovascular, pulmonary, gi and gu systems are negative, except as otherwise noted.      Objective           Vitals:  No vitals were obtained today due to virtual visit.    Physical Exam   healthy, alert, and no distress  PSYCH: Alert and oriented times 3; coherent speech, normal   rate and volume, able to articulate logical thoughts, able   to abstract reason, no tangential thoughts, no hallucinations   or delusions  His affect is normal  RESP: No cough, no audible wheezing, able to talk in full sentences  Remainder of exam unable to be completed due to telephone visits    Assessment/Plan  71 yo female with lumbar ddd, disc herniations, ongoing low back pain with radicular symptoms not resolved      I independently reviewed the following imaging studies and discussed with  patient:  Lumbar MRI shows ddd, disc herniations  Discussed and ordered NOAM  And rx refilled for celebrex and ordered PT  Followup after NOAM          Phone call duration: 20 minutes  Phone call start: 800am  Phone call end: 820am  Dr Rome      Again, thank you for allowing me to participate in the care of your patient.        Sincerely,        Gray Rome MD    Electronically signed

## 2025-07-14 NOTE — LETTER
7/14/2025      Marlee Tobar  [id number] [facility] [address]      Dear Colleague,    Thank you for referring your patient, Marlee Tobar, to the [facility] [place]. Please see a copy of my visit note below.    Camille is a 70 year old who is being evaluated via a billable telephone visit.    What phone number would you like to be contacted at? listed  How would you like to obtain your AVS? Soni  Originating Location (pt. Location): Home    Distant Location (provider location):  On-site  Telephone visit completed due to the patient did not consent to a video visit.  Phone call duration: 20 minutes  Patient is a  70   year old who is being evaluated via a billable telephone visit.      What phone number would you like to be contacted at? CELL  How would you like to obtain your AVS? KASIEHAREVANGELINA        Subjective   Patient is a   70  year old who presents by phone call visit for the following:     HPI   Followup for low back pain and radiculopathy  after havingj lumbar MRI  Celebrex is helping  Wants to discuss ordering NOAM and refill celebrex and ordering PT      Review of Systems   Constitutional, HEENT, cardiovascular, pulmonary, gi and gu systems are negative, except as otherwise noted.      Objective           Vitals:  No vitals were obtained today due to virtual visit.    Physical Exam   healthy, alert, and no distress  PSYCH: Alert and oriented times 3; coherent speech, normal   rate and volume, able to articulate logical thoughts, able   to abstract reason, no tangential thoughts, no hallucinations   or delusions  His affect is normal  RESP: No cough, no audible wheezing, able to talk in full sentences  Remainder of exam unable to be completed due to telephone visits    Assessment/Plan  69 yo female with lumbar ddd, disc herniations, ongoing low back pain with radicular symptoms not resolved      I independently reviewed the following imaging studies and discussed with  patient:  Lumbar MRI shows ddd, disc herniations  Discussed and ordered NOAM  And rx refilled for celebrex and ordered PT  Followup after NOAM          Phone call duration: 20 minutes  Phone call start: 800am  Phone call end: 820am  Dr Rome      Again, thank you for allowing me to participate in the care of your patient.        Sincerely,        Gray Rome MD    Electronically signed

## 2025-07-14 NOTE — PROGRESS NOTES
Camille is a 70 year old who is being evaluated via a billable telephone visit.    What phone number would you like to be contacted at? listed  How would you like to obtain your AVS? Joe  Originating Location (pt. Location): Home    Distant Location (provider location):  On-site  Telephone visit completed due to the patient did not consent to a video visit.  Phone call duration: 20 minutes  Patient is a  70   year old who is being evaluated via a billable telephone visit.      What phone number would you like to be contacted at? CELL  How would you like to obtain your AVS? JOE        Subjective   Patient is a   70  year old who presents by phone call visit for the following:     HPI   Followup for low back pain and radiculopathy  after havingj lumbar MRI  Celebrex is helping  Wants to discuss ordering NOAM and refill celebrex and ordering PT      Review of Systems   Constitutional, HEENT, cardiovascular, pulmonary, gi and gu systems are negative, except as otherwise noted.      Objective           Vitals:  No vitals were obtained today due to virtual visit.    Physical Exam   healthy, alert, and no distress  PSYCH: Alert and oriented times 3; coherent speech, normal   rate and volume, able to articulate logical thoughts, able   to abstract reason, no tangential thoughts, no hallucinations   or delusions  His affect is normal  RESP: No cough, no audible wheezing, able to talk in full sentences  Remainder of exam unable to be completed due to telephone visits    Assessment/Plan  69 yo female with lumbar ddd, disc herniations, ongoing low back pain with radicular symptoms not resolved      I independently reviewed the following imaging studies and discussed with patient:  Lumbar MRI shows ddd, disc herniations  Discussed and ordered NOAM  And rx refilled for celebrex and ordered PT  Followup after NOAM          Phone call duration: 20 minutes  Phone call start: 800am  Phone call end: 820am  Dr Rome

## 2025-08-06 ENCOUNTER — OFFICE VISIT (OUTPATIENT)
Dept: FAMILY MEDICINE | Facility: CLINIC | Age: 70
End: 2025-08-06
Payer: COMMERCIAL

## 2025-08-06 VITALS
SYSTOLIC BLOOD PRESSURE: 128 MMHG | OXYGEN SATURATION: 99 % | HEART RATE: 59 BPM | WEIGHT: 174 LBS | TEMPERATURE: 97 F | BODY MASS INDEX: 27.97 KG/M2 | HEIGHT: 66 IN | RESPIRATION RATE: 16 BRPM | DIASTOLIC BLOOD PRESSURE: 78 MMHG

## 2025-08-06 DIAGNOSIS — R68.84 JAW PAIN: Primary | ICD-10-CM

## 2025-08-06 DIAGNOSIS — Z79.899 MEDICATION MANAGEMENT: ICD-10-CM

## 2025-08-06 DIAGNOSIS — Z23 COVID-19 VACCINE ADMINISTERED: ICD-10-CM

## 2025-08-06 LAB
ANION GAP SERPL CALCULATED.3IONS-SCNC: 9 MMOL/L (ref 7–15)
BASOPHILS # BLD AUTO: 0 10E3/UL (ref 0–0.2)
BASOPHILS NFR BLD AUTO: 1 %
BUN SERPL-MCNC: 13.6 MG/DL (ref 8–23)
CALCIUM SERPL-MCNC: 9.5 MG/DL (ref 8.8–10.4)
CHLORIDE SERPL-SCNC: 106 MMOL/L (ref 98–107)
CREAT SERPL-MCNC: 0.84 MG/DL (ref 0.51–0.95)
CREAT UR-MCNC: 55.8 MG/DL
CRP SERPL-MCNC: <3 MG/L
EGFRCR SERPLBLD CKD-EPI 2021: 75 ML/MIN/1.73M2
EOSINOPHIL # BLD AUTO: 0.1 10E3/UL (ref 0–0.7)
EOSINOPHIL NFR BLD AUTO: 3 %
ERYTHROCYTE [DISTWIDTH] IN BLOOD BY AUTOMATED COUNT: 13 % (ref 10–15)
ERYTHROCYTE [SEDIMENTATION RATE] IN BLOOD BY WESTERGREN METHOD: 10 MM/HR (ref 0–30)
GLUCOSE SERPL-MCNC: 79 MG/DL (ref 70–99)
HCO3 SERPL-SCNC: 27 MMOL/L (ref 22–29)
HCT VFR BLD AUTO: 37.2 % (ref 35–47)
HGB BLD-MCNC: 12.3 G/DL (ref 11.7–15.7)
IMM GRANULOCYTES # BLD: 0 10E3/UL
IMM GRANULOCYTES NFR BLD: 0 %
LYMPHOCYTES # BLD AUTO: 1.6 10E3/UL (ref 0.8–5.3)
LYMPHOCYTES NFR BLD AUTO: 31 %
MCH RBC QN AUTO: 31.4 PG (ref 26.5–33)
MCHC RBC AUTO-ENTMCNC: 33.1 G/DL (ref 31.5–36.5)
MCV RBC AUTO: 95 FL (ref 78–100)
MICROALBUMIN UR-MCNC: <12 MG/L
MICROALBUMIN/CREAT UR: NORMAL MG/G{CREAT}
MONOCYTES # BLD AUTO: 0.6 10E3/UL (ref 0–1.3)
MONOCYTES NFR BLD AUTO: 11 %
NEUTROPHILS # BLD AUTO: 2.9 10E3/UL (ref 1.6–8.3)
NEUTROPHILS NFR BLD AUTO: 55 %
PLATELET # BLD AUTO: 180 10E3/UL (ref 150–450)
POTASSIUM SERPL-SCNC: 4.4 MMOL/L (ref 3.4–5.3)
RBC # BLD AUTO: 3.92 10E6/UL (ref 3.8–5.2)
SODIUM SERPL-SCNC: 142 MMOL/L (ref 135–145)
WBC # BLD AUTO: 5.3 10E3/UL (ref 4–11)

## 2025-08-06 PROCEDURE — 99213 OFFICE O/P EST LOW 20 MIN: CPT | Mod: 25 | Performed by: FAMILY MEDICINE

## 2025-08-06 PROCEDURE — 85652 RBC SED RATE AUTOMATED: CPT | Performed by: FAMILY MEDICINE

## 2025-08-06 PROCEDURE — 90480 ADMN SARSCOV2 VAC 1/ONLY CMP: CPT | Performed by: FAMILY MEDICINE

## 2025-08-06 PROCEDURE — 91320 SARSCV2 VAC 30MCG TRS-SUC IM: CPT | Performed by: FAMILY MEDICINE

## 2025-08-06 PROCEDURE — 82570 ASSAY OF URINE CREATININE: CPT | Performed by: FAMILY MEDICINE

## 2025-08-06 PROCEDURE — G2211 COMPLEX E/M VISIT ADD ON: HCPCS | Performed by: FAMILY MEDICINE

## 2025-08-06 PROCEDURE — 85025 COMPLETE CBC W/AUTO DIFF WBC: CPT | Performed by: FAMILY MEDICINE

## 2025-08-06 PROCEDURE — 82043 UR ALBUMIN QUANTITATIVE: CPT | Performed by: FAMILY MEDICINE

## 2025-08-06 PROCEDURE — 36415 COLL VENOUS BLD VENIPUNCTURE: CPT | Performed by: FAMILY MEDICINE

## 2025-08-06 PROCEDURE — 3078F DIAST BP <80 MM HG: CPT | Performed by: FAMILY MEDICINE

## 2025-08-06 PROCEDURE — 86140 C-REACTIVE PROTEIN: CPT | Performed by: FAMILY MEDICINE

## 2025-08-06 PROCEDURE — 3074F SYST BP LT 130 MM HG: CPT | Performed by: FAMILY MEDICINE

## 2025-08-06 PROCEDURE — 80048 BASIC METABOLIC PNL TOTAL CA: CPT | Performed by: FAMILY MEDICINE

## 2025-08-06 PROCEDURE — 1125F AMNT PAIN NOTED PAIN PRSNT: CPT | Performed by: FAMILY MEDICINE

## 2025-08-06 ASSESSMENT — PAIN SCALES - GENERAL: PAINLEVEL_OUTOF10: MILD PAIN (2)

## 2025-08-13 ENCOUNTER — THERAPY VISIT (OUTPATIENT)
Dept: PHYSICAL THERAPY | Facility: CLINIC | Age: 70
End: 2025-08-13
Attending: PREVENTIVE MEDICINE
Payer: COMMERCIAL

## 2025-08-13 DIAGNOSIS — M51.362 DEGENERATION OF INTERVERTEBRAL DISC OF LUMBAR REGION WITH DISCOGENIC BACK PAIN AND LOWER EXTREMITY PAIN: ICD-10-CM

## 2025-08-13 DIAGNOSIS — M51.16 LUMBAR DISC HERNIATION WITH RADICULOPATHY: ICD-10-CM

## 2025-08-13 PROCEDURE — 97110 THERAPEUTIC EXERCISES: CPT | Mod: GP | Performed by: PHYSICAL THERAPIST

## 2025-08-13 PROCEDURE — 97162 PT EVAL MOD COMPLEX 30 MIN: CPT | Mod: GP | Performed by: PHYSICAL THERAPIST

## 2025-08-19 ENCOUNTER — ANCILLARY PROCEDURE (OUTPATIENT)
Dept: GENERAL RADIOLOGY | Facility: CLINIC | Age: 70
End: 2025-08-19
Attending: PREVENTIVE MEDICINE
Payer: COMMERCIAL

## 2025-08-19 DIAGNOSIS — M51.362 DEGENERATION OF INTERVERTEBRAL DISC OF LUMBAR REGION WITH DISCOGENIC BACK PAIN AND LOWER EXTREMITY PAIN: ICD-10-CM

## 2025-08-19 DIAGNOSIS — M51.16 LUMBAR DISC HERNIATION WITH RADICULOPATHY: ICD-10-CM

## 2025-08-19 PROCEDURE — 62323 NJX INTERLAMINAR LMBR/SAC: CPT | Performed by: RADIOLOGY

## 2025-08-19 RX ORDER — IOPAMIDOL 408 MG/ML
2 INJECTION, SOLUTION INTRATHECAL ONCE
Status: COMPLETED | OUTPATIENT
Start: 2025-08-19 | End: 2025-08-19

## 2025-08-19 RX ORDER — METHYLPREDNISOLONE ACETATE 80 MG/ML
80 INJECTION, SUSPENSION INTRA-ARTICULAR; INTRALESIONAL; INTRAMUSCULAR; SOFT TISSUE ONCE
Status: COMPLETED | OUTPATIENT
Start: 2025-08-19 | End: 2025-08-19

## 2025-08-19 RX ORDER — BUPIVACAINE HYDROCHLORIDE 5 MG/ML
2 INJECTION, SOLUTION PERINEURAL ONCE
Status: COMPLETED | OUTPATIENT
Start: 2025-08-19 | End: 2025-08-19

## 2025-08-19 RX ADMIN — BUPIVACAINE HYDROCHLORIDE 10 MG: 5 INJECTION, SOLUTION PERINEURAL at 15:35

## 2025-08-19 RX ADMIN — IOPAMIDOL 2 ML: 408 INJECTION, SOLUTION INTRATHECAL at 15:35

## 2025-08-19 RX ADMIN — METHYLPREDNISOLONE ACETATE 1 MG: 80 INJECTION, SUSPENSION INTRA-ARTICULAR; INTRALESIONAL; INTRAMUSCULAR; SOFT TISSUE at 15:35

## 2025-08-22 SDOH — HEALTH STABILITY: PHYSICAL HEALTH: ON AVERAGE, HOW MANY DAYS PER WEEK DO YOU ENGAGE IN MODERATE TO STRENUOUS EXERCISE (LIKE A BRISK WALK)?: 3 DAYS

## 2025-08-22 SDOH — HEALTH STABILITY: PHYSICAL HEALTH: ON AVERAGE, HOW MANY MINUTES DO YOU ENGAGE IN EXERCISE AT THIS LEVEL?: 30 MIN

## 2025-08-28 DIAGNOSIS — M17.0 BILATERAL PRIMARY OSTEOARTHRITIS OF KNEE: Primary | ICD-10-CM

## 2025-08-28 SDOH — HEALTH STABILITY: PHYSICAL HEALTH: ON AVERAGE, HOW MANY DAYS PER WEEK DO YOU ENGAGE IN MODERATE TO STRENUOUS EXERCISE (LIKE A BRISK WALK)?: 3 DAYS

## 2025-08-28 SDOH — HEALTH STABILITY: PHYSICAL HEALTH: ON AVERAGE, HOW MANY MINUTES DO YOU ENGAGE IN EXERCISE AT THIS LEVEL?: 20 MIN

## 2025-08-28 ASSESSMENT — PATIENT HEALTH QUESTIONNAIRE - PHQ9
10. IF YOU CHECKED OFF ANY PROBLEMS, HOW DIFFICULT HAVE THESE PROBLEMS MADE IT FOR YOU TO DO YOUR WORK, TAKE CARE OF THINGS AT HOME, OR GET ALONG WITH OTHER PEOPLE: SOMEWHAT DIFFICULT
SUM OF ALL RESPONSES TO PHQ QUESTIONS 1-9: 3
SUM OF ALL RESPONSES TO PHQ QUESTIONS 1-9: 3

## 2025-08-29 ENCOUNTER — OFFICE VISIT (OUTPATIENT)
Dept: FAMILY MEDICINE | Facility: CLINIC | Age: 70
End: 2025-08-29
Attending: INTERNAL MEDICINE
Payer: COMMERCIAL

## 2025-08-29 ENCOUNTER — MYC MEDICAL ADVICE (OUTPATIENT)
Dept: FAMILY MEDICINE | Facility: CLINIC | Age: 70
End: 2025-08-29

## 2025-08-29 VITALS
WEIGHT: 171.4 LBS | RESPIRATION RATE: 16 BRPM | HEART RATE: 61 BPM | OXYGEN SATURATION: 98 % | SYSTOLIC BLOOD PRESSURE: 134 MMHG | DIASTOLIC BLOOD PRESSURE: 83 MMHG | HEIGHT: 66 IN | TEMPERATURE: 97.8 F | BODY MASS INDEX: 27.55 KG/M2

## 2025-08-29 DIAGNOSIS — E78.5 HYPERLIPIDEMIA LDL GOAL <100: ICD-10-CM

## 2025-08-29 DIAGNOSIS — N30.00 ACUTE CYSTITIS WITHOUT HEMATURIA: ICD-10-CM

## 2025-08-29 DIAGNOSIS — F41.0 PANIC ATTACK: ICD-10-CM

## 2025-08-29 DIAGNOSIS — A09 TRAVELER'S DIARRHEA: ICD-10-CM

## 2025-08-29 DIAGNOSIS — Z00.00 ENCOUNTER FOR MEDICARE ANNUAL WELLNESS EXAM: Primary | ICD-10-CM

## 2025-08-29 DIAGNOSIS — M47.816 SPONDYLOSIS OF LUMBAR REGION WITHOUT MYELOPATHY OR RADICULOPATHY: ICD-10-CM

## 2025-08-29 LAB
ALBUMIN SERPL BCG-MCNC: 4.5 G/DL (ref 3.5–5.2)
ALP SERPL-CCNC: 70 U/L (ref 40–150)
ALT SERPL W P-5'-P-CCNC: 42 U/L (ref 0–50)
ANION GAP SERPL CALCULATED.3IONS-SCNC: 10 MMOL/L (ref 7–15)
AST SERPL W P-5'-P-CCNC: 34 U/L (ref 0–45)
BILIRUB SERPL-MCNC: 0.6 MG/DL
BUN SERPL-MCNC: 14.3 MG/DL (ref 8–23)
CALCIUM SERPL-MCNC: 9.7 MG/DL (ref 8.8–10.4)
CHLORIDE SERPL-SCNC: 103 MMOL/L (ref 98–107)
CHOLEST SERPL-MCNC: 151 MG/DL
CREAT SERPL-MCNC: 0.85 MG/DL (ref 0.51–0.95)
EGFRCR SERPLBLD CKD-EPI 2021: 74 ML/MIN/1.73M2
FASTING STATUS PATIENT QL REPORTED: YES
FASTING STATUS PATIENT QL REPORTED: YES
GLUCOSE SERPL-MCNC: 86 MG/DL (ref 70–99)
HCO3 SERPL-SCNC: 29 MMOL/L (ref 22–29)
HDLC SERPL-MCNC: 76 MG/DL
LDLC SERPL CALC-MCNC: 67 MG/DL
NONHDLC SERPL-MCNC: 75 MG/DL
POTASSIUM SERPL-SCNC: 4.5 MMOL/L (ref 3.4–5.3)
PROT SERPL-MCNC: 7.1 G/DL (ref 6.4–8.3)
SODIUM SERPL-SCNC: 142 MMOL/L (ref 135–145)
TRIGL SERPL-MCNC: 38 MG/DL
TSH SERPL DL<=0.005 MIU/L-ACNC: 3.21 UIU/ML (ref 0.3–4.2)

## 2025-08-29 PROCEDURE — 80061 LIPID PANEL: CPT | Performed by: INTERNAL MEDICINE

## 2025-08-29 PROCEDURE — 36415 COLL VENOUS BLD VENIPUNCTURE: CPT | Performed by: INTERNAL MEDICINE

## 2025-08-29 PROCEDURE — G0439 PPPS, SUBSEQ VISIT: HCPCS | Performed by: INTERNAL MEDICINE

## 2025-08-29 PROCEDURE — 80053 COMPREHEN METABOLIC PANEL: CPT | Performed by: INTERNAL MEDICINE

## 2025-08-29 PROCEDURE — 3048F LDL-C <100 MG/DL: CPT | Performed by: INTERNAL MEDICINE

## 2025-08-29 PROCEDURE — 3075F SYST BP GE 130 - 139MM HG: CPT | Performed by: INTERNAL MEDICINE

## 2025-08-29 PROCEDURE — 99213 OFFICE O/P EST LOW 20 MIN: CPT | Mod: 25 | Performed by: INTERNAL MEDICINE

## 2025-08-29 PROCEDURE — 3079F DIAST BP 80-89 MM HG: CPT | Performed by: INTERNAL MEDICINE

## 2025-08-29 PROCEDURE — 84443 ASSAY THYROID STIM HORMONE: CPT | Performed by: INTERNAL MEDICINE

## 2025-08-29 RX ORDER — CEPHALEXIN 500 MG/1
500 CAPSULE ORAL 2 TIMES DAILY
Qty: 14 CAPSULE | Refills: 0 | Status: SHIPPED | OUTPATIENT
Start: 2025-08-29 | End: 2025-09-02

## 2025-08-29 RX ORDER — ALPRAZOLAM 0.25 MG
.25-.5 TABLET ORAL 2 TIMES DAILY PRN
Qty: 14 TABLET | Refills: 0 | Status: SHIPPED | OUTPATIENT
Start: 2025-08-29 | End: 2025-09-02

## 2025-08-29 RX ORDER — AZITHROMYCIN 250 MG/1
TABLET, FILM COATED ORAL
Qty: 6 TABLET | Refills: 1 | Status: SHIPPED | OUTPATIENT
Start: 2025-08-29 | End: 2025-09-02

## 2025-08-29 RX ORDER — GABAPENTIN 100 MG/1
100 CAPSULE ORAL 3 TIMES DAILY
Qty: 270 CAPSULE | Refills: 1 | Status: SHIPPED | OUTPATIENT
Start: 2025-08-29

## 2025-09-02 ENCOUNTER — OFFICE VISIT (OUTPATIENT)
Dept: ORTHOPEDICS | Facility: CLINIC | Age: 70
End: 2025-09-02
Attending: PREVENTIVE MEDICINE
Payer: COMMERCIAL

## 2025-09-02 ENCOUNTER — ANCILLARY PROCEDURE (OUTPATIENT)
Dept: GENERAL RADIOLOGY | Facility: CLINIC | Age: 70
End: 2025-09-02
Attending: PREVENTIVE MEDICINE
Payer: COMMERCIAL

## 2025-09-02 DIAGNOSIS — M17.0 BILATERAL PRIMARY OSTEOARTHRITIS OF KNEE: ICD-10-CM

## 2025-09-02 DIAGNOSIS — M51.362 DEGENERATION OF INTERVERTEBRAL DISC OF LUMBAR REGION WITH DISCOGENIC BACK PAIN AND LOWER EXTREMITY PAIN: ICD-10-CM

## 2025-09-02 DIAGNOSIS — M51.16 LUMBAR DISC HERNIATION WITH RADICULOPATHY: ICD-10-CM

## 2025-09-02 DIAGNOSIS — M25.551 BILATERAL HIP PAIN: ICD-10-CM

## 2025-09-02 DIAGNOSIS — M17.0 BILATERAL PRIMARY OSTEOARTHRITIS OF KNEE: Primary | ICD-10-CM

## 2025-09-02 DIAGNOSIS — M25.552 BILATERAL HIP PAIN: ICD-10-CM

## 2025-09-02 PROCEDURE — 99214 OFFICE O/P EST MOD 30 MIN: CPT | Mod: 25 | Performed by: PREVENTIVE MEDICINE

## 2025-09-02 PROCEDURE — 20610 DRAIN/INJ JOINT/BURSA W/O US: CPT | Mod: 50 | Performed by: PREVENTIVE MEDICINE

## 2025-09-02 RX ORDER — TRIAMCINOLONE ACETONIDE 40 MG/ML
40 INJECTION, SUSPENSION INTRA-ARTICULAR; INTRAMUSCULAR
Status: COMPLETED | OUTPATIENT
Start: 2025-09-02 | End: 2025-09-02

## 2025-09-02 RX ORDER — LIDOCAINE HYDROCHLORIDE 10 MG/ML
4 INJECTION, SOLUTION INFILTRATION; PERINEURAL
Status: COMPLETED | OUTPATIENT
Start: 2025-09-02 | End: 2025-09-02

## 2025-09-02 RX ORDER — ALPRAZOLAM 0.25 MG
.25-.5 TABLET ORAL 2 TIMES DAILY PRN
Qty: 14 TABLET | Refills: 0 | Status: SHIPPED | OUTPATIENT
Start: 2025-09-02

## 2025-09-02 RX ORDER — CEPHALEXIN 500 MG/1
500 CAPSULE ORAL 2 TIMES DAILY
Qty: 14 CAPSULE | Refills: 0 | Status: SHIPPED | OUTPATIENT
Start: 2025-09-02

## 2025-09-02 RX ORDER — AZITHROMYCIN 250 MG/1
TABLET, FILM COATED ORAL
Qty: 6 TABLET | Refills: 1 | Status: SHIPPED | OUTPATIENT
Start: 2025-09-02

## 2025-09-02 RX ADMIN — LIDOCAINE HYDROCHLORIDE 4 ML: 10 INJECTION, SOLUTION INFILTRATION; PERINEURAL at 08:41

## 2025-09-02 RX ADMIN — TRIAMCINOLONE ACETONIDE 40 MG: 40 INJECTION, SUSPENSION INTRA-ARTICULAR; INTRAMUSCULAR at 08:41
